# Patient Record
Sex: MALE | Race: BLACK OR AFRICAN AMERICAN | NOT HISPANIC OR LATINO | Employment: UNEMPLOYED | URBAN - METROPOLITAN AREA
[De-identification: names, ages, dates, MRNs, and addresses within clinical notes are randomized per-mention and may not be internally consistent; named-entity substitution may affect disease eponyms.]

---

## 2023-10-12 PROBLEM — S81.839A: Status: ACTIVE | Noted: 2023-10-12

## 2023-10-12 PROBLEM — S82.209A TIBIA FRACTURE: Status: ACTIVE | Noted: 2023-10-12

## 2023-10-12 PROBLEM — I31.9 MYOPERICARDITIS (HHS-HCC): Status: ACTIVE | Noted: 2023-10-12

## 2023-10-12 PROBLEM — M35.2: Status: ACTIVE | Noted: 2023-10-12

## 2023-10-12 PROBLEM — K29.20 GASTRITIS, ALCOHOLIC: Status: ACTIVE | Noted: 2023-10-12

## 2023-10-12 PROBLEM — M87.052 AVASCULAR NECROSIS OF LEFT FEMORAL HEAD (MULTI): Status: ACTIVE | Noted: 2023-10-12

## 2023-10-12 PROBLEM — S72.402A: Status: ACTIVE | Noted: 2023-10-12

## 2023-10-12 PROBLEM — S62.329A FRACTURE OF METACARPAL SHAFT OF RIGHT HAND, CLOSED: Status: ACTIVE | Noted: 2023-10-12

## 2023-10-12 PROBLEM — F10.10 ALCOHOL ABUSE: Status: ACTIVE | Noted: 2023-10-12

## 2023-10-12 PROBLEM — M87.051 AVASCULAR NECROSIS OF RIGHT FEMORAL HEAD (MULTI): Status: ACTIVE | Noted: 2023-10-12

## 2023-10-12 PROBLEM — H44.111 PANUVEITIS OF RIGHT EYE: Status: ACTIVE | Noted: 2023-10-12

## 2023-10-12 PROBLEM — M54.50 LUMBAGO: Status: ACTIVE | Noted: 2023-10-12

## 2023-10-12 RX ORDER — NAPROXEN SODIUM 220 MG/1
1 TABLET, FILM COATED ORAL DAILY
COMMUNITY
Start: 2021-10-21

## 2023-10-12 RX ORDER — OXYCODONE AND ACETAMINOPHEN 5; 325 MG/1; MG/1
1 TABLET ORAL EVERY 6 HOURS PRN
COMMUNITY
End: 2024-05-21 | Stop reason: HOSPADM

## 2023-10-12 RX ORDER — MELOXICAM 15 MG/1
15 TABLET ORAL DAILY
COMMUNITY
End: 2024-05-21 | Stop reason: HOSPADM

## 2023-10-12 RX ORDER — BRIMONIDINE TARTRATE 2 MG/ML
1 SOLUTION/ DROPS OPHTHALMIC EVERY 12 HOURS
COMMUNITY
Start: 2021-12-17

## 2023-10-12 RX ORDER — IBUPROFEN 600 MG/1
1 TABLET ORAL 3 TIMES DAILY PRN
COMMUNITY
Start: 2021-06-11 | End: 2023-11-06

## 2023-10-12 RX ORDER — PREDNISONE 1 MG/1
TABLET ORAL
COMMUNITY
Start: 2022-08-01 | End: 2024-05-21 | Stop reason: HOSPADM

## 2023-10-12 RX ORDER — ADALIMUMAB 40MG/0.4ML
KIT SUBCUTANEOUS
COMMUNITY
Start: 2022-01-04 | End: 2024-05-21 | Stop reason: HOSPADM

## 2023-10-12 RX ORDER — NALTREXONE HYDROCHLORIDE 50 MG/1
1 TABLET, FILM COATED ORAL DAILY
COMMUNITY
Start: 2021-06-11 | End: 2024-05-21 | Stop reason: HOSPADM

## 2023-10-12 RX ORDER — AZATHIOPRINE 50 MG/1
3 TABLET ORAL DAILY
COMMUNITY
Start: 2021-09-20 | End: 2024-05-21 | Stop reason: HOSPADM

## 2023-10-12 RX ORDER — ERGOCALCIFEROL 1.25 MG/1
1 CAPSULE ORAL
COMMUNITY
Start: 2021-10-21

## 2023-10-12 RX ORDER — PANTOPRAZOLE SODIUM 40 MG/1
1 TABLET, DELAYED RELEASE ORAL DAILY
COMMUNITY
Start: 2021-06-11

## 2023-10-13 ENCOUNTER — OFFICE VISIT (OUTPATIENT)
Dept: ORTHOPEDIC SURGERY | Facility: CLINIC | Age: 28
End: 2023-10-13
Payer: COMMERCIAL

## 2023-10-13 DIAGNOSIS — M16.12 PRIMARY OSTEOARTHRITIS OF LEFT HIP: Primary | ICD-10-CM

## 2023-10-13 PROCEDURE — 99203 OFFICE O/P NEW LOW 30 MIN: CPT | Performed by: EMERGENCY MEDICINE

## 2023-10-13 PROCEDURE — 20611 DRAIN/INJ JOINT/BURSA W/US: CPT | Performed by: EMERGENCY MEDICINE

## 2023-10-13 RX ORDER — LIDOCAINE HYDROCHLORIDE 10 MG/ML
4 INJECTION INFILTRATION; PERINEURAL
Status: COMPLETED | OUTPATIENT
Start: 2023-10-13 | End: 2023-10-13

## 2023-10-13 RX ORDER — TRIAMCINOLONE ACETONIDE 40 MG/ML
80 INJECTION, SUSPENSION INTRA-ARTICULAR; INTRAMUSCULAR
Status: COMPLETED | OUTPATIENT
Start: 2023-10-13 | End: 2023-10-13

## 2023-10-13 RX ADMIN — TRIAMCINOLONE ACETONIDE 80 MG: 40 INJECTION, SUSPENSION INTRA-ARTICULAR; INTRAMUSCULAR at 13:59

## 2023-10-13 RX ADMIN — LIDOCAINE HYDROCHLORIDE 4 ML: 10 INJECTION INFILTRATION; PERINEURAL at 13:59

## 2023-10-13 NOTE — PROGRESS NOTES
Subjective   Juan Marsh is a 28 y.o. male who presents for Pain of the Left Hip (Ref by Scarlett for hip inj. )    HPI  28-year-old male referred by Dr. Leonard for a left hip ultrasound-guided intra-articular cortisone injection.  Patient has history of left hip avascular necrosis.  He would like to exhaust all conservative measures prior to consideration for total joint replacement.  Considering this, he was referred for a therapeutic corticosteroid injection.      ROS: All pertinent positive symptoms are included in the history of present illness.    All other systems have been reviewed and are negative and noncontributory to this patient's current ailments.    Objective     There were no vitals filed for this visit.    Physical Exam  General/Constitutional: No apparent distress. Well-nourished and well developed.  Head: Normocephalic, Atraumatic.   Eyes: EOMI.  Vascular: No edema, swelling or tenderness, except as noted in detailed exam.  Respiratory: Non-labored breathing.  Integumentary: No impressive skin lesions present, except as noted in detailed exam.  Neurological: Alert and oriented.  Psychological:  Normal mood and affect.  Musculoskeletal: Normal, except as noted in detailed exam.  Left hip: No rash.  No erythema.  No deformity.  Flexion 80.  Internal rotation 0.  External rotation 10.  Positive FADIR.    Radiographs reviewed performed on 8/8/2023:  AP pelvis, left hip AP and lateral: Bilateral hip avascular necrosis and DJD.    Patient ID: Juan Marsh is a 28 y.o. male.    L Inj/Asp: L hip joint on 10/13/2023 1:59 PM  Indications: pain  Details: 20 G needle, ultrasound-guided anterior approach  Medications: 80 mg triamcinolone acetonide 40 mg/mL; 4 mL lidocaine 10 mg/mL (1 %)  Outcome: tolerated well, no immediate complications  Procedure, treatment alternatives, risks and benefits explained, specific risks discussed. Consent was given by the patient. Immediately prior to procedure a  time out was called to verify the correct patient, procedure, equipment, support staff and site/side marked as required. Patient was prepped and draped in the usual sterile fashion.           Assessment/Plan   Problem List Items Addressed This Visit    None  Visit Diagnoses       Primary osteoarthritis of left hip        Relevant Orders    Point of Care Ultrasound          Discussed risks versus benefits of having injection performed. Patient has elected to proceed with procedure. Please refer to procedure note above. Discussed with patient to limit weightbearing activities over the next 24-48 hours, they can then progress to full activities as tolerated. Discussed with patient to avoid water submersion over the next 2 days. Discussed with patient to call me immediately if they develop worsening pain, rash, erythema, or fevers. Patient should follow-up with Dr. Leonard, or return to no sooner than 3 months for repeat injection if he would like.  He also has right hip avascular process and DJD.  Patient states that his right hip is beginning to bother him and would like to consider injection therapy in the future if this continues to be bothersome for him.    Kirill Montes De Oca, DO  Sports Medicine  [facility]     ** Please excuse any errors in grammar or translation related to this dictation. Voice recognition software was utilized to prepare this document. **

## 2023-10-30 ENCOUNTER — APPOINTMENT (OUTPATIENT)
Dept: PHYSICAL THERAPY | Facility: HOSPITAL | Age: 28
End: 2023-10-30
Payer: COMMERCIAL

## 2023-11-06 ENCOUNTER — HOSPITAL ENCOUNTER (EMERGENCY)
Facility: HOSPITAL | Age: 28
Discharge: HOME | End: 2023-11-06
Payer: COMMERCIAL

## 2023-11-06 VITALS
SYSTOLIC BLOOD PRESSURE: 122 MMHG | WEIGHT: 130 LBS | BODY MASS INDEX: 19.7 KG/M2 | RESPIRATION RATE: 16 BRPM | DIASTOLIC BLOOD PRESSURE: 84 MMHG | TEMPERATURE: 97.8 F | HEART RATE: 88 BPM | OXYGEN SATURATION: 98 % | HEIGHT: 68 IN

## 2023-11-06 DIAGNOSIS — G89.29 CHRONIC RIGHT SHOULDER PAIN: Primary | ICD-10-CM

## 2023-11-06 DIAGNOSIS — M25.511 CHRONIC RIGHT SHOULDER PAIN: Primary | ICD-10-CM

## 2023-11-06 PROCEDURE — 99283 EMERGENCY DEPT VISIT LOW MDM: CPT

## 2023-11-06 PROCEDURE — 99285 EMERGENCY DEPT VISIT HI MDM: CPT | Mod: 25

## 2023-11-06 PROCEDURE — 2500000004 HC RX 250 GENERAL PHARMACY W/ HCPCS (ALT 636 FOR OP/ED): Mod: SE

## 2023-11-06 PROCEDURE — 99284 EMERGENCY DEPT VISIT MOD MDM: CPT

## 2023-11-06 PROCEDURE — 96372 THER/PROPH/DIAG INJ SC/IM: CPT

## 2023-11-06 RX ORDER — KETOROLAC TROMETHAMINE 30 MG/ML
30 INJECTION, SOLUTION INTRAMUSCULAR; INTRAVENOUS ONCE
Status: COMPLETED | OUTPATIENT
Start: 2023-11-06 | End: 2023-11-06

## 2023-11-06 RX ORDER — IBUPROFEN 600 MG/1
600 TABLET ORAL EVERY 8 HOURS PRN
Qty: 30 TABLET | Refills: 0 | Status: SHIPPED | OUTPATIENT
Start: 2023-11-06

## 2023-11-06 RX ORDER — ORPHENADRINE CITRATE 30 MG/ML
60 INJECTION INTRAMUSCULAR; INTRAVENOUS ONCE
Status: COMPLETED | OUTPATIENT
Start: 2023-11-06 | End: 2023-11-06

## 2023-11-06 RX ORDER — ACETAMINOPHEN 325 MG/1
650 TABLET ORAL EVERY 6 HOURS PRN
Qty: 30 TABLET | Refills: 0 | Status: SHIPPED | OUTPATIENT
Start: 2023-11-06

## 2023-11-06 RX ORDER — ORPHENADRINE CITRATE 30 MG/ML
INJECTION INTRAMUSCULAR; INTRAVENOUS
Status: COMPLETED
Start: 2023-11-06 | End: 2023-11-06

## 2023-11-06 RX ORDER — KETOROLAC TROMETHAMINE 30 MG/ML
INJECTION, SOLUTION INTRAMUSCULAR; INTRAVENOUS
Status: COMPLETED
Start: 2023-11-06 | End: 2023-11-06

## 2023-11-06 RX ORDER — METHOCARBAMOL 500 MG/1
500 TABLET, FILM COATED ORAL 2 TIMES DAILY
Qty: 20 TABLET | Refills: 0 | Status: SHIPPED | OUTPATIENT
Start: 2023-11-06 | End: 2024-05-21 | Stop reason: HOSPADM

## 2023-11-06 RX ADMIN — KETOROLAC TROMETHAMINE 30 MG: 30 INJECTION, SOLUTION INTRAMUSCULAR; INTRAVENOUS at 09:46

## 2023-11-06 RX ADMIN — ORPHENADRINE CITRATE 60 MG: 30 INJECTION INTRAMUSCULAR; INTRAVENOUS at 09:46

## 2023-11-06 ASSESSMENT — PAIN DESCRIPTION - ORIENTATION: ORIENTATION: RIGHT

## 2023-11-06 ASSESSMENT — PAIN - FUNCTIONAL ASSESSMENT: PAIN_FUNCTIONAL_ASSESSMENT: 0-10

## 2023-11-06 ASSESSMENT — COLUMBIA-SUICIDE SEVERITY RATING SCALE - C-SSRS
1. IN THE PAST MONTH, HAVE YOU WISHED YOU WERE DEAD OR WISHED YOU COULD GO TO SLEEP AND NOT WAKE UP?: NO
6. HAVE YOU EVER DONE ANYTHING, STARTED TO DO ANYTHING, OR PREPARED TO DO ANYTHING TO END YOUR LIFE?: NO
2. HAVE YOU ACTUALLY HAD ANY THOUGHTS OF KILLING YOURSELF?: NO

## 2023-11-06 ASSESSMENT — PAIN DESCRIPTION - LOCATION: LOCATION: SHOULDER

## 2023-11-06 ASSESSMENT — PAIN DESCRIPTION - PAIN TYPE: TYPE: ACUTE PAIN

## 2023-11-06 ASSESSMENT — PAIN SCALES - GENERAL: PAINLEVEL_OUTOF10: 8

## 2023-11-06 NOTE — ED PROVIDER NOTES
"HPI   Chief Complaint   Patient presents with    Shoulder Pain     R Shoulder Pain/R hand pain x \" a minute\"        Limitations to History: None    HPI: This is a 28-year-old male with a past medical history of arthritis and avascular necrosis of the right femoral head who presents to the emergency room with a 1 year history of right-sided shoulder pain.  Patient states that he has had this pain for 1 year and it has been unchanged.  Patient denies any constitutional symptoms such as fevers, chills, cough, nasal congestion.  Patient does state that he is now having some paresthesias going down his right upper extremity and into his hand.  Patient denies any recent traumas or falls.  Patient does state that he dislocated his shoulder 3 years ago.    Additional History Obtained from: None    12 point review of systems was performed and is negative unless otherwise specified    ------------------------------------------------------------------------------------------------------------------------------------------  Physical Exam:    VS: As documented in the triage note and EMR flowsheet from this visit were reviewed.    CONSTITUTIONAL: Well appearing, well nourished, awake, alert, oriented to person, place, time/situation and in no apparent distress.   EYES: Clear bilaterally, pupils equal, round and reactive to light.   CARDIOVASCULAR: Normal rate, regular rhythm.  Heart sounds S1, S2.  No murmurs, rubs or gallops.  RESPIRATORY: Breath sounds clear and equal bilaterally. No wheezes or rhonchi.   GASTROINTESTINAL: Abdomen soft, non-distended, no rebound, no guarding.   MUSCULOSKELETAL: Spine appears normal, range of motion is not limited, no muscle or joint tenderness.  Limited range of motion of the right shoulder secondary to pain.  Full active range of motion of the right elbow and wrist.  Radial and ulnar pulses palpated bilaterally with capillary refill less than 2 seconds in all phalanges.  No decrease in  " strength or sensation.  Positive Oneal sign.  NEUROLOGICAL: Alert and oriented, no focal deficits, no motor or sensory deficits.   SKIN: Skin normal color for race, warm, dry and intact. No evidence of trauma.   PSYCHIATRIC: Alert and oriented to person, place, time/situation. normal mood and affect. No apparent risk to self or others.     ------------------------------------------------------------------------------------------------------------------------------------------    Medical Decision Making:    This is a 28-year-old male who presents to the emergency room with concerns for chronic right shoulder pain.  Patient does not have any vascular compromise and has good  strength and sensation.  Given that patient does have arthritis in his left hip, it is very likely that patient has osteoarthritis of the right shoulder.  Differential includes adhesive capsulitis, rotator cuff tear, and cervical radiculopathy.  Patient was administered Toradol and Norflex for symptomatic management.  Patient was reassessed and did have some improvement of symptoms.  Patient will be discharged home with a prescription for ibuprofen, Tylenol, and a few dose of methocarbamol for symptomatic management.  Patient understands that he cannot operate heavy machinery including motor vehicles while taking muscle relaxers.  Patient was given strict return precautions.  Patient was agreeable to this plan had no further questions.  I did provide patient with the number for the [facility] as well as the sports medicine clinic for outpatient follow-up.  Patient will be discharged home.  Patient understands that if symptoms worsen or change in any way, they should return for immediate medical attention.  Patient understands that they should follow-up with their primary care physician within the next week to follow-up for shoulder pain.  Patient was stable upon discharge.      External Records Reviewed: I reviewed recent and  relevant outside records including: Previous provider notes      Escalation of Care:  Appropriate for outpatient follow-up with primary care provider, sports medicine    Social Determinants Affecting Care:  None    Prescription Drug Consideration: Ibuprofen, Tylenol, methocarbamol      @edcourse@    @edlabs@    @edimaging@    ARLINE Tesfaye PA-C  [facility]  [facility]                            No data recorded                Patient History   No past medical history on file.  Past Surgical History:   Procedure Laterality Date    CT HEAD ANGIO W AND WO IV CONTRAST  9/2/2021    CT HEAD ANGIO W AND WO IV CONTRAST 9/2/2021 [facility] CLINICAL LEGACY    CT NECK ANGIO W AND WO IV CONTRAST  9/2/2021    CT NECK ANGIO W AND WO IV CONTRAST 9/2/2021 [facility] CLINICAL LEGACY     Family History   Problem Relation Name Age of Onset    Other (Diabetes mellitus) Other Multiple Family Members     Hypertension Other Multiple Family Members     Cancer Other Multiple Family Members      Social History     Tobacco Use    Smoking status: Not on file    Smokeless tobacco: Not on file   Substance Use Topics    Alcohol use: Not on file    Drug use: Not on file       Physical Exam   ED Triage Vitals [11/06/23 0844]   Temp Heart Rate Resp BP   36.6 °C (97.8 °F) 88 16 122/84      SpO2 Temp src Heart Rate Source Patient Position   98 % -- Monitor Sitting      BP Location FiO2 (%)     -- --       Physical Exam    ED Course & MDM        Medical Decision Making      Procedure  Procedures     Angel Dalton PA-C  11/06/23 1004

## 2023-11-24 ENCOUNTER — DOCUMENTATION (OUTPATIENT)
Dept: PHYSICAL THERAPY | Facility: HOSPITAL | Age: 28
End: 2023-11-24
Payer: COMMERCIAL

## 2023-11-24 NOTE — PROGRESS NOTES
Physical Therapy    Discharge Summary    Name: Juan Marsh  MRN: [id number]  : 1995  Date: 23    Discharge Summary: PT    Discharge Information: Date of discharge 23, Date of last visit 23, Date of evaluation 23, Number of attended visits 1, Referred by Horacio, and Referred for bilateral hip pain      Discharge Status: Patient was seen for initial evaluation and instruction in HEP     Rehab Discharge Reason: Failed to schedule and/or keep follow-up appointment(s)

## 2024-03-06 ENCOUNTER — HOSPITAL ENCOUNTER (EMERGENCY)
Facility: HOSPITAL | Age: 29
Discharge: HOME | End: 2024-03-06
Payer: COMMERCIAL

## 2024-03-06 VITALS
SYSTOLIC BLOOD PRESSURE: 145 MMHG | HEART RATE: 104 BPM | TEMPERATURE: 98.2 F | DIASTOLIC BLOOD PRESSURE: 100 MMHG | OXYGEN SATURATION: 100 % | RESPIRATION RATE: 15 BRPM | BODY MASS INDEX: 17.77 KG/M2 | WEIGHT: 120 LBS | HEIGHT: 69 IN

## 2024-03-06 PROCEDURE — 4500999001 HC ED NO CHARGE

## 2024-03-06 ASSESSMENT — COLUMBIA-SUICIDE SEVERITY RATING SCALE - C-SSRS
6. HAVE YOU EVER DONE ANYTHING, STARTED TO DO ANYTHING, OR PREPARED TO DO ANYTHING TO END YOUR LIFE?: NO
1. IN THE PAST MONTH, HAVE YOU WISHED YOU WERE DEAD OR WISHED YOU COULD GO TO SLEEP AND NOT WAKE UP?: NO
2. HAVE YOU ACTUALLY HAD ANY THOUGHTS OF KILLING YOURSELF?: NO

## 2024-03-06 ASSESSMENT — PAIN - FUNCTIONAL ASSESSMENT: PAIN_FUNCTIONAL_ASSESSMENT: 0-10

## 2024-03-06 ASSESSMENT — PAIN SCALES - GENERAL: PAINLEVEL_OUTOF10: 8

## 2024-05-14 ENCOUNTER — HOSPITAL ENCOUNTER (INPATIENT)
Facility: HOSPITAL | Age: 29
LOS: 6 days | Discharge: HOME | End: 2024-05-21
Attending: EMERGENCY MEDICINE | Admitting: PSYCHIATRY & NEUROLOGY
Payer: COMMERCIAL

## 2024-05-14 ENCOUNTER — DOCUMENTATION (OUTPATIENT)
Dept: NEUROLOGY | Facility: HOSPITAL | Age: 29
End: 2024-05-14
Payer: COMMERCIAL

## 2024-05-14 ENCOUNTER — APPOINTMENT (OUTPATIENT)
Dept: RADIOLOGY | Facility: HOSPITAL | Age: 29
End: 2024-05-14
Payer: COMMERCIAL

## 2024-05-14 ENCOUNTER — CLINICAL SUPPORT (OUTPATIENT)
Dept: EMERGENCY MEDICINE | Facility: HOSPITAL | Age: 29
End: 2024-05-14
Payer: COMMERCIAL

## 2024-05-14 DIAGNOSIS — R59.1 LYMPHADENOPATHY: ICD-10-CM

## 2024-05-14 DIAGNOSIS — M35.2 BEHCET'S SYNDROME, NEUROLOGIC TYPE (MULTI): Primary | ICD-10-CM

## 2024-05-14 LAB
ALBUMIN SERPL BCP-MCNC: 3.7 G/DL (ref 3.4–5)
ALP SERPL-CCNC: 98 U/L (ref 33–120)
ALT SERPL W P-5'-P-CCNC: 6 U/L (ref 10–52)
ANION GAP SERPL CALC-SCNC: 15 MMOL/L (ref 10–20)
AST SERPL W P-5'-P-CCNC: 12 U/L (ref 9–39)
BASOPHILS # BLD AUTO: 0.03 X10*3/UL (ref 0–0.1)
BASOPHILS NFR BLD AUTO: 0.4 %
BILIRUB SERPL-MCNC: 0.3 MG/DL (ref 0–1.2)
BNP SERPL-MCNC: 5 PG/ML (ref 0–99)
BUN SERPL-MCNC: 13 MG/DL (ref 6–23)
CALCIUM SERPL-MCNC: 9.3 MG/DL (ref 8.6–10.6)
CARDIAC TROPONIN I PNL SERPL HS: <3 NG/L (ref 0–53)
CARDIAC TROPONIN I PNL SERPL HS: <3 NG/L (ref 0–53)
CHLORIDE SERPL-SCNC: 104 MMOL/L (ref 98–107)
CO2 SERPL-SCNC: 24 MMOL/L (ref 21–32)
CREAT SERPL-MCNC: 0.88 MG/DL (ref 0.5–1.3)
EGFRCR SERPLBLD CKD-EPI 2021: >90 ML/MIN/1.73M*2
EOSINOPHIL # BLD AUTO: 0.04 X10*3/UL (ref 0–0.7)
EOSINOPHIL NFR BLD AUTO: 0.5 %
ERYTHROCYTE [DISTWIDTH] IN BLOOD BY AUTOMATED COUNT: 15.5 % (ref 11.5–14.5)
GLUCOSE SERPL-MCNC: 91 MG/DL (ref 74–99)
HCT VFR BLD AUTO: 34.8 % (ref 41–52)
HGB BLD-MCNC: 12.2 G/DL (ref 13.5–17.5)
IMM GRANULOCYTES # BLD AUTO: 0.02 X10*3/UL (ref 0–0.7)
IMM GRANULOCYTES NFR BLD AUTO: 0.3 % (ref 0–0.9)
INR PPP: 1 (ref 0.9–1.1)
LYMPHOCYTES # BLD AUTO: 1.9 X10*3/UL (ref 1.2–4.8)
LYMPHOCYTES NFR BLD AUTO: 24 %
MCH RBC QN AUTO: 31.2 PG (ref 26–34)
MCHC RBC AUTO-ENTMCNC: 35.1 G/DL (ref 32–36)
MCV RBC AUTO: 89 FL (ref 80–100)
MONOCYTES # BLD AUTO: 0.63 X10*3/UL (ref 0.1–1)
MONOCYTES NFR BLD AUTO: 8 %
NEUTROPHILS # BLD AUTO: 5.3 X10*3/UL (ref 1.2–7.7)
NEUTROPHILS NFR BLD AUTO: 66.8 %
NRBC BLD-RTO: 0 /100 WBCS (ref 0–0)
PLATELET # BLD AUTO: 341 X10*3/UL (ref 150–450)
POTASSIUM SERPL-SCNC: 4.9 MMOL/L (ref 3.5–5.3)
PROT SERPL-MCNC: 6.8 G/DL (ref 6.4–8.2)
PROTHROMBIN TIME: 11.2 SECONDS (ref 9.8–12.8)
RBC # BLD AUTO: 3.91 X10*6/UL (ref 4.5–5.9)
SODIUM SERPL-SCNC: 138 MMOL/L (ref 136–145)
WBC # BLD AUTO: 7.9 X10*3/UL (ref 4.4–11.3)

## 2024-05-14 PROCEDURE — 85610 PROTHROMBIN TIME: CPT | Performed by: EMERGENCY MEDICINE

## 2024-05-14 PROCEDURE — 70547 MR ANGIOGRAPHY NECK W/O DYE: CPT | Mod: 59

## 2024-05-14 PROCEDURE — 82947 ASSAY GLUCOSE BLOOD QUANT: CPT

## 2024-05-14 PROCEDURE — 84075 ASSAY ALKALINE PHOSPHATASE: CPT | Performed by: EMERGENCY MEDICINE

## 2024-05-14 PROCEDURE — 70544 MR ANGIOGRAPHY HEAD W/O DYE: CPT

## 2024-05-14 PROCEDURE — 70544 MR ANGIOGRAPHY HEAD W/O DYE: CPT | Mod: 59

## 2024-05-14 PROCEDURE — 99223 1ST HOSP IP/OBS HIGH 75: CPT

## 2024-05-14 PROCEDURE — 70551 MRI BRAIN STEM W/O DYE: CPT | Mod: 52

## 2024-05-14 PROCEDURE — 99285 EMERGENCY DEPT VISIT HI MDM: CPT | Mod: 25

## 2024-05-14 PROCEDURE — 86780 TREPONEMA PALLIDUM: CPT

## 2024-05-14 PROCEDURE — 36415 COLL VENOUS BLD VENIPUNCTURE: CPT | Performed by: EMERGENCY MEDICINE

## 2024-05-14 PROCEDURE — 72141 MRI NECK SPINE W/O DYE: CPT | Mod: 52

## 2024-05-14 PROCEDURE — 93005 ELECTROCARDIOGRAM TRACING: CPT

## 2024-05-14 PROCEDURE — 70540 MRI ORBIT/FACE/NECK W/O DYE: CPT

## 2024-05-14 PROCEDURE — 83880 ASSAY OF NATRIURETIC PEPTIDE: CPT | Performed by: EMERGENCY MEDICINE

## 2024-05-14 PROCEDURE — 99285 EMERGENCY DEPT VISIT HI MDM: CPT | Performed by: EMERGENCY MEDICINE

## 2024-05-14 PROCEDURE — 84484 ASSAY OF TROPONIN QUANT: CPT | Performed by: EMERGENCY MEDICINE

## 2024-05-14 PROCEDURE — 99253 IP/OBS CNSLTJ NEW/EST LOW 45: CPT

## 2024-05-14 PROCEDURE — 85025 COMPLETE CBC W/AUTO DIFF WBC: CPT | Performed by: EMERGENCY MEDICINE

## 2024-05-14 ASSESSMENT — PAIN SCALES - GENERAL: PAINLEVEL_OUTOF10: 0 - NO PAIN

## 2024-05-14 ASSESSMENT — COLUMBIA-SUICIDE SEVERITY RATING SCALE - C-SSRS
6. HAVE YOU EVER DONE ANYTHING, STARTED TO DO ANYTHING, OR PREPARED TO DO ANYTHING TO END YOUR LIFE?: NO
2. HAVE YOU ACTUALLY HAD ANY THOUGHTS OF KILLING YOURSELF?: NO
1. IN THE PAST MONTH, HAVE YOU WISHED YOU WERE DEAD OR WISHED YOU COULD GO TO SLEEP AND NOT WAKE UP?: NO

## 2024-05-14 ASSESSMENT — PAIN - FUNCTIONAL ASSESSMENT: PAIN_FUNCTIONAL_ASSESSMENT: 0-10

## 2024-05-14 NOTE — CONSULTS
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 Ophthalmology consulted due to complaints of diplopia and blurred vision.    Patient reports that for the last few years, ever since being diagnosed with Behcet's and undergoing treatment for CRVO/CME OD, he has had blurred vision in the superior hemifield of his right eye (at least for 3 years). He reports that yesterday 5/13/24 around the time he woke up, he noticed that he was having horizontal diplopia. When he closed either eye, the diplopia resolved, but with both eyes open, objects were next to each other. He believes the diplopia is equal in all fields of gaze and all head positions. He endorses blurred vision in his right eye that is not new for him. He also endorses stable bilateral photophobia and flashes when he closes his eyes. He denies eye pain but says that when looking from side to side, he feels a soreness in his eye lids. No eye redness, floaters, or sudden vision loss.    Of note, patient last seen by uveitis specialist Dr. Chavez on 11/18/22. He had previously been on Azathioprine and Humira but per patient, he has been off of all immunosuppression medication for at least the last year.      ROS: Patient denies pain, redness, discharge, blind spots, or floaters. All other systems have been reviewed and are negative.    PMHx: please refer to admission HPI  Medications: please refer to medication reconciliation  Allergies: please refer to patient allergy list  Past Ocular History: as per above HPI  Family  History: reviewed and noncontributory to chief ophthalmic complaint  Orientation: Alert and oriented x3, appropriate mood and behavior    Examination:     Base Eye Exam       Visual Acuity (Snellen - Linear)         Right Left    Near sc 20/20 20/20              Tonometry (Tonopen, 6:42 PM)         Right Left    Pressure 14 13              Pupils         Dark Light React APD    Right 4 3 Brisk None    Left 4 3 Brisk None              Visual Fields         Left Right     Full Full              Extraocular Movement         Right Left     Full Full                  Additional Tests       Color         Right Left    Ishihara 11/11 11/11                  Slit Lamp and Fundus Exam       External Exam         Right Left    External Normal Normal              Slit Lamp Exam         Right Left    Lids/Lashes Normal Normal    Conjunctiva/Sclera Melanosis Melanosis    Cornea Clear Clear    Anterior Chamber Deep and quiet, no cell or hypopyon Deep and quiet, no cell or hypopyon    Iris Round and reactive Round and reactive    Lens Clear Clear    Anterior Vitreous Normal, no cell Normal, no cell              Fundus Exam         Right Left    Disc Pink and sharp Pink and sharp    C/D Ratio .3 .3    Macula Dull reflex Normal    Vessels attenuation, inferior ghost vessel; inferior perivascular sheathing, inferior heme and CWS attenuation    Periphery Inferior peripheral white exudates; no retinal tears or detachments Nasal dark without pressure                    Imaging:  Pending MRI brain/orbit w and wo contrast; MRV brain and MRA head/neck    Assessment and Plan:  #Posterior +/- intermediate uveitis  - 27 y/o M with history neuro-Behcet's, bilateral uveitis, retinal vasculitis with CRVO/CME right eye (s/p Kenalogg OD 9/21, Avastin OD 9/21; Avastin OD 11/21; Eylea OD 8/22; and Eylea OD 9/22) presenting to ED with right sided numbness, right ear ringing, blurred vision and diplopia  - Entrance testing is excellent. Anterior  segment exam is  unremarkable without evidence of hypopyon, ac cell or flare, no TIDs or iris nodules. On dilated exam, there are attenuated vessels in both eyes; there is also an inferior ghost vessel with perivascular sheathing, inferior heme and CWS with peripheral white exudates in the right eye  - Presentation is concerning for posterior +/- intermediate uveitis. There is a presumed history of neuro-Behcet's given initial presentation in 2021 of uveitis, apthous ulcers and genital ulcers. However, with evidence of peripheral snow banking on exam in  patient, would be highly suspicious for sarcoidosis as well. Testing in 2021 was negative for HSV1/HSV2/SLE/lyme/ACE/HLA B27/HLA B51/T spot/HIV/RPR/AQP4/MOG. With patient off of immunosuppression for the last year and now with acute presentation, would recommend working up for at least TB, sarcoid and syphillis, in addition to MRI imaging.    Recommendations:  - Recommend starting Pred forte QID right eye  - Would hold off on systemic prednisone at this point  - Recommend re-establishing with rheumatology to resume systemic immunosuppression treatment  - Recommend lab work up for TB, sarcoid, syphillis:  - RPR or VDRL  - FTA-ABS  - TB quantiferon  - Chest XR for hilar lymphadenopathy  - ACE  - Lysozyme  - Patient needs to be seen by uveitis specialist this week. We will email secretaries to arrange. Patient to be seen Thurs 5/16/24 at 8 AM in [facility].      Discussed with Dr. Nikki Alonzo MD PGY-4    Sly Vasquez MD  Ophthalmology PGY-2      Ophthalmology Adult Pager - [phone]  Ophthalmology Pediatrics Pager - [phone]    For adult follow-up appointments, call: [phone]  For pediatric follow-up appointments, call: [phone]      NOTE: This note is not finalized until attending reviews and signs.

## 2024-05-14 NOTE — ED TRIAGE NOTES
Pt reports that he started having right sided numbness that started 3 days ago, he states that it started with a ringing in his right ear. He also endorsed a headache and neck pain. He states that he has a PMHX of a stroke, MI, HTN that he is not prescribed medications for.

## 2024-05-14 NOTE — PROGRESS NOTES
I received Juan Marsh in signout from Dr. Juan Padron.  Please see the previous note for all HPI, PE and MDM up to the time of signout at 1500.  This is in addition to the primary documentation.    In brief Juan Marsh is an 28 y.o. male with a history of Behcet's disease that presents to the emergency department today for right-sided numbness and blurred vision concerning for exacerbation of his Behcet's disease.  The patient had lab work completed which was overall negative and neurology was consulted and are pending their recommendations at the time of signout.    MDM:  The patient was monitored for any changes in vital signs or symptoms and remained hemodynamically throughout the ED course.  Neurology recommended MRI orbits w and w/o contrast, MRV brain, MRA brain and have placed orders for these.  They are also recommending consulting ophthalmology given the patient's vision issues.    ED Course as of 05/15/24 0229   Tue May 14, 2024   1345 Comprehensive Metabolic Panel(!)  Largely unremarkable [TK]   1345 Brain Natriuretic Peptide  BNP WNL [TK]   1347 Protime-INR  PT/INR WNL [TK]   1347 Troponin I Series, High Sensitivity (0, 1 HR)  Trop WNL [TK]   1347 CBC with Differential(!)  Hgb appears to be at baseline [TK]   1657 Neurology recommending consult to ophthalmology.  Consult order placed at this time. [RS]   2220 Ophthalmology are recommending further workup with rpr or vdrl, fta-abs, tb quant, chest xr, ace and lysozyme which orders for these will be placed.  They are also recommending prednisolone acetate 1% 4 times daily for the right eye and this will be ordered.  The remainder of the ophthalmology workup can be followed up outpatient and will defer further workup to the neurology team at this time. [RS]   Wed May 15, 2024   0015 I signed the patient out to Mini Valdes DO in stable condition pending imaging and final disposition per neurology.  Patient will likely need to be admitted  given significant right-sided numbness and vision changes in the setting of Behcet's disease. [RS]      ED Course User Index  [RS] Otoniel Morales DO  [TK] Juan Padron MD     Disposition: Signed out to oncoming provider    Assessment and plan discussed with Dr. Isa Morales DO   Emergency Medicine, PGY-1     Disclaimer: This note was dictated by speech recognition. Minor errors in transcription may be present.     Procedures

## 2024-05-14 NOTE — PROGRESS NOTES
Chief Complaint: Right sided numbness     History of Present Illness:   Juan Marsh is a 28 y.o. male with a PMHx of Behcet's disease, arthritis, and avascular necrosis presenting to the ED for complaint of right sided whole body numbness and ringing in his ear that began Sunday. Patient also complains of blurry vision and double vision.  The patient also complains of a headache that he rates currently as a 6/10. Patient denies nausea, vomiting, bowel dysfunction, bladder dysfunction, fever, shakes, or chest pain.     Patient has indicated five episodes of right sided weakness with oral and genital ulcers since initial diagnosis in 2021.     Per Chart Review of Behcet history  In May of 2021, the patient developed pleuritic chest pain that was diagnosed as myopericarditis and treated with colchicine and ibuprofen. Patient presented to ED in August of 2021 with a oral and genital ulcers, headache, blurry vision, and right hand and leg weakness and Optho exam significant for retinal vasculitis. This presentation raised suspicion for Behcet's syndrome.     Initial workup included   MRI brain - MRI brain and cervical spine positive for Ill-defined region of abnormal T2/FLAIR hyperintensity involving the left lentiform nucleus, internal capsule, thalamus, cerebral peduncle, rostral tyrone, and medial temporal  pole with associated enhancement and punctate focus of diffusion restriction ( in internal capsule) which is concerning for parenchymal neuro Behcets.   LP - normal cells and protein, negative HSV1 and HSV 2, negative bacterial and viral panel  Negative SLE and rheumatologic   Lyme negative, ACE WNL  CSF did not show infective etiology   HLA-B27 and HLA B 51 is negative  CBC, CMP are WNL, TB T spot negative, HIV negative, RPR negative  AQP4 and MOG came back negative      Upon discharge, patient was given the following discharge orders:  FU with neuroimmunology   FU with Rheumatology   FU with  ophthalmology  FU with cardiology   Continue prednisone 80 mg/day as recommended by Optho for 2 weeks and then reduce to 60 mg/day x 2 weeks. Rheum will taper the steroids as outpt  Per Rheum: if TPMT is normal start on Imuran 150 mg/day  Rheum will follow up on his TPMT levels and will send the prescription for imuran to his pharmacy  cont PPI while on steroids   cont ASA 81 mg      Patient was seen by Dr. Ulloa on 10/21/2021, and patient was advised to take baby aspirin for future stroke prophylaxis, Vitamin D and smoking cessation.    On 01/03/2022, the patient was seen by rheumatology and presented with worsening retinal vasculitis prompting the initiation of TNF-alpha inhibitor therapy.  The patient was then seen by Dr. Ulloa with neurology on 05/02/2022, and it was noted that due to his CNS involvement, he should be closely monitored while taking Humira and follow-up appointment was to be scheduled in 01/2023. The patient did not follow-up with neurology.      Surgical History  Past Surgical History:   Procedure Laterality Date    CT ANGIO NECK  9/2/2021    CT NECK ANGIO W AND WO IV CONTRAST 9/2/2021 [facility] CLINICAL LEGACY    CT HEAD ANGIO W AND WO IV CONTRAST  9/2/2021    CT HEAD ANGIO W AND WO IV CONTRAST 9/2/2021 [facility] CLINICAL LEGACY       Social History  Endorses marijuana usage daily   Endorses 2 cans of EtOH daily   Indicates cigar use daily   Denies cocaine, heroin, and IV drug use   Patient lives at home with his mother, works in [email or web address].     Allergies  Patient has no known allergies.    Medications  (Not in a hospital admission)    Current Outpatient Medications   Medication Instructions    acetaminophen (TYLENOL) 650 mg, oral, Every 6 hours PRN    adalimumab (Humira,CF, Pen) 40 mg/0.4 mL pen injector kit pen-injector INJECT ONE PEN SUBCUTANEOUSLY EVERY OTHER WEEK<BR>    aspirin 81 mg chewable tablet 1 tablet, oral, Daily    azaTHIOprine (Imuran) 50 mg tablet 3 tablets, oral, Daily    brimonidine  (AlphaGAN P) 0.2 % ophthalmic solution 1 drop, Right Eye, Every 12 hours    ergocalciferol (Vitamin D-2) 1.25 MG (00338 UT) capsule 1 capsule, oral, Once Weekly    ibuprofen 600 mg, oral, Every 8 hours PRN    meloxicam (MOBIC) 15 mg, oral, Daily    methocarbamol (ROBAXIN) 500 mg, oral, 2 times daily    naltrexone (Depade) 50 mg tablet 1 tablet, oral, Daily    oxyCODONE-acetaminophen (Percocet) 5-325 mg tablet 1 tablet, oral, Every 6 hours PRN    pantoprazole (ProtoNix) 40 mg EC tablet 1 tablet, oral, Daily    predniSONE (Deltasone) 1 mg tablet Take as directed.      Patient indicates he is not currently taking any medication.     OBJECTIVE    24 Hour Vitals:  Temp:  [36.6 °C (97.8 °F)] 36.6 °C (97.8 °F)  Heart Rate:  [86] 86  Resp:  [18] 18  BP: (117)/(84) 117/84  FiO2 (%):  [21 %] 21 %    Relevant Prior Workup    Prior Brain Imaging:  MR Brain W and WO IV Contrast - 09/01/2021  1.  Ill-defined region of abnormal T2/FLAIR hyperintensity involving  the left lentiform nucleus, internal capsule, thalamus, cerebral  peduncle, rostral tyrone, and left corona radiata with associated  enhancement and punctate focus of diffusion restriction, as detailed.  Finding is nonspecific, but would be typical in location and  appearance for brain involvement of Behcet's disease. Location would  be atypical for acute disseminated encephalomyelitis or multiple  sclerosis.  2. Unremarkable MRI of the cervical spine.  MR Cervical Spine W and WO IV Contrast - 09/01/2021  1.  Ill-defined region of abnormal T2/FLAIR hyperintensity involving  the left lentiform nucleus, internal capsule, thalamus, cerebral  peduncle, rostral tyrone, and left corona radiata with associated  enhancement and punctate focus of diffusion restriction, as detailed.  Finding is nonspecific, but would be typical in location and  appearance for brain involvement of Behcet's disease. Location would  be atypical for acute disseminated encephalomyelitis or  multiple  sclerosis.  2. Unremarkable MRI of the cervical spine.    CT Angio Neck W and WO IV Contrast - 09/02/2021  1. Somewhat limited, motion degraded examination without evidence of  hemodynamically significant stenosis or proximal large branch vessel  cutoff on CTA of the head.  2. No measurable stenosis on CTA of the neck.    CT Angio Head W and WO IV Contrast - 09/02/2021  1. Somewhat limited, motion degraded examination without evidence of  hemodynamically significant stenosis or proximal large branch vessel  cutoff on CTA of the head.  2. No measurable stenosis on CTA of the neck.      MR Brain W and WO IV Contrast - 11/08/2021  Marked improvement in ill-defined FLAIR hyperintensity involving the  left lentiform nucleus and brainstem with residual small T2/FLAIR  hyperintense focus involving the left posterior limb of the internal  capsule and left cerebral peduncle consistent with improvement in  neuro Behcet's. Resolution of prior abnormal enhancement. No new  areas of signal abnormality identified.      Lab Results  Results from last 72 hours   Lab Units 05/14/24  1225   SODIUM mmol/L 138   POTASSIUM mmol/L 4.9   BUN mg/dL 13   CREATININE mg/dL 0.88   CALCIUM mg/dL 9.3      Results from last 72 hours   Lab Units 05/14/24  1225   WBC AUTO x10*3/uL 7.9   HEMOGLOBIN g/dL 12.2*   HEMATOCRIT % 34.8*   PLATELETS AUTO x10*3/uL 341      Results from last 72 hours   Lab Units 05/14/24  1225   AST U/L 12   ALT U/L 6*   ALK PHOS U/L 98          Physical Exam:  Neurological Exam:  MENTAL STATUS:  Orientation: AxOx3  Language: Expression and comprehension,  Thought processes: Logical, organized  Judgment and Insight: Intact    CRANIAL NERVES:  - II:  Patient indicates right eye upper scotoma  - III, IV, VI: EOM full to pursuit without nystagmus but indicates biocular double vision  - V: V1-V3 Patient reports diminished sensation on the right side  - VII: Face muscles symmetric with smile and eye closure  - VIII:  Intact to interview  - IX, X: Palate elevated symmetrically bilaterally, no hoarseness  - XI: 5/5 strength on shoulder shrugging bilaterally  - XII: Tongue midline without atrophy or fasciculation    MOTOR:   Muscle bulk: Normal throughout.  Muscle tone: Normal in both upper and lower extremities.  Movements: No fasciculations, tremors or other abnormal movement.    - Strength:   R                L  Deltoid    4+             5  Biceps    4+             5  Triceps    4+              5                 5-         5          Hip Abduction  5-               5  Hip Adduction  5-               5  Hip flexion   5-               5  Knee Ext    5-               5  Knee Flex           5-               5  DorsiFlex   5-               5  PlantarFlex        5-               5    REFLEXES:   L                 R  Biceps               2                 3  Triceps               2          3   BR                      2                 3  Patellar               2                 3  Achilles   1+               2+      Negative Major   Clonus present on the right side     COORDINATION: Assessment Deferred  SENSORY: Patient indicated diminished sensation on the right face, RUE, and RLE.  PROPRIOCEPTION:  Assessment Deferred  ROMBERG: Assessment Deferred  GAIT: Assessment Deferred     =-=-=-=-=-=-=-    Assessment:  Mr. 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 The patient also has a prior exposure to Humira.    Patient's neurological exam is notable for right eye visual field deficit, double vision, diminished right sided sensation, and mild proximal right upper extremity weakness.     The patient's symptoms may be caused by a new Behcet attack, residual symptoms from a prior Behcet attack, or iatrogenic  CNS inflammation. Given recent symptoms, patient will need ophthalmology evaluation, and vessel imaging to evaluate for vasculitis, and imaging to evaluate for new or exacerbated Behcet lesions.     Plan:   MRI brain, orbit, and cervical spine (W and WO contrast)  MRV brain   MRA head and neck     Consider initiating aspirin therapy pending imaging   Initiate IVMP pending imaging  Consult ophthalmology   Further recs pending above imaging    Patient is discussed and seen with Dr. Ulloa.         Shweta Jacobsen, MS3    _____________________________  I have reviewed and edited the information above and I agree with the assessment and plan as outlined by the medical student.     Rachel Thapa MD

## 2024-05-14 NOTE — CONSULTS
Chief Complaint: Right sided numbness     History of Present Illness:   Juan Marsh is a 28 y.o. male with a PMHx of Behcet's disease, arthritis, and avascular necrosis presenting to the ED for complaint of right sided whole body numbness and ringing in his ear that began Sunday. Patient also complains of blurry vision and double vision.  The patient also complains of a headache that he rates currently as a 6/10. Patient denies nausea, vomiting, bowel dysfunction, bladder dysfunction, fever, shakes, or chest pain.     Patient has indicated five episodes of right sided weakness with oral and genital ulcers since initial diagnosis in 2021.     Per Chart Review of Behcet history  In May of 2021, the patient developed pleuritic chest pain that was diagnosed as myopericarditis and treated with colchicine and ibuprofen. Patient presented to ED in August of 2021 with a oral and genital ulcers, headache, blurry vision, and right hand and leg weakness and Optho exam significant for retinal vasculitis. This presentation raised suspicion for Behcet's syndrome.     Initial workup included   MRI brain - MRI brain and cervical spine positive for Ill-defined region of abnormal T2/FLAIR hyperintensity involving the left lentiform nucleus, internal capsule, thalamus, cerebral peduncle, rostral tyrone, and medial temporal  pole with associated enhancement and punctate focus of diffusion restriction ( in internal capsule) which is concerning for parenchymal neuro Behcets.   LP - normal cells and protein, negative HSV1 and HSV 2, negative bacterial and viral panel  Negative SLE and rheumatologic   Lyme negative, ACE WNL  CSF did not show infective etiology   HLA-B27 and HLA B 51 is negative  CBC, CMP are WNL, TB T spot negative, HIV negative, RPR negative  AQP4 and MOG came back negative      Upon discharge, patient was given the following discharge orders:  FU with neuroimmunology   FU with Rheumatology   FU with  ophthalmology  FU with cardiology   Continue prednisone 80 mg/day as recommended by Optho for 2 weeks and then reduce to 60 mg/day x 2 weeks. Rheum will taper the steroids as outpt  Per Rheum: if TPMT is normal start on Imuran 150 mg/day  Rheum will follow up on his TPMT levels and will send the prescription for imuran to his pharmacy  cont PPI while on steroids   cont ASA 81 mg      Patient was seen by Dr. Ulloa on 10/21/2021, and patient was advised to take baby aspirin for future stroke prophylaxis, Vitamin D and smoking cessation.    On 01/03/2022, the patient was seen by rheumatology and presented with worsening retinal vasculitis prompting the initiation of TNF-alpha inhibitor therapy.  The patient was then seen by Dr. Ulloa with neurology on 05/02/2022, and it was noted that due to his CNS involvement, he should be closely monitored while taking Humira and follow-up appointment was to be scheduled in 01/2023. The patient did not follow-up with neurology.      Surgical History  Past Surgical History:   Procedure Laterality Date    CT ANGIO NECK  9/2/2021    CT NECK ANGIO W AND WO IV CONTRAST 9/2/2021 [facility] CLINICAL LEGACY    CT HEAD ANGIO W AND WO IV CONTRAST  9/2/2021    CT HEAD ANGIO W AND WO IV CONTRAST 9/2/2021 [facility] CLINICAL LEGACY       Social History  Endorses marijuana usage daily   Endorses 2 cans of EtOH daily   Indicates cigar use daily   Denies cocaine, heroin, and IV drug use   Patient lives at home with his mother, works in [employer].     Allergies  Patient has no known allergies.    Medications  (Not in a hospital admission)    Current Outpatient Medications   Medication Instructions    acetaminophen (TYLENOL) 650 mg, oral, Every 6 hours PRN    adalimumab (Humira,CF, Pen) 40 mg/0.4 mL pen injector kit pen-injector INJECT ONE PEN SUBCUTANEOUSLY EVERY OTHER WEEK<BR>    aspirin 81 mg chewable tablet 1 tablet, oral, Daily    azaTHIOprine (Imuran) 50 mg tablet 3 tablets, oral, Daily    brimonidine  (AlphaGAN P) 0.2 % ophthalmic solution 1 drop, Right Eye, Every 12 hours    ergocalciferol (Vitamin D-2) 1.25 MG (71844 UT) capsule 1 capsule, oral, Once Weekly    ibuprofen 600 mg, oral, Every 8 hours PRN    meloxicam (MOBIC) 15 mg, oral, Daily    methocarbamol (ROBAXIN) 500 mg, oral, 2 times daily    naltrexone (Depade) 50 mg tablet 1 tablet, oral, Daily    oxyCODONE-acetaminophen (Percocet) 5-325 mg tablet 1 tablet, oral, Every 6 hours PRN    pantoprazole (ProtoNix) 40 mg EC tablet 1 tablet, oral, Daily    predniSONE (Deltasone) 1 mg tablet Take as directed.      Patient indicates he is not currently taking any medication.     OBJECTIVE    24 Hour Vitals:  Temperature:  [36.6 °C (97.8 °F)] 36.6 °C (97.8 °F)  Heart Rate:  [65-86] 65  Respirations:  [18] 18  BP: (116-117)/(84-85) 116/85  FiO2 (%):  [21 %] 21 %    Relevant Prior Workup    Prior Brain Imaging:  MR Brain W and WO IV Contrast - 09/01/2021  1.  Ill-defined region of abnormal T2/FLAIR hyperintensity involving  the left lentiform nucleus, internal capsule, thalamus, cerebral  peduncle, rostral tyrone, and left corona radiata with associated  enhancement and punctate focus of diffusion restriction, as detailed.  Finding is nonspecific, but would be typical in location and  appearance for brain involvement of Behcet's disease. Location would  be atypical for acute disseminated encephalomyelitis or multiple  sclerosis.  2. Unremarkable MRI of the cervical spine.  MR Cervical Spine W and WO IV Contrast - 09/01/2021  1.  Ill-defined region of abnormal T2/FLAIR hyperintensity involving  the left lentiform nucleus, internal capsule, thalamus, cerebral  peduncle, rostral tyrone, and left corona radiata with associated  enhancement and punctate focus of diffusion restriction, as detailed.  Finding is nonspecific, but would be typical in location and  appearance for brain involvement of Behcet's disease. Location would  be atypical for acute disseminated  encephalomyelitis or multiple  sclerosis.  2. Unremarkable MRI of the cervical spine.    CT Angio Neck W and WO IV Contrast - 09/02/2021  1. Somewhat limited, motion degraded examination without evidence of  hemodynamically significant stenosis or proximal large branch vessel  cutoff on CTA of the head.  2. No measurable stenosis on CTA of the neck.    CT Angio Head W and WO IV Contrast - 09/02/2021  1. Somewhat limited, motion degraded examination without evidence of  hemodynamically significant stenosis or proximal large branch vessel  cutoff on CTA of the head.  2. No measurable stenosis on CTA of the neck.      MR Brain W and WO IV Contrast - 11/08/2021  Marked improvement in ill-defined FLAIR hyperintensity involving the  left lentiform nucleus and brainstem with residual small T2/FLAIR  hyperintense focus involving the left posterior limb of the internal  capsule and left cerebral peduncle consistent with improvement in  neuro Behcet's. Resolution of prior abnormal enhancement. No new  areas of signal abnormality identified.      Lab Results  Results from last 72 hours   Lab Units 05/14/24  1225   SODIUM mmol/L 138   POTASSIUM mmol/L 4.9   BUN mg/dL 13   CREATININE mg/dL 0.88   CALCIUM mg/dL 9.3      Results from last 72 hours   Lab Units 05/14/24  1225   WBC AUTO x10*3/uL 7.9   HEMOGLOBIN g/dL 12.2*   HEMATOCRIT % 34.8*   PLATELETS AUTO x10*3/uL 341      Results from last 72 hours   Lab Units 05/14/24  1225   AST U/L 12   ALT U/L 6*   ALK PHOS U/L 98          Physical Exam:  Neurological Exam:  MENTAL STATUS:  Orientation: AxOx3  Language: Expression and comprehension,  Thought processes: Logical, organized  Judgment and Insight: Intact    CRANIAL NERVES:  - II:  Patient indicates right eye upper scotoma  - III, IV, VI: EOM full to pursuit without nystagmus but indicates biocular double vision  - V: V1-V3 Patient reports diminished sensation on the right side  - VII: Face muscles symmetric with smile and eye  closure  - VIII: Intact to interview  - IX, X: Palate elevated symmetrically bilaterally, no hoarseness  - XI: 5/5 strength on shoulder shrugging bilaterally  - XII: Tongue midline without atrophy or fasciculation    MOTOR:   Muscle bulk: Normal throughout.  Muscle tone: Normal in both upper and lower extremities.  Movements: No fasciculations, tremors or other abnormal movement.    - Strength:   R                L  Deltoid    4+             5  Biceps    4+             5  Triceps    4+              5                 5-         5          Hip Abduction  5-               5  Hip Adduction  5-               5  Hip flexion   5-               5  Knee Ext    5-               5  Knee Flex           5-               5  DorsiFlex   5-               5  PlantarFlex        5-               5    REFLEXES:   L                 R  Biceps               2                 3  Triceps               2          3   BR                      2                 3  Patellar               2                 3  Achilles   1+               2+      Negative Major   Clonus present on the right side     COORDINATION: Assessment Deferred  SENSORY: Patient indicated diminished sensation on the right face, RUE, and RLE.  PROPRIOCEPTION:  Assessment Deferred  ROMBERG: Assessment Deferred  GAIT: Assessment Deferred     =-=-=-=-=-=-=-    Assessment:  Mr. 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 The patient also has a prior exposure to Humira.    Patient's neurological exam is notable for right eye visual field deficit, double vision, diminished right sided sensation, and mild proximal right upper extremity weakness.     The patient's symptoms may be caused by a new Behcet attack, residual symptoms from a prior Behcet  attack, or iatrogenic CNS inflammation. Given recent symptoms, patient will need ophthalmology evaluation, and vessel imaging to evaluate for vasculitis, and imaging to evaluate for new or exacerbated Behcet lesions.     Plan:   MRI brain, orbit, and cervical spine (W and WO contrast)  MRV brain   MRA head and neck     Will likely start ASA after imaging   Possible IVMP/other immunosuppressants after  imaging  Please consult ophthalmology   Further recs pending above imaging    Patient is discussed and seen with Dr. Ulloa.  Above not final until signed by an attending.     Shweta Jacobsen, MS3  _______________________________________________________________________________________________    I have reviewed and edited the information above and I agree with the assessment and plan as outlined by the medical student.      Rachel Thapa MD

## 2024-05-14 NOTE — ED PROVIDER NOTES
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 He reported that he had ringing in his ear that started Sunday and reported that he developed numbness on the whole right side of his body yesterday. The patient stated that he had a similar episode in the past and was admitted at that time. He denied any hearing loss but reported his hearing does sound muffled on the right side. He denied any weakness and is able to ambulate without problems. He denied any vision changes or lesion in his mouth.     Of note, his presentation is similar to an admission he had in 2021 for right sided weakness. He was thought to have Behcet's disease and was treated with steroids.                            Noemi Coma Scale Score: 15                     Patient History   History reviewed. No pertinent past medical history.  Past Surgical History:   Procedure Laterality Date    CT ANGIO NECK  9/2/2021    CT NECK ANGIO W AND WO IV CONTRAST 9/2/2021 [facility]    CT HEAD ANGIO W AND WO IV CONTRAST  9/2/2021    CT HEAD ANGIO W AND WO IV CONTRAST 9/2/2021 [facility]     Family History   Problem Relation Name Age of Onset    Other (Diabetes mellitus) Other Multiple Family Members     Hypertension Other Multiple Family Members     Cancer Other Multiple Family Members      Social History     Tobacco Use    Smoking status: Every Day     Types: Cigars    Smokeless tobacco: Not on file   Vaping Use    Vaping status: Unknown   Substance Use Topics    Alcohol use: Yes    Drug use: Yes     Types: Marijuana       Physical Exam   ED Triage Vitals [05/14/24 1047]   Temperature Heart Rate Respirations BP   36.6 °C (97.8 °F) 86 18 117/84      Pulse Ox Temp Source Heart Rate Source  Patient Position   98 % Temporal Monitor Sitting      BP Location FiO2 (%)     Right arm 21 %       Physical Exam  Constitutional:       General: He is not in acute distress.     Appearance: Normal appearance. He is not ill-appearing or diaphoretic.   HENT:      Head: Normocephalic and atraumatic.      Mouth/Throat:      Mouth: Mucous membranes are moist.      Pharynx: No oropharyngeal exudate or posterior oropharyngeal erythema.   Eyes:      Extraocular Movements: Extraocular movements intact.   Cardiovascular:      Rate and Rhythm: Normal rate and regular rhythm.      Heart sounds: Normal heart sounds. No murmur heard.     No friction rub. No gallop.   Pulmonary:      Effort: Pulmonary effort is normal. No respiratory distress.      Breath sounds: Normal breath sounds. No stridor. No wheezing, rhonchi or rales.   Musculoskeletal:      Cervical back: Normal range of motion.   Skin:     General: Skin is warm and dry.   Neurological:      Mental Status: He is alert.      Cranial Nerves: Cranial nerves 2-12 are intact.      Motor: No weakness.   Psychiatric:         Mood and Affect: Mood normal.         Behavior: Behavior normal.         ED Course & MDM   ED Course as of 05/18/24 1758   Tue May 14, 2024   1345 Comprehensive Metabolic Panel(!)  Largely unremarkable [TK]   1345 Brain Natriuretic Peptide  BNP WNL [TK]   1347 Protime-INR  PT/INR WNL [TK]   1347 Troponin I Series, High Sensitivity (0, 1 HR)  Trop WNL [TK]   1347 CBC with Differential(!)  Hgb appears to be at baseline [TK]   1657 Neurology recommending consult to ophthalmology.  Consult order placed at this time. [RS]   2220 Ophthalmology are recommending further workup with rpr or vdrl, fta-abs, tb quant, chest xr, ace and lysozyme which orders for these will be placed.  They are also recommending prednisolone acetate 1% 4 times daily for the right eye and this will be ordered.  The remainder of the ophthalmology workup can be followed up outpatient and  will defer further workup to the neurology team at this time. [RS]   Wed May 15, 2024   0015 I signed the patient out to Mini Valdes DO in stable condition pending imaging and final disposition per neurology.  Patient will likely need to be admitted given significant right-sided numbness and vision changes in the setting of Behcet's disease. 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 The patient had a similar admission in 2021 concerning for behcet's disease. The patient's presentation could b due to a flair of his behcet's disease vs stroke. A neuro exam was performed and unremarkable. Basic labs were ordered which were largely unremarkable. Hgb was 12.2 which appears to be at baseline. Troponin, BNP, and PT/INR were unremarkable. Neurology were consulted and recommended MRI of brain and c-spine which are pending. The patient remains hemodynamically stable and was signed out to the oncoming resident.        Procedure  Procedures     Juan Padron MD  Resident  05/14/24 8224

## 2024-05-15 ENCOUNTER — APPOINTMENT (OUTPATIENT)
Dept: RADIOLOGY | Facility: HOSPITAL | Age: 29
End: 2024-05-15
Payer: COMMERCIAL

## 2024-05-15 LAB
ALBUMIN SERPL BCP-MCNC: 3.8 G/DL (ref 3.4–5)
ANION GAP SERPL CALC-SCNC: 17 MMOL/L (ref 10–20)
APPEARANCE CSF: CLEAR
ATRIAL RATE: 78 BPM
BASOPHILS # BLD AUTO: 0.02 X10*3/UL (ref 0–0.1)
BASOPHILS NFR BLD AUTO: 0.3 %
BASOPHILS NFR CSF MANUAL: 0 %
BLASTS CSF MANUAL: 0 %
BUN SERPL-MCNC: 11 MG/DL (ref 6–23)
C GATTII+NEOFOR DNA CSF QL NAA+NON-PROBE: NOT DETECTED
CALCIUM SERPL-MCNC: 9.6 MG/DL (ref 8.6–10.6)
CHLORIDE SERPL-SCNC: 101 MMOL/L (ref 98–107)
CMV DNA CSF QL NAA+NON-PROBE: NOT DETECTED
CO2 SERPL-SCNC: 24 MMOL/L (ref 21–32)
COLOR CSF: COLORLESS
COLOR CSF: COLORLESS
COLOR SPUN CSF: COLORLESS
CREAT SERPL-MCNC: 0.8 MG/DL (ref 0.5–1.3)
E COLI K1 DNA CSF QL NAA+NON-PROBE: NOT DETECTED
EGFRCR SERPLBLD CKD-EPI 2021: >90 ML/MIN/1.73M*2
EOSINOPHIL # BLD AUTO: 0.07 X10*3/UL (ref 0–0.7)
EOSINOPHIL NFR BLD AUTO: 1.1 %
EOSINOPHIL NFR CSF MANUAL: 0 %
ERYTHROCYTE [DISTWIDTH] IN BLOOD BY AUTOMATED COUNT: 15.7 % (ref 11.5–14.5)
EV RNA CSF QL NAA+NON-PROBE: NOT DETECTED
GLUCOSE CSF-MCNC: 63 MG/DL (ref 40–70)
GLUCOSE SERPL-MCNC: 90 MG/DL (ref 74–99)
GP B STREP DNA CSF QL NAA+NON-PROBE: NOT DETECTED
HAEM INFLU DNA CSF QL NAA+NON-PROBE: NOT DETECTED
HCT VFR BLD AUTO: 37.8 % (ref 41–52)
HGB BLD-MCNC: 12.1 G/DL (ref 13.5–17.5)
HHV6 DNA CSF QL NAA+NON-PROBE: NOT DETECTED
HSV1 DNA CSF QL NAA+NON-PROBE: NOT DETECTED
HSV1 DNA CSF QL NAA+PROBE: NOT DETECTED
HSV2 DNA CSF QL NAA+NON-PROBE: NOT DETECTED
HSV2 DNA CSF QL NAA+PROBE: NOT DETECTED
IMM GRANULOCYTES # BLD AUTO: 0.06 X10*3/UL (ref 0–0.7)
IMM GRANULOCYTES NFR BLD AUTO: 0.9 % (ref 0–0.9)
IMM GRANULOCYTES NFR CSF: 0 %
L MONOCYTOG DNA CSF QL NAA+NON-PROBE: NOT DETECTED
LYMPHOCYTES # BLD AUTO: 2.11 X10*3/UL (ref 1.2–4.8)
LYMPHOCYTES NFR BLD AUTO: 32.3 %
LYMPHOCYTES NFR CSF MANUAL: 31 % (ref 28–96)
MAGNESIUM SERPL-MCNC: 2.2 MG/DL (ref 1.6–2.4)
MCH RBC QN AUTO: 29.9 PG (ref 26–34)
MCHC RBC AUTO-ENTMCNC: 32 G/DL (ref 32–36)
MCV RBC AUTO: 93 FL (ref 80–100)
MONOCYTES # BLD AUTO: 0.56 X10*3/UL (ref 0.1–1)
MONOCYTES NFR BLD AUTO: 8.6 %
MONOS+MACROS NFR CSF MANUAL: 19 % (ref 16–56)
N MEN DNA CSF QL NAA+NON-PROBE: NOT DETECTED
NEUTROPHILS # BLD AUTO: 3.72 X10*3/UL (ref 1.2–7.7)
NEUTROPHILS NFR BLD AUTO: 56.8 %
NEUTS SEG NFR CSF MANUAL: 50 % (ref 0–5)
NRBC BLD-RTO: 0 /100 WBCS (ref 0–0)
OTHER CELLS NFR CSF MANUAL: 0 %
P AXIS: 74 DEGREES
P OFFSET: 199 MS
P ONSET: 145 MS
PARECHOVIRUS A RNA CSF QL NAA+NON-PROBE: NOT DETECTED
PHOSPHATE SERPL-MCNC: 4.7 MG/DL (ref 2.5–4.9)
PLASMA CELLS NFR CSF MICRO: 0 %
PLATELET # BLD AUTO: 372 X10*3/UL (ref 150–450)
POTASSIUM SERPL-SCNC: 3.7 MMOL/L (ref 3.5–5.3)
PR INTERVAL: 158 MS
PROT CSF-MCNC: 48 MG/DL (ref 15–45)
Q ONSET: 224 MS
QRS COUNT: 13 BEATS
QRS DURATION: 80 MS
QT INTERVAL: 350 MS
QTC CALCULATION(BAZETT): 399 MS
QTC FREDERICIA: 382 MS
R AXIS: 63 DEGREES
RBC # BLD AUTO: 4.05 X10*6/UL (ref 4.5–5.9)
RBC # CSF AUTO: 0 /UL (ref 0–5)
S PNEUM DNA CSF QL NAA+NON-PROBE: NOT DETECTED
SODIUM SERPL-SCNC: 138 MMOL/L (ref 136–145)
T AXIS: 75 DEGREES
T OFFSET: 399 MS
TOTAL CELLS COUNTED CSF: 100
TREPONEMA PALLIDUM IGG+IGM AB [PRESENCE] IN SERUM OR PLASMA BY IMMUNOASSAY: NONREACTIVE
TUBE # CSF: ABNORMAL
VENTRICULAR RATE: 78 BPM
VZV DNA CSF QL NAA+NON-PROBE: NOT DETECTED
WBC # BLD AUTO: 6.5 X10*3/UL (ref 4.4–11.3)
WBC # CSF AUTO: 12 /UL (ref 1–5)

## 2024-05-15 PROCEDURE — 70540 MRI ORBIT/FACE/NECK W/O DYE: CPT | Mod: REDUCED SERVICES | Performed by: RADIOLOGY

## 2024-05-15 PROCEDURE — 82164 ANGIOTENSIN I ENZYME TEST: CPT

## 2024-05-15 PROCEDURE — 70543 MRI ORBT/FAC/NCK W/O &W/DYE: CPT

## 2024-05-15 PROCEDURE — 2500000001 HC RX 250 WO HCPCS SELF ADMINISTERED DRUGS (ALT 637 FOR MEDICARE OP): Mod: SE

## 2024-05-15 PROCEDURE — 2500000004 HC RX 250 GENERAL PHARMACY W/ HCPCS (ALT 636 FOR OP/ED)

## 2024-05-15 PROCEDURE — 70544 MR ANGIOGRAPHY HEAD W/O DYE: CPT | Mod: REDUCED SERVICES | Performed by: RADIOLOGY

## 2024-05-15 PROCEDURE — 36415 COLL VENOUS BLD VENIPUNCTURE: CPT

## 2024-05-15 PROCEDURE — 85025 COMPLETE CBC W/AUTO DIFF WBC: CPT

## 2024-05-15 PROCEDURE — 83735 ASSAY OF MAGNESIUM: CPT

## 2024-05-15 PROCEDURE — 70546 MR ANGIOGRAPH HEAD W/O&W/DYE: CPT

## 2024-05-15 PROCEDURE — 88112 CYTOPATH CELL ENHANCE TECH: CPT | Performed by: PATHOLOGY

## 2024-05-15 PROCEDURE — 99223 1ST HOSP IP/OBS HIGH 75: CPT

## 2024-05-15 PROCEDURE — 88112 CYTOPATH CELL ENHANCE TECH: CPT | Mod: TC,MCY

## 2024-05-15 PROCEDURE — 88185 FLOWCYTOMETRY/TC ADD-ON: CPT | Mod: TC

## 2024-05-15 PROCEDURE — 88187 FLOWCYTOMETRY/READ 2-8: CPT | Performed by: PATHOLOGY

## 2024-05-15 PROCEDURE — 009U3ZX DRAINAGE OF SPINAL CANAL, PERCUTANEOUS APPROACH, DIAGNOSTIC: ICD-10-PCS | Performed by: PSYCHIATRY & NEUROLOGY

## 2024-05-15 PROCEDURE — 88104 CYTOPATH FL NONGYN SMEARS: CPT | Performed by: PATHOLOGY

## 2024-05-15 PROCEDURE — 89051 BODY FLUID CELL COUNT: CPT

## 2024-05-15 PROCEDURE — A9575 INJ GADOTERATE MEGLUMI 0.1ML: HCPCS | Performed by: PSYCHIATRY & NEUROLOGY

## 2024-05-15 PROCEDURE — 85549 MURAMIDASE: CPT

## 2024-05-15 PROCEDURE — 87529 HSV DNA AMP PROBE: CPT

## 2024-05-15 PROCEDURE — 87070 CULTURE OTHR SPECIMN AEROBIC: CPT

## 2024-05-15 PROCEDURE — 71046 X-RAY EXAM CHEST 2 VIEWS: CPT | Performed by: RADIOLOGY

## 2024-05-15 PROCEDURE — 82040 ASSAY OF SERUM ALBUMIN: CPT

## 2024-05-15 PROCEDURE — 2550000001 HC RX 255 CONTRASTS: Performed by: PSYCHIATRY & NEUROLOGY

## 2024-05-15 PROCEDURE — 2500000005 HC RX 250 GENERAL PHARMACY W/O HCPCS

## 2024-05-15 PROCEDURE — 82374 ASSAY BLOOD CARBON DIOXIDE: CPT

## 2024-05-15 PROCEDURE — 1100000001 HC PRIVATE ROOM DAILY

## 2024-05-15 PROCEDURE — 70553 MRI BRAIN STEM W/O & W/DYE: CPT

## 2024-05-15 PROCEDURE — 2500000001 HC RX 250 WO HCPCS SELF ADMINISTERED DRUGS (ALT 637 FOR MEDICARE OP)

## 2024-05-15 PROCEDURE — 72141 MRI NECK SPINE W/O DYE: CPT | Mod: REDUCED SERVICES | Performed by: RADIOLOGY

## 2024-05-15 PROCEDURE — 99222 1ST HOSP IP/OBS MODERATE 55: CPT | Performed by: STUDENT IN AN ORGANIZED HEALTH CARE EDUCATION/TRAINING PROGRAM

## 2024-05-15 PROCEDURE — 82945 GLUCOSE OTHER FLUID: CPT

## 2024-05-15 PROCEDURE — 84157 ASSAY OF PROTEIN OTHER: CPT

## 2024-05-15 PROCEDURE — 71046 X-RAY EXAM CHEST 2 VIEWS: CPT

## 2024-05-15 PROCEDURE — 70551 MRI BRAIN STEM W/O DYE: CPT | Mod: REDUCED SERVICES | Performed by: RADIOLOGY

## 2024-05-15 PROCEDURE — 2500000001 HC RX 250 WO HCPCS SELF ADMINISTERED DRUGS (ALT 637 FOR MEDICARE OP): Performed by: STUDENT IN AN ORGANIZED HEALTH CARE EDUCATION/TRAINING PROGRAM

## 2024-05-15 PROCEDURE — 70547 MR ANGIOGRAPHY NECK W/O DYE: CPT | Mod: REDUCED SERVICES | Performed by: RADIOLOGY

## 2024-05-15 PROCEDURE — 93010 ELECTROCARDIOGRAM REPORT: CPT | Performed by: NURSE PRACTITIONER

## 2024-05-15 PROCEDURE — 87483 CNS DNA AMP PROBE TYPE 12-25: CPT

## 2024-05-15 RX ORDER — MELOXICAM 15 MG/1
15 TABLET ORAL DAILY
Status: DISCONTINUED | OUTPATIENT
Start: 2024-05-15 | End: 2024-05-16

## 2024-05-15 RX ORDER — ACETAMINOPHEN 325 MG/1
650 TABLET ORAL EVERY 4 HOURS PRN
Status: DISCONTINUED | OUTPATIENT
Start: 2024-05-15 | End: 2024-05-21 | Stop reason: HOSPADM

## 2024-05-15 RX ORDER — GADOTERATE MEGLUMINE 376.9 MG/ML
15 INJECTION INTRAVENOUS
Status: COMPLETED | OUTPATIENT
Start: 2024-05-15 | End: 2024-05-15

## 2024-05-15 RX ORDER — CAFFEINE 200 MG
100 TABLET ORAL ONCE AS NEEDED
Status: DISCONTINUED | OUTPATIENT
Start: 2024-05-15 | End: 2024-05-21 | Stop reason: HOSPADM

## 2024-05-15 RX ORDER — PREDNISOLONE ACETATE 10 MG/ML
1 SUSPENSION/ DROPS OPHTHALMIC 4 TIMES DAILY
Status: DISCONTINUED | OUTPATIENT
Start: 2024-05-15 | End: 2024-05-21 | Stop reason: HOSPADM

## 2024-05-15 RX ORDER — KETOROLAC TROMETHAMINE 30 MG/ML
30 INJECTION, SOLUTION INTRAMUSCULAR; INTRAVENOUS
Status: COMPLETED | OUTPATIENT
Start: 2024-05-15 | End: 2024-05-15

## 2024-05-15 RX ORDER — NAPROXEN SODIUM 220 MG/1
81 TABLET, FILM COATED ORAL DAILY
Status: DISCONTINUED | OUTPATIENT
Start: 2024-05-15 | End: 2024-05-21 | Stop reason: HOSPADM

## 2024-05-15 RX ORDER — OXYCODONE HYDROCHLORIDE 5 MG/1
5 TABLET ORAL ONCE
Status: COMPLETED | OUTPATIENT
Start: 2024-05-15 | End: 2024-05-15

## 2024-05-15 RX ORDER — ACETAMINOPHEN 325 MG/1
975 TABLET ORAL ONCE
Status: COMPLETED | OUTPATIENT
Start: 2024-05-15 | End: 2024-05-15

## 2024-05-15 RX ORDER — PANTOPRAZOLE SODIUM 40 MG/1
40 TABLET, DELAYED RELEASE ORAL DAILY
Status: DISCONTINUED | OUTPATIENT
Start: 2024-05-15 | End: 2024-05-21 | Stop reason: HOSPADM

## 2024-05-15 RX ORDER — POLYETHYLENE GLYCOL 3350 17 G/17G
17 POWDER, FOR SOLUTION ORAL DAILY
Status: DISCONTINUED | OUTPATIENT
Start: 2024-05-15 | End: 2024-05-21 | Stop reason: HOSPADM

## 2024-05-15 RX ORDER — ENOXAPARIN SODIUM 100 MG/ML
40 INJECTION SUBCUTANEOUS EVERY 24 HOURS
Status: DISCONTINUED | OUTPATIENT
Start: 2024-05-15 | End: 2024-05-21 | Stop reason: HOSPADM

## 2024-05-15 RX ORDER — AZATHIOPRINE 75 MG/1
150 TABLET ORAL DAILY
Status: DISCONTINUED | OUTPATIENT
Start: 2024-05-15 | End: 2024-05-21 | Stop reason: HOSPADM

## 2024-05-15 RX ORDER — NALTREXONE HYDROCHLORIDE 50 MG/1
50 TABLET, FILM COATED ORAL DAILY
Status: DISCONTINUED | OUTPATIENT
Start: 2024-05-15 | End: 2024-05-21 | Stop reason: HOSPADM

## 2024-05-15 RX ORDER — GABAPENTIN 300 MG/1
300 CAPSULE ORAL EVERY 8 HOURS SCHEDULED
Status: DISCONTINUED | OUTPATIENT
Start: 2024-05-15 | End: 2024-05-21 | Stop reason: HOSPADM

## 2024-05-15 RX ORDER — BRIMONIDINE TARTRATE 2 MG/ML
1 SOLUTION/ DROPS OPHTHALMIC EVERY 12 HOURS
Status: DISCONTINUED | OUTPATIENT
Start: 2024-05-15 | End: 2024-05-21 | Stop reason: HOSPADM

## 2024-05-15 RX ORDER — ACETAMINOPHEN 160 MG/5ML
650 SOLUTION ORAL EVERY 4 HOURS PRN
Status: DISCONTINUED | OUTPATIENT
Start: 2024-05-15 | End: 2024-05-21 | Stop reason: HOSPADM

## 2024-05-15 RX ADMIN — PANTOPRAZOLE SODIUM 40 MG: 40 TABLET, DELAYED RELEASE ORAL at 09:50

## 2024-05-15 RX ADMIN — BRIMONIDINE TARTRATE 1 DROP: 2 SOLUTION/ DROPS OPHTHALMIC at 10:49

## 2024-05-15 RX ADMIN — GADOTERATE MEGLUMINE 12 ML: 376.9 INJECTION INTRAVENOUS at 14:06

## 2024-05-15 RX ADMIN — BRIMONIDINE TARTRATE 1 DROP: 2 SOLUTION/ DROPS OPHTHALMIC at 18:51

## 2024-05-15 RX ADMIN — PREDNISOLONE ACETATE 1 DROP: 10 SUSPENSION/ DROPS OPHTHALMIC at 02:54

## 2024-05-15 RX ADMIN — NALTREXONE HYDROCHLORIDE 50 MG: 50 TABLET, FILM COATED ORAL at 09:50

## 2024-05-15 RX ADMIN — ACETAMINOPHEN 975 MG: 325 TABLET ORAL at 00:38

## 2024-05-15 RX ADMIN — ENOXAPARIN SODIUM 40 MG: 100 INJECTION SUBCUTANEOUS at 09:53

## 2024-05-15 RX ADMIN — OXYCODONE HYDROCHLORIDE 5 MG: 5 TABLET ORAL at 00:38

## 2024-05-15 RX ADMIN — PREDNISOLONE ACETATE 1 DROP: 10 SUSPENSION/ DROPS OPHTHALMIC at 10:49

## 2024-05-15 RX ADMIN — AZATHIOPRINE 150 MG: 75 TABLET ORAL at 09:50

## 2024-05-15 RX ADMIN — PREDNISOLONE ACETATE 1 DROP: 10 SUSPENSION/ DROPS OPHTHALMIC at 20:59

## 2024-05-15 RX ADMIN — ASPIRIN 81 MG 81 MG: 81 TABLET ORAL at 09:50

## 2024-05-15 RX ADMIN — METHYLPREDNISOLONE SODIUM SUCCINATE 1000 MG: 1 INJECTION, POWDER, LYOPHILIZED, FOR SOLUTION INTRAMUSCULAR; INTRAVENOUS at 22:33

## 2024-05-15 RX ADMIN — GABAPENTIN 300 MG: 300 CAPSULE ORAL at 21:00

## 2024-05-15 RX ADMIN — KETOROLAC TROMETHAMINE 30 MG: 30 INJECTION, SOLUTION INTRAMUSCULAR; INTRAVENOUS at 13:23

## 2024-05-15 RX ADMIN — PREDNISOLONE ACETATE 1 DROP: 10 SUSPENSION/ DROPS OPHTHALMIC at 18:49

## 2024-05-15 RX ADMIN — PREDNISOLONE ACETATE 1 DROP: 10 SUSPENSION/ DROPS OPHTHALMIC at 15:26

## 2024-05-15 ASSESSMENT — COGNITIVE AND FUNCTIONAL STATUS - GENERAL
DAILY ACTIVITIY SCORE: 24
MOBILITY SCORE: 24
MOBILITY SCORE: 24
PATIENT BASELINE BEDBOUND: NO

## 2024-05-15 ASSESSMENT — ACTIVITIES OF DAILY LIVING (ADL)
TOILETING: INDEPENDENT
LACK_OF_TRANSPORTATION: NO
DRESSING YOURSELF: INDEPENDENT
HEARING - RIGHT EAR: FUNCTIONAL
BATHING: INDEPENDENT
PATIENT'S MEMORY ADEQUATE TO SAFELY COMPLETE DAILY ACTIVITIES?: YES
GROOMING: INDEPENDENT
WALKS IN HOME: INDEPENDENT
JUDGMENT_ADEQUATE_SAFELY_COMPLETE_DAILY_ACTIVITIES: YES
HEARING - LEFT EAR: FUNCTIONAL
FEEDING YOURSELF: INDEPENDENT
ADEQUATE_TO_COMPLETE_ADL: YES

## 2024-05-15 ASSESSMENT — PAIN SCALES - GENERAL
PAINLEVEL_OUTOF10: 10 - WORST POSSIBLE PAIN
PAINLEVEL_OUTOF10: 1

## 2024-05-15 ASSESSMENT — PAIN - FUNCTIONAL ASSESSMENT: PAIN_FUNCTIONAL_ASSESSMENT: 0-10

## 2024-05-15 ASSESSMENT — PAIN DESCRIPTION - LOCATION: LOCATION: BACK

## 2024-05-15 NOTE — CONSULTS
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 Ophthalmology consulted due to complaints of diplopia and blurred vision.    Patient reports that for the last few years, ever since being diagnosed with Behcet's and undergoing treatment for CRVO/CME OD, he has had blurred vision in the superior hemifield of his right eye (at least for 3 years). He reports that yesterday 5/13/24 around the time he woke up, he noticed that he was having horizontal diplopia. When he closed either eye, the diplopia resolved, but with both eyes open, objects were next to each other. He believes the diplopia is equal in all fields of gaze and all head positions. He endorses blurred vision in his right eye that is not new for him. He also endorses stable bilateral photophobia and flashes when he closes his eyes. He denies eye pain but says that when looking from side to side, he feels a soreness in his eye lids. No eye redness, floaters, or sudden vision loss.    Of note, patient last seen by uveitis specialist Dr. Chavez on 11/18/22. He had previously been on Azathioprine and Humira but per patient, he has been off of all immunosuppression medication for at least the last year.    Update 5/15/24  Vision stable compared with prior exam subjectively, no new complaints. Declines to have intravitreal injection while inpatient.      ROS: Patient denies pain, redness, discharge, blind spots, or floaters. All other systems have been reviewed and are negative.    PMHx: please refer to admission  HPI  Medications: please refer to medication reconciliation  Allergies: please refer to patient allergy list  Past Ocular History: as per above HPI  Family History: reviewed and noncontributory to chief ophthalmic complaint  Orientation: Alert and oriented x3, appropriate mood and behavior    Examination:     Slit Lamp and Fundus Exam       External Exam         Right Left    External Normal Normal              Slit Lamp Exam         Right Left    Lids/Lashes Normal Normal    Conjunctiva/Sclera Melanosis Melanosis    Cornea Clear Clear    Anterior Chamber Deep and quiet, no cell or hypopyon Deep and quiet, no cell or hypopyon    Iris Round and reactive Round and reactive    Lens Clear Clear    Anterior Vitreous Normal, no cell Normal, no cell              Fundus Exam         Right Left    Disc Pink and sharp Pink and sharp    C/D Ratio .3 .3    Macula Dull reflex Normal    Vessels attenuation, inferior ghost vessel; inferior perivascular sheathing, inferior heme and CWS, overall approximately stable vs. Prior fundus imaging attenuation    Periphery Inferior peripheral white exudates; no retinal tears or detachments Nasal dark without pressure                    Imaging:  Pending MRI brain/orbit w and wo contrast; MRV brain and MRA head/neck    Assessment and Plan:  #Posterior +/- intermediate uveitis  - 27 y/o M with history neuro-Behcet's, bilateral uveitis, retinal vasculitis with CRVO/CME right eye (s/p Kenalogg OD 9/21, Avastin OD 9/21; Avastin OD 11/21; Eylea OD 8/22; and Eylea OD 9/22) presenting to ED with right sided numbness, right ear ringing, blurred vision and diplopia  - Entrance testing is excellent. Anterior segment exam is  unremarkable without evidence of hypopyon, ac cell or flare, no TIDs or iris nodules. On dilated exam, there are attenuated vessels in both eyes; there is also an inferior ghost vessel with perivascular sheathing, inferior heme and CWS with peripheral white exudates in the right  eye  - Presentation is concerning for posterior +/- intermediate uveitis. There is a presumed history of neuro-Behcet's given initial presentation in 2021 of uveitis, apthous ulcers and genital ulcers. However, with evidence of peripheral snow banking on exam in  patient, would be highly suspicious for sarcoidosis as well. Testing in 2021 was negative for HSV1/HSV2/SLE/lyme/ACE/HLA B27/HLA B51/T spot/HIV/RPR/AQP4/MOG. With patient off of immunosuppression for the last year and now with acute presentation, would recommend working up for at least TB, sarcoid and syphillis, in addition to MRI imaging.    Recommendations:  - Recommend starting Pred forte QID right eye  - Recommend re-establishing with rheumatology to resume systemic immunosuppression treatment  - Recommend lab work up for TB, sarcoid, syphillis:  - RPR or VDRL  - FTA-ABS  - TB quantiferon  - Chest XR for hilar lymphadenopathy  - ACE  - Lysozyme  - Examined with Dr Chavez 5/15/24, although exam overall fairly stable vs prior with Dr Chavez, recommended intravitreal injection; patient declined in favor of outpatient follow up  -Emphasized importance of adherence to systemic therapies for modulation of underlying , patient expresses understanding  -Will message secretaries to arrange outpatient follow up, patient expresses understanding of importance of outpatient follow up      Examined and discussed with Dr Chavez      Ophthalmology Adult Pager - [phone]  Ophthalmology Pediatrics Pager - [phone]    For adult follow-up appointments, call: [phone]  For pediatric follow-up appointments, call: [phone]      NOTE: This note is not finalized until attending reviews and signs.

## 2024-05-15 NOTE — PROGRESS NOTES
Emergency Medicine Transition of Care Note.      I received Juan Marsh in signout from Dr. Morales.  Please see the previous ED provider note for all HPI, PE and MDM up to the time of signout at 2300. This is in addition to the primary record. In brief Juan Marsh is an 28 y.o. male presenting for   Chief Complaint   Patient presents with    Numbness    .      At the time of signout we were awaiting: MRI, neuro recs, and re-eval    ED Course as of 05/15/24 0312   Tue May 14, 2024   1345 Comprehensive Metabolic Panel(!)  Largely unremarkable [TK]   1345 Brain Natriuretic Peptide  BNP WNL [TK]   1347 Protime-INR  PT/INR WNL [TK]   1347 Troponin I Series, High Sensitivity (0, 1 HR)  Trop WNL [TK]   1347 CBC with Differential(!)  Hgb appears to be at baseline [TK]   1657 Neurology recommending consult to ophthalmology.  Consult order placed at this time. [RS]   2220 Ophthalmology are recommending further workup with rpr or vdrl, fta-abs, tb quant, chest xr, ace and lysozyme which orders for these will be placed.  They are also recommending prednisolone acetate 1% 4 times daily for the right eye and this will be ordered.  The remainder of the ophthalmology workup can be followed up outpatient and will defer further workup to the neurology team at this time. [RS]   Wed May 15, 2024   0015 I signed the patient out to Mini Valdes DO in stable condition pending imaging and final disposition per neurology.  Patient will likely need to be admitted given significant right-sided numbness and vision changes in the setting of Behcet's disease. [RS]      ED Course User Index  [RS] Otoniel Morales DO  [TK] Juan Padron MD         Diagnoses as of 05/15/24 0312   Behcet's syndrome, neurologic type (Multi)       Final diagnoses:   [M35.2] Behcet's syndrome, neurologic type (Multi)       Medical Decision Making    Briefly, this is a 28-year-old male who initially presented to the ED with right-sided weakness,  found to have intracranial Behcet's.  Discussed this with patient, and the neurology team.  Neurology recommended admission for further management, patient is agreeable to this plan.  He was given Tylenol and oxycodone for pain, which on reevaluation was improved.  Remained hemodynamically stable in the ED.      Mini Ortega DO  Emergency Medicine , PGY-2    I reviewed the case with the attending ED physician. The attending ED physician agrees with the plan. Patient and/or patient´s representative was counseled regarding labs, imaging, likely diagnosis, and plan. All questions were answered.    Disclaimer: This note was dictated by speech recognition.  Attempt at proofreading was made to minimize errors.  Errors in transcription may be present.  Please call if questions.

## 2024-05-15 NOTE — CARE PLAN
The patient's goals for the shift include pt will remain freee from falls    The clinical goals for the shift include no increased blurry vision    Over the shift, the patient did not make progress toward the following goals. Barriers to progression include chronic disease. Recommendations to address these barriers include medication.

## 2024-05-15 NOTE — H&P
History Of Present Illness  Juan Marsh is a 28 y.o. male with a PMHx of Behcet's disease, arthritis, and avascular necrosis presenting to the ED for complaint of right sided whole body numbness and ringing in his ear that began Sunday. Patient also complains of blurry vision and double vision.  The patient also complains of a headache that he rates currently as a 6/10. Patient denies nausea, vomiting, bowel dysfunction, bladder dysfunction, fever, shakes, or chest pain.      Patient has indicated five episodes of right sided weakness with oral and genital ulcers since initial diagnosis in 2021.      Per Chart Review of Behcet history  In May of 2021, the patient developed pleuritic chest pain that was diagnosed as myopericarditis and treated with colchicine and ibuprofen. Patient presented to ED in August of 2021 with a oral and genital ulcers, headache, blurry vision, and right hand and leg weakness and Optho exam significant for retinal vasculitis. This presentation raised suspicion for Behcet's syndrome.      Initial workup included   MRI brain - MRI brain and cervical spine positive for Ill-defined region of abnormal T2/FLAIR hyperintensity involving the left lentiform nucleus, internal capsule, thalamus, cerebral peduncle, rostral tyrone, and medial temporal  pole with associated enhancement and punctate focus of diffusion restriction ( in internal capsule) which is concerning for parenchymal neuro Behcets.   LP - normal cells and protein, negative HSV1 and HSV 2, negative bacterial and viral panel  Negative SLE and rheumatologic   Lyme negative, ACE WNL  CSF did not show infective etiology   HLA-B27 and HLA B 51 is negative  CBC, CMP are WNL, TB T spot negative, HIV negative, RPR negative  AQP4 and MOG came back negative        Upon discharge, patient was given the following discharge orders:  FU with neuroimmunology   FU with Rheumatology   FU with ophthalmology  FU with cardiology   Continue  prednisone 80 mg/day as recommended by Optho for 2 weeks and then reduce to 60 mg/day x 2 weeks. Rheum will taper the steroids as outpt  Per Rheum: if TPMT is normal start on Imuran 150 mg/day  Rheum will follow up on his TPMT levels and will send the prescription for imuran to his pharmacy  cont PPI while on steroids   cont ASA 81 mg     Patient was seen by Dr. Ulloa on 10/21/2021, and patient was advised to take baby aspirin for future stroke prophylaxis, Vitamin D and smoking cessation.     On 01/03/2022, the patient was seen by rheumatology and presented with worsening retinal vasculitis prompting the initiation of TNF-alpha inhibitor therapy.  The patient was then seen by Dr. Ulloa with neurology on 05/02/2022, and it was noted that due to his CNS involvement, he should be closely monitored while taking Humira and follow-up appointment was to be scheduled in 01/2023. The patient did not follow-up with neurology.    Relevant Prior Workup     Prior Brain Imaging:  MR Brain W and WO IV Contrast - 09/01/2021  1.  Ill-defined region of abnormal T2/FLAIR hyperintensity involving  the left lentiform nucleus, internal capsule, thalamus, cerebral  peduncle, rostral tyrone, and left corona radiata with associated  enhancement and punctate focus of diffusion restriction, as detailed.  Finding is nonspecific, but would be typical in location and  appearance for brain involvement of Behcet's disease. Location would  be atypical for acute disseminated encephalomyelitis or multiple  sclerosis.  2. Unremarkable MRI of the cervical spine.  MR Cervical Spine W and WO IV Contrast - 09/01/2021  1.  Ill-defined region of abnormal T2/FLAIR hyperintensity involving  the left lentiform nucleus, internal capsule, thalamus, cerebral  peduncle, rostral tyrone, and left corona radiata with associated  enhancement and punctate focus of diffusion restriction, as detailed.  Finding is nonspecific, but would be typical in location  "and  appearance for brain involvement of Behcet's disease. Location would  be atypical for acute disseminated encephalomyelitis or multiple  sclerosis.  2. Unremarkable MRI of the cervical spine.     CT Angio Neck W and WO IV Contrast - 09/02/2021  1. Somewhat limited, motion degraded examination without evidence of  hemodynamically significant stenosis or proximal large branch vessel  cutoff on CTA of the head.  2. No measurable stenosis on CTA of the neck.     CT Angio Head W and WO IV Contrast - 09/02/2021  1. Somewhat limited, motion degraded examination without evidence of  hemodynamically significant stenosis or proximal large branch vessel  cutoff on CTA of the head.  2. No measurable stenosis on CTA of the neck.        MR Brain W and WO IV Contrast - 11/08/2021  Marked improvement in ill-defined FLAIR hyperintensity involving the  left lentiform nucleus and brainstem with residual small T2/FLAIR  hyperintense focus involving the left posterior limb of the internal  capsule and left cerebral peduncle consistent with improvement in  neuro Behcet's. Resolution of prior abnormal enhancement. No new  areas of signal abnormality identified.       Past Medical History  No past medical history on file.  Surgical History  Past Surgical History:   Procedure Laterality Date    CT ANGIO NECK  9/2/2021    CT NECK ANGIO W AND WO IV CONTRAST 9/2/2021 [facility]    CT HEAD ANGIO W AND WO IV CONTRAST  9/2/2021    CT HEAD ANGIO W AND WO IV CONTRAST 9/2/2021 [facility]     Social History  Social History     Tobacco Use    Smoking status: Unknown     Allergies  Patient has no known allergies.  (Not in a hospital admission)      Review of Systems  Neurological Exam  Physical Exam  Last Recorded Vitals  Blood pressure 121/80, pulse 70, temperature 36.9 °C (98.5 °F), temperature source Oral, resp. rate 18, height 1.753 m (5' 9\"), weight 59 kg (130 lb), SpO2 99%.    Neurological Exam:  MENTAL " STATUS:  Orientation: AxOx3  Language: Expression and comprehension,  Thought processes: Logical, organized  Judgment and Insight: Intact     CRANIAL NERVES:  - II:  Patient indicates right eye upper scotoma  - III, IV, VI: EOM full to pursuit without nystagmus but indicates biocular double vision  - V: V1-V3 Patient reports diminished sensation on the right side  - VII: Face muscles symmetric with smile and eye closure  - VIII: Intact to interview  - IX, X: Palate elevated symmetrically bilaterally, no hoarseness  - XI: 5/5 strength on shoulder shrugging bilaterally  - XII: Tongue midline without atrophy or fasciculation     MOTOR:   Muscle bulk: Normal throughout.  Muscle tone: Normal in both upper and lower extremities.  Movements: No fasciculations, tremors or other abnormal movement.     - Strength:         R                L  Deltoid               4+             5  Biceps                4+             5  Triceps               4+              5                      5-               5                                           Hip Abduction  5-               5  Hip Adduction  5-               5  Hip flexion         5-               5  Knee Ext             5-               5  Knee Flex           5-               5  DorsiFlex            5-               5  PlantarFlex        5-               5     REFLEXES:          L                 R  Biceps                2                 3  Triceps               2                 3   BR                      2                 3  Patellar               2                 3  Achilles              1+               2+        Negative Major   Clonus present on the right side      COORDINATION: Assessment Deferred  SENSORY: Patient indicated diminished sensation on the right face, RUE, and RLE.  PROPRIOCEPTION:  Assessment Deferred  ROMBERG: Assessment Deferred  GAIT: Assessment Deferred    Relevant Results  Results for orders placed or performed during the hospital encounter of  05/14/24 (from the past 24 hour(s))   CBC with Differential   Result Value Ref Range    WBC 7.9 4.4 - 11.3 x10*3/uL    nRBC 0.0 0.0 - 0.0 /100 WBCs    RBC 3.91 (L) 4.50 - 5.90 x10*6/uL    Hemoglobin 12.2 (L) 13.5 - 17.5 g/dL    Hematocrit 34.8 (L) 41.0 - 52.0 %    MCV 89 80 - 100 fL    MCH 31.2 26.0 - 34.0 pg    MCHC 35.1 32.0 - 36.0 g/dL    RDW 15.5 (H) 11.5 - 14.5 %    Platelets 341 150 - 450 x10*3/uL    Neutrophils % 66.8 40.0 - 80.0 %    Immature Granulocytes %, Automated 0.3 0.0 - 0.9 %    Lymphocytes % 24.0 13.0 - 44.0 %    Monocytes % 8.0 2.0 - 10.0 %    Eosinophils % 0.5 0.0 - 6.0 %    Basophils % 0.4 0.0 - 2.0 %    Neutrophils Absolute 5.30 1.20 - 7.70 x10*3/uL    Immature Granulocytes Absolute, Automated 0.02 0.00 - 0.70 x10*3/uL    Lymphocytes Absolute 1.90 1.20 - 4.80 x10*3/uL    Monocytes Absolute 0.63 0.10 - 1.00 x10*3/uL    Eosinophils Absolute 0.04 0.00 - 0.70 x10*3/uL    Basophils Absolute 0.03 0.00 - 0.10 x10*3/uL   Comprehensive Metabolic Panel   Result Value Ref Range    Glucose 91 74 - 99 mg/dL    Sodium 138 136 - 145 mmol/L    Potassium 4.9 3.5 - 5.3 mmol/L    Chloride 104 98 - 107 mmol/L    Bicarbonate 24 21 - 32 mmol/L    Anion Gap 15 10 - 20 mmol/L    Urea Nitrogen 13 6 - 23 mg/dL    Creatinine 0.88 0.50 - 1.30 mg/dL    eGFR >90 >60 mL/min/1.73m*2    Calcium 9.3 8.6 - 10.6 mg/dL    Albumin 3.7 3.4 - 5.0 g/dL    Alkaline Phosphatase 98 33 - 120 U/L    Total Protein 6.8 6.4 - 8.2 g/dL    AST 12 9 - 39 U/L    Bilirubin, Total 0.3 0.0 - 1.2 mg/dL    ALT 6 (L) 10 - 52 U/L   Brain Natriuretic Peptide   Result Value Ref Range    BNP 5 0 - 99 pg/mL   Protime-INR   Result Value Ref Range    Protime 11.2 9.8 - 12.8 seconds    INR 1.0 0.9 - 1.1   Troponin I, High Sensitivity, Initial   Result Value Ref Range    Troponin I, High Sensitivity <3 0 - 53 ng/L   Troponin, High Sensitivity, 1 Hour   Result Value Ref Range    Troponin I, High Sensitivity <3 0 - 53 ng/L     Blue Eye Coma Scale  Best Eye  Response: Spontaneous  Best Verbal Response: Oriented  Best Motor Response: Follows commands  Noemi Coma Scale Score: 15    Result Date: 5/15/2024  Interpreted By:  Aubree العلي and Baker Zachary STUDY: XR CHEST 2 VIEWS; ;  5/15/2024 12:44 am   INDICATION: Signs/Symptoms:TB r/o.   COMPARISON: Chest radiograph on 11/02/2022.   ACCESSION NUMBER(S): [id number]   ORDERING CLINICIAN: TREMAINE CABALLERO   FINDINGS: PA and lateral views of the chest.   The cardiomediastinal silhouette is normal in size and configuration.   No consolidation, pleural effusion, or pneumothorax.   No acute osseous abnormality.       No acute cardiopulmonary process.   I personally reviewed the images/study and I agree with the findings as stated by Dr. Pedro Good M.D. This study was interpreted at [place], [place].   MACRO: None   Signed by: Aubree العلي 5/15/2024 2:24 AM Dictation workstation:   [id number]    MR angio head wo IV contrast    Result Date: 5/15/2024  Interpreted By:  Aubree العلي and Marshall Colin STUDY: MR BRAIN WO IV CONTRAST; MR CERVICAL SPINE WO IV CONTRAST; MR ANGIO NECK WO IV CONTRAST; MR ANGIO HEAD WO IV CONTRAST; MR ORBIT WO IV CONTRAST;  5/15/2024 12:31 am   INDICATION: Signs/Symptoms:Patient with history of neuro Behcet's, presenting with right-sided weakness, sensory symptoms, diplopia, right superior visual field deficit; Signs/Symptoms:Patient with history of neuro Behcet's, presenting with right-sided weakness, sensory symptoms, diplopia, right superior visual field deficit, check for vasculitis.   COMPARISON: MRI brain dated 11/06/2021 09/01/2021. MRI cervical spine dated 09/01/2021.   ACCESSION NUMBER(S): [id number]; [id number]; [id number]; [id number]; [id number]   ORDERING CLINICIAN: TREMAINE CABALLERO   TECHNIQUE: Diffusion, T2, FLAIR, and T1 weighted MR images of the brain were obtained.  High resolution coronal T1 and STIR images  of the orbits were obtained.  Time-of-flight MRA of the head and neck was performed. The images were reviewed as source images and maximum intensity projections.  Limited sagittal T2 weighted images were acquired through the cervical spine. The remainder of the cervical spine examination was unable to be completed secondary to patient's intolerance of exam. Evaluation of the orbits is partially limited by motion artifact on coronal imaging.   FINDINGS: MR head and orbits:   CSF Spaces: The ventricles, sulci and basal cisterns are within normal limits. No extra-axial fluid collections.   Parenchyma: There is no diffusion restriction abnormality to suggest acute infarct.  There is new/progressed confluent, extensive T2 and FLAIR hyperintensity involving the left posterior limb of the internal capsule, left thalamus, and left midbrain which partially extends into the left tyrone and mid medulla. There is minimal associated local mass effect. No midline shift or evidence of ventricular entrapment. No associated gradient echo signal to suggest hemorrhage. Findings are significantly progressed in this region from prior imaging.   Orbits: There is no intraorbital mass.  The optic nerves and optic chiasm are unremarkable.  The extraocular muscles are unremarkable. The lacrimal glands are within normal limits.   Paranasal Sinuses and Mastoids: Minimal mucosal thickening within the left ethmoid air cells otherwise visualized paranasal sinuses and mastoid air cells are unremarkable.   MRA of head:   Anterior circulation: There is expected flow signal in bilateral intracranial internal carotid arteries, bilateral carotid terminals, bilateral proximal anterior and middle cerebral arteries.   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 Otherwise, bilateral intracranial vertebral arteries,  vertebrobasilar junction, basilar artery and proximal posterior cerebral arteries demonstrate expected flow signal.   MRA of neck:   The source images are mildly degraded by artifact.   Right carotid vessels: There is expected flow signal in the visualized portion of the common carotid artery. There is mild attenuation of flow signal at the carotid bifurcation which may be secondary to flow related artifact. The internal carotid artery in the neck demonstrates expected flow signal.   Left carotid vessels:  There is expected flow signal in the visualized portion of the common carotid artery. There is mild attenuation of flow signal at the carotid bifurcation which may be secondary to flow related artifact. The internal carotid artery in the neck demonstrates expected flow signal.   Vertebral vessels:  Asymmetric slightly diminutive appearance of the left vertebral artery otherwise the visualized segments of the cervical vertebral arteries demonstrate expected flow signal.   MR cervical spine:   Limited cervical spine imaging.   Alignment: There is mild reversal of the normal lordotic curvature of the cervical spine, similar to prior exam.   Vertebrae/Intervertebral Discs: The vertebral bodies demonstrate expected height.  The marrow signal is within normal limits. Mild interval increased diffuse disc bulging at C6-C7 which partially effaces the ventral thecal sac without significant spinal canal stenosis. Otherwise, the remainder of the intervertebral disc demonstrate normal signal intensity and morphology.   Cord: Normal in caliber and signal.   The upper thoracic vertebrae and spinal canal are unremarkable.   The prevertebral and posterior paraspinous soft tissues are within normal limits.       MR brain:   Extensive confluent T2/FLAIR hyperintensity involving the left posterior limb of the internal capsule, extending into the left thalamus and left midbrain which partially extends into the left tyrone and medulla  with mild associated mass effect. Findings are nonspecific and atypical for age however given clinical history findings may represent progression of intracranial Behcet's disease.   No evidence of restricted diffusion   MR orbits:   Unremarkable MRI of the orbits.   MRA head and neck:   No evidence of major vessel cut off or significant stenosis on MRA head and neck.   MR cervical spine:   Limited sagittal T2 weighted imaging of the cervical spine demonstrates mild interval increased diffuse disc bulging at C6-C7 which partially effaces the ventral thecal sac without significant spinal canal stenosis. Otherwise, no cervical spinal cord signal abnormality.     I personally reviewed the images/study and I agree with the resident findings as stated. This study was interpreted at [place], [place].   MACRO: None   Signed by: Aubree العلي 5/15/2024 2:22 AM Dictation workstation:   [id number]    MR angio neck wo IV contrast    Result Date: 5/15/2024  Interpreted By:  Aubree العلي and Marshall Colin STUDY: MR BRAIN WO IV CONTRAST; MR CERVICAL SPINE WO IV CONTRAST; MR ANGIO NECK WO IV CONTRAST; MR ANGIO HEAD WO IV CONTRAST; MR ORBIT WO IV CONTRAST;  5/15/2024 12:31 am   INDICATION: Signs/Symptoms:Patient with history of neuro Behcet's, presenting with right-sided weakness, sensory symptoms, diplopia, right superior visual field deficit; Signs/Symptoms:Patient with history of neuro Behcet's, presenting with right-sided weakness, sensory symptoms, diplopia, right superior visual field deficit, check for vasculitis.   COMPARISON: MRI brain dated 11/06/2021 09/01/2021. MRI cervical spine dated 09/01/2021.   ACCESSION NUMBER(S): [id number]; [id number]; [id number]; [id number]; [id number]   ORDERING CLINICIAN: TREMAINE CABALLERO   TECHNIQUE: Diffusion, T2, FLAIR, and T1 weighted MR images of the brain were obtained.  High resolution coronal T1 and STIR images of the  orbits were obtained.  Time-of-flight MRA of the head and neck was performed. The images were reviewed as source images and maximum intensity projections.  Limited sagittal T2 weighted images were acquired through the cervical spine. The remainder of the cervical spine examination was unable to be completed secondary to patient's intolerance of exam. Evaluation of the orbits is partially limited by motion artifact on coronal imaging.   FINDINGS: MR head and orbits:   CSF Spaces: The ventricles, sulci and basal cisterns are within normal limits. No extra-axial fluid collections.   Parenchyma: There is no diffusion restriction abnormality to suggest acute infarct.  There is new/progressed confluent, extensive T2 and FLAIR hyperintensity involving the left posterior limb of the internal capsule, left thalamus, and left midbrain which partially extends into the left tyrone and mid medulla. There is minimal associated local mass effect. No midline shift or evidence of ventricular entrapment. No associated gradient echo signal to suggest hemorrhage. Findings are significantly progressed in this region from prior imaging.   Orbits: There is no intraorbital mass.  The optic nerves and optic chiasm are unremarkable.  The extraocular muscles are unremarkable. The lacrimal glands are within normal limits.   Paranasal Sinuses and Mastoids: Minimal mucosal thickening within the left ethmoid air cells otherwise visualized paranasal sinuses and mastoid air cells are unremarkable.   MRA of head:   Anterior circulation: There is expected flow signal in bilateral intracranial internal carotid arteries, bilateral carotid terminals, bilateral proximal anterior and middle cerebral arteries.   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 Otherwise, bilateral intracranial vertebral arteries,  with mild associated mass effect. Findings are nonspecific and atypical for age however given clinical history findings may represent progression of intracranial Behcet's disease.   No evidence of restricted diffusion   MR orbits:   Unremarkable MRI of the orbits.   MRA head and neck:   No evidence of major vessel cut off or significant stenosis on MRA head and neck.   MR cervical spine:   Limited sagittal T2 weighted imaging of the cervical spine demonstrates mild interval increased diffuse disc bulging at C6-C7 which partially effaces the ventral thecal sac without significant spinal canal stenosis. Otherwise, no cervical spinal cord signal abnormality.     I personally reviewed the images/study and I agree with the resident findings as stated. This study was interpreted at [place], [place].   MACRO: None   Signed by: Aubree العلي 5/15/2024 2:22 AM Dictation workstation:   [id number]    MR orbit wo IV contrast    Result Date: 5/15/2024  Interpreted By:  Aubree العلي and Marshall Colin STUDY: MR BRAIN WO IV CONTRAST; MR CERVICAL SPINE WO IV CONTRAST; MR ANGIO NECK WO IV CONTRAST; MR ANGIO HEAD WO IV CONTRAST; MR ORBIT WO IV CONTRAST;  5/15/2024 12:31 am   INDICATION: Signs/Symptoms:Patient with history of neuro Behcet's, presenting with right-sided weakness, sensory symptoms, diplopia, right superior visual field deficit; Signs/Symptoms:Patient with history of neuro Behcet's, presenting with right-sided weakness, sensory symptoms, diplopia, right superior visual field deficit, check for vasculitis.   COMPARISON: MRI brain dated 11/06/2021 09/01/2021. MRI cervical spine dated 09/01/2021.   ACCESSION NUMBER(S): [id number]; [id number]; [id number]; [id number]; [id number]   ORDERING CLINICIAN: TREMAINE CABALLERO   TECHNIQUE: Diffusion, T2, FLAIR, and T1 weighted MR images of the brain were obtained.  High resolution coronal T1 and STIR images of the  with mild associated mass effect. Findings are nonspecific and atypical for age however given clinical history findings may represent progression of intracranial Behcet's disease.   No evidence of restricted diffusion   MR orbits:   Unremarkable MRI of the orbits.   MRA head and neck:   No evidence of major vessel cut off or significant stenosis on MRA head and neck.   MR cervical spine:   Limited sagittal T2 weighted imaging of the cervical spine demonstrates mild interval increased diffuse disc bulging at C6-C7 which partially effaces the ventral thecal sac without significant spinal canal stenosis. Otherwise, no cervical spinal cord signal abnormality.     I personally reviewed the images/study and I agree with the resident findings as stated. This study was interpreted at [place], [place].   MACRO: None   Signed by: Aubree العلي 5/15/2024 2:22 AM Dictation workstation:   [id number]    MR brain wo IV contrast    Result Date: 5/15/2024  Interpreted By:  Aubree العلي and Marshall Colin STUDY: MR BRAIN WO IV CONTRAST; MR CERVICAL SPINE WO IV CONTRAST; MR ANGIO NECK WO IV CONTRAST; MR ANGIO HEAD WO IV CONTRAST; MR ORBIT WO IV CONTRAST;  5/15/2024 12:31 am   INDICATION: Signs/Symptoms:Patient with history of neuro Behcet's, presenting with right-sided weakness, sensory symptoms, diplopia, right superior visual field deficit; Signs/Symptoms:Patient with history of neuro Behcet's, presenting with right-sided weakness, sensory symptoms, diplopia, right superior visual field deficit, check for vasculitis.   COMPARISON: MRI brain dated 11/06/2021 09/01/2021. MRI cervical spine dated 09/01/2021.   ACCESSION NUMBER(S): [id number]; [id number]; [id number]; [id number]; [id number]   ORDERING CLINICIAN: TREMAINE CABALLERO   TECHNIQUE: Diffusion, T2, FLAIR, and T1 weighted MR images of the brain were obtained.  High resolution coronal T1 and STIR images of the  with mild associated mass effect. Findings are nonspecific and atypical for age however given clinical history findings may represent progression of intracranial Behcet's disease.   No evidence of restricted diffusion   MR orbits:   Unremarkable MRI of the orbits.   MRA head and neck:   No evidence of major vessel cut off or significant stenosis on MRA head and neck.   MR cervical spine:   Limited sagittal T2 weighted imaging of the cervical spine demonstrates mild interval increased diffuse disc bulging at C6-C7 which partially effaces the ventral thecal sac without significant spinal canal stenosis. Otherwise, no cervical spinal cord signal abnormality.     I personally reviewed the images/study and I agree with the resident findings as stated. This study was interpreted at [place], [place].   MACRO: None   Signed by: Aubree العلي 5/15/2024 2:22 AM Dictation workstation:   [id number]    MR cervical spine wo IV contrast    Result Date: 5/15/2024  Interpreted By:  Aubree العلي and Marshall Colin STUDY: MR BRAIN WO IV CONTRAST; MR CERVICAL SPINE WO IV CONTRAST; MR ANGIO NECK WO IV CONTRAST; MR ANGIO HEAD WO IV CONTRAST; MR ORBIT WO IV CONTRAST;  5/15/2024 12:31 am   INDICATION: Signs/Symptoms:Patient with history of neuro Behcet's, presenting with right-sided weakness, sensory symptoms, diplopia, right superior visual field deficit; Signs/Symptoms:Patient with history of neuro Behcet's, presenting with right-sided weakness, sensory symptoms, diplopia, right superior visual field deficit, check for vasculitis.   COMPARISON: MRI brain dated 11/06/2021 09/01/2021. MRI cervical spine dated 09/01/2021.   ACCESSION NUMBER(S): [id number]; [id number]; [id number]; [id number]; [id number]   ORDERING CLINICIAN: TREMAINE CABALLERO   TECHNIQUE: Diffusion, T2, FLAIR, and T1 weighted MR images of the brain were obtained.  High resolution coronal T1 and STIR images of  the orbits were obtained.  Time-of-flight MRA of the head and neck was performed. The images were reviewed as source images and maximum intensity projections.  Limited sagittal T2 weighted images were acquired through the cervical spine. The remainder of the cervical spine examination was unable to be completed secondary to patient's intolerance of exam. Evaluation of the orbits is partially limited by motion artifact on coronal imaging.   FINDINGS: MR head and orbits:   CSF Spaces: The ventricles, sulci and basal cisterns are within normal limits. No extra-axial fluid collections.   Parenchyma: There is no diffusion restriction abnormality to suggest acute infarct.  There is new/progressed confluent, extensive T2 and FLAIR hyperintensity involving the left posterior limb of the internal capsule, left thalamus, and left midbrain which partially extends into the left tyrone and mid medulla. There is minimal associated local mass effect. No midline shift or evidence of ventricular entrapment. No associated gradient echo signal to suggest hemorrhage. Findings are significantly progressed in this region from prior imaging.   Orbits: There is no intraorbital mass.  The optic nerves and optic chiasm are unremarkable.  The extraocular muscles are unremarkable. The lacrimal glands are within normal limits.   Paranasal Sinuses and Mastoids: Minimal mucosal thickening within the left ethmoid air cells otherwise visualized paranasal sinuses and mastoid air cells are unremarkable.   MRA of head:   Anterior circulation: There is expected flow signal in bilateral intracranial internal carotid arteries, bilateral carotid terminals, bilateral proximal anterior and middle cerebral arteries.   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 Otherwise, bilateral intracranial vertebral arteries,  with mild associated mass effect. Findings are nonspecific and atypical for age however given clinical history findings may represent progression of intracranial Behcet's disease.   No evidence of restricted diffusion   MR orbits:   Unremarkable MRI of the orbits.   MRA head and neck:   No evidence of major vessel cut off or significant stenosis on MRA head and neck.   MR cervical spine:   Limited sagittal T2 weighted imaging of the cervical spine demonstrates mild interval increased diffuse disc bulging at C6-C7 which partially effaces the ventral thecal sac without significant spinal canal stenosis. Otherwise, no cervical spinal cord signal abnormality.     I personally reviewed the images/study and I agree with the resident findings as stated. This study was interpreted at [facility], [place], [place].   MACRO: None   Signed by: Aubree العلي 5/15/2024 2:22 AM Dictation workstation:   [id number]    MR venography intracranial wo IV contrast    Result Date: 5/15/2024  These images are not reportable by radiology and will not be interpreted by  Radiologists.     Assessment/Plan   Active Problems:  There are no active Hospital Problems.    Mr. 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 The patient also has a prior exposure to Humira.     Patient's neurological exam is notable for right eye visual field deficit, double vision, diminished right sided sensation, and mild proximal right upper extremity weakness. MRI brain demonstrates FLAIR changes in the left midbrain/tyrone, thalamus, and internal capsule, most consistent with Bachet's attack. MRI orbits, MRV brain, and MRA head/neck are not remarkable. MRI C-spine demonstrates some incidental C6-7 disc  bulging but no cord signal changes.      #Neuro Bahcets  -admit to Neurology  -will discuss initiation of IVMP/other immunosuppressant with attending in the AM  - continue ASA 81mg daily      *Note was forwarded from initial consulting residents to an H&P with an updated plan, I did not personally see this patient*      René Camara MD

## 2024-05-15 NOTE — PROGRESS NOTES
Care Transitions Progress Note:  Plan per medical team: Behets synddrome; LP steroid  Team Members Present: NP: MD: CHANTAL: FARHAN  Dispo: home  Status: inpatient  [facility] [place]  Barriers:none  Adod: 5 days

## 2024-05-15 NOTE — PROGRESS NOTES
"Juan Marsh is a 28 y.o. male on day 0 of admission presenting with Behcet's syndrome, neurologic type (Multi).    Subjective   Juan was fatigued today after not much rest last night and his long MRI imaging session. His tiredness improved later in the morning. He reports not feeling any different from yesterday related to his diminished sensation of the R side. Also reporting leg pain related to his known history of avascular necrosis.     Leg pain limited his time in MRI yesterday. Juan asked for medication to reduce pain before next imaging session.     Objective   Last Recorded Vitals  Blood pressure 128/86, pulse 87, temperature 36.2 °C (97.2 °F), temperature source Temporal, resp. rate 16, height 1.753 m (5' 9\"), weight 59 kg (130 lb), SpO2 100%.    Physical Exam  Gen: in no acute distress  CVS: limbs warm and well-perfused  Pulm: breathing comfortably on RA  Neuro:   - Awake, alert, oriented  - Face symmetric, full EOM. Endorses horizontal diplopia upon looking to his left  - Sensation to light touch diminished on entire R side compared to L (face, UE, LE)    Medications  aspirin, 81 mg, oral, Daily  azaTHIOprine, 150 mg, oral, Daily  brimonidine, 1 drop, Right Eye, q12h  enoxaparin, 40 mg, subcutaneous, q24h  gabapentin, 300 mg, oral, q8h YONNY  gadoterate meglumine, 15 mL, intravenous, Once in imaging  [Held by provider] meloxicam, 15 mg, oral, Daily  naltrexone, 50 mg, oral, Daily  pantoprazole, 40 mg, oral, Daily  polyethylene glycol, 17 g, oral, Daily  prednisoLONE acetate, 1 drop, Right Eye, 4x daily      Labs  Results from last 72 hours   Lab Units 05/15/24  1033 05/14/24  1225   SODIUM mmol/L 138 138   POTASSIUM mmol/L 3.7 4.9   BUN mg/dL 11 13   CREATININE mg/dL 0.80 0.88   CALCIUM mg/dL 9.6 9.3   MAGNESIUM mg/dL 2.20  --       Results from last 72 hours   Lab Units 05/15/24  1033 05/14/24  1225   WBC AUTO x10*3/uL 6.5 7.9   HEMOGLOBIN g/dL 12.1* 12.2*   HEMATOCRIT % 37.8* 34.8* "   PLATELETS AUTO x10*3/uL 372 341      Results from last 72 hours   Lab Units 05/14/24  1225   AST U/L 12   ALT U/L 6*   ALK PHOS U/L 98   PROTEIN TOTAL g/dL 6.8   BILIRUBIN TOTAL mg/dL 0.3      Results from last 72 hours   Lab Units 05/14/24  1225   INR  1.0      Imaging  MR brain wo (5/14):  Extensive confluent T2/FLAIR hyperintensity involving the left  posterior limb of the internal capsule, extending into the left  thalamus and left midbrain which partially extends into the left tyrone  and medulla with mild associated mass effect. Findings are  nonspecific and atypical for age however given clinical history  findings may represent progression of intracranial Behcet's disease.  No evidence of restricted diffusion      MR orbits wo (5/14):  Unremarkable MRI of the orbits.      MRA head and neck wo (5/14):  No evidence of major vessel cut off or significant stenosis on MRA  head and neck.      MR cervical spine wo (5/14):  Limited sagittal T2 weighted imaging of the cervical spine  demonstrates mild interval increased diffuse disc bulging at C6-C7  which partially effaces the ventral thecal sac without significant  spinal canal stenosis. Otherwise, no cervical spinal cord signal  abnormality.        Assessment/Plan   Mr. 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 The patient also has prior exposure to Humira. Patient's neurological exam is notable for R eye visual field deficit, diplopia, diminished sensation on entire R side, and mild proximal right upper extremity weakness.     #Neuro Behcet's  :: 5/14 MRIs wo contrast: brain demonstrates FLAIR changes in the left midbrain/tyrone, thalamus, and internal capsule, most consistent with Behcet's attack.   ::MRI orbits, MRV  brain, and MRA head/neck are not remarkable.   ::MRI C-spine demonstrates some incidental C6-7 disc bulging but no cord signal changes.  - Order STAT MRI w contrast brain/orbits and MRV w contrast  - After MRI, STAT LP with routine values, infectious workup, priority on cytology and flow cytometry to r/o lymphoma, infectious causes  - After LP, IVMP 5 doses followed by 60 mg PO Prednisone with slow taper (10 mg every 2 weeks & maintenance 10mg)  - ASA 81mg daily  - Azathioprine 150mg daily  - Gabapentin 300mg q8h  - Toradol 30mg injection once prior to second MRI session  - Pending labs   Serum: ACE, lysozyme  Note written by FAB RUTHERFORD, MS4.     I have reviewed and edited the information above and I agree with the assessment and plan as outlined by the medical student. Edits made directly into the note as necessary.  Josy Cutler MD   PGY-2 Neurology  General [phone]

## 2024-05-15 NOTE — PROCEDURES
NEUROLOGY PROCEDURE NOTE  Procedure Name: Lumbar Puncture  Date: 5/14/24  Time of Procedure: 16:30  Proceduralist(s): Iraj Cutler  Indication(s): Hx neuro Bechet's, r/o Lymphoma,   Consented?: Yes  Pre-Procedure Verification?: Yes    The patient was positioned in lateral decubitus. The lower back was prepped and draped in the usual sterile fashion. A solution of 1% lidocaine was used to numb the region. Using landmarks, a 22-gauge spinal needle was inserted in the L4-L5 innerspace and advanced into the subarachnoid space on 2 attempt(s). The stylet was removed. Four tubes containing a total approximate volume of 13 cc of clear fluid were collected and sent for analysis. The patient tolerated the procedure well; there were no immediate complications. Instructed to lie flat for at least 2 hours.

## 2024-05-16 LAB
ALBUMIN SERPL BCP-MCNC: 4.2 G/DL (ref 3.4–5)
ANION GAP SERPL CALC-SCNC: 18 MMOL/L (ref 10–20)
APTT PPP: 52 SECONDS (ref 27–38)
BASOPHILS # BLD AUTO: 0.01 X10*3/UL (ref 0–0.1)
BASOPHILS NFR BLD AUTO: 0.1 %
BUN SERPL-MCNC: 17 MG/DL (ref 6–23)
CALCIUM SERPL-MCNC: 9.9 MG/DL (ref 8.6–10.6)
CHLORIDE SERPL-SCNC: 99 MMOL/L (ref 98–107)
CO2 SERPL-SCNC: 22 MMOL/L (ref 21–32)
CREAT SERPL-MCNC: 0.88 MG/DL (ref 0.5–1.3)
EGFRCR SERPLBLD CKD-EPI 2021: >90 ML/MIN/1.73M*2
EOSINOPHIL # BLD AUTO: 0 X10*3/UL (ref 0–0.7)
EOSINOPHIL NFR BLD AUTO: 0 %
ERYTHROCYTE [DISTWIDTH] IN BLOOD BY AUTOMATED COUNT: 15.2 % (ref 11.5–14.5)
GLUCOSE SERPL-MCNC: 151 MG/DL (ref 74–99)
HCT VFR BLD AUTO: 42.4 % (ref 41–52)
HGB BLD-MCNC: 13.7 G/DL (ref 13.5–17.5)
HLA-B51 QL: NEGATIVE
IMM GRANULOCYTES # BLD AUTO: 0.02 X10*3/UL (ref 0–0.7)
IMM GRANULOCYTES NFR BLD AUTO: 0.3 % (ref 0–0.9)
INR PPP: 1.1 (ref 0.9–1.1)
LYMPHOCYTES # BLD AUTO: 0.85 X10*3/UL (ref 1.2–4.8)
LYMPHOCYTES NFR BLD AUTO: 11.4 %
MAGNESIUM SERPL-MCNC: 2.13 MG/DL (ref 1.6–2.4)
MCH RBC QN AUTO: 29.7 PG (ref 26–34)
MCHC RBC AUTO-ENTMCNC: 32.3 G/DL (ref 32–36)
MCV RBC AUTO: 92 FL (ref 80–100)
MONOCYTES # BLD AUTO: 0.03 X10*3/UL (ref 0.1–1)
MONOCYTES NFR BLD AUTO: 0.4 %
NEUTROPHILS # BLD AUTO: 6.57 X10*3/UL (ref 1.2–7.7)
NEUTROPHILS NFR BLD AUTO: 87.8 %
NRBC BLD-RTO: 0 /100 WBCS (ref 0–0)
PATH REVIEW-CELL CT,CSF: NORMAL
PHOSPHATE SERPL-MCNC: 4.1 MG/DL (ref 2.5–4.9)
PLATELET # BLD AUTO: 415 X10*3/UL (ref 150–450)
POTASSIUM SERPL-SCNC: 4.2 MMOL/L (ref 3.5–5.3)
PROTHROMBIN TIME: 12.3 SECONDS (ref 9.8–12.8)
RBC # BLD AUTO: 4.61 X10*6/UL (ref 4.5–5.9)
SODIUM SERPL-SCNC: 135 MMOL/L (ref 136–145)
WBC # BLD AUTO: 7.5 X10*3/UL (ref 4.4–11.3)

## 2024-05-16 PROCEDURE — 2500000005 HC RX 250 GENERAL PHARMACY W/O HCPCS

## 2024-05-16 PROCEDURE — 2500000004 HC RX 250 GENERAL PHARMACY W/ HCPCS (ALT 636 FOR OP/ED)

## 2024-05-16 PROCEDURE — 36415 COLL VENOUS BLD VENIPUNCTURE: CPT | Performed by: STUDENT IN AN ORGANIZED HEALTH CARE EDUCATION/TRAINING PROGRAM

## 2024-05-16 PROCEDURE — 85610 PROTHROMBIN TIME: CPT | Performed by: STUDENT IN AN ORGANIZED HEALTH CARE EDUCATION/TRAINING PROGRAM

## 2024-05-16 PROCEDURE — 99233 SBSQ HOSP IP/OBS HIGH 50: CPT

## 2024-05-16 PROCEDURE — 80069 RENAL FUNCTION PANEL: CPT

## 2024-05-16 PROCEDURE — 83735 ASSAY OF MAGNESIUM: CPT

## 2024-05-16 PROCEDURE — 2500000001 HC RX 250 WO HCPCS SELF ADMINISTERED DRUGS (ALT 637 FOR MEDICARE OP)

## 2024-05-16 PROCEDURE — 36415 COLL VENOUS BLD VENIPUNCTURE: CPT

## 2024-05-16 PROCEDURE — 85025 COMPLETE CBC W/AUTO DIFF WBC: CPT

## 2024-05-16 PROCEDURE — 2500000001 HC RX 250 WO HCPCS SELF ADMINISTERED DRUGS (ALT 637 FOR MEDICARE OP): Performed by: STUDENT IN AN ORGANIZED HEALTH CARE EDUCATION/TRAINING PROGRAM

## 2024-05-16 PROCEDURE — 1100000001 HC PRIVATE ROOM DAILY

## 2024-05-16 RX ORDER — LIDOCAINE 560 MG/1
2 PATCH PERCUTANEOUS; TOPICAL; TRANSDERMAL DAILY
Status: DISCONTINUED | OUTPATIENT
Start: 2024-05-16 | End: 2024-05-21 | Stop reason: HOSPADM

## 2024-05-16 RX ADMIN — PREDNISOLONE ACETATE 1 DROP: 10 SUSPENSION/ DROPS OPHTHALMIC at 05:12

## 2024-05-16 RX ADMIN — METHYLPREDNISOLONE SODIUM SUCCINATE 1000 MG: 1 INJECTION, POWDER, LYOPHILIZED, FOR SOLUTION INTRAMUSCULAR; INTRAVENOUS at 14:30

## 2024-05-16 RX ADMIN — BRIMONIDINE TARTRATE 1 DROP: 2 SOLUTION/ DROPS OPHTHALMIC at 16:45

## 2024-05-16 RX ADMIN — GABAPENTIN 300 MG: 300 CAPSULE ORAL at 14:30

## 2024-05-16 RX ADMIN — AZATHIOPRINE 150 MG: 75 TABLET ORAL at 09:06

## 2024-05-16 RX ADMIN — POLYETHYLENE GLYCOL 3350 17 G: 17 POWDER, FOR SOLUTION ORAL at 09:06

## 2024-05-16 RX ADMIN — ASPIRIN 81 MG 81 MG: 81 TABLET ORAL at 09:05

## 2024-05-16 RX ADMIN — PREDNISOLONE ACETATE 1 DROP: 10 SUSPENSION/ DROPS OPHTHALMIC at 13:00

## 2024-05-16 RX ADMIN — PREDNISOLONE ACETATE 1 DROP: 10 SUSPENSION/ DROPS OPHTHALMIC at 20:24

## 2024-05-16 RX ADMIN — PANTOPRAZOLE SODIUM 40 MG: 40 TABLET, DELAYED RELEASE ORAL at 09:05

## 2024-05-16 RX ADMIN — LIDOCAINE 2 PATCH: 4 PATCH TOPICAL at 09:06

## 2024-05-16 RX ADMIN — PREDNISOLONE ACETATE 1 DROP: 10 SUSPENSION/ DROPS OPHTHALMIC at 17:00

## 2024-05-16 RX ADMIN — GABAPENTIN 300 MG: 300 CAPSULE ORAL at 21:01

## 2024-05-16 RX ADMIN — NALTREXONE HYDROCHLORIDE 50 MG: 50 TABLET, FILM COATED ORAL at 09:06

## 2024-05-16 RX ADMIN — ENOXAPARIN SODIUM 40 MG: 100 INJECTION SUBCUTANEOUS at 09:05

## 2024-05-16 RX ADMIN — BRIMONIDINE TARTRATE 1 DROP: 2 SOLUTION/ DROPS OPHTHALMIC at 04:25

## 2024-05-16 RX ADMIN — GABAPENTIN 300 MG: 300 CAPSULE ORAL at 05:12

## 2024-05-16 ASSESSMENT — COGNITIVE AND FUNCTIONAL STATUS - GENERAL
MOBILITY SCORE: 24
MOBILITY SCORE: 24
DAILY ACTIVITIY SCORE: 24

## 2024-05-16 ASSESSMENT — PAIN DESCRIPTION - LOCATION: LOCATION: BACK

## 2024-05-16 ASSESSMENT — PAIN - FUNCTIONAL ASSESSMENT: PAIN_FUNCTIONAL_ASSESSMENT: 0-10

## 2024-05-16 ASSESSMENT — PAIN SCALES - GENERAL
PAINLEVEL_OUTOF10: 4
PAINLEVEL_OUTOF10: 0 - NO PAIN

## 2024-05-16 NOTE — DOCUMENTATION CLARIFICATION NOTE
"    PATIENT:               MADELIN GEE  ACCT #:                  [id number]  MRN:                       [id number]  :                       1995  ADMIT DATE:       2024 10:32 PM  DISCH DATE:  RESPONDING PROVIDER #:        [id number]          PROVIDER RESPONSE TEXT:    Midline shift or mass effect without brain compression    CDI QUERY TEXT:    Clarification    Instruction:    Based on your assessment of the patient and the clinical information, please provide the requested documentation by clicking on the appropriate radio button and enter any additional information if prompted.    Question: Please further clarify if there is a diagnosis related to the clinical information    When answering this query, please exercise your independent professional judgment. The fact that a question is being asked, does not imply that any particular answer is desired or expected.    The patient's clinical indicators include:  Clinical Information:  Patient is a 28 year old male who presented to the ED with fight sided weakness.    Clinical Indicators:  From the MR of the brain on , \" Extensive confluent T2/FLAIR hyperintensity involving the left posterior limb of the internal capsule, extending into the left thalamus and left midbrain which partially extends into the left tyrone and medulla with mild associated mass effect. \"    Treatment:  IVMP 1 gram daily x 5 days, repeat MRI after IVMP, LP, frequent neuro checks, Imuran    Risk Factors:  Tinnitus, headache, visual defects, Behcet's disease, weakness  Options provided:  -- Brain compression non-traumatic  -- Brain compression traumatic  -- Midline shift or mass effect without brain compression  -- Other - I will add my own diagnosis  -- Refer to Clinical Documentation Reviewer    Query created by: Kirsten Reddy on 2024 8:30 AM      Electronically signed by:  SHAHNAZ RODAS MD 2024 1:31 PM          "

## 2024-05-16 NOTE — PROGRESS NOTES
"Juan Marsh is a 28 y.o. male on day 1 of admission presenting with Behcet's syndrome, neurologic type (Multi).    Subjective   No acute events overnight. Juan reports feeling similar to yesterday, still trouble with superior visual field on the R and diminished R sided sensation. Has not noticed much difference since his first IVMP dose last night.     Objective   Last Recorded Vitals  Blood pressure 112/75, pulse 93, temperature 35.9 °C (96.6 °F), temperature source Temporal, resp. rate 16, height 1.753 m (5' 9\"), weight 59 kg (130 lb), SpO2 99%.    Physical Exam  Gen: in no acute distress  CVS: limbs warm and well-perfused  Pulm: breathing comfortably on RA  Neuro:   - Awake, alert, oriented  - Face symmetric, full EOM. Mild horizontal diplopia upon looking fully to his left and right (seeing \"one and a half fingers\" when one finger is held up). R superior visual field deficit.  - Sensation to light touch diminished on entire R side compared to L (face, UE, LE)    Medications  aspirin, 81 mg, oral, Daily  azaTHIOprine, 150 mg, oral, Daily  brimonidine, 1 drop, Right Eye, q12h  enoxaparin, 40 mg, subcutaneous, q24h  gabapentin, 300 mg, oral, q8h YONNY  lidocaine, 2 patch, transdermal, Daily  methylPREDNISolone sodium succinate (PF), 1,000 mg, intravenous, q18h  naltrexone, 50 mg, oral, Daily  pantoprazole, 40 mg, oral, Daily  polyethylene glycol, 17 g, oral, Daily  prednisoLONE acetate, 1 drop, Right Eye, 4x daily    Labs  Results from last 72 hours   Lab Units 05/16/24  1117 05/15/24  1033 05/14/24  1225   SODIUM mmol/L 135* 138 138   POTASSIUM mmol/L 4.2 3.7 4.9   BUN mg/dL 17 11 13   CREATININE mg/dL 0.88 0.80 0.88   CALCIUM mg/dL 9.9 9.6 9.3   MAGNESIUM mg/dL 2.13 2.20  --       Results from last 72 hours   Lab Units 05/16/24  1126 05/15/24  1033 05/14/24  1225   WBC AUTO x10*3/uL 7.5 6.5 7.9   HEMOGLOBIN g/dL 13.7 12.1* 12.2*   HEMATOCRIT % 42.4 37.8* 34.8*   PLATELETS AUTO x10*3/uL 415 372 341    "   Results from last 72 hours   Lab Units 05/14/24  1225   AST U/L 12   ALT U/L 6*   ALK PHOS U/L 98   PROTEIN TOTAL g/dL 6.8   BILIRUBIN TOTAL mg/dL 0.3      Results from last 72 hours   Lab Units 05/16/24  1126 05/14/24  1225   INR  1.1 1.0      Imaging  MRI brain/orbit w contrast, MRV w contrast (5/15):  1. No evidence of acute ischemia or significant midline shift.  2. No evidence of dural venous sinus thrombosis.  3. Redemonstration of a mildly expansile region of T2/FLAIR signal  abnormality extending from the left thalamus, internal capsule, and  left lentiform nucleus along the left brainstem to the left medullary  pyramids which demonstrates mild patchy enhancement, not measurably  changed compared to previous contrast-enhanced MRI dated 11/06/2021.  Additional patchy enhancement within the left hippocampal region.  4. Finding is most compatible with bechet syn however low-grade  neoplasm such as primary CNS lymphoma not excluded.    Assessment/Plan   Mr. 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 The patient also has prior exposure to Humira. Patient's neurological exam is notable for R eye visual field deficit, diplopia, diminished sensation on entire R side, and mild proximal right upper extremity weakness.     Updates:  5/16 - LP completed yesterday, basic profile leukocytosis and slightly high protein, infectious workup negative so far. Received first IVMP dose last night. CT c/a/p to r/o lymphoma and sarcoidosis.    #Neuro Behcet's  ::MRI brain/orbit w contrast (5/15) - Redemonstration of a mildly expansile region of T2/FLAIR signal abnormality extending from the left thalamus, internal capsule, and  left lentiform nucleus along the left brainstem to the  left medullary  pyramids which demonstrates mild patchy enhancement, not measurably  changed compared to previous contrast-enhanced MRI dated 11/06/2021.  Additional patchy enhancement within the left hippocampal region.  ::MRI orbits, MRV brain, and MRA head/neck (all w/wo) are not remarkable.   ::MRI C-spine wo (5/14) - demonstrates some incidental C6-7 disc bulging but no cord signal changes.  -CSF (5/15): glucose 63, protein 48, RBC 0, WBC 12, 50% neutrophils  - LP completed 5/15 with routine values, infectious workup, priority on cytology and flow cytometry to r/o lymphoma, infectious causes   WBC 12, Edinson% 50, Prot 48   Biofire negative   Syph nonreactive   HSV1/2 negative   Culture/smear prelim negative  - IVMP 1g q18h, dose 1/5 - 5/15 2233  - CT chest abdomen pelvis to r/o lymphoma and sarcoidosis  - After 5th dose IVMP, start 60 mg PO Prednisone with slow taper (10 mg every 2 weeks & maintenance 10mg)  - ASA 81mg daily  - Azathioprine 150mg daily  - Gabapentin 300mg q8h  - Toradol 30mg injection once prior to second MRI session  - Pending labs   Serum: ACE, lysozyme    Note written by FAB RUTHERFORD, MS4.     I have reviewed and edited the information above and I agree with the assessment and plan as outlined by the medical student. Edits made directly into the note as necessary.  Josy Cutler MD   PGY-2 Neurology  General [phone]

## 2024-05-16 NOTE — CARE PLAN
The patient's goals for the shift include pt will remain freee from falls    The clinical goals for the shift include no increase blurry vision

## 2024-05-16 NOTE — CONSULTS
"Nutrition Initial Assessment:   Nutrition Assessment    Reason for Assessment: Admission nursing screening    Patient is a 28 y.o. male presenting with two days duration of R sided diminished sensation, headache, tinnitus, and diplopia.      PMHx  Behcet's disease, arthritis, and avascular necrosis       Nutrition History:  Energy Intake: Fair 50-75 %  Food and Nutrient History: Pt states that he was eating well at home. Since he has been here his appetite has been less. He states that the facial numbness makes it feel like he had novocaine  Food Allergies/Intolerances:  None  GI Symptoms: None  Oral Problems: None       Anthropometrics:  Height: 175.3 cm (5' 9\")   Weight: 59 kg (130 lb)   BMI (Calculated): 19.19  IBW/kg (Dietitian Calculated): 73 kg  Percent of IBW: 81 %       Weight History:   Wt Readings from Last 3 Encounters:   05/14/24 59 kg (130 lb)   11/06/23 59 kg (130 lb)   09/29/22 61.8 kg (136 lb 5 oz)      Pt states that his usual weight is between 130-140#.  He would like to gain weight but can't.     Weight Change %:   Wt fairly stable for the past several years.     Nutrition Focused Physical Exam Findings:    Subcutaneous Fat Loss:   Orbital Fat Pads: Mild-Moderate (slight dark circles and slight hollowing)  Buccal Fat Pads: Mild-Moderate (flat cheeks, minimal bounce)  Triceps: Mild-Moderate (less than ample fat tissue)  Muscle Wasting:  Temporalis: Well nourished (well-defined muscle)  Pectoralis (Clavicular Region): Well nourished (clavicle not visible)  Deltoid/Trapezius: Well nourished (rounded appearance at arm, shoulder, neck)  Interosseous: Well nourished (muscle bulges)    Nutrition Significant Labs:  CBC Trend:   Results from last 7 days   Lab Units 05/16/24  1126 05/15/24  1033 05/14/24  1225   WBC AUTO x10*3/uL 7.5 6.5 7.9   RBC AUTO x10*6/uL 4.61 4.05* 3.91*   HEMOGLOBIN g/dL 13.7 12.1* 12.2*   HEMATOCRIT % 42.4 37.8* 34.8*   MCV fL 92 93 89   PLATELETS AUTO x10*3/uL 415 372 341    , BMP " Trend:   Results from last 7 days   Lab Units 05/15/24  1033 05/14/24  1225   GLUCOSE mg/dL 90 91   CALCIUM mg/dL 9.6 9.3   SODIUM mmol/L 138 138   POTASSIUM mmol/L 3.7 4.9   CO2 mmol/L 24 24   CHLORIDE mmol/L 101 104   BUN mg/dL 11 13   CREATININE mg/dL 0.80 0.88        Nutrition Specific Medications:  Solumedrol,     I/O:   Last BM Date: 05/15/24;      Dietary Orders (From admission, onward)       Start     Ordered    05/15/24 0430  Adult diet Regular  Diet effective now        Question:  Diet type  Answer:  Regular    05/15/24 0426                     Estimated Needs:   Total Energy Estimated Needs (kCal): 2000 kCal  Method for Estimating Needs: 28kcal/kg IBW  Total Protein Estimated Needs (g): 85 g  Method for Estimating Needs: 1.2gm/kg IBW  Total Fluid Estimated Needs (mL):  (per team)        Nutrition Diagnosis   Malnutrition Diagnosis  Patient has Malnutrition Diagnosis: No (Pt is underwt with low fat mass. Muscle mass intact with no weight loss.)    Nutrition Diagnosis  Patient has Nutrition Diagnosis: Yes  Diagnosis Status (1): New  Nutrition Diagnosis 1: Underweight  Related to (1): unknown cause  As Evidenced by (1): BMI of 19..2 and 81% IBW.       Nutrition Interventions/Recommendations         Nutrition Prescription:  Individualized Nutrition Prescription Provided for : Add Ensure Plus one per day for an additional 350 calories and 13 gm pro per serving.        Nutrition Interventions:   Interventions: Meals and snacks, Medical food supplement  Goal: Consume <75% of meals and one ONS per day.      Nutrition Education:   Discussed use of high protein drinks at home to promote weight gain.  Offered product suggestions.        Nutrition Monitoring and Evaluation   Food/Nutrient Related History Monitoring  Monitoring and Evaluation Plan: Energy intake, Amount of food  Criteria: Intake adequate to meet needs    Body Composition/Growth/Weight History  Monitoring and Evaluation Plan: Weight  Criteria: prevent  weight loss/promote weight gain.      Time Spent/Follow-up Reminder:   Time Spent (min): 45 minutes  Last Date of Nutrition Visit: 05/16/24  Nutrition Follow-Up Needed?: Dietitian to reassess per policy

## 2024-05-17 ENCOUNTER — APPOINTMENT (OUTPATIENT)
Dept: RADIOLOGY | Facility: HOSPITAL | Age: 29
End: 2024-05-17
Payer: COMMERCIAL

## 2024-05-17 LAB
ACE SERPL-CCNC: 21 U/L (ref 16–85)
ALBUMIN SERPL BCP-MCNC: 3.8 G/DL (ref 3.4–5)
ANION GAP SERPL CALC-SCNC: 14 MMOL/L (ref 10–20)
BASOPHILS # BLD AUTO: 0.02 X10*3/UL (ref 0–0.1)
BASOPHILS NFR BLD AUTO: 0.1 %
BUN SERPL-MCNC: 19 MG/DL (ref 6–23)
CALCIUM SERPL-MCNC: 9.5 MG/DL (ref 8.6–10.6)
CHLORIDE SERPL-SCNC: 103 MMOL/L (ref 98–107)
CO2 SERPL-SCNC: 25 MMOL/L (ref 21–32)
CREAT SERPL-MCNC: 0.87 MG/DL (ref 0.5–1.3)
EGFRCR SERPLBLD CKD-EPI 2021: >90 ML/MIN/1.73M*2
EOSINOPHIL # BLD AUTO: 0 X10*3/UL (ref 0–0.7)
EOSINOPHIL NFR BLD AUTO: 0 %
ERYTHROCYTE [DISTWIDTH] IN BLOOD BY AUTOMATED COUNT: 15.1 % (ref 11.5–14.5)
GLUCOSE SERPL-MCNC: 117 MG/DL (ref 74–99)
HCT VFR BLD AUTO: 36.8 % (ref 41–52)
HGB BLD-MCNC: 11.9 G/DL (ref 13.5–17.5)
IMM GRANULOCYTES # BLD AUTO: 0.09 X10*3/UL (ref 0–0.7)
IMM GRANULOCYTES NFR BLD AUTO: 0.5 % (ref 0–0.9)
LYMPHOCYTES # BLD AUTO: 1.17 X10*3/UL (ref 1.2–4.8)
LYMPHOCYTES NFR BLD AUTO: 6 %
MAGNESIUM SERPL-MCNC: 2.2 MG/DL (ref 1.6–2.4)
MCH RBC QN AUTO: 29.9 PG (ref 26–34)
MCHC RBC AUTO-ENTMCNC: 32.3 G/DL (ref 32–36)
MCV RBC AUTO: 93 FL (ref 80–100)
MONOCYTES # BLD AUTO: 0.72 X10*3/UL (ref 0.1–1)
MONOCYTES NFR BLD AUTO: 3.7 %
NEUTROPHILS # BLD AUTO: 17.54 X10*3/UL (ref 1.2–7.7)
NEUTROPHILS NFR BLD AUTO: 89.7 %
NRBC BLD-RTO: 0 /100 WBCS (ref 0–0)
PHOSPHATE SERPL-MCNC: 4.8 MG/DL (ref 2.5–4.9)
PLATELET # BLD AUTO: 381 X10*3/UL (ref 150–450)
POTASSIUM SERPL-SCNC: 4.4 MMOL/L (ref 3.5–5.3)
RBC # BLD AUTO: 3.98 X10*6/UL (ref 4.5–5.9)
SODIUM SERPL-SCNC: 138 MMOL/L (ref 136–145)
WBC # BLD AUTO: 19.5 X10*3/UL (ref 4.4–11.3)

## 2024-05-17 PROCEDURE — 2500000005 HC RX 250 GENERAL PHARMACY W/O HCPCS

## 2024-05-17 PROCEDURE — 2500000004 HC RX 250 GENERAL PHARMACY W/ HCPCS (ALT 636 FOR OP/ED)

## 2024-05-17 PROCEDURE — 85025 COMPLETE CBC W/AUTO DIFF WBC: CPT

## 2024-05-17 PROCEDURE — 83735 ASSAY OF MAGNESIUM: CPT

## 2024-05-17 PROCEDURE — 74177 CT ABD & PELVIS W/CONTRAST: CPT | Performed by: STUDENT IN AN ORGANIZED HEALTH CARE EDUCATION/TRAINING PROGRAM

## 2024-05-17 PROCEDURE — 2500000001 HC RX 250 WO HCPCS SELF ADMINISTERED DRUGS (ALT 637 FOR MEDICARE OP): Performed by: STUDENT IN AN ORGANIZED HEALTH CARE EDUCATION/TRAINING PROGRAM

## 2024-05-17 PROCEDURE — 2500000001 HC RX 250 WO HCPCS SELF ADMINISTERED DRUGS (ALT 637 FOR MEDICARE OP)

## 2024-05-17 PROCEDURE — 36415 COLL VENOUS BLD VENIPUNCTURE: CPT

## 2024-05-17 PROCEDURE — 97165 OT EVAL LOW COMPLEX 30 MIN: CPT | Mod: GO

## 2024-05-17 PROCEDURE — 97161 PT EVAL LOW COMPLEX 20 MIN: CPT | Mod: GP | Performed by: PHYSICAL THERAPIST

## 2024-05-17 PROCEDURE — 71260 CT THORAX DX C+: CPT | Performed by: STUDENT IN AN ORGANIZED HEALTH CARE EDUCATION/TRAINING PROGRAM

## 2024-05-17 PROCEDURE — 86481 TB AG RESPONSE T-CELL SUSP: CPT

## 2024-05-17 PROCEDURE — 80069 RENAL FUNCTION PANEL: CPT

## 2024-05-17 PROCEDURE — 2550000001 HC RX 255 CONTRASTS: Performed by: PSYCHIATRY & NEUROLOGY

## 2024-05-17 PROCEDURE — 74177 CT ABD & PELVIS W/CONTRAST: CPT

## 2024-05-17 PROCEDURE — 1100000001 HC PRIVATE ROOM DAILY

## 2024-05-17 PROCEDURE — 99233 SBSQ HOSP IP/OBS HIGH 50: CPT

## 2024-05-17 RX ORDER — LORAZEPAM 1 MG/1
2 TABLET ORAL EVERY 2 HOUR PRN
Status: DISCONTINUED | OUTPATIENT
Start: 2024-05-17 | End: 2024-05-19

## 2024-05-17 RX ORDER — MULTIVIT-MIN/IRON FUM/FOLIC AC 7.5 MG-4
1 TABLET ORAL DAILY
Status: DISCONTINUED | OUTPATIENT
Start: 2024-05-17 | End: 2024-05-21 | Stop reason: HOSPADM

## 2024-05-17 RX ORDER — LANOLIN ALCOHOL/MO/W.PET/CERES
100 CREAM (GRAM) TOPICAL DAILY
Status: DISCONTINUED | OUTPATIENT
Start: 2024-05-20 | End: 2024-05-19

## 2024-05-17 RX ORDER — LORAZEPAM 0.5 MG/1
0.5 TABLET ORAL EVERY 2 HOUR PRN
Status: DISCONTINUED | OUTPATIENT
Start: 2024-05-17 | End: 2024-05-19

## 2024-05-17 RX ORDER — FOLIC ACID 1 MG/1
1 TABLET ORAL DAILY
Status: DISCONTINUED | OUTPATIENT
Start: 2024-05-17 | End: 2024-05-21 | Stop reason: HOSPADM

## 2024-05-17 RX ORDER — LORAZEPAM 1 MG/1
1 TABLET ORAL EVERY 2 HOUR PRN
Status: DISCONTINUED | OUTPATIENT
Start: 2024-05-17 | End: 2024-05-19

## 2024-05-17 RX ADMIN — LIDOCAINE 2 PATCH: 4 PATCH TOPICAL at 10:03

## 2024-05-17 RX ADMIN — THIAMINE HYDROCHLORIDE 500 MG: 100 INJECTION, SOLUTION INTRAMUSCULAR; INTRAVENOUS at 14:00

## 2024-05-17 RX ADMIN — NALTREXONE HYDROCHLORIDE 50 MG: 50 TABLET, FILM COATED ORAL at 10:03

## 2024-05-17 RX ADMIN — GABAPENTIN 300 MG: 300 CAPSULE ORAL at 22:10

## 2024-05-17 RX ADMIN — PANTOPRAZOLE SODIUM 40 MG: 40 TABLET, DELAYED RELEASE ORAL at 10:02

## 2024-05-17 RX ADMIN — BRIMONIDINE TARTRATE 1 DROP: 2 SOLUTION/ DROPS OPHTHALMIC at 04:21

## 2024-05-17 RX ADMIN — FOLIC ACID 1 MG: 1 TABLET ORAL at 11:46

## 2024-05-17 RX ADMIN — METHYLPREDNISOLONE SODIUM SUCCINATE 1000 MG: 1 INJECTION, POWDER, LYOPHILIZED, FOR SOLUTION INTRAMUSCULAR; INTRAVENOUS at 10:02

## 2024-05-17 RX ADMIN — ACETAMINOPHEN 650 MG: 650 SOLUTION ORAL at 22:10

## 2024-05-17 RX ADMIN — AZATHIOPRINE 150 MG: 75 TABLET ORAL at 10:03

## 2024-05-17 RX ADMIN — PREDNISOLONE ACETATE 1 DROP: 10 SUSPENSION/ DROPS OPHTHALMIC at 05:07

## 2024-05-17 RX ADMIN — THIAMINE HYDROCHLORIDE 500 MG: 100 INJECTION, SOLUTION INTRAMUSCULAR; INTRAVENOUS at 22:12

## 2024-05-17 RX ADMIN — GABAPENTIN 300 MG: 300 CAPSULE ORAL at 05:04

## 2024-05-17 RX ADMIN — GABAPENTIN 300 MG: 300 CAPSULE ORAL at 15:20

## 2024-05-17 RX ADMIN — PREDNISOLONE ACETATE 1 DROP: 10 SUSPENSION/ DROPS OPHTHALMIC at 20:02

## 2024-05-17 RX ADMIN — IOHEXOL 80 ML: 350 INJECTION, SOLUTION INTRAVENOUS at 19:40

## 2024-05-17 RX ADMIN — PREDNISOLONE ACETATE 1 DROP: 10 SUSPENSION/ DROPS OPHTHALMIC at 15:53

## 2024-05-17 RX ADMIN — BRIMONIDINE TARTRATE 1 DROP: 2 SOLUTION/ DROPS OPHTHALMIC at 15:55

## 2024-05-17 RX ADMIN — Medication 1 TABLET: at 11:46

## 2024-05-17 RX ADMIN — ASPIRIN 81 MG 81 MG: 81 TABLET ORAL at 10:02

## 2024-05-17 RX ADMIN — ENOXAPARIN SODIUM 40 MG: 100 INJECTION SUBCUTANEOUS at 10:03

## 2024-05-17 ASSESSMENT — PAIN - FUNCTIONAL ASSESSMENT
PAIN_FUNCTIONAL_ASSESSMENT: 0-10

## 2024-05-17 ASSESSMENT — LIFESTYLE VARIABLES
PAROXYSMAL SWEATS: NO SWEAT VISIBLE
NAUSEA AND VOMITING: NO NAUSEA AND NO VOMITING
TOTAL SCORE: 3
PAROXYSMAL SWEATS: NO SWEAT VISIBLE
AGITATION: NORMAL ACTIVITY
AUDITORY DISTURBANCES: NOT PRESENT
ORIENTATION AND CLOUDING OF SENSORIUM: ORIENTED AND CAN DO SERIAL ADDITIONS
VISUAL DISTURBANCES: NOT PRESENT
ORIENTATION AND CLOUDING OF SENSORIUM: ORIENTED AND CAN DO SERIAL ADDITIONS
ANXIETY: NO ANXIETY, AT EASE
NAUSEA AND VOMITING: NO NAUSEA AND NO VOMITING
HEADACHE, FULLNESS IN HEAD: NOT PRESENT
AGITATION: NORMAL ACTIVITY
PAROXYSMAL SWEATS: BARELY PERCEPTIBLE SWEATING, PALMS MOIST
ORIENTATION AND CLOUDING OF SENSORIUM: ORIENTED AND CAN DO SERIAL ADDITIONS
VISUAL DISTURBANCES: NOT PRESENT
TOTAL SCORE: 0
ANXIETY: NO ANXIETY, AT EASE
AUDITORY DISTURBANCES: NOT PRESENT
HEADACHE, FULLNESS IN HEAD: NOT PRESENT
VISUAL DISTURBANCES: NOT PRESENT
AUDITORY DISTURBANCES: NOT PRESENT
TREMOR: NO TREMOR
TREMOR: NO TREMOR
NAUSEA AND VOMITING: NO NAUSEA AND NO VOMITING
AGITATION: 2
ANXIETY: NO ANXIETY, AT EASE
TOTAL SCORE: 0
TREMOR: NO TREMOR
HEADACHE, FULLNESS IN HEAD: NOT PRESENT

## 2024-05-17 ASSESSMENT — PAIN SCALES - GENERAL
PAINLEVEL_OUTOF10: 8
PAINLEVEL_OUTOF10: 0 - NO PAIN
PAINLEVEL_OUTOF10: 0 - NO PAIN
PAINLEVEL_OUTOF10: 8

## 2024-05-17 ASSESSMENT — COGNITIVE AND FUNCTIONAL STATUS - GENERAL
MOBILITY SCORE: 24
DAILY ACTIVITIY SCORE: 24
MOBILITY SCORE: 24
DAILY ACTIVITIY SCORE: 24
MOBILITY SCORE: 24

## 2024-05-17 ASSESSMENT — ACTIVITIES OF DAILY LIVING (ADL)
ADL_ASSISTANCE: INDEPENDENT
ADL_ASSISTANCE: INDEPENDENT

## 2024-05-17 NOTE — PROGRESS NOTES
Occupational Therapy    Evaluation    Patient Name: Juan Marsh  MRN: [id number]  Today's Date: 5/17/2024  Room: [address] [place] [address]  Time Calculation  Start Time: 0922  Stop Time: 0938  Time Calculation (min): 16 min    Assessment  IP OT Assessment  OT Assessment: Pt presents with decreased R UE strength and coordination, and balance imapiring occuaptional performance of ADLs and functional mobility. Pt demonstrates near functional performance of ADLs and functional mobility, and demonstrates understanding of built up handle and hand exercises. No further acute OT needs. Pt would benefit from low intensity post d/c  Evaluation/Treatment Tolerance: Patient tolerated treatment well  End of Session Communication: Bedside nurse  End of Session Patient Position: Up in chair, Alarm off, not on at start of session  Plan:  No Skilled OT: No acute OT goals identified  OT Frequency: OT eval only  OT Discharge Recommendations: Low intensity level of continued care  Equipment Recommended upon Discharge:  (tub bench)  OT Recommended Transfer Status: Assist of 1, Stand by assist  OT - OK to Discharge: Yes (indicates completed eval and discharge recommendation)    Subjective   Current Problem:  1. Behcet's syndrome, neurologic type (Multi)          General:  Reason for Referral: presenting with two days duration of R sided diminished sensation, headache, tinnitus, and diplopia. The patient also has prior exposure to Humira. Patient's neurological exam is notable for R eye visual field deficit, diplopia, diminished sensation on entire R side, and mild proximal right upper extremity weakness.  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, arthritis, and avascular necrosis of right femoral head  Co-Treatment: PT  Co-Treatment Reason: maximize Pt safety and therapeutic success  Prior to Session Communication: Bedside  nurse  Patient Position Received: Bed, 3 rail up, Alarm off, not on at start of session  General Comment: pleasant and cooperative, agreeable to OT   Precautions:  Hearing/Visual Limitations: WFL; Pt reported improved diplopia but still slightly present  Medical Precautions: Fall precautions    Pain:  Pain Assessment  Pain Assessment: 0-10  Pain Score: 8  Pain Type: Chronic pain  Pain Location: Leg  Pain Orientation: Left, Right  Pain Interventions: Repositioned, Ambulation/increased activity  Lines/Tubes/Drains:  heplocked       Objective   Cognition:  Overall Cognitive Status: Within Functional Limits  Orientation Level: Oriented X4  Safety/Judgement: Within Functional Limits           Home Living:  Type of Home: Apartment  Lives With: Parent(s) (mother)  Home Adaptive Equipment: Crutches  Home Layout: One level, Full bath main level  Home Access: Stairs to enter with rails  Entrance Stairs-Rails: Right  Entrance Stairs-Number of Steps: 1 1/2 flights  Bathroom Shower/Tub: Tub/shower unit  Bathroom Toilet: Standard  Bathroom Equipment: None   Prior Function:  Level of [place]: Independent with ADLs and functional transfers  ADL Assistance: Independent  Homemaking Assistance: Independent  Ambulatory Assistance: Independent  Hand Dominance: Right  IADL History:  Current License: Yes  Mode of Transportation: Car  Occupation: Unemployed  Leisure and Hobbies: boxing  ADL:  Eating Deficit:  (MOD I anticipated)  Grooming Deficit:  (MOD I anticipated)  Bathing Deficit:  (MOD I anticiapted)  UE Dressing Deficit:  (IND anticipated)  LE Dressing Deficit:  (Pt donned B socks IND seated EOB)  Toileting Deficit:  (IND anticipated)  Activity Tolerance:  Endurance: Endurance does not limit participation in activity    Bed Mobility/Transfers: Bed Mobility  Bed Mobility: Yes  Bed Mobility 1  Bed Mobility 1: Supine to sitting  Level of Assistance 1: Independent  Functional Mobility  Functional Mobility Performed: Yes  Functional  Mobility 1  Comments 1: Pt performed functional mobility max household distance from bed to 1 lap in hallway to 1 hallway length to 3 steps down/up back to room supervision-SBA, Pt demonstrated slightly unsteady gait     Transfers  Transfer: Yes  Transfer 1  Transfer From 1: Bed to  Transfer to 1: Stand  Technique 1: Sit to stand  Transfer Level of Assistance 1: Independent  Trials/Comments 1: 2x  Transfers 2  Transfer From 2: Stand to  Transfer to 2: Bed  Technique 2: Stand to sit  Transfer Level of Assistance 2: Independent  Trials/Comments 2: 1x  Transfers 3  Transfer From 3: Stand to  Transfer to 3: Chair with arms  Technique 3: Stand to sit  Transfer Level of Assistance 3: Independent  Trials/Comments 3: 1x    Vision: Vision - Basic Assessment  Patient Visual Report: Diplopia (slightly, Pt reported improvement since admission)    Sensation:  Light Touch: No apparent deficits    Coordination:  Movements are Fluid and Coordinated: No  Upper Body Coordination: R UE impaired coordination  Finger to Nose:  (R UE impaired)  Rapid Alternating Movements: Intact   Hand Function:  Hand Function  Gross Grasp:  (L UE WFL, R UE gross grasp AROM WFL and strength 3/5)  Coordination:  (L UE WFL, R UE impaired)  Extremities: RUE   RUE :  (AROM WFL, grossly 4/5), LUE   LUE: Within Functional Limits, RLE   RLE : Within Functional Limits, and LLE   LLE : Within Functional Limits    Outcome Measures: [facility] Daily Activity  Putting on and taking off regular lower body clothing: None  Bathing (including washing, rinsing, drying): None  Putting on and taking off regular upper body clothing: None  Toileting, which includes using toilet, bedpan or urinal: None  Taking care of personal grooming such as brushing teeth: None  Eating Meals: None  Daily Activity - Total Score: 24         ,     OT Adult Other Outcome Measures  4AT: 4AT-    Education Documentation  Precautions, taught by Sweetie Solitario OT at 5/17/2024 10:54 AM.  Learner:  Patient  Readiness: Acceptance  Method: Explanation  Response: Verbalizes Understanding    ADL Training, taught by Sweetie Solitario OT at 5/17/2024 10:54 AM.  Learner: Patient  Readiness: Acceptance  Method: Explanation  Response: Verbalizes Understanding    Education Comments  OT educated and demo'd use of built up hand for ADLs (I.e feeding, grooming) and hand exercises with therasponge (yellow and red provided)    05/17/24 at 10:55 AM   Sweetie Solitario OT   Rehab Office: [phone]

## 2024-05-17 NOTE — PROGRESS NOTES
"Juan Marsh is a 28 y.o. male on day 2 of admission presenting with Behcet's syndrome, neurologic type (Multi).    Subjective   No acute events overnight. Juan reports feeling similar to yesterday, still trouble with superior visual field on the R and diminished R sided sensation. May have had slight improvement of double vision.     Objective   Last Recorded Vitals  Blood pressure 110/73, pulse 80, temperature 36.5 °C (97.7 °F), temperature source Temporal, resp. rate 17, height 1.753 m (5' 9\"), weight 59 kg (130 lb), SpO2 99%.    Physical Exam  Gen: in no acute distress  CVS: limbs warm and well-perfused  Pulm: breathing comfortably on RA  Neuro:   - Awake, alert, oriented  - Face symmetric, full EOM. Mild horizontal diplopia upon looking fully to his left and right, may be improving but unclear. R superior visual field deficit.  - Sensation to light touch diminished on entire R side compared to L (face, UE, LE)    Medications  aspirin, 81 mg, oral, Daily  azaTHIOprine, 150 mg, oral, Daily  brimonidine, 1 drop, Right Eye, q12h  enoxaparin, 40 mg, subcutaneous, q24h  folic acid, 1 mg, oral, Daily  gabapentin, 300 mg, oral, q8h YONNY  lidocaine, 2 patch, transdermal, Daily  methylPREDNISolone sodium succinate (PF), 1,000 mg, intravenous, q18h  multivitamin with minerals, 1 tablet, oral, Daily  naltrexone, 50 mg, oral, Daily  pantoprazole, 40 mg, oral, Daily  polyethylene glycol, 17 g, oral, Daily  prednisoLONE acetate, 1 drop, Right Eye, 4x daily  [START ON 5/20/2024] thiamine, 100 mg, oral, Daily  thiamine, 500 mg, intravenous, q8h YONNY    Labs  Results from last 72 hours   Lab Units 05/17/24  0901 05/16/24  1117 05/15/24  1033   SODIUM mmol/L 138 135* 138   POTASSIUM mmol/L 4.4 4.2 3.7   BUN mg/dL 19 17 11   CREATININE mg/dL 0.87 0.88 0.80   CALCIUM mg/dL 9.5 9.9 9.6   MAGNESIUM mg/dL 2.20 2.13 2.20      Results from last 72 hours   Lab Units 05/17/24  0901 05/16/24  1126 05/15/24  1033   WBC AUTO " x10*3/uL 19.5* 7.5 6.5   HEMOGLOBIN g/dL 11.9* 13.7 12.1*   HEMATOCRIT % 36.8* 42.4 37.8*   PLATELETS AUTO x10*3/uL 381 415 372      Results from last 72 hours   Lab Units 05/16/24  1126   INR  1.1      Imaging  No new imaging to review    Assessment/Plan   Mr. 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 The patient also has prior exposure to Humira. Patient's neurological exam is notable for R eye visual field deficit, diplopia, diminished sensation on entire R side, and mild proximal right upper extremity weakness.     Updates:  5/17 - DNA HLA-B51 negative. Awaiting CT c/a/p to r/o lymphoma and sarcoid. Third IVMP dose this morning.     5/16 - LP completed yesterday, basic profile leukocytosis and slightly high protein, infectious workup negative so far. Received first IVMP dose last night. CT c/a/p to r/o lymphoma and sarcoidosis.    #Neuro Behcet's  ::MRI brain/orbit w contrast (5/15) - Redemonstration of a mildly expansile region of T2/FLAIR signal abnormality extending from the left thalamus, internal capsule, and  left lentiform nucleus along the left brainstem to the left medullary  pyramids which demonstrates mild patchy enhancement, not measurably  changed compared to previous contrast-enhanced MRI dated 11/06/2021.  Additional patchy enhancement within the left hippocampal region.  ::MRI orbits, MRV brain, and MRA head/neck (all w/wo) are not remarkable.   ::MRI C-spine wo (5/14) - demonstrates some incidental C6-7 disc bulging but no cord signal changes.  - CSF (5/15): glucose 63, protein 48, RBC 0, WBC 12, 50% neutrophils  Biofire negative   Syph nonreactive   HSV1/2 negative   Culture/smear prelim negative   Cytology/flow cytometry pending  - IVMP 1g q18h,  dose 3/5 - 5/15 2233, 5/16 1430, 5/17 1002  - Awaiting CT chest abdomen pelvis to r/o lymphoma and sarcoidosis  - After 5th dose IVMP, start 60 mg PO Prednisone with slow taper (10 mg every 2 weeks & maintenance 10mg)  - ASA 81mg daily  - Azathioprine 150mg daily  - Gabapentin 300mg q8h  - Toradol 30mg injection once prior to second MRI session  - Pending labs   Serum: ACE, lysozyme    Note written by FAB RUTHERFORD, MS4.     I have reviewed and edited the information above and I agree with the assessment and plan as outlined by the medical student. Edits made directly into the note as necessary.  Josy Cutler MD   PGY-2 Neurology  General [phone]

## 2024-05-17 NOTE — CARE PLAN
The patient's goals for the shift include pain control    The clinical goals for the shift include pt will be free of falls

## 2024-05-17 NOTE — PROGRESS NOTES
Physical Therapy    Physical Therapy Evaluation Only    Patient Name: Juan Marsh  MRN: [id number]  Today's Date: 5/17/2024   Time Calculation  Start Time: 0922  Stop Time: 0938  Time Calculation (min): 16 min    Assessment/Plan   PT Assessment  Rehab Prognosis: Excellent  End of Session Communication: Bedside nurse  Assessment Comment: Pt. is a 27 y/o M admitted with R sided weakness, improved with IV steriods. Pt. presents at baseline for functional mobility and is safe to return home with no acute PT needs.  End of Session Patient Position: Up in chair, Alarm off, not on at start of session  IP OR SWING BED PT PLAN  Inpatient or Swing Bed: Inpatient  PT Plan  Treatment/Interventions: Bed mobility, Transfer training, Gait training, Stair training, Therapeutic activity  PT Plan: PT Eval only  PT Eval Only Reason: No acute PT needs identified  PT Frequency: PT eval only  PT Discharge Recommendations: Other (comment) (recommend outpatient PT for higher level strengthening and balance exercises.)  PT Recommended Transfer Status: Independent  PT - OK to Discharge: Yes (PT evaluation complete and DC recommendation made.)    Subjective       General Visit Information:  General  Reason for Referral: presenting with two days duration of R sided diminished sensation, headache, tinnitus, and diplopia. The patient also has prior exposure to Humira. Patient's neurological exam is notable for R eye visual field deficit, diplopia, diminished sensation on entire R side, and mild proximal right upper extremity weakness.  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 mobility and facilitate DC planning  Prior to Session Communication: Bedside nurse  Patient Position Received: Bed, 3 rail up, Alarm  off, not on at start of session  General Comment: Pt. sleeping upon arrival, woke up pleasant and agreeable to participate in session. Pt presents at functional baseline with no IP rehab needs.    Home Living:  Home Living  Type of Home: Apartment  Lives With: Parent(s) (mother)  Home Adaptive Equipment: Crutches  Home Layout: One level, Full bath main level  Home Access: Stairs to enter with rails  Entrance Stairs-Rails: Right  Entrance Stairs-Number of Steps: 1 1/2 flights  Bathroom Shower/Tub: Tub/shower unit    Prior Level of Function:  Prior Function Per Pt/Caregiver Report  Level of [place]: Independent with ADLs and functional transfers, Independent with homemaking with ambulation  ADL Assistance: Independent  Homemaking Assistance: Independent  Ambulatory Assistance: Independent (no AD)  Vocational: Unemployed  Prior Function Comments: -driving, reports 2 recent falls.    Precautions:  Precautions  Hearing/Visual Limitations: WFL; Pt reported improved diplopia but still slightly present  Medical Precautions: Fall precautions       Objective     Pain:  Pain Assessment  Pain Assessment: 0-10  Pain Score: 8  Pain Type: Chronic pain  Pain Location: Leg  Pain Orientation: Right, Left    Cognition:  Cognition  Overall Cognitive Status: Within Functional Limits  Orientation Level: Oriented X4    General Assessments:      Activity Tolerance  Endurance: Endurance does not limit participation in activity  Sensation  Light Touch: No apparent deficits        Coordination  Rapid Alternating Movements: Intact  Coordination Comment: finger opposition in tact  Postural Control  Postural Control: Within Functional Limits  Static Sitting Balance  Static Sitting-Balance Support: Feet supported  Static Sitting-Level of Assistance: Independent  Dynamic Sitting Balance  Dynamic Sitting-Balance Support: Feet supported  Dynamic Sitting-Balance:  (mod I for LE MMT)  Static Standing Balance  Static Standing-Balance Support: No  upper extremity supported  Static Standing-Level of Assistance: Modified independent  Dynamic Standing Balance  Dynamic Standing-Balance Support: No upper extremity supported  Dynamic Standing-Balance:  (mod I for hallway ambulation)    Functional Assessments:     Bed Mobility  Bed Mobility: Yes  Bed Mobility 1  Bed Mobility 1: Supine to sitting  Level of Assistance 1: Independent  Transfers  Transfer: Yes  Transfer 1  Technique 1: Sit to stand, Stand to sit  Transfer Device 1:  (no AD)  Transfer Level of Assistance 1: Independent  Transfers 2  Transfer From 2: Stand to  Transfer to 2: Chair with arms  Technique 2: Sit to stand, Stand to sit  Transfer Device 2:  (no AD)  Transfer Level of Assistance 2: Independent  Ambulation/Gait Training  Ambulation/Gait Training Performed: Yes  Ambulation/Gait Training 1  Surface 1: Level tile  Device 1: No device  Assistance 1: Modified independent  Quality of Gait 1:  (no gross gait deviations identified. No LOB/postural sway.)  Comments/Distance (ft) 1: 200 ft  Stairs  Stairs: Yes  Stairs  Rails 1: Right  Device 1: Railing  Assistance 1: Modified independent  Comment/Number of Steps 1: ascend/descend 3 steps in step over step pattern without difficulty       Extremity/Trunk Assessments:        RLE   RLE : Within Functional Limits  LLE   LLE : Within Functional Limits    Outcome Measures:  [facility] Basic Mobility  Turning from your back to your side while in a flat bed without using bedrails: None  Moving from lying on your back to sitting on the side of a flat bed without using bedrails: None  Moving to and from bed to chair (including a wheelchair): None  Standing up from a chair using your arms (e.g. wheelchair or bedside chair): None  To walk in hospital room: None  Climbing 3-5 steps with railing: None  Basic Mobility - Total Score: 24                    Education Documentation  Body Mechanics, taught by Angelita oRoney PT at 5/17/2024 12:50 PM.  Learner:  Patient  Readiness: Acceptance  Method: Explanation  Response: Verbalizes Understanding    Mobility Training, taught by Angelita Rooney PT at 5/17/2024 12:50 PM.  Learner: Patient  Readiness: Acceptance  Method: Explanation  Response: Verbalizes Understanding

## 2024-05-18 LAB
ALB CSF/SERPL: 7.6 RATIO (ref 0–9)
ALBUMIN CSF-MCNC: 26 MG/DL (ref 0–35)
ALBUMIN SERPL BCP-MCNC: 3.6 G/DL (ref 3.4–5)
ALBUMIN SERPL-MCNC: 3400 MG/DL (ref 3500–5200)
ANION GAP SERPL CALC-SCNC: 15 MMOL/L (ref 10–20)
BASOPHILS # BLD AUTO: 0.02 X10*3/UL (ref 0–0.1)
BASOPHILS NFR BLD AUTO: 0.1 %
BUN SERPL-MCNC: 24 MG/DL (ref 6–23)
CALCIUM SERPL-MCNC: 9.3 MG/DL (ref 8.6–10.6)
CHLORIDE SERPL-SCNC: 101 MMOL/L (ref 98–107)
CO2 SERPL-SCNC: 25 MMOL/L (ref 21–32)
CREAT SERPL-MCNC: 0.9 MG/DL (ref 0.5–1.3)
EGFRCR SERPLBLD CKD-EPI 2021: >90 ML/MIN/1.73M*2
EOSINOPHIL # BLD AUTO: 0 X10*3/UL (ref 0–0.7)
EOSINOPHIL NFR BLD AUTO: 0 %
ERYTHROCYTE [DISTWIDTH] IN BLOOD BY AUTOMATED COUNT: 15.3 % (ref 11.5–14.5)
GLUCOSE SERPL-MCNC: 122 MG/DL (ref 74–99)
HCT VFR BLD AUTO: 34.1 % (ref 41–52)
HGB BLD-MCNC: 11.5 G/DL (ref 13.5–17.5)
IGG CSF-MCNC: 4.3 MG/DL (ref 0–6)
IGG SERPL-MCNC: 1141 MG/DL (ref 768–1632)
IGG SYNTH RATE SER+CSF CALC-MRATE: <0 MG/D
IGG/ALB CLEAR SER+CSF-RTO: 0.49 RATIO (ref 0.28–0.66)
IGG/ALB CSF: 0.17 RATIO (ref 0.09–0.25)
IMM GRANULOCYTES # BLD AUTO: 0.12 X10*3/UL (ref 0–0.7)
IMM GRANULOCYTES NFR BLD AUTO: 0.8 % (ref 0–0.9)
LYMPHOCYTES # BLD AUTO: 0.84 X10*3/UL (ref 1.2–4.8)
LYMPHOCYTES NFR BLD AUTO: 5.5 %
LYSOZYME SERPL-MCNC: 1.24 UG/ML
MAGNESIUM SERPL-MCNC: 2.23 MG/DL (ref 1.6–2.4)
MCH RBC QN AUTO: 30.7 PG (ref 26–34)
MCHC RBC AUTO-ENTMCNC: 33.7 G/DL (ref 32–36)
MCV RBC AUTO: 91 FL (ref 80–100)
MONOCYTES # BLD AUTO: 0.14 X10*3/UL (ref 0.1–1)
MONOCYTES NFR BLD AUTO: 0.9 %
NEUTROPHILS # BLD AUTO: 14.28 X10*3/UL (ref 1.2–7.7)
NEUTROPHILS NFR BLD AUTO: 92.7 %
NRBC BLD-RTO: 0 /100 WBCS (ref 0–0)
OLIGOCLONAL BANDS CSF ELPH-IMP: ABNORMAL
OLIGOCLONAL BANDS CSF ELPH-IMP: NEGATIVE
OLIGOCLONAL BANDS CSF IEF: 0 BANDS (ref 0–1)
PHOSPHATE SERPL-MCNC: 4.6 MG/DL (ref 2.5–4.9)
PLATELET # BLD AUTO: 350 X10*3/UL (ref 150–450)
POTASSIUM SERPL-SCNC: 4.5 MMOL/L (ref 3.5–5.3)
RBC # BLD AUTO: 3.75 X10*6/UL (ref 4.5–5.9)
SODIUM SERPL-SCNC: 136 MMOL/L (ref 136–145)
WBC # BLD AUTO: 15.4 X10*3/UL (ref 4.4–11.3)

## 2024-05-18 PROCEDURE — 2500000001 HC RX 250 WO HCPCS SELF ADMINISTERED DRUGS (ALT 637 FOR MEDICARE OP)

## 2024-05-18 PROCEDURE — 99233 SBSQ HOSP IP/OBS HIGH 50: CPT

## 2024-05-18 PROCEDURE — 85025 COMPLETE CBC W/AUTO DIFF WBC: CPT

## 2024-05-18 PROCEDURE — 1100000001 HC PRIVATE ROOM DAILY

## 2024-05-18 PROCEDURE — 2500000001 HC RX 250 WO HCPCS SELF ADMINISTERED DRUGS (ALT 637 FOR MEDICARE OP): Performed by: STUDENT IN AN ORGANIZED HEALTH CARE EDUCATION/TRAINING PROGRAM

## 2024-05-18 PROCEDURE — 80069 RENAL FUNCTION PANEL: CPT

## 2024-05-18 PROCEDURE — 36415 COLL VENOUS BLD VENIPUNCTURE: CPT

## 2024-05-18 PROCEDURE — 83735 ASSAY OF MAGNESIUM: CPT

## 2024-05-18 PROCEDURE — 2500000004 HC RX 250 GENERAL PHARMACY W/ HCPCS (ALT 636 FOR OP/ED)

## 2024-05-18 PROCEDURE — 2500000005 HC RX 250 GENERAL PHARMACY W/O HCPCS

## 2024-05-18 RX ADMIN — PREDNISOLONE ACETATE 1 DROP: 10 SUSPENSION/ DROPS OPHTHALMIC at 17:10

## 2024-05-18 RX ADMIN — ASPIRIN 81 MG 81 MG: 81 TABLET ORAL at 09:30

## 2024-05-18 RX ADMIN — THIAMINE HYDROCHLORIDE 500 MG: 100 INJECTION, SOLUTION INTRAMUSCULAR; INTRAVENOUS at 15:26

## 2024-05-18 RX ADMIN — PREDNISOLONE ACETATE 1 DROP: 10 SUSPENSION/ DROPS OPHTHALMIC at 13:02

## 2024-05-18 RX ADMIN — FOLIC ACID 1 MG: 1 TABLET ORAL at 09:30

## 2024-05-18 RX ADMIN — ACETAMINOPHEN 650 MG: 325 TABLET ORAL at 20:16

## 2024-05-18 RX ADMIN — PREDNISOLONE ACETATE 1 DROP: 10 SUSPENSION/ DROPS OPHTHALMIC at 20:10

## 2024-05-18 RX ADMIN — METHYLPREDNISOLONE SODIUM SUCCINATE 1000 MG: 1 INJECTION, POWDER, LYOPHILIZED, FOR SOLUTION INTRAMUSCULAR; INTRAVENOUS at 02:17

## 2024-05-18 RX ADMIN — THIAMINE HYDROCHLORIDE 500 MG: 100 INJECTION, SOLUTION INTRAMUSCULAR; INTRAVENOUS at 05:55

## 2024-05-18 RX ADMIN — BRIMONIDINE TARTRATE 1 DROP: 2 SOLUTION/ DROPS OPHTHALMIC at 20:10

## 2024-05-18 RX ADMIN — GABAPENTIN 300 MG: 300 CAPSULE ORAL at 20:11

## 2024-05-18 RX ADMIN — AZATHIOPRINE 150 MG: 75 TABLET ORAL at 09:31

## 2024-05-18 RX ADMIN — PREDNISOLONE ACETATE 1 DROP: 10 SUSPENSION/ DROPS OPHTHALMIC at 09:32

## 2024-05-18 RX ADMIN — PANTOPRAZOLE SODIUM 40 MG: 40 TABLET, DELAYED RELEASE ORAL at 09:30

## 2024-05-18 RX ADMIN — THIAMINE HYDROCHLORIDE 500 MG: 100 INJECTION, SOLUTION INTRAMUSCULAR; INTRAVENOUS at 21:44

## 2024-05-18 RX ADMIN — NALTREXONE HYDROCHLORIDE 50 MG: 50 TABLET, FILM COATED ORAL at 09:30

## 2024-05-18 RX ADMIN — GABAPENTIN 300 MG: 300 CAPSULE ORAL at 13:02

## 2024-05-18 RX ADMIN — Medication 1 TABLET: at 09:30

## 2024-05-18 RX ADMIN — GABAPENTIN 300 MG: 300 CAPSULE ORAL at 09:40

## 2024-05-18 RX ADMIN — BRIMONIDINE TARTRATE 1 DROP: 2 SOLUTION/ DROPS OPHTHALMIC at 09:31

## 2024-05-18 RX ADMIN — METHYLPREDNISOLONE SODIUM SUCCINATE 1000 MG: 1 INJECTION, POWDER, LYOPHILIZED, FOR SOLUTION INTRAMUSCULAR; INTRAVENOUS at 20:10

## 2024-05-18 RX ADMIN — LIDOCAINE 2 PATCH: 4 PATCH TOPICAL at 09:29

## 2024-05-18 RX ADMIN — ENOXAPARIN SODIUM 40 MG: 100 INJECTION SUBCUTANEOUS at 09:32

## 2024-05-18 ASSESSMENT — PAIN SCALES - GENERAL
PAINLEVEL_OUTOF10: 0 - NO PAIN

## 2024-05-18 ASSESSMENT — LIFESTYLE VARIABLES
TREMOR: NO TREMOR
TOTAL SCORE: 0
TREMOR: NO TREMOR
HEADACHE, FULLNESS IN HEAD: NOT PRESENT
NAUSEA AND VOMITING: NO NAUSEA AND NO VOMITING
AGITATION: NORMAL ACTIVITY
PAROXYSMAL SWEATS: NO SWEAT VISIBLE
ORIENTATION AND CLOUDING OF SENSORIUM: ORIENTED AND CAN DO SERIAL ADDITIONS
TOTAL SCORE: 0
TREMOR: NO TREMOR
NAUSEA AND VOMITING: NO NAUSEA AND NO VOMITING
TOTAL SCORE: 0
PAROXYSMAL SWEATS: NO SWEAT VISIBLE
ANXIETY: NO ANXIETY, AT EASE
AGITATION: NORMAL ACTIVITY
ORIENTATION AND CLOUDING OF SENSORIUM: ORIENTED AND CAN DO SERIAL ADDITIONS
AGITATION: NORMAL ACTIVITY
HEADACHE, FULLNESS IN HEAD: NOT PRESENT
ORIENTATION AND CLOUDING OF SENSORIUM: ORIENTED AND CAN DO SERIAL ADDITIONS
ANXIETY: NO ANXIETY, AT EASE
AGITATION: NORMAL ACTIVITY
ORIENTATION AND CLOUDING OF SENSORIUM: ORIENTED AND CAN DO SERIAL ADDITIONS
AUDITORY DISTURBANCES: NOT PRESENT
NAUSEA AND VOMITING: NO NAUSEA AND NO VOMITING
AGITATION: NORMAL ACTIVITY
TOTAL SCORE: 0
PAROXYSMAL SWEATS: NO SWEAT VISIBLE
ORIENTATION AND CLOUDING OF SENSORIUM: ORIENTED AND CAN DO SERIAL ADDITIONS
NAUSEA AND VOMITING: NO NAUSEA AND NO VOMITING
TOTAL SCORE: 1
TOTAL SCORE: 0
ANXIETY: NO ANXIETY, AT EASE
AUDITORY DISTURBANCES: NOT PRESENT
TREMOR: NO TREMOR
HEADACHE, FULLNESS IN HEAD: NOT PRESENT
VISUAL DISTURBANCES: VERY MILD SENSITIVITY
AUDITORY DISTURBANCES: NOT PRESENT
ANXIETY: NO ANXIETY, AT EASE
TREMOR: NO TREMOR
ORIENTATION AND CLOUDING OF SENSORIUM: ORIENTED AND CAN DO SERIAL ADDITIONS
VISUAL DISTURBANCES: NOT PRESENT
HEADACHE, FULLNESS IN HEAD: NOT PRESENT
NAUSEA AND VOMITING: NO NAUSEA AND NO VOMITING
VISUAL DISTURBANCES: NOT PRESENT
NAUSEA AND VOMITING: NO NAUSEA AND NO VOMITING
PAROXYSMAL SWEATS: NO SWEAT VISIBLE
VISUAL DISTURBANCES: NOT PRESENT
VISUAL DISTURBANCES: NOT PRESENT
AGITATION: NORMAL ACTIVITY
AUDITORY DISTURBANCES: NOT PRESENT
HEADACHE, FULLNESS IN HEAD: NOT PRESENT
PAROXYSMAL SWEATS: NO SWEAT VISIBLE
AUDITORY DISTURBANCES: NOT PRESENT
PAROXYSMAL SWEATS: NO SWEAT VISIBLE
PULSE: 70
ANXIETY: NO ANXIETY, AT EASE
ANXIETY: NO ANXIETY, AT EASE
VISUAL DISTURBANCES: NOT PRESENT
HEADACHE, FULLNESS IN HEAD: NOT PRESENT
TREMOR: NO TREMOR
AUDITORY DISTURBANCES: NOT PRESENT
BLOOD PRESSURE: 109/71

## 2024-05-18 ASSESSMENT — COGNITIVE AND FUNCTIONAL STATUS - GENERAL
DAILY ACTIVITIY SCORE: 24
MOBILITY SCORE: 24

## 2024-05-18 ASSESSMENT — PAIN - FUNCTIONAL ASSESSMENT
PAIN_FUNCTIONAL_ASSESSMENT: 0-10
PAIN_FUNCTIONAL_ASSESSMENT: 0-10

## 2024-05-18 NOTE — CARE PLAN
The patient's goals for the shift include remain free from falls    The clinical goals for the shift include remain free of falls    Problem: Pain  Goal: Turns in bed with improved pain control throughout the shift  Outcome: Progressing  Goal: Walks with improved pain control throughout the shift  Outcome: Progressing  Goal: Free from opioid side effects throughout the shift  Outcome: Progressing     Problem: Fall/Injury  Goal: Not fall by end of shift  Outcome: Progressing  Goal: Verbalize understanding of personal risk factors for fall in the hospital  Outcome: Progressing  Goal: Verbalize understanding of risk factor reduction measures to prevent injury from fall in the home  Outcome: Progressing  Goal: Pace activities to prevent fatigue by end of the shift  Outcome: Progressing

## 2024-05-18 NOTE — HOSPITAL COURSE
Mr. 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 The patient also has prior exposure to Humira. Patient's neurological exam was notable for R eye visual field deficit, diplopia, diminished sensation on entire R side, and mild proximal right upper extremity weakness.     He was admitted to the general neurology service.  MRI brain/orbit with contrast on 5/15 showed redemonstration of a mildly expansile region of T2/FLAIR signal abnormality extending from the left thalamus, internal capsule, and left lentiform nucleus along the left brainstem to the left medullary pyramids which demonstrates mild patchy enhancement, not measurably changed compared to previous contrast-enhanced MRI dated 11/06/2021. MRI orbits, MRV brain, and MRA head/neck (all w/wo) are not remarkable. MRI C-spine wo (5/14) - demonstrates some incidental C6-7 disc bulging but no cord signal changes. LP was completed on 5/15 which revealed glucose 63, protein 48 WBC 12.  Biofire negative, syphilis, HSV 1/2, culture/smear negative.  Flow cytometry and cytology pending. ACE and lysozyme wnl.     Imaging findings were concerning for relapse of neuro Bechet vs. Lymphoma.  He was treated with a 5-dose course of Solu-Medrol from 5/15 to 5/18.  CT CAP on 5/17 showed size increase in bilateral external iliac lymph nodes and interval development of new bilateral femoral head osteonecrosis. IR consulted for lymph node biopsy however due to technically difficult biopsy, recommendation was to monitor size of lymph node with repeat imaging. Repeat MRI brain (5/20) showed interval resolution of enhancement in L thalamus/tyrone and improvement of enhancement in L cerebral peduncle.    Patient's symptoms  clinically improved, and plan for further outpatient workup and care was discussed with patient who agrees with plan.  Patient medically stable for discharge to home on 5/21.    New medications at discharge:  Mycophenolate Mofetil 500 mg twice daily  Prednisone 60 mg daily with taper 10 mg/week till stable on 10 mg/day    Follow-ups:  Neuro immunology: neuro Bechet follow up, follow up repeat iliac LN imaging in 2 months. Started on MMF, follow up biweekly CBC.  Rheumatology: post hospital discharge, follow up management of Bechet  Ophthalmology: follow up Uveitis

## 2024-05-18 NOTE — NURSING NOTE
Outcome Summary: Upon arrival at the patients bedside the patient was off the unit, shortly after the patient appeared ambulating around the unit with his street clothes on.Rn performed a head to toe assessment, findings recorded, MSP+4, GCS+=15, the patient denies pain during the shift, no noted diplopia, increased weakness, or tinnitus. The patient reports that after receiving several dosages of the IV steroids he feels much better. The patient remained free of distress, ciwa =0, q2 vitals until 1045 on 5/18, rounding ensued care transferred without incidence.

## 2024-05-18 NOTE — CARE PLAN
Problem: Pain  Goal: Turns in bed with improved pain control throughout the shift  Outcome: Progressing  Goal: Walks with improved pain control throughout the shift  Outcome: Progressing  Goal: Free from opioid side effects throughout the shift  Outcome: Progressing     Problem: Fall/Injury  Goal: Not fall by end of shift  Outcome: Progressing  Goal: Verbalize understanding of personal risk factors for fall in the hospital  Outcome: Progressing  Goal: Pace activities to prevent fatigue by end of the shift  Outcome: Progressing   T

## 2024-05-18 NOTE — PROGRESS NOTES
"Juan Marsh is a 28 y.o. male on day 3 of admission presenting with Behcet's syndrome, neurologic type (Multi).    Subjective   No acute events overnight. Juan reports both his R sided numbness and R eye visual field deficit are \"a little bit\" better. Received his fourth dose IVMP this morning at 0217. Last dose due tonight at 2000.     Discussed with Juan the results of his CT chest/abdomen/pelvis and our plan of lymph node biopsy.     Objective   Last Recorded Vitals  Blood pressure 110/73, pulse 83, temperature 36.7 °C (98.1 °F), temperature source Temporal, resp. rate 22, height 1.753 m (5' 9\"), weight 59 kg (130 lb), SpO2 100%.    Physical Exam  Gen: in no acute distress  CVS: limbs warm and well-perfused  Pulm: breathing comfortably on RA  Neuro:   - Awake, alert, oriented  - Face symmetric, full EOM. Mild horizontal diplopia upon looking fully to his left and right, may be improving but unclear. R superior visual field deficit.  - Sensation to light touch diminished on entire R side compared to L (face, UE, LE)    Medications  aspirin, 81 mg, oral, Daily  azaTHIOprine, 150 mg, oral, Daily  brimonidine, 1 drop, Right Eye, q12h  enoxaparin, 40 mg, subcutaneous, q24h  folic acid, 1 mg, oral, Daily  gabapentin, 300 mg, oral, q8h YONNY  lidocaine, 2 patch, transdermal, Daily  methylPREDNISolone sodium succinate (PF), 1,000 mg, intravenous, q18h  multivitamin with minerals, 1 tablet, oral, Daily  naltrexone, 50 mg, oral, Daily  pantoprazole, 40 mg, oral, Daily  polyethylene glycol, 17 g, oral, Daily  prednisoLONE acetate, 1 drop, Right Eye, 4x daily  [START ON 5/20/2024] thiamine, 100 mg, oral, Daily  thiamine, 500 mg, intravenous, q8h YONNY    Labs  Results from last 72 hours   Lab Units 05/18/24  0859 05/17/24  0901 05/16/24  1117   SODIUM mmol/L 136 138 135*   POTASSIUM mmol/L 4.5 4.4 4.2   BUN mg/dL 24* 19 17   CREATININE mg/dL 0.90 0.87 0.88   CALCIUM mg/dL 9.3 9.5 9.9   MAGNESIUM mg/dL 2.23 2.20 " 2.13      Results from last 72 hours   Lab Units 05/18/24  0859 05/17/24  0901 05/16/24  1126   WBC AUTO x10*3/uL 15.4* 19.5* 7.5   HEMOGLOBIN g/dL 11.5* 11.9* 13.7   HEMATOCRIT % 34.1* 36.8* 42.4   PLATELETS AUTO x10*3/uL 350 381 415      Results from last 72 hours   Lab Units 05/16/24  1126   INR  1.1      Imaging  CT chest/abdomen/pelvis (5/17):   CHEST:  1.  No evidence intrathoracic lymphadenopathy or mass.      ABDOMEN-PELVIS:  1.  Slight interval increase in size of prominent bilateral external  iliac lymph nodes compared to 05/25/2021 CT, measuring up to 1.6 x  1.0 cm as described, which can be reactive.  2. Otherwise, no evidence of abdominopelvic lymphadenopathy or mass.  No hepatosplenomegaly.  3. Indeterminate trace free pelvic fluid.  4. Interval development of new bilateral femoral head osteonecrosis,  compared to 05/25/2021 CT.    Assessment/Plan   Mr. 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 The patient also has prior exposure to Humira. Patient's neurological exam is notable for R eye visual field deficit, diplopia, diminished sensation on entire R side, and mild proximal right upper extremity weakness.     Updates:  5/18 - Fourth dose IVMP this morning. CT c/a/p showed size increase in bilateral external iliac lymph nodes and interval development of new bilateral femoral head osteonecrosis. Ordered lymph node biopsy. Repeat MRI brain to be completed after last IVMP dose.    5/17 - DNA HLA-B51 negative. Awaiting CT c/a/p to r/o lymphoma and sarcoid. Third IVMP dose this morning.     5/16 - LP completed yesterday, basic profile leukocytosis and slightly high protein, infectious workup negative so far. Received first IVMP dose last night. CT c/a/p to r/o  lymphoma and sarcoidosis.    #Neuro Behcet's  ::MRI brain/orbit w contrast (5/15) - Redemonstration of a mildly expansile region of T2/FLAIR signal abnormality extending from the left thalamus, internal capsule, and  left lentiform nucleus along the left brainstem to the left medullary  pyramids which demonstrates mild patchy enhancement, not measurably  changed compared to previous contrast-enhanced MRI dated 11/06/2021.  Additional patchy enhancement within the left hippocampal region.  ::MRI orbits, MRV brain, and MRA head/neck (all w/wo) are not remarkable.   ::MRI C-spine wo (5/14) - demonstrates some incidental C6-7 disc bulging but no cord signal changes.  ::CT chest/abdomen/pelvis (5/17) - size increase in bilateral external iliac lymph nodes and interval development of new bilateral femoral head osteonecrosis  - CSF (5/15): glucose 63, protein 48, RBC 0, WBC 12, 50% neutrophils  Biofire negative   Syph nonreactive   HSV1/2 negative   Culture/smear prelim negative   Cytology/flow cytometry pending  - IVMP 1g q18h, dose 4/5 - 5/15 2233, 5/16 1430, 5/17 1002, 5/18 0217  - After 5th dose IVMP, start 60 mg PO Prednisone with taper (10 mg every week & maintenance 10mg) - faster taper than originally planned due to osteonecrosis on CT  - Repeat MRI brain ordered for 5/19 AM (after last dose of IVMP course)  Medications  - ASA 81mg daily  - Azathioprine 150mg daily  - Gabapentin 300mg q8h  - Sarcoidosis labs   Serum: ACE wnl, lysozyme pending    #Hx daily EtOH use  - Continue CIWA for now    Note written by FAB RUTHERFORD, MS4.     I have reviewed and edited the information above and I agree with the assessment and plan as outlined by the medical student. Edits made directly into the note as necessary.  Josy Cutler MD   PGY-2 Neurology  General [phone]

## 2024-05-19 LAB
ALBUMIN SERPL BCP-MCNC: 3.7 G/DL (ref 3.4–5)
ANION GAP SERPL CALC-SCNC: 15 MMOL/L (ref 10–20)
BASOPHILS # BLD AUTO: 0.01 X10*3/UL (ref 0–0.1)
BASOPHILS NFR BLD AUTO: 0.1 %
BUN SERPL-MCNC: 18 MG/DL (ref 6–23)
CALCIUM SERPL-MCNC: 9.3 MG/DL (ref 8.6–10.6)
CHLORIDE SERPL-SCNC: 100 MMOL/L (ref 98–107)
CO2 SERPL-SCNC: 28 MMOL/L (ref 21–32)
CREAT SERPL-MCNC: 0.85 MG/DL (ref 0.5–1.3)
EGFRCR SERPLBLD CKD-EPI 2021: >90 ML/MIN/1.73M*2
EOSINOPHIL # BLD AUTO: 0 X10*3/UL (ref 0–0.7)
EOSINOPHIL NFR BLD AUTO: 0 %
ERYTHROCYTE [DISTWIDTH] IN BLOOD BY AUTOMATED COUNT: 14.7 % (ref 11.5–14.5)
GLUCOSE SERPL-MCNC: 99 MG/DL (ref 74–99)
HCT VFR BLD AUTO: 33.1 % (ref 41–52)
HGB BLD-MCNC: 11.6 G/DL (ref 13.5–17.5)
IMM GRANULOCYTES # BLD AUTO: 0.06 X10*3/UL (ref 0–0.7)
IMM GRANULOCYTES NFR BLD AUTO: 0.5 % (ref 0–0.9)
LYMPHOCYTES # BLD AUTO: 1.12 X10*3/UL (ref 1.2–4.8)
LYMPHOCYTES NFR BLD AUTO: 8.7 %
MAGNESIUM SERPL-MCNC: 2.3 MG/DL (ref 1.6–2.4)
MCH RBC QN AUTO: 30.9 PG (ref 26–34)
MCHC RBC AUTO-ENTMCNC: 35 G/DL (ref 32–36)
MCV RBC AUTO: 88 FL (ref 80–100)
MONOCYTES # BLD AUTO: 0.48 X10*3/UL (ref 0.1–1)
MONOCYTES NFR BLD AUTO: 3.7 %
NEUTROPHILS # BLD AUTO: 11.15 X10*3/UL (ref 1.2–7.7)
NEUTROPHILS NFR BLD AUTO: 87 %
NIL(NEG) CONTROL SPOT COUNT: NORMAL
NRBC BLD-RTO: 0 /100 WBCS (ref 0–0)
PANEL A SPOT COUNT: 0
PANEL B SPOT COUNT: 0
PHOSPHATE SERPL-MCNC: 4.6 MG/DL (ref 2.5–4.9)
PLATELET # BLD AUTO: 338 X10*3/UL (ref 150–450)
POS CONTROL SPOT COUNT: NORMAL
POTASSIUM SERPL-SCNC: 4.6 MMOL/L (ref 3.5–5.3)
RBC # BLD AUTO: 3.76 X10*6/UL (ref 4.5–5.9)
SODIUM SERPL-SCNC: 138 MMOL/L (ref 136–145)
T-SPOT. TB INTERPRETATION: NEGATIVE
WBC # BLD AUTO: 12.8 X10*3/UL (ref 4.4–11.3)

## 2024-05-19 PROCEDURE — 2500000004 HC RX 250 GENERAL PHARMACY W/ HCPCS (ALT 636 FOR OP/ED)

## 2024-05-19 PROCEDURE — 2500000005 HC RX 250 GENERAL PHARMACY W/O HCPCS

## 2024-05-19 PROCEDURE — 2500000001 HC RX 250 WO HCPCS SELF ADMINISTERED DRUGS (ALT 637 FOR MEDICARE OP)

## 2024-05-19 PROCEDURE — 1100000001 HC PRIVATE ROOM DAILY

## 2024-05-19 PROCEDURE — 83735 ASSAY OF MAGNESIUM: CPT

## 2024-05-19 PROCEDURE — 36415 COLL VENOUS BLD VENIPUNCTURE: CPT

## 2024-05-19 PROCEDURE — 85025 COMPLETE CBC W/AUTO DIFF WBC: CPT

## 2024-05-19 PROCEDURE — 2500000001 HC RX 250 WO HCPCS SELF ADMINISTERED DRUGS (ALT 637 FOR MEDICARE OP): Performed by: STUDENT IN AN ORGANIZED HEALTH CARE EDUCATION/TRAINING PROGRAM

## 2024-05-19 PROCEDURE — 99233 SBSQ HOSP IP/OBS HIGH 50: CPT

## 2024-05-19 PROCEDURE — 80069 RENAL FUNCTION PANEL: CPT

## 2024-05-19 RX ADMIN — GABAPENTIN 300 MG: 300 CAPSULE ORAL at 21:11

## 2024-05-19 RX ADMIN — AZATHIOPRINE 150 MG: 75 TABLET ORAL at 09:01

## 2024-05-19 RX ADMIN — PREDNISOLONE ACETATE 1 DROP: 10 SUSPENSION/ DROPS OPHTHALMIC at 20:38

## 2024-05-19 RX ADMIN — LIDOCAINE 2 PATCH: 4 PATCH TOPICAL at 09:00

## 2024-05-19 RX ADMIN — GABAPENTIN 300 MG: 300 CAPSULE ORAL at 13:03

## 2024-05-19 RX ADMIN — PREDNISOLONE ACETATE 1 DROP: 10 SUSPENSION/ DROPS OPHTHALMIC at 13:03

## 2024-05-19 RX ADMIN — GABAPENTIN 300 MG: 300 CAPSULE ORAL at 06:31

## 2024-05-19 RX ADMIN — THIAMINE HYDROCHLORIDE 500 MG: 100 INJECTION, SOLUTION INTRAMUSCULAR; INTRAVENOUS at 06:31

## 2024-05-19 RX ADMIN — ENOXAPARIN SODIUM 40 MG: 100 INJECTION SUBCUTANEOUS at 09:00

## 2024-05-19 RX ADMIN — ASPIRIN 81 MG 81 MG: 81 TABLET ORAL at 09:01

## 2024-05-19 RX ADMIN — Medication 1 TABLET: at 09:01

## 2024-05-19 RX ADMIN — PANTOPRAZOLE SODIUM 40 MG: 40 TABLET, DELAYED RELEASE ORAL at 09:01

## 2024-05-19 RX ADMIN — PREDNISOLONE ACETATE 1 DROP: 10 SUSPENSION/ DROPS OPHTHALMIC at 16:29

## 2024-05-19 RX ADMIN — PREDNISOLONE ACETATE 1 DROP: 10 SUSPENSION/ DROPS OPHTHALMIC at 06:31

## 2024-05-19 RX ADMIN — FOLIC ACID 1 MG: 1 TABLET ORAL at 09:01

## 2024-05-19 RX ADMIN — ACETAMINOPHEN 650 MG: 325 TABLET ORAL at 20:37

## 2024-05-19 RX ADMIN — BRIMONIDINE TARTRATE 1 DROP: 2 SOLUTION/ DROPS OPHTHALMIC at 09:00

## 2024-05-19 RX ADMIN — BRIMONIDINE TARTRATE 1 DROP: 2 SOLUTION/ DROPS OPHTHALMIC at 20:34

## 2024-05-19 RX ADMIN — NALTREXONE HYDROCHLORIDE 50 MG: 50 TABLET, FILM COATED ORAL at 09:01

## 2024-05-19 ASSESSMENT — COGNITIVE AND FUNCTIONAL STATUS - GENERAL
DAILY ACTIVITIY SCORE: 24
MOBILITY SCORE: 24
DAILY ACTIVITIY SCORE: 24
MOBILITY SCORE: 24
MOBILITY SCORE: 24
DAILY ACTIVITIY SCORE: 24

## 2024-05-19 ASSESSMENT — PAIN SCALES - GENERAL
PAINLEVEL_OUTOF10: 3
PAINLEVEL_OUTOF10: 0 - NO PAIN

## 2024-05-19 ASSESSMENT — PAIN - FUNCTIONAL ASSESSMENT: PAIN_FUNCTIONAL_ASSESSMENT: 0-10

## 2024-05-19 ASSESSMENT — PAIN DESCRIPTION - LOCATION: LOCATION: HEAD

## 2024-05-19 NOTE — PROGRESS NOTES
"Juan Marsh is a 28 y.o. male on day 4 of admission presenting with Behcet's syndrome, neurologic type (Multi).    Subjective   NAEON. R sided numbness and R eye visual field deficit are improving.    Objective   Last Recorded Vitals  Blood pressure 122/67, pulse 88, temperature 36.4 °C (97.5 °F), resp. rate 17, height 1.753 m (5' 9\"), weight 59 kg (130 lb), SpO2 96%.    Physical Exam  Gen: in no acute distress  CVS: limbs warm and well-perfused  Pulm: breathing comfortably on RA  Neuro:   - Awake, alert, oriented  - Face symmetric, full EOM. Mild horizontal diplopia upon looking fully to his left and right, may be improving but unclear. R superior visual field deficit.  - Sensation to light touch diminished on entire R side compared to L (face, UE, LE)  - Motor: 4/5 R  strength, 4+/5 RUE flexion and extension, 5/5 LUE    Medications  aspirin, 81 mg, oral, Daily  azaTHIOprine, 150 mg, oral, Daily  brimonidine, 1 drop, Right Eye, q12h  enoxaparin, 40 mg, subcutaneous, q24h  folic acid, 1 mg, oral, Daily  gabapentin, 300 mg, oral, q8h YONNY  lidocaine, 2 patch, transdermal, Daily  multivitamin with minerals, 1 tablet, oral, Daily  naltrexone, 50 mg, oral, Daily  pantoprazole, 40 mg, oral, Daily  polyethylene glycol, 17 g, oral, Daily  prednisoLONE acetate, 1 drop, Right Eye, 4x daily  [START ON 5/20/2024] thiamine, 100 mg, oral, Daily  thiamine, 500 mg, intravenous, q8h YONNY    Labs  Results from last 72 hours   Lab Units 05/18/24  0859 05/17/24  0901   SODIUM mmol/L 136 138   POTASSIUM mmol/L 4.5 4.4   BUN mg/dL 24* 19   CREATININE mg/dL 0.90 0.87   CALCIUM mg/dL 9.3 9.5   MAGNESIUM mg/dL 2.23 2.20      Results from last 72 hours   Lab Units 05/18/24  0859 05/17/24  0901 05/16/24  1126   WBC AUTO x10*3/uL 15.4* 19.5* 7.5   HEMOGLOBIN g/dL 11.5* 11.9* 13.7   HEMATOCRIT % 34.1* 36.8* 42.4   PLATELETS AUTO x10*3/uL 350 381 415      Results from last 72 hours   Lab Units 05/16/24  1126   INR  1.1    "   Imaging  CT chest/abdomen/pelvis (5/17):   CHEST:  1.  No evidence intrathoracic lymphadenopathy or mass.      ABDOMEN-PELVIS:  1.  Slight interval increase in size of prominent bilateral external  iliac lymph nodes compared to 05/25/2021 CT, measuring up to 1.6 x  1.0 cm as described, which can be reactive.  2. Otherwise, no evidence of abdominopelvic lymphadenopathy or mass.  No hepatosplenomegaly.  3. Indeterminate trace free pelvic fluid.  4. Interval development of new bilateral femoral head osteonecrosis,  compared to 05/25/2021 CT.    Assessment/Plan   Mr. 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 The patient also has prior exposure to Humira. Patient's neurological exam is notable for R eye visual field deficit, diplopia, diminished sensation on entire R side, and mild proximal right upper extremity weakness.     Updates:  5/19 - Last dose IVMP. Repeat MRI brain tomorrow. Lymph node biopsy pending. Will consider initiating MMF 500mg BID pending results of biopsy. Follow up outpatient with Dr. Ulloa and rheumatology, referrals placed. Will discuss utility of IVIG vs PLEX. Discuss utility of rituximab vs. Infliximab if no improvement. CIWA discontinued.    5/18 - Fourth dose IVMP this morning. CT c/a/p showed size increase in bilateral external iliac lymph nodes and interval development of new bilateral femoral head osteonecrosis. Ordered lymph node biopsy. Repeat MRI brain to be completed after last IVMP dose.    5/17 - DNA HLA-B51 negative. Awaiting CT c/a/p to r/o lymphoma and sarcoid. Third IVMP dose this morning.     5/16 - LP completed yesterday, basic profile leukocytosis and slightly high protein, infectious workup negative so far. Received first IVMP  dose last night. CT c/a/p to r/o lymphoma and sarcoidosis.    #Neuro Behcet's  ::MRI brain/orbit w contrast (5/15) - Redemonstration of a mildly expansile region of T2/FLAIR signal abnormality extending from the left thalamus, internal capsule, and  left lentiform nucleus along the left brainstem to the left medullary  pyramids which demonstrates mild patchy enhancement, not measurably  changed compared to previous contrast-enhanced MRI dated 11/06/2021.  Additional patchy enhancement within the left hippocampal region.  ::MRI orbits, MRV brain, and MRA head/neck (all w/wo) are not remarkable.   ::MRI C-spine wo (5/14) - demonstrates some incidental C6-7 disc bulging but no cord signal changes.  ::CT chest/abdomen/pelvis (5/17) - size increase in bilateral external iliac lymph nodes and interval development of new bilateral femoral head osteonecrosis  - CSF (5/15): glucose 63, protein 48, RBC 0, WBC 12, 50% neutrophils  Biofire negative   Syph nonreactive   HSV1/2 negative   Culture/smear prelim negative   Cytology/flow cytometry pending  - IVMP 1g q18h, dose 4/5 - 5/15 2233, 5/16 1430, 5/17 1002, 5/18 0217  - After 5th dose IVMP, start 60 mg PO Prednisone with taper (10 mg every week & maintenance 10mg) - faster taper than originally planned due to osteonecrosis on CT  - Repeat MRI brain ordered for 5/19 AM (after last dose of IVMP course)  Medications  - ASA 81mg daily  - Azathioprine 150mg daily  - Gabapentin 300mg q8h  - Sarcoidosis labs   Serum: ACE wnl, lysozyme pending    #Hx daily EtOH use  - Continue CIWA for now    Arnold Archuleta MD   PGY-1 Neurology  General [phone]

## 2024-05-20 ENCOUNTER — APPOINTMENT (OUTPATIENT)
Dept: RADIOLOGY | Facility: HOSPITAL | Age: 29
End: 2024-05-20
Payer: COMMERCIAL

## 2024-05-20 LAB
ALBUMIN SERPL BCP-MCNC: 3.5 G/DL (ref 3.4–5)
ANION GAP SERPL CALC-SCNC: 13 MMOL/L (ref 10–20)
BASOPHILS # BLD AUTO: 0 X10*3/UL (ref 0–0.1)
BASOPHILS NFR BLD AUTO: 0 %
BUN SERPL-MCNC: 21 MG/DL (ref 6–23)
CALCIUM SERPL-MCNC: 8.9 MG/DL (ref 8.6–10.6)
CHLORIDE SERPL-SCNC: 102 MMOL/L (ref 98–107)
CO2 SERPL-SCNC: 29 MMOL/L (ref 21–32)
CREAT SERPL-MCNC: 0.88 MG/DL (ref 0.5–1.3)
EGFRCR SERPLBLD CKD-EPI 2021: >90 ML/MIN/1.73M*2
EOSINOPHIL # BLD AUTO: 0.01 X10*3/UL (ref 0–0.7)
EOSINOPHIL NFR BLD AUTO: 0.1 %
ERYTHROCYTE [DISTWIDTH] IN BLOOD BY AUTOMATED COUNT: 15.4 % (ref 11.5–14.5)
GLUCOSE BLD MANUAL STRIP-MCNC: 154 MG/DL (ref 74–99)
GLUCOSE SERPL-MCNC: 77 MG/DL (ref 74–99)
HCT VFR BLD AUTO: 35.8 % (ref 41–52)
HGB BLD-MCNC: 11.4 G/DL (ref 13.5–17.5)
IMM GRANULOCYTES # BLD AUTO: 0.04 X10*3/UL (ref 0–0.7)
IMM GRANULOCYTES NFR BLD AUTO: 0.4 % (ref 0–0.9)
LYMPHOCYTES # BLD AUTO: 4.23 X10*3/UL (ref 1.2–4.8)
LYMPHOCYTES NFR BLD AUTO: 38.9 %
MAGNESIUM SERPL-MCNC: 2.34 MG/DL (ref 1.6–2.4)
MCH RBC QN AUTO: 30.2 PG (ref 26–34)
MCHC RBC AUTO-ENTMCNC: 31.8 G/DL (ref 32–36)
MCV RBC AUTO: 95 FL (ref 80–100)
MONOCYTES # BLD AUTO: 0.46 X10*3/UL (ref 0.1–1)
MONOCYTES NFR BLD AUTO: 4.2 %
NEUTROPHILS # BLD AUTO: 6.13 X10*3/UL (ref 1.2–7.7)
NEUTROPHILS NFR BLD AUTO: 56.4 %
NRBC BLD-RTO: 0 /100 WBCS (ref 0–0)
PHOSPHATE SERPL-MCNC: 3.8 MG/DL (ref 2.5–4.9)
PLATELET # BLD AUTO: 355 X10*3/UL (ref 150–450)
POTASSIUM SERPL-SCNC: 3.9 MMOL/L (ref 3.5–5.3)
RBC # BLD AUTO: 3.77 X10*6/UL (ref 4.5–5.9)
SODIUM SERPL-SCNC: 140 MMOL/L (ref 136–145)
WBC # BLD AUTO: 10.9 X10*3/UL (ref 4.4–11.3)

## 2024-05-20 PROCEDURE — 36415 COLL VENOUS BLD VENIPUNCTURE: CPT

## 2024-05-20 PROCEDURE — 70553 MRI BRAIN STEM W/O & W/DYE: CPT | Performed by: RADIOLOGY

## 2024-05-20 PROCEDURE — 70553 MRI BRAIN STEM W/O & W/DYE: CPT

## 2024-05-20 PROCEDURE — 85025 COMPLETE CBC W/AUTO DIFF WBC: CPT

## 2024-05-20 PROCEDURE — 2500000004 HC RX 250 GENERAL PHARMACY W/ HCPCS (ALT 636 FOR OP/ED)

## 2024-05-20 PROCEDURE — 80069 RENAL FUNCTION PANEL: CPT

## 2024-05-20 PROCEDURE — 2500000001 HC RX 250 WO HCPCS SELF ADMINISTERED DRUGS (ALT 637 FOR MEDICARE OP)

## 2024-05-20 PROCEDURE — A9575 INJ GADOTERATE MEGLUMI 0.1ML: HCPCS | Performed by: STUDENT IN AN ORGANIZED HEALTH CARE EDUCATION/TRAINING PROGRAM

## 2024-05-20 PROCEDURE — 2500000005 HC RX 250 GENERAL PHARMACY W/O HCPCS

## 2024-05-20 PROCEDURE — 99232 SBSQ HOSP IP/OBS MODERATE 35: CPT

## 2024-05-20 PROCEDURE — 2550000001 HC RX 255 CONTRASTS: Performed by: STUDENT IN AN ORGANIZED HEALTH CARE EDUCATION/TRAINING PROGRAM

## 2024-05-20 PROCEDURE — 2500000001 HC RX 250 WO HCPCS SELF ADMINISTERED DRUGS (ALT 637 FOR MEDICARE OP): Performed by: STUDENT IN AN ORGANIZED HEALTH CARE EDUCATION/TRAINING PROGRAM

## 2024-05-20 PROCEDURE — 83735 ASSAY OF MAGNESIUM: CPT

## 2024-05-20 PROCEDURE — 1100000001 HC PRIVATE ROOM DAILY

## 2024-05-20 RX ORDER — PREDNISONE 20 MG/1
60 TABLET ORAL DAILY
Status: DISCONTINUED | OUTPATIENT
Start: 2024-05-20 | End: 2024-05-21 | Stop reason: HOSPADM

## 2024-05-20 RX ORDER — PREDNISONE 10 MG/1
10 TABLET ORAL DAILY
Status: DISCONTINUED | OUTPATIENT
Start: 2024-07-01 | End: 2024-05-20

## 2024-05-20 RX ORDER — GADOTERATE MEGLUMINE 376.9 MG/ML
12 INJECTION INTRAVENOUS
Status: COMPLETED | OUTPATIENT
Start: 2024-05-20 | End: 2024-05-20

## 2024-05-20 RX ORDER — PREDNISONE 10 MG/1
10 TABLET ORAL DAILY
Status: DISCONTINUED | OUTPATIENT
Start: 2024-06-24 | End: 2024-05-21 | Stop reason: HOSPADM

## 2024-05-20 RX ORDER — PREDNISONE 20 MG/1
20 TABLET ORAL DAILY
Status: DISCONTINUED | OUTPATIENT
Start: 2024-06-17 | End: 2024-05-20

## 2024-05-20 RX ORDER — PREDNISONE 20 MG/1
40 TABLET ORAL DAILY
Status: DISCONTINUED | OUTPATIENT
Start: 2024-06-17 | End: 2024-05-20

## 2024-05-20 RX ORDER — PREDNISONE 20 MG/1
40 TABLET ORAL DAILY
Status: DISCONTINUED | OUTPATIENT
Start: 2024-06-03 | End: 2024-05-21 | Stop reason: HOSPADM

## 2024-05-20 RX ORDER — PREDNISONE 20 MG/1
20 TABLET ORAL DAILY
Status: DISCONTINUED | OUTPATIENT
Start: 2024-06-17 | End: 2024-05-21 | Stop reason: HOSPADM

## 2024-05-20 RX ORDER — PREDNISONE 20 MG/1
60 TABLET ORAL DAILY
Status: DISCONTINUED | OUTPATIENT
Start: 2024-05-20 | End: 2024-05-20

## 2024-05-20 RX ADMIN — ENOXAPARIN SODIUM 40 MG: 100 INJECTION SUBCUTANEOUS at 08:38

## 2024-05-20 RX ADMIN — PREDNISOLONE ACETATE 1 DROP: 10 SUSPENSION/ DROPS OPHTHALMIC at 05:31

## 2024-05-20 RX ADMIN — ACETAMINOPHEN 650 MG: 325 TABLET ORAL at 21:58

## 2024-05-20 RX ADMIN — PREDNISOLONE ACETATE 1 DROP: 10 SUSPENSION/ DROPS OPHTHALMIC at 16:23

## 2024-05-20 RX ADMIN — GABAPENTIN 300 MG: 300 CAPSULE ORAL at 21:58

## 2024-05-20 RX ADMIN — LIDOCAINE 2 PATCH: 4 PATCH TOPICAL at 08:38

## 2024-05-20 RX ADMIN — ASPIRIN 81 MG 81 MG: 81 TABLET ORAL at 08:39

## 2024-05-20 RX ADMIN — PREDNISOLONE ACETATE 1 DROP: 10 SUSPENSION/ DROPS OPHTHALMIC at 21:58

## 2024-05-20 RX ADMIN — GABAPENTIN 300 MG: 300 CAPSULE ORAL at 05:31

## 2024-05-20 RX ADMIN — GADOTERATE MEGLUMINE 12 ML: 376.9 INJECTION INTRAVENOUS at 23:09

## 2024-05-20 RX ADMIN — PANTOPRAZOLE SODIUM 40 MG: 40 TABLET, DELAYED RELEASE ORAL at 08:39

## 2024-05-20 RX ADMIN — PREDNISONE 60 MG: 20 TABLET ORAL at 13:09

## 2024-05-20 RX ADMIN — GABAPENTIN 300 MG: 300 CAPSULE ORAL at 13:09

## 2024-05-20 RX ADMIN — BRIMONIDINE TARTRATE 1 DROP: 2 SOLUTION/ DROPS OPHTHALMIC at 21:58

## 2024-05-20 RX ADMIN — AZATHIOPRINE 150 MG: 75 TABLET ORAL at 08:38

## 2024-05-20 RX ADMIN — POLYETHYLENE GLYCOL 3350 17 G: 17 POWDER, FOR SOLUTION ORAL at 08:38

## 2024-05-20 RX ADMIN — FOLIC ACID 1 MG: 1 TABLET ORAL at 08:39

## 2024-05-20 RX ADMIN — NALTREXONE HYDROCHLORIDE 50 MG: 50 TABLET, FILM COATED ORAL at 08:38

## 2024-05-20 RX ADMIN — Medication 1 TABLET: at 08:39

## 2024-05-20 RX ADMIN — PREDNISOLONE ACETATE 1 DROP: 10 SUSPENSION/ DROPS OPHTHALMIC at 13:11

## 2024-05-20 RX ADMIN — BRIMONIDINE TARTRATE 1 DROP: 2 SOLUTION/ DROPS OPHTHALMIC at 09:00

## 2024-05-20 ASSESSMENT — COGNITIVE AND FUNCTIONAL STATUS - GENERAL
MOBILITY SCORE: 24
DAILY ACTIVITIY SCORE: 24
MOBILITY SCORE: 24

## 2024-05-20 ASSESSMENT — PAIN - FUNCTIONAL ASSESSMENT
PAIN_FUNCTIONAL_ASSESSMENT: 0-10
PAIN_FUNCTIONAL_ASSESSMENT: 0-10

## 2024-05-20 ASSESSMENT — PAIN SCALES - GENERAL
PAINLEVEL_OUTOF10: 3
PAINLEVEL_OUTOF10: 9

## 2024-05-20 ASSESSMENT — PAIN DESCRIPTION - LOCATION
LOCATION: BACK
LOCATION: BACK

## 2024-05-20 NOTE — CARE PLAN
The patient's goals for the shift include pt will remain freee from falls    The clinical goals for the shift include will be comfortable

## 2024-05-20 NOTE — PROGRESS NOTES
"Juan Marsh is a 28 y.o. male on day 5 of admission presenting with Behcet's syndrome, neurologic type (Multi).    Subjective   No acute events overnight. He reports feeling \"alright\" today and improved overall since the beginning of his admission. R sided numbness and R eye visual field deficit are improving.     Discussion of the plan to get MRI today and lymph node biopsy by IR. Reviewed with him the reasoning between leading differentials of neuro Behcet's and lymphoma.     Objective   Last Recorded Vitals  Blood pressure 102/69, pulse 58, temperature 36.8 °C (98.2 °F), temperature source Temporal, resp. rate 16, height 1.753 m (5' 9\"), weight 59 kg (130 lb), SpO2 98%.    Physical Exam  Gen: in no acute distress  CVS: limbs warm and well-perfused  Pulm: breathing comfortably on RA  Neuro:   - Awake, alert, oriented  - Face symmetric, full EOM. Mild horizontal diplopia upon looking fully to his left and right, may be improving but unclear. R superior visual field deficit.  - Sensation to light touch diminished on entire R side compared to L (face, UE, LE)  - Motor: 4/5 R  strength, 4+/5 RUE flexion and extension, 5/5 LUE    Medications  aspirin, 81 mg, oral, Daily  azaTHIOprine, 150 mg, oral, Daily  brimonidine, 1 drop, Right Eye, q12h  enoxaparin, 40 mg, subcutaneous, q24h  folic acid, 1 mg, oral, Daily  gabapentin, 300 mg, oral, q8h YONNY  lidocaine, 2 patch, transdermal, Daily  multivitamin with minerals, 1 tablet, oral, Daily  naltrexone, 50 mg, oral, Daily  pantoprazole, 40 mg, oral, Daily  polyethylene glycol, 17 g, oral, Daily  prednisoLONE acetate, 1 drop, Right Eye, 4x daily    Labs  Results from last 72 hours   Lab Units 05/20/24  0844 05/19/24  1230 05/18/24  0859   SODIUM mmol/L 140 138 136   POTASSIUM mmol/L 3.9 4.6 4.5   BUN mg/dL 21 18 24*   CREATININE mg/dL 0.88 0.85 0.90   CALCIUM mg/dL 8.9 9.3 9.3   MAGNESIUM mg/dL 2.34 2.30 2.23      Results from last 72 hours   Lab Units " 05/20/24  0844 05/19/24  1230 05/18/24  0859   WBC AUTO x10*3/uL 10.9 12.8* 15.4*   HEMOGLOBIN g/dL 11.4* 11.6* 11.5*   HEMATOCRIT % 35.8* 33.1* 34.1*   PLATELETS AUTO x10*3/uL 355 338 350             Imaging  No new imaging to review    Assessment/Plan   Mr. 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 The patient also has prior exposure to Humira. Patient's neurological exam is notable for R eye visual field deficit, diplopia, diminished sensation on entire R side, and mild proximal right upper extremity weakness.     Updates:  5/20 - Started prednisone taper 60 mg (reduce by 10 mg every week, maintenance 10 mg). Repeat MRI brain scheduled for today. Scheduled as add-on for external iliac lymph node bx by IR.    5/19 - Last dose IVMP. Repeat MRI brain tomorrow. Lymph node biopsy pending. Will consider initiating MMF 500mg BID pending results of biopsy. Follow up outpatient with Dr. Ulloa and rheumatology, referrals placed. Will discuss utility of IVIG vs PLEX. Discuss utility of rituximab vs. Infliximab if no improvement. CIWA discontinued.    5/18 - Fourth dose IVMP this morning. CT c/a/p showed size increase in bilateral external iliac lymph nodes and interval development of new bilateral femoral head osteonecrosis. Ordered lymph node biopsy. Repeat MRI brain to be completed after last IVMP dose.    5/17 - DNA HLA-B51 negative. Awaiting CT c/a/p to r/o lymphoma and sarcoid. Third IVMP dose this morning.     5/16 - LP completed yesterday, basic profile leukocytosis and slightly high protein, infectious workup negative so far. Received first IVMP dose last night. CT c/a/p to r/o lymphoma and sarcoidosis.    #Neuro Behcet's  ::MRI brain/orbit w contrast (5/15) -  Redemonstration of a mildly expansile region of T2/FLAIR signal abnormality extending from the left thalamus, internal capsule, and  left lentiform nucleus along the left brainstem to the left medullary  pyramids which demonstrates mild patchy enhancement, not measurably  changed compared to previous contrast-enhanced MRI dated 11/06/2021.  Additional patchy enhancement within the left hippocampal region.  ::MRI orbits, MRV brain, and MRA head/neck (all w/wo) are not remarkable.   ::MRI C-spine wo (5/14) - demonstrates some incidental C6-7 disc bulging but no cord signal changes.  ::CT chest/abdomen/pelvis (5/17) - size increase in bilateral external iliac lymph nodes and interval development of new bilateral femoral head osteonecrosis  - CSF (5/15): glucose 63, protein 48, RBC 0, WBC 12, 50% neutrophils  Biofire negative   Syph nonreactive   HSV1/2 negative   Culture/smear prelim negative   Cytology/flow cytometry pending  - IVMP 1g q18h, dose 5/5 completed on 5/18  - After 5th dose IVMP, started 60 mg PO Prednisone with taper (10 mg every week & maintenance 10 mg) - faster taper than originally planned due to osteonecrosis on CT  - Repeat MRI brain ordered for 5/19 AM (after last dose of IVMP course)  - Ordered external iliac lymph node biopsy by IR   Medications  - ASA 81mg daily  - Azathioprine 150mg daily  - Gabapentin 300mg q8h  - Sarcoidosis labs   Serum: ACE, lysozyme wnl    #Hx daily EtOH use  - Continue CIWA for now    FAB RUTHERFORD, MS4  Reviewed by Mohinder Shi  Neurology PGY-2  General Neurology team pager [phone]

## 2024-05-21 ENCOUNTER — PHARMACY VISIT (OUTPATIENT)
Dept: PHARMACY | Facility: CLINIC | Age: 29
End: 2024-05-21
Payer: MEDICAID

## 2024-05-21 VITALS
HEIGHT: 69 IN | BODY MASS INDEX: 19.26 KG/M2 | TEMPERATURE: 99 F | SYSTOLIC BLOOD PRESSURE: 100 MMHG | OXYGEN SATURATION: 100 % | WEIGHT: 130 LBS | DIASTOLIC BLOOD PRESSURE: 65 MMHG | HEART RATE: 54 BPM | RESPIRATION RATE: 16 BRPM

## 2024-05-21 LAB
ALBUMIN SERPL BCP-MCNC: 4 G/DL (ref 3.4–5)
ANION GAP SERPL CALC-SCNC: 13 MMOL/L (ref 10–20)
BASOPHILS # BLD AUTO: 0.01 X10*3/UL (ref 0–0.1)
BASOPHILS NFR BLD AUTO: 0.1 %
BUN SERPL-MCNC: 19 MG/DL (ref 6–23)
CALCIUM SERPL-MCNC: 9.1 MG/DL (ref 8.6–10.6)
CHLORIDE SERPL-SCNC: 101 MMOL/L (ref 98–107)
CO2 SERPL-SCNC: 26 MMOL/L (ref 21–32)
CREAT SERPL-MCNC: 0.78 MG/DL (ref 0.5–1.3)
EGFRCR SERPLBLD CKD-EPI 2021: >90 ML/MIN/1.73M*2
EOSINOPHIL # BLD AUTO: 0.03 X10*3/UL (ref 0–0.7)
EOSINOPHIL NFR BLD AUTO: 0.2 %
ERYTHROCYTE [DISTWIDTH] IN BLOOD BY AUTOMATED COUNT: 14.7 % (ref 11.5–14.5)
GLUCOSE SERPL-MCNC: 85 MG/DL (ref 74–99)
HCT VFR BLD AUTO: 35.8 % (ref 41–52)
HGB BLD-MCNC: 12 G/DL (ref 13.5–17.5)
IMM GRANULOCYTES # BLD AUTO: 0.05 X10*3/UL (ref 0–0.7)
IMM GRANULOCYTES NFR BLD AUTO: 0.4 % (ref 0–0.9)
LYMPHOCYTES # BLD AUTO: 5 X10*3/UL (ref 1.2–4.8)
LYMPHOCYTES NFR BLD AUTO: 41.1 %
MAGNESIUM SERPL-MCNC: 2.38 MG/DL (ref 1.6–2.4)
MCH RBC QN AUTO: 30.5 PG (ref 26–34)
MCHC RBC AUTO-ENTMCNC: 33.5 G/DL (ref 32–36)
MCV RBC AUTO: 91 FL (ref 80–100)
MONOCYTES # BLD AUTO: 0.44 X10*3/UL (ref 0.1–1)
MONOCYTES NFR BLD AUTO: 3.6 %
NEUTROPHILS # BLD AUTO: 6.65 X10*3/UL (ref 1.2–7.7)
NEUTROPHILS NFR BLD AUTO: 54.6 %
NRBC BLD-RTO: 0 /100 WBCS (ref 0–0)
PHOSPHATE SERPL-MCNC: 3 MG/DL (ref 2.5–4.9)
PLATELET # BLD AUTO: 391 X10*3/UL (ref 150–450)
POTASSIUM SERPL-SCNC: 3.4 MMOL/L (ref 3.5–5.3)
RBC # BLD AUTO: 3.93 X10*6/UL (ref 4.5–5.9)
SODIUM SERPL-SCNC: 137 MMOL/L (ref 136–145)
WBC # BLD AUTO: 12.2 X10*3/UL (ref 4.4–11.3)

## 2024-05-21 PROCEDURE — RXMED WILLOW AMBULATORY MEDICATION CHARGE

## 2024-05-21 PROCEDURE — 2500000001 HC RX 250 WO HCPCS SELF ADMINISTERED DRUGS (ALT 637 FOR MEDICARE OP)

## 2024-05-21 PROCEDURE — 36415 COLL VENOUS BLD VENIPUNCTURE: CPT

## 2024-05-21 PROCEDURE — 2500000005 HC RX 250 GENERAL PHARMACY W/O HCPCS

## 2024-05-21 PROCEDURE — 2500000004 HC RX 250 GENERAL PHARMACY W/ HCPCS (ALT 636 FOR OP/ED)

## 2024-05-21 PROCEDURE — 85025 COMPLETE CBC W/AUTO DIFF WBC: CPT

## 2024-05-21 PROCEDURE — 2500000001 HC RX 250 WO HCPCS SELF ADMINISTERED DRUGS (ALT 637 FOR MEDICARE OP): Performed by: STUDENT IN AN ORGANIZED HEALTH CARE EDUCATION/TRAINING PROGRAM

## 2024-05-21 PROCEDURE — 83735 ASSAY OF MAGNESIUM: CPT

## 2024-05-21 PROCEDURE — 80069 RENAL FUNCTION PANEL: CPT

## 2024-05-21 RX ORDER — FOLIC ACID 1 MG/1
1 TABLET ORAL DAILY
Qty: 30 TABLET | Refills: 0 | Status: SHIPPED | OUTPATIENT
Start: 2024-05-21 | End: 2024-06-20

## 2024-05-21 RX ORDER — GABAPENTIN 300 MG/1
300 CAPSULE ORAL EVERY 8 HOURS SCHEDULED
Qty: 90 CAPSULE | Refills: 1 | Status: SHIPPED | OUTPATIENT
Start: 2024-05-21 | End: 2024-05-21

## 2024-05-21 RX ORDER — GABAPENTIN 300 MG/1
300 CAPSULE ORAL EVERY 8 HOURS SCHEDULED
Qty: 90 CAPSULE | Refills: 1 | Status: SHIPPED | OUTPATIENT
Start: 2024-05-21 | End: 2024-07-20

## 2024-05-21 RX ORDER — MYCOPHENOLATE MOFETIL 500 MG/1
500 TABLET ORAL 2 TIMES DAILY
Qty: 180 TABLET | Refills: 0 | Status: SHIPPED | OUTPATIENT
Start: 2024-05-21 | End: 2024-08-19

## 2024-05-21 RX ORDER — PREDNISONE 10 MG/1
TABLET ORAL DAILY
Qty: 74 TABLET | Refills: 0 | Status: SHIPPED | OUTPATIENT
Start: 2024-06-17 | End: 2024-05-21

## 2024-05-21 RX ORDER — LIDOCAINE 560 MG/1
2 PATCH PERCUTANEOUS; TOPICAL; TRANSDERMAL DAILY
Qty: 28 PATCH | Refills: 0 | Status: SHIPPED | OUTPATIENT
Start: 2024-05-21

## 2024-05-21 RX ORDER — PREDNISONE 10 MG/1
TABLET ORAL
Qty: 194 TABLET | Refills: 0 | Status: SHIPPED | OUTPATIENT
Start: 2024-05-21 | End: 2024-06-24

## 2024-05-21 RX ORDER — PREDNISONE 10 MG/1
TABLET ORAL DAILY
Qty: 120 TABLET | Refills: 0 | Status: SHIPPED | OUTPATIENT
Start: 2024-05-21 | End: 2024-05-21

## 2024-05-21 RX ORDER — PREDNISOLONE ACETATE 10 MG/ML
1 SUSPENSION/ DROPS OPHTHALMIC 4 TIMES DAILY
Qty: 15 ML | Refills: 1 | Status: SHIPPED | OUTPATIENT
Start: 2024-05-21

## 2024-05-21 RX ORDER — PREDNISONE 10 MG/1
TABLET ORAL
Qty: 74 TABLET | Refills: 0 | Status: SHIPPED | OUTPATIENT
Start: 2024-06-17 | End: 2024-05-21

## 2024-05-21 RX ORDER — LIDOCAINE 560 MG/1
2 PATCH PERCUTANEOUS; TOPICAL; TRANSDERMAL DAILY
Qty: 28 PATCH | Refills: 0 | Status: SHIPPED | OUTPATIENT
Start: 2024-05-21 | End: 2024-05-21

## 2024-05-21 RX ORDER — PREDNISOLONE ACETATE 10 MG/ML
1 SUSPENSION/ DROPS OPHTHALMIC 4 TIMES DAILY
Qty: 15 ML | Refills: 1 | Status: SHIPPED | OUTPATIENT
Start: 2024-05-21 | End: 2024-05-21

## 2024-05-21 RX ORDER — MULTIVIT-MIN/IRON FUM/FOLIC AC 7.5 MG-4
1 TABLET ORAL DAILY
Qty: 30 TABLET | Refills: 0 | Status: SHIPPED | OUTPATIENT
Start: 2024-05-21 | End: 2024-06-20

## 2024-05-21 RX ORDER — FOLIC ACID 1 MG/1
1 TABLET ORAL DAILY
Qty: 30 TABLET | Refills: 1 | Status: SHIPPED | OUTPATIENT
Start: 2024-05-21 | End: 2024-05-21

## 2024-05-21 RX ORDER — MULTIVIT-MIN/IRON FUM/FOLIC AC 7.5 MG-4
1 TABLET ORAL DAILY
Qty: 30 TABLET | Refills: 1 | Status: SHIPPED | OUTPATIENT
Start: 2024-05-21 | End: 2024-05-21

## 2024-05-21 RX ADMIN — PANTOPRAZOLE SODIUM 40 MG: 40 TABLET, DELAYED RELEASE ORAL at 14:38

## 2024-05-21 RX ADMIN — Medication 1 TABLET: at 14:38

## 2024-05-21 RX ADMIN — LIDOCAINE 2 PATCH: 4 PATCH TOPICAL at 14:37

## 2024-05-21 RX ADMIN — NALTREXONE HYDROCHLORIDE 50 MG: 50 TABLET, FILM COATED ORAL at 14:38

## 2024-05-21 RX ADMIN — AZATHIOPRINE 150 MG: 75 TABLET ORAL at 14:37

## 2024-05-21 RX ADMIN — FOLIC ACID 1 MG: 1 TABLET ORAL at 14:38

## 2024-05-21 RX ADMIN — PREDNISONE 60 MG: 20 TABLET ORAL at 14:37

## 2024-05-21 RX ADMIN — BRIMONIDINE TARTRATE 1 DROP: 2 SOLUTION/ DROPS OPHTHALMIC at 11:09

## 2024-05-21 RX ADMIN — ENOXAPARIN SODIUM 40 MG: 100 INJECTION SUBCUTANEOUS at 14:37

## 2024-05-21 RX ADMIN — ASPIRIN 81 MG 81 MG: 81 TABLET ORAL at 14:38

## 2024-05-21 RX ADMIN — GABAPENTIN 300 MG: 300 CAPSULE ORAL at 14:37

## 2024-05-21 RX ADMIN — PREDNISOLONE ACETATE 1 DROP: 10 SUSPENSION/ DROPS OPHTHALMIC at 14:40

## 2024-05-21 ASSESSMENT — PAIN SCALES - GENERAL
PAINLEVEL_OUTOF10: 0 - NO PAIN

## 2024-05-21 ASSESSMENT — PAIN - FUNCTIONAL ASSESSMENT
PAIN_FUNCTIONAL_ASSESSMENT: 0-10

## 2024-05-21 NOTE — CONSULTS
IR was consulted for assessment of right external iliac/inguinal enlarged lymph node. Imaging was reviewed which showed a nonenlarged lymph node by size criteria which was not in a safe, accessible location for biopsy. Imaging follow up is recommended to document stability or progression.  This was discussed with the ordering provider, Sidney Zaldivar DO.    The consult was discussed with attending physician, Kenzie Kowalski MD.     Angel Trinidad MD, PGY-3

## 2024-05-21 NOTE — DISCHARGE INSTRUCTIONS
You were in the hospital due to neurological symptoms of Behcet's disease. This is an autoimmune condition in which the body creates antibodies that cause inflammation. IV steroids helped reduce the inflammation in your brain that was causing your right side numbness, weakness, and vision problems. Continuing to take your medication, reducing or eliminating consumption of alcohol and tobacco, and following up outpatient with your doctors will help to further reduce your symptoms and lower the chance that they will come back.     While in the hospital, you began a steroid taper. The starting dose is prednisone 60mg once daily, and every 7 days since your first dose you will be taking 10mg less (50mg, 40mg, 30mg,...) until you reach the maintenance dose of 10mg which you will continue taking. You will start taking a steroid-sparing medication called Cellcept (mycophenolate) which suppresses the immune system. You will take 2 500mg pills daily for a total of 1000mg per day. The Cellcept (mycophenolate) will help lower the inflammation caused by your immune system due to Behcet's disease. While your system is getting used to this medication, we have ordered blood labs for you every 2 weeks to keep a close watch on your blood cell counts.     Please follow up outpatient with Neurology (Dr. Ulloa), Rheumatology (Dr. Payne), and ophthalmology. You also have a repeat CT pelvis to be completed before your follow-up doctor visits to evaluate the enlarged lymph nodes found during this hospitalization.

## 2024-05-21 NOTE — DISCHARGE SUMMARY
Discharge Diagnosis  Behcet's syndrome, neurologic type (Multi)    Follow-ups:  Neuro immunology: neuro Bechet follow up, follow up repeat iliac LN imaging in 2 months. Started on MMF, follow up biweekly CBC.  Rheumatology: post hospital discharge, follow up management of Bechet  Ophthalmology: follow up Uveitis    Test Results Pending At Discharge  Pending Labs       Order Current Status    Flow Cytometry Test In process    Flow Cytometry Test In process    Non-gynecologic cytology In process    CSF Culture/Smear Preliminary result          Hospital Course  Mr. 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 The patient also has prior exposure to Humira. Patient's neurological exam was notable for R eye visual field deficit, diplopia, diminished sensation on entire R side, and mild proximal right upper extremity weakness.     He was admitted to the general neurology service.  MRI brain/orbit with contrast on 5/15 showed redemonstration of a mildly expansile region of T2/FLAIR signal abnormality extending from the left thalamus, internal capsule, and left lentiform nucleus along the left brainstem to the left medullary pyramids which demonstrates mild patchy enhancement, not measurably changed compared to previous contrast-enhanced MRI dated 11/06/2021. MRI orbits, MRV brain, and MRA head/neck (all w/wo) are not remarkable. MRI C-spine wo (5/14) - demonstrates some incidental C6-7 disc bulging but no cord signal changes. LP was completed on 5/15 which revealed glucose 63, protein 48 WBC 12.  Biofire negative, syphilis, HSV 1/2, culture/smear negative.  Flow cytometry and cytology pending. ACE and lysozyme wnl.     Imaging findings were concerning for relapse  of neuro Bechet vs. Lymphoma.  He was treated with a 5-dose course of Solu-Medrol from 5/15 to 5/18.  CT CAP on 5/17 showed size increase in bilateral external iliac lymph nodes and interval development of new bilateral femoral head osteonecrosis. IR consulted for lymph node biopsy however due to technically difficult biopsy, recommendation was to monitor size of lymph node with repeat imaging. Repeat MRI brain (5/20) showed interval resolution of enhancement in L thalamus/tyrone and improvement of enhancement in L cerebral peduncle.    Patient's symptoms clinically improved, and plan for further outpatient workup and care was discussed with patient who agrees with plan.  Patient medically stable for discharge to home on 5/21.    New medications at discharge:  Mycophenolate Mofetil 500 mg twice daily  Prednisone 60 mg daily with taper 10 mg/week till stable on 10 mg/day    Pertinent Physical Exam At Time of Discharge  Gen: in no acute distress  CVS: limbs warm and well-perfused  Pulm: breathing comfortably on RA  Neuro:   - Awake, alert, oriented  - Face symmetric, full EOM. Much improved horizontal diplopia upon looking fully to his left and right. R superior visual field deficit that is improved temporally, but still somewhat present nasally  - Sensation to light touch faintly diminished on R face/UE/LE. Most prominent on R fingertips.   - Motor: 4+/5 R  strength, 4+/5 RUE flexion and extension, 5/5 LUE      Home Medications     Medication List      START taking these medications     folic acid 1 mg tablet; Commonly known as: Folvite; Take 1 tablet (1 mg)   by mouth once daily.   gabapentin 300 mg capsule; Commonly known as: Neurontin; Take 1 capsule   (300 mg) by mouth every 8 hours.   lidocaine 4 % patch; Place 2 patches over 12 hours on the skin once   daily. Remove & discard patch within 12 hours or as directed by MD.   multivitamin with minerals tablet; Take 1 tablet by mouth once daily.   mycophenolate 500  mg tablet; Commonly known as: Cellcept; Take 1 tablet   (500 mg) by mouth 2 times a day.   prednisoLONE acetate 1 % ophthalmic suspension; Commonly known as:   Pred-Forte; Administer 1 drop into the right eye 4 times a day.     CHANGE how you take these medications     predniSONE 10 mg tablet; Commonly known as: Deltasone; Take 6 tablets   (60 mg) by mouth once daily for 6 days, THEN 5 tablets (50 mg) once daily   for 7 days, THEN 4 tablets (40 mg) once daily for 7 days, THEN 3 tablets   (30 mg) once daily for 7 days, THEN 2 tablets (20 mg) once daily for 7   days. THEN 1 tablet (10 mg) once daily.; Start taking on: May 21, 2024;   What changed: medication strength, See the new instructions.     CONTINUE taking these medications     acetaminophen 325 mg tablet; Commonly known as: TylenoL; Take 2 tablets   (650 mg) by mouth every 6 hours if needed for mild pain (1 - 3) or fever   (temp greater than 38.0 C).   aspirin 81 mg chewable tablet   brimonidine 0.2 % ophthalmic solution; Commonly known as: AlphaGAN   ergocalciferol 1.25 MG (02334 UT) capsule; Commonly known as: Vitamin   D-2   ibuprofen 600 mg tablet; Take 1 tablet (600 mg) by mouth every 8 hours   if needed for mild pain (1 - 3) or fever (temp greater than 38.0 C).   pantoprazole 40 mg EC tablet; Commonly known as: ProtoNix     STOP taking these medications     azaTHIOprine 50 mg tablet; Commonly known as: Imuran   Humira(CF) Pen 40 mg/0.4 mL pen injector kit pen-injector; Generic drug:   adalimumab   meloxicam 15 mg tablet; Commonly known as: Mobic   methocarbamol 500 mg tablet; Commonly known as: Robaxin   naltrexone 50 mg tablet; Commonly known as: Depade   oxyCODONE-acetaminophen 5-325 mg tablet; Commonly known as: Percocet       Outpatient Follow-Up  Future Appointments   Date Time Provider Department Center   7/30/2024 12:00 PM Juancarlos Ulloa MD [id number] Academic       [facility], [facility]  Edited by: Arnold Archuleta MD PGY1 Neurology

## 2024-05-22 ENCOUNTER — PATIENT OUTREACH (OUTPATIENT)
Dept: CARE COORDINATION | Facility: CLINIC | Age: 29
End: 2024-05-22
Payer: COMMERCIAL

## 2024-05-22 LAB
BACTERIA CSF CULT: NORMAL
CELL POPULATIONS: NORMAL
DIAGNOSIS: NORMAL
FLOW DIFFERENTIAL: NORMAL
FLOW TEST ORDERED: NORMAL
GRAM STN SPEC: NORMAL
GRAM STN SPEC: NORMAL
LAB TEST METHOD: NORMAL
NUMBER OF CELLS COLLECTED: NORMAL
PATH REPORT.TOTAL CANCER: NORMAL
SIGNATURE COMMENT: NORMAL
SPECIMEN VIABILITY: NORMAL

## 2024-05-24 ENCOUNTER — PATIENT OUTREACH (OUTPATIENT)
Dept: CARE COORDINATION | Facility: CLINIC | Age: 29
End: 2024-05-24
Payer: COMMERCIAL

## 2024-05-28 LAB
LABORATORY COMMENT REPORT: NORMAL
LABORATORY COMMENT REPORT: NORMAL
PATH REPORT.FINAL DX SPEC: NORMAL
PATH REPORT.GROSS SPEC: NORMAL
PATH REPORT.RELEVANT HX SPEC: NORMAL
PATH REPORT.TOTAL CANCER: NORMAL
RESIDENT REVIEW: NORMAL

## 2024-06-18 ENCOUNTER — APPOINTMENT (OUTPATIENT)
Dept: OCCUPATIONAL THERAPY | Facility: HOSPITAL | Age: 29
End: 2024-06-18
Payer: COMMERCIAL

## 2024-06-19 ENCOUNTER — APPOINTMENT (OUTPATIENT)
Dept: OPHTHALMOLOGY | Facility: CLINIC | Age: 29
End: 2024-06-19
Payer: COMMERCIAL

## 2024-06-19 DIAGNOSIS — H44.111 PANUVEITIS OF RIGHT EYE: ICD-10-CM

## 2024-06-19 DIAGNOSIS — H34.8310 BRANCH RETINAL VEIN OCCLUSION OF RIGHT EYE WITH MACULAR EDEMA (CMS-HCC): Primary | ICD-10-CM

## 2024-06-19 PROCEDURE — 67028 INJECTION EYE DRUG: CPT | Mod: RIGHT SIDE | Performed by: OPHTHALMOLOGY

## 2024-06-19 PROCEDURE — 92134 CPTRZ OPH DX IMG PST SGM RTA: CPT | Mod: BILATERAL PROCEDURE | Performed by: OPHTHALMOLOGY

## 2024-06-19 PROCEDURE — 99214 OFFICE O/P EST MOD 30 MIN: CPT | Performed by: OPHTHALMOLOGY

## 2024-06-19 ASSESSMENT — CONF VISUAL FIELD
OD_NORMAL: 1
OD_INFERIOR_TEMPORAL_RESTRICTION: 0
OD_SUPERIOR_NASAL_RESTRICTION: 0
OS_INFERIOR_TEMPORAL_RESTRICTION: 0
OS_SUPERIOR_TEMPORAL_RESTRICTION: 0
OS_NORMAL: 1
OD_SUPERIOR_TEMPORAL_RESTRICTION: 0
METHOD: COUNTING FINGERS
OS_SUPERIOR_NASAL_RESTRICTION: 0
OS_INFERIOR_NASAL_RESTRICTION: 0
OD_INFERIOR_NASAL_RESTRICTION: 0

## 2024-06-19 ASSESSMENT — VISUAL ACUITY
METHOD: SNELLEN - LINEAR
OD_SC: 20/50-1
OD_PH_SC: 20/25-1
OS_SC: 20/20-1

## 2024-06-19 ASSESSMENT — ENCOUNTER SYMPTOMS
GASTROINTESTINAL NEGATIVE: 0
MUSCULOSKELETAL NEGATIVE: 0
EYES NEGATIVE: 1
CONSTITUTIONAL NEGATIVE: 0
CARDIOVASCULAR NEGATIVE: 0
RESPIRATORY NEGATIVE: 0
NEUROLOGICAL NEGATIVE: 0
ALLERGIC/IMMUNOLOGIC NEGATIVE: 0
ENDOCRINE NEGATIVE: 0
PSYCHIATRIC NEGATIVE: 0

## 2024-06-19 ASSESSMENT — TONOMETRY
OD_IOP_MMHG: 13
OS_IOP_MMHG: 14
IOP_METHOD: GOLDMANN APPLANATION

## 2024-06-19 NOTE — PROGRESS NOTES
#Posterior +/- intermediate uveitis right eye   - 27 y/o M with history neuro-Behcet's, bilateral uveitis, retinal vasculitis with CRVO/CME right eye (s/p Kenalogg OD 9/21, Avastin OD 9/21; Avastin OD 11/21; Eylea OD 8/22; and Eylea OD 9/22) presenting to ED with right sided numbness, right ear ringing, blurred vision and diplopia  - Entrance testing is excellent. Anterior segment exam is  unremarkable without evidence of hypopyon, ac cell or flare, no TIDs or iris nodules. On dilated exam, there are attenuated vessels in both eyes; there is also an inferior ghost vessel with perivascular sheathing, inferior heme and CWS with peripheral white exudates in the right eye  - Presentation is concerning for posterior +/- intermediate uveitis. There is a presumed history of neuro-Behcet's given initial presentation in 2021 of uveitis, apthous ulcers and genital ulcers. However, with evidence of peripheral snow banking on exam in  patient, would be highly suspicious for sarcoidosis as well. Testing in 2021 was negative for HSV1/HSV2/SLE/lyme/ACE/HLA B27/HLA B51/T spot/HIV/RPR/AQP4/MOG. With patient off of immunosuppression for the last year and now with acute presentation, would recommend working up for at least TB, sarcoid and syphillis, in addition to MRI imaging.     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 Recommend anti-VEGF injections. Eylea done OD today in a sterile manner with Betadine 5% for antisepsis.        This injection is unrelated to immunosuppressive being considered  This is a separate activity or very complex care  RTC 1 month

## 2024-06-20 ASSESSMENT — EXTERNAL EXAM - LEFT EYE: OS_EXAM: NORMAL

## 2024-06-20 ASSESSMENT — SLIT LAMP EXAM - LIDS
COMMENTS: NORMAL
COMMENTS: NORMAL

## 2024-06-20 ASSESSMENT — CUP TO DISC RATIO
OD_RATIO: .3
OS_RATIO: .3

## 2024-06-20 ASSESSMENT — EXTERNAL EXAM - RIGHT EYE: OD_EXAM: NORMAL

## 2024-06-20 ASSESSMENT — ENCOUNTER SYMPTOMS: HEMATOLOGIC/LYMPHATIC NEGATIVE: 1

## 2024-06-24 ENCOUNTER — PATIENT OUTREACH (OUTPATIENT)
Dept: CARE COORDINATION | Facility: CLINIC | Age: 29
End: 2024-06-24
Payer: COMMERCIAL

## 2024-06-24 NOTE — PROGRESS NOTES
Outreach call to patient to check in 30 days after hospital discharge to support smooth transition of care.  Patient with no additional needs noted. Patient had an MD appointment on June 19th.  No additional outreach needed at this time.

## 2024-06-26 ENCOUNTER — APPOINTMENT (OUTPATIENT)
Dept: OCCUPATIONAL THERAPY | Facility: HOSPITAL | Age: 29
End: 2024-06-26
Payer: COMMERCIAL

## 2024-06-26 ENCOUNTER — HOSPITAL ENCOUNTER (OUTPATIENT)
Dept: RADIOLOGY | Facility: HOSPITAL | Age: 29
Discharge: HOME | End: 2024-06-26
Payer: COMMERCIAL

## 2024-06-26 DIAGNOSIS — R59.1 LYMPHADENOPATHY: ICD-10-CM

## 2024-06-26 PROCEDURE — 74177 CT ABD & PELVIS W/CONTRAST: CPT

## 2024-06-26 PROCEDURE — 2550000001 HC RX 255 CONTRASTS: Mod: SE

## 2024-07-09 ENCOUNTER — OFFICE VISIT (OUTPATIENT)
Dept: NEUROLOGY | Facility: HOSPITAL | Age: 29
End: 2024-07-09
Payer: COMMERCIAL

## 2024-07-09 DIAGNOSIS — M35.2 BEHCET'S SYNDROME, NEUROLOGIC TYPE (MULTI): ICD-10-CM

## 2024-07-09 PROCEDURE — 99215 OFFICE O/P EST HI 40 MIN: CPT | Performed by: PSYCHIATRY & NEUROLOGY

## 2024-07-09 RX ORDER — PREDNISONE 5 MG/1
5 TABLET ORAL DAILY
Qty: 30 TABLET | Refills: 6 | Status: SHIPPED | OUTPATIENT
Start: 2024-07-09 | End: 2025-02-04

## 2024-07-09 RX ORDER — PREDNISONE 5 MG/1
5 TABLET ORAL DAILY
Qty: 30 TABLET | Refills: 6 | Status: SHIPPED | OUTPATIENT
Start: 2024-07-09 | End: 2024-07-09
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
## 2024-07-09 NOTE — PATIENT INSTRUCTIONS
I'm glad you are feeling better after the steroids.     Please continue mycophenolate 500 mg twice daily. I will add a small dose of prednisone 5 mg daily to prevent any recurrence of brain inflmmation until mycophenolate is in full effect. You can stop the prednisone in November.     I will order STAT referral to orthopedics to help with your hip pain.     Please continue to work on quitting smoking.     Blood work anytime this week.     I will order repeat brain MRI and pelvic CT to be done in November to monitor improvement.     Follow up in November or December after the scans.     Thank you for visiting the clinic today    Juancarlos Ulloa MD

## 2024-07-30 ENCOUNTER — APPOINTMENT (OUTPATIENT)
Dept: NEUROLOGY | Facility: HOSPITAL | Age: 29
End: 2024-07-30
Payer: COMMERCIAL

## 2024-08-02 ENCOUNTER — APPOINTMENT (OUTPATIENT)
Dept: OPHTHALMOLOGY | Facility: CLINIC | Age: 29
End: 2024-08-02
Payer: COMMERCIAL

## 2024-08-20 ENCOUNTER — HOSPITAL ENCOUNTER (OUTPATIENT)
Dept: RADIOLOGY | Facility: HOSPITAL | Age: 29
Discharge: HOME | End: 2024-08-20
Payer: COMMERCIAL

## 2024-08-20 ENCOUNTER — OFFICE VISIT (OUTPATIENT)
Dept: ORTHOPEDIC SURGERY | Facility: HOSPITAL | Age: 29
End: 2024-08-20
Payer: COMMERCIAL

## 2024-08-20 DIAGNOSIS — M87.051 AVASCULAR NECROSIS OF RIGHT FEMORAL HEAD (MULTI): ICD-10-CM

## 2024-08-20 DIAGNOSIS — M87.052 AVASCULAR NECROSIS OF LEFT FEMORAL HEAD (MULTI): ICD-10-CM

## 2024-08-20 DIAGNOSIS — S72.492D OTHER CLOSED FRACTURE OF DISTAL END OF LEFT FEMUR WITH ROUTINE HEALING, SUBSEQUENT ENCOUNTER: ICD-10-CM

## 2024-08-20 DIAGNOSIS — M35.2: Primary | ICD-10-CM

## 2024-08-20 PROCEDURE — 73552 X-RAY EXAM OF FEMUR 2/>: CPT | Mod: LT

## 2024-08-20 PROCEDURE — 73552 X-RAY EXAM OF FEMUR 2/>: CPT | Mod: BILATERAL PROCEDURE | Performed by: RADIOLOGY

## 2024-08-20 PROCEDURE — 73522 X-RAY EXAM HIPS BI 3-4 VIEWS: CPT

## 2024-08-20 PROCEDURE — 73522 X-RAY EXAM HIPS BI 3-4 VIEWS: CPT | Mod: BILATERAL PROCEDURE | Performed by: RADIOLOGY

## 2024-08-20 PROCEDURE — 99214 OFFICE O/P EST MOD 30 MIN: CPT | Performed by: ORTHOPAEDIC SURGERY

## 2024-08-20 RX ORDER — CYCLOBENZAPRINE HCL 10 MG
10 TABLET ORAL 3 TIMES DAILY PRN
Qty: 90 TABLET | Refills: 0 | Status: SHIPPED | OUTPATIENT
Start: 2024-08-20 | End: 2024-09-19

## 2024-08-20 NOTE — PROGRESS NOTES
Subjective    Patient ID: Juan Marsh is a 29 y.o. male.    Chief Complaint: Bilateral hip pain     Last Surgery: Left retrograde femoral nail 11/2/2022    HPI  Patient underwent retrograde nail fixation by me in 2022.  His fracture is healed.  He has no issue related to the fracture.  He is see me for bilateral hip pain.  I have seen him in the past for bilateral severe hip avascular necrosis with collapse.  Patient has  Behcet's disease and is on 5 mg of prednisone.  The last time I saw him in February of last year I referred him to receive intra-articular corticosteroid injection.  He reported that the injection gave him relief but the lasted for about a month.  Recently he had worsening pain in his hip.  Patient reported that he is having difficulty walking and has been using crutches.  He is out of work due to pain in the hip.  Objective   Ortho Exam  The patient is well-nourished and well-developed and in no acute distress. The patient displayed normal mood and affect. The patient's pupils were equal, round sclerae are white. Patient's respirations appear to be regular and unlabored.    Patient is able to ambulate with a mild antalgic gait.  Examination of the hip reveals no skin abnormalities.  No tenderness to palpation about the hip, knee and thigh area.  Range of motion of the hip reveals flexion past 90 degrees, patient has full extension, 40 degrees of hip abduction, 30 degrees of abduction, internal rotation to 20 degrees and external rotation to 45 degrees.  There is moderate pain with rotational range of motion of the hip.    Image Results:  I personally reviewed bilateral hip radiograph that reveals severe bilateral hip avascular necrosis with subchondral collapse.  The left femur fracture is well-healed.  Intramedullary nail in place.    Assessment/Plan   Encounter Diagnoses:  Other closed fracture of distal end of left femur with routine healing, subsequent encounter    Avascular necrosis  of left femoral head (Multi)    Avascular necrosis of right femoral head (Multi)  Patient severe bilateral hip avascular necrosis with subchondral collapse and secondary osteoarthritis that is very symptomatic symptomatic.  I discussed treatment options with patient.  I discussed with patient that would likely benefit from arthroplasty in the future however I recommend exhausting conservative pain at this time specially given his young age.  I referred patient for outpatient ultrasound-guided corticosteroid injection to bilateral hip.  Patient will continue gentle stretching exercises.  I referred patient to outpatient physical therapy.  Patient will continue to take over-the-counter anti-inflammatories as needed.  Patient will come back and see me in the future if the above treatment option does not alleviate her symptoms and wants to discuss arthroplasty in the future.   Orders Placed This Encounter    XR hips bilateral 3 or 4 VW w pelvis when performed    XR femur left 2+ views     No follow-ups on file.

## 2024-08-27 ENCOUNTER — APPOINTMENT (OUTPATIENT)
Dept: NEUROLOGY | Facility: HOSPITAL | Age: 29
End: 2024-08-27
Payer: COMMERCIAL

## 2024-08-28 ENCOUNTER — APPOINTMENT (OUTPATIENT)
Dept: CARDIOLOGY | Facility: HOSPITAL | Age: 29
End: 2024-08-28
Payer: COMMERCIAL

## 2024-08-28 ENCOUNTER — APPOINTMENT (OUTPATIENT)
Dept: RADIOLOGY | Facility: HOSPITAL | Age: 29
End: 2024-08-28
Payer: COMMERCIAL

## 2024-08-28 ENCOUNTER — HOSPITAL ENCOUNTER (INPATIENT)
Facility: HOSPITAL | Age: 29
LOS: 2 days | Discharge: HOME | End: 2024-08-31
Attending: EMERGENCY MEDICINE | Admitting: HOSPITALIST
Payer: COMMERCIAL

## 2024-08-28 DIAGNOSIS — M35.2: Primary | ICD-10-CM

## 2024-08-28 LAB
ALBUMIN SERPL BCP-MCNC: 4.8 G/DL (ref 3.4–5)
ALP SERPL-CCNC: 86 U/L (ref 33–120)
ALT SERPL W P-5'-P-CCNC: 8 U/L (ref 10–52)
ANION GAP SERPL CALC-SCNC: 20 MMOL/L (ref 10–20)
AST SERPL W P-5'-P-CCNC: 14 U/L (ref 9–39)
ATRIAL RATE: 74 BPM
BASOPHILS # BLD AUTO: 0.04 X10*3/UL (ref 0–0.1)
BASOPHILS NFR BLD AUTO: 0.5 %
BILIRUB SERPL-MCNC: 0.5 MG/DL (ref 0–1.2)
BUN SERPL-MCNC: 28 MG/DL (ref 6–23)
CALCIUM SERPL-MCNC: 10.1 MG/DL (ref 8.6–10.3)
CARDIAC TROPONIN I PNL SERPL HS: 3 NG/L (ref 0–20)
CHLORIDE SERPL-SCNC: 102 MMOL/L (ref 98–107)
CO2 SERPL-SCNC: 21 MMOL/L (ref 21–32)
CREAT SERPL-MCNC: 1.02 MG/DL (ref 0.5–1.3)
EGFRCR SERPLBLD CKD-EPI 2021: >90 ML/MIN/1.73M*2
EOSINOPHIL # BLD AUTO: 0.03 X10*3/UL (ref 0–0.7)
EOSINOPHIL NFR BLD AUTO: 0.4 %
ERYTHROCYTE [DISTWIDTH] IN BLOOD BY AUTOMATED COUNT: 14.2 % (ref 11.5–14.5)
GLUCOSE SERPL-MCNC: 92 MG/DL (ref 74–99)
HCT VFR BLD AUTO: 45.8 % (ref 41–52)
HGB BLD-MCNC: 14.8 G/DL (ref 13.5–17.5)
IMM GRANULOCYTES # BLD AUTO: 0.02 X10*3/UL (ref 0–0.7)
IMM GRANULOCYTES NFR BLD AUTO: 0.2 % (ref 0–0.9)
LYMPHOCYTES # BLD AUTO: 2.46 X10*3/UL (ref 1.2–4.8)
LYMPHOCYTES NFR BLD AUTO: 30.3 %
MAGNESIUM SERPL-MCNC: 2.6 MG/DL (ref 1.6–2.4)
MCH RBC QN AUTO: 32.7 PG (ref 26–34)
MCHC RBC AUTO-ENTMCNC: 32.3 G/DL (ref 32–36)
MCV RBC AUTO: 101 FL (ref 80–100)
MONOCYTES # BLD AUTO: 0.75 X10*3/UL (ref 0.1–1)
MONOCYTES NFR BLD AUTO: 9.2 %
NEUTROPHILS # BLD AUTO: 4.82 X10*3/UL (ref 1.2–7.7)
NEUTROPHILS NFR BLD AUTO: 59.4 %
NRBC BLD-RTO: 0 /100 WBCS (ref 0–0)
P AXIS: 70 DEGREES
P OFFSET: 198 MS
P ONSET: 145 MS
PLATELET # BLD AUTO: 357 X10*3/UL (ref 150–450)
POTASSIUM SERPL-SCNC: 4.4 MMOL/L (ref 3.5–5.3)
PR INTERVAL: 154 MS
PROT SERPL-MCNC: 7.9 G/DL (ref 6.4–8.2)
Q ONSET: 222 MS
QRS COUNT: 12 BEATS
QRS DURATION: 78 MS
QT INTERVAL: 362 MS
QTC CALCULATION(BAZETT): 401 MS
QTC FREDERICIA: 388 MS
R AXIS: 57 DEGREES
RBC # BLD AUTO: 4.53 X10*6/UL (ref 4.5–5.9)
SODIUM SERPL-SCNC: 139 MMOL/L (ref 136–145)
T AXIS: 60 DEGREES
T OFFSET: 403 MS
VENTRICULAR RATE: 74 BPM
WBC # BLD AUTO: 8.1 X10*3/UL (ref 4.4–11.3)

## 2024-08-28 PROCEDURE — 36415 COLL VENOUS BLD VENIPUNCTURE: CPT

## 2024-08-28 PROCEDURE — 84484 ASSAY OF TROPONIN QUANT: CPT

## 2024-08-28 PROCEDURE — 85025 COMPLETE CBC W/AUTO DIFF WBC: CPT

## 2024-08-28 PROCEDURE — 82077 ASSAY SPEC XCP UR&BREATH IA: CPT | Performed by: HOSPITALIST

## 2024-08-28 PROCEDURE — 70450 CT HEAD/BRAIN W/O DYE: CPT

## 2024-08-28 PROCEDURE — 80053 COMPREHEN METABOLIC PANEL: CPT

## 2024-08-28 PROCEDURE — 99285 EMERGENCY DEPT VISIT HI MDM: CPT

## 2024-08-28 PROCEDURE — 96375 TX/PRO/DX INJ NEW DRUG ADDON: CPT

## 2024-08-28 PROCEDURE — 2500000004 HC RX 250 GENERAL PHARMACY W/ HCPCS (ALT 636 FOR OP/ED): Performed by: EMERGENCY MEDICINE

## 2024-08-28 PROCEDURE — 93005 ELECTROCARDIOGRAM TRACING: CPT

## 2024-08-28 PROCEDURE — 96374 THER/PROPH/DIAG INJ IV PUSH: CPT

## 2024-08-28 PROCEDURE — 70450 CT HEAD/BRAIN W/O DYE: CPT | Performed by: STUDENT IN AN ORGANIZED HEALTH CARE EDUCATION/TRAINING PROGRAM

## 2024-08-28 PROCEDURE — 83735 ASSAY OF MAGNESIUM: CPT

## 2024-08-28 RX ORDER — ONDANSETRON HYDROCHLORIDE 2 MG/ML
4 INJECTION, SOLUTION INTRAVENOUS ONCE
Status: COMPLETED | OUTPATIENT
Start: 2024-08-28 | End: 2024-08-28

## 2024-08-28 RX ORDER — MORPHINE SULFATE 4 MG/ML
4 INJECTION, SOLUTION INTRAMUSCULAR; INTRAVENOUS ONCE
Status: COMPLETED | OUTPATIENT
Start: 2024-08-28 | End: 2024-08-28

## 2024-08-28 ASSESSMENT — PAIN DESCRIPTION - LOCATION
LOCATION: HEAD
LOCATION: NECK

## 2024-08-28 ASSESSMENT — PAIN SCALES - GENERAL
PAINLEVEL_OUTOF10: 10 - WORST POSSIBLE PAIN

## 2024-08-28 ASSESSMENT — PAIN DESCRIPTION - PAIN TYPE: TYPE: ACUTE PAIN

## 2024-08-28 ASSESSMENT — PAIN - FUNCTIONAL ASSESSMENT: PAIN_FUNCTIONAL_ASSESSMENT: 0-10

## 2024-08-28 NOTE — ED TRIAGE NOTES
TRIAGE NOTE   I saw the patient as the Clinician in Triage and performed a brief history and physical exam, established acuity, and ordered appropriate tests to develop basic plan of care. Patient will be seen by an [facility], resident and/or physician who will independently evaluate the patient. Please see subsequent provider notes for further details and disposition.     Brief HPI: In brief, Juan Marsh is a 29 y.o. male that presents for headache.  Patient has past medical history of Bechet disease.  Patient states that he had a history of a lesion in his brain and is following with neurology.  He states that over the past week he has had increasing headache located on the left side of his head.  Describes it as throbbing aching and stabbing.  Denies exacerbating relieving factors.  States it is intermittent.  He states that he feels very off balance when he walks as well.  States that he feels like he is moving very slow and is unable to talk at a normal speed.  He denies any numbness or weakness or tingling.  Denies chest pain or shortness of breath or syncope.  He states that yesterday he had visual hallucinations which she has never experienced in the past.  Does not currently have visual hallucinations.  Denies auditory hallucinations.  Denies any suicidal or homicidal ideation.  Does have history of depression.  Has never had similar symptoms in the past.  Has no other symptoms or concerns at this time.  Last known well was over a week ago.  Focused Physical exam:   EOMI without nystagmus.  PERRL.  5/5 strength in upper and lower extremities bilaterally.  Sensation intact in upper and lower extremities bilaterally.  Very unsteady gait.  Needs assistance with ambulation.  Plan/MDM:   Initiating CBC, CMP, troponin, EKG, magnesium, CT head.  Patient will be seen in the back of the ED for further evaluation and treatment.  I will not be following with this patient during his ED visit.  This is just a  preliminary evaluation during triage.  Notified nursing staff that patient needs to be the next to the back of the ED.  No stroke alert called given duration of symptoms.    I evaluated this patient in triage with the RN. Due to the patients complaint labs and or imaging were ordered by myself in an attempt to expedite patient care however I am not participating in care after evaluation. This is a preliminary assessment. Pt does not appear in acute distress at this time. They will have a full evaluation as soon as possible. They will be cared for by another provider who will possibly order more labs, imaging or interventions. Pt did not have a full ROS or PE completed by myself however below is a summary with reasons for orders.  For the remainder of the patient's workup and ED course, please refer to the main ED provider note. We discussed need for diagnostic testing including laboratory studies and imaging.  We also discussed that patient may be asked to wait in the waiting room while these tests are pending.  They understand that if they choose to leave without having the testing completed or resulted that we cannot rule out acute life threatening illnesses and the risks involved could lead to worsening condition, permanent disability or even death.

## 2024-08-28 NOTE — ED TRIAGE NOTES
Pt arrives to ED for a headache that has been ongoing since Sat. Pt states he has hx of migraines. Pt reports sensitivity to lights and sounds. Pt denies CP/SOB. Pt states he had episode of emesis this morning and reports nausea.

## 2024-08-29 ENCOUNTER — APPOINTMENT (OUTPATIENT)
Dept: RADIOLOGY | Facility: HOSPITAL | Age: 29
End: 2024-08-29
Payer: COMMERCIAL

## 2024-08-29 PROBLEM — M35.2: Status: ACTIVE | Noted: 2024-08-29

## 2024-08-29 LAB
ANION GAP SERPL CALC-SCNC: 9 MMOL/L (ref 10–20)
BASOPHILS # BLD AUTO: 0 X10*3/UL (ref 0–0.1)
BASOPHILS NFR BLD AUTO: 0 %
BUN SERPL-MCNC: 20 MG/DL (ref 6–23)
CALCIUM SERPL-MCNC: 6.2 MG/DL (ref 8.6–10.3)
CHLORIDE SERPL-SCNC: 115 MMOL/L (ref 98–107)
CO2 SERPL-SCNC: 20 MMOL/L (ref 21–32)
CREAT SERPL-MCNC: 0.63 MG/DL (ref 0.5–1.3)
EGFRCR SERPLBLD CKD-EPI 2021: >90 ML/MIN/1.73M*2
EOSINOPHIL # BLD AUTO: 0 X10*3/UL (ref 0–0.7)
EOSINOPHIL NFR BLD AUTO: 0 %
ERYTHROCYTE [DISTWIDTH] IN BLOOD BY AUTOMATED COUNT: 13.3 % (ref 11.5–14.5)
ETHANOL SERPL-MCNC: <10 MG/DL
GLUCOSE SERPL-MCNC: 94 MG/DL (ref 74–99)
HCT VFR BLD AUTO: 27.1 % (ref 41–52)
HGB BLD-MCNC: 9.2 G/DL (ref 13.5–17.5)
IMM GRANULOCYTES # BLD AUTO: 0.01 X10*3/UL (ref 0–0.7)
IMM GRANULOCYTES NFR BLD AUTO: 0.4 % (ref 0–0.9)
LYMPHOCYTES # BLD AUTO: 0.42 X10*3/UL (ref 1.2–4.8)
LYMPHOCYTES NFR BLD AUTO: 16.1 %
MAGNESIUM SERPL-MCNC: 1.5 MG/DL (ref 1.6–2.4)
MCH RBC QN AUTO: 32.7 PG (ref 26–34)
MCHC RBC AUTO-ENTMCNC: 33.9 G/DL (ref 32–36)
MCV RBC AUTO: 96 FL (ref 80–100)
MONOCYTES # BLD AUTO: 0.02 X10*3/UL (ref 0.1–1)
MONOCYTES NFR BLD AUTO: 0.8 %
NEUTROPHILS # BLD AUTO: 2.16 X10*3/UL (ref 1.2–7.7)
NEUTROPHILS NFR BLD AUTO: 82.7 %
NRBC BLD-RTO: 0 /100 WBCS (ref 0–0)
PLATELET # BLD AUTO: 249 X10*3/UL (ref 150–450)
POTASSIUM SERPL-SCNC: 3.1 MMOL/L (ref 3.5–5.3)
RBC # BLD AUTO: 2.81 X10*6/UL (ref 4.5–5.9)
SODIUM SERPL-SCNC: 141 MMOL/L (ref 136–145)
WBC # BLD AUTO: 2.6 X10*3/UL (ref 4.4–11.3)

## 2024-08-29 PROCEDURE — 85025 COMPLETE CBC W/AUTO DIFF WBC: CPT | Performed by: HOSPITALIST

## 2024-08-29 PROCEDURE — 70553 MRI BRAIN STEM W/O & W/DYE: CPT

## 2024-08-29 PROCEDURE — 2500000004 HC RX 250 GENERAL PHARMACY W/ HCPCS (ALT 636 FOR OP/ED): Performed by: HOSPITALIST

## 2024-08-29 PROCEDURE — 70553 MRI BRAIN STEM W/O & W/DYE: CPT | Performed by: STUDENT IN AN ORGANIZED HEALTH CARE EDUCATION/TRAINING PROGRAM

## 2024-08-29 PROCEDURE — 80048 BASIC METABOLIC PNL TOTAL CA: CPT | Performed by: HOSPITALIST

## 2024-08-29 PROCEDURE — 2500000001 HC RX 250 WO HCPCS SELF ADMINISTERED DRUGS (ALT 637 FOR MEDICARE OP): Performed by: HOSPITALIST

## 2024-08-29 PROCEDURE — 2500000005 HC RX 250 GENERAL PHARMACY W/O HCPCS: Performed by: HOSPITALIST

## 2024-08-29 PROCEDURE — 2550000001 HC RX 255 CONTRASTS: Performed by: HOSPITALIST

## 2024-08-29 PROCEDURE — 83735 ASSAY OF MAGNESIUM: CPT | Performed by: HOSPITALIST

## 2024-08-29 PROCEDURE — 36415 COLL VENOUS BLD VENIPUNCTURE: CPT | Performed by: HOSPITALIST

## 2024-08-29 PROCEDURE — 1200000002 HC GENERAL ROOM WITH TELEMETRY DAILY

## 2024-08-29 PROCEDURE — 99223 1ST HOSP IP/OBS HIGH 75: CPT | Performed by: HOSPITALIST

## 2024-08-29 PROCEDURE — A9575 INJ GADOTERATE MEGLUMI 0.1ML: HCPCS | Performed by: HOSPITALIST

## 2024-08-29 PROCEDURE — 99253 IP/OBS CNSLTJ NEW/EST LOW 45: CPT | Performed by: PSYCHIATRY & NEUROLOGY

## 2024-08-29 RX ORDER — ACETAMINOPHEN 325 MG/1
650 TABLET ORAL EVERY 4 HOURS PRN
Status: DISCONTINUED | OUTPATIENT
Start: 2024-08-29 | End: 2024-08-31 | Stop reason: HOSPADM

## 2024-08-29 RX ORDER — NAPROXEN SODIUM 220 MG/1
81 TABLET, FILM COATED ORAL DAILY
Status: DISCONTINUED | OUTPATIENT
Start: 2024-08-29 | End: 2024-08-31 | Stop reason: HOSPADM

## 2024-08-29 RX ORDER — SENNOSIDES 8.6 MG/1
2 TABLET ORAL 2 TIMES DAILY
Status: DISCONTINUED | OUTPATIENT
Start: 2024-08-29 | End: 2024-08-31 | Stop reason: HOSPADM

## 2024-08-29 RX ORDER — BRIMONIDINE TARTRATE 2 MG/ML
1 SOLUTION/ DROPS OPHTHALMIC 2 TIMES DAILY
Status: DISCONTINUED | OUTPATIENT
Start: 2024-08-29 | End: 2024-08-31 | Stop reason: HOSPADM

## 2024-08-29 RX ORDER — CYCLOBENZAPRINE HCL 10 MG
10 TABLET ORAL 3 TIMES DAILY PRN
Status: DISCONTINUED | OUTPATIENT
Start: 2024-08-29 | End: 2024-08-31 | Stop reason: HOSPADM

## 2024-08-29 RX ORDER — ONDANSETRON HYDROCHLORIDE 2 MG/ML
4 INJECTION, SOLUTION INTRAVENOUS EVERY 8 HOURS PRN
Status: DISCONTINUED | OUTPATIENT
Start: 2024-08-29 | End: 2024-08-31 | Stop reason: HOSPADM

## 2024-08-29 RX ORDER — SODIUM CHLORIDE 9 MG/ML
75 INJECTION, SOLUTION INTRAVENOUS CONTINUOUS
Status: DISCONTINUED | OUTPATIENT
Start: 2024-08-29 | End: 2024-08-31 | Stop reason: HOSPADM

## 2024-08-29 RX ORDER — ONDANSETRON 4 MG/1
4 TABLET, ORALLY DISINTEGRATING ORAL EVERY 8 HOURS PRN
Status: DISCONTINUED | OUTPATIENT
Start: 2024-08-29 | End: 2024-08-31 | Stop reason: HOSPADM

## 2024-08-29 RX ORDER — GADOTERATE MEGLUMINE 376.9 MG/ML
10 INJECTION INTRAVENOUS
Status: COMPLETED | OUTPATIENT
Start: 2024-08-29 | End: 2024-08-29

## 2024-08-29 RX ORDER — PREDNISOLONE ACETATE 10 MG/ML
1 SUSPENSION/ DROPS OPHTHALMIC 4 TIMES DAILY
Status: DISCONTINUED | OUTPATIENT
Start: 2024-08-29 | End: 2024-08-29

## 2024-08-29 RX ORDER — PREDNISOLONE ACETATE 10 MG/ML
1 SUSPENSION/ DROPS OPHTHALMIC 4 TIMES DAILY
Status: DISCONTINUED | OUTPATIENT
Start: 2024-08-29 | End: 2024-08-31 | Stop reason: HOSPADM

## 2024-08-29 SDOH — SOCIAL STABILITY: SOCIAL INSECURITY: ARE THERE ANY APPARENT SIGNS OF INJURIES/BEHAVIORS THAT COULD BE RELATED TO ABUSE/NEGLECT?: NO

## 2024-08-29 SDOH — SOCIAL STABILITY: SOCIAL INSECURITY: HAVE YOU HAD ANY THOUGHTS OF HARMING ANYONE ELSE?: NO

## 2024-08-29 SDOH — SOCIAL STABILITY: SOCIAL INSECURITY: HAS ANYONE EVER THREATENED TO HURT YOUR FAMILY OR YOUR PETS?: NO

## 2024-08-29 SDOH — ECONOMIC STABILITY: INCOME INSECURITY: HOW HARD IS IT FOR YOU TO PAY FOR THE VERY BASICS LIKE FOOD, HOUSING, MEDICAL CARE, AND HEATING?: SOMEWHAT HARD

## 2024-08-29 SDOH — ECONOMIC STABILITY: TRANSPORTATION INSECURITY
IN THE PAST 12 MONTHS, HAS THE LACK OF TRANSPORTATION KEPT YOU FROM MEDICAL APPOINTMENTS OR FROM GETTING MEDICATIONS?: NO

## 2024-08-29 SDOH — HEALTH STABILITY: MENTAL HEALTH: HOW OFTEN DO YOU HAVE 6 OR MORE DRINKS ON ONE OCCASION?: NEVER

## 2024-08-29 SDOH — HEALTH STABILITY: MENTAL HEALTH: HOW OFTEN DO YOU HAVE A DRINK CONTAINING ALCOHOL?: NEVER

## 2024-08-29 SDOH — HEALTH STABILITY: PHYSICAL HEALTH: ON AVERAGE, HOW MANY MINUTES DO YOU ENGAGE IN EXERCISE AT THIS LEVEL?: 40 MIN

## 2024-08-29 SDOH — ECONOMIC STABILITY: HOUSING INSECURITY: IN THE PAST 12 MONTHS, HOW MANY TIMES HAVE YOU MOVED WHERE YOU WERE LIVING?: 0

## 2024-08-29 SDOH — ECONOMIC STABILITY: HOUSING INSECURITY: AT ANY TIME IN THE PAST 12 MONTHS, WERE YOU HOMELESS OR LIVING IN A SHELTER (INCLUDING NOW)?: NO

## 2024-08-29 SDOH — ECONOMIC STABILITY: INCOME INSECURITY: IN THE LAST 12 MONTHS, WAS THERE A TIME WHEN YOU WERE NOT ABLE TO PAY THE MORTGAGE OR RENT ON TIME?: NO

## 2024-08-29 SDOH — HEALTH STABILITY: PHYSICAL HEALTH: ON AVERAGE, HOW MANY DAYS PER WEEK DO YOU ENGAGE IN MODERATE TO STRENUOUS EXERCISE (LIKE A BRISK WALK)?: 3 DAYS

## 2024-08-29 SDOH — SOCIAL STABILITY: SOCIAL INSECURITY: DO YOU FEEL ANYONE HAS EXPLOITED OR TAKEN ADVANTAGE OF YOU FINANCIALLY OR OF YOUR PERSONAL PROPERTY?: NO

## 2024-08-29 SDOH — SOCIAL STABILITY: SOCIAL INSECURITY: DO YOU FEEL UNSAFE GOING BACK TO THE PLACE WHERE YOU ARE LIVING?: NO

## 2024-08-29 SDOH — SOCIAL STABILITY: SOCIAL INSECURITY: ARE YOU OR HAVE YOU BEEN THREATENED OR ABUSED PHYSICALLY, EMOTIONALLY, OR SEXUALLY BY ANYONE?: NO

## 2024-08-29 SDOH — SOCIAL STABILITY: SOCIAL INSECURITY: HAVE YOU HAD THOUGHTS OF HARMING ANYONE ELSE?: NO

## 2024-08-29 SDOH — SOCIAL STABILITY: SOCIAL INSECURITY: WERE YOU ABLE TO COMPLETE ALL THE BEHAVIORAL HEALTH SCREENINGS?: YES

## 2024-08-29 SDOH — SOCIAL STABILITY: SOCIAL INSECURITY: ABUSE: ADULT

## 2024-08-29 SDOH — HEALTH STABILITY: MENTAL HEALTH: HOW MANY STANDARD DRINKS CONTAINING ALCOHOL DO YOU HAVE ON A TYPICAL DAY?: PATIENT DOES NOT DRINK

## 2024-08-29 SDOH — ECONOMIC STABILITY: TRANSPORTATION INSECURITY
IN THE PAST 12 MONTHS, HAS LACK OF TRANSPORTATION KEPT YOU FROM MEETINGS, WORK, OR FROM GETTING THINGS NEEDED FOR DAILY LIVING?: NO

## 2024-08-29 SDOH — SOCIAL STABILITY: SOCIAL INSECURITY: DOES ANYONE TRY TO KEEP YOU FROM HAVING/CONTACTING OTHER FRIENDS OR DOING THINGS OUTSIDE YOUR HOME?: NO

## 2024-08-29 ASSESSMENT — ACTIVITIES OF DAILY LIVING (ADL)
ASSISTIVE_DEVICE: CRUTCHES
TOILETING: INDEPENDENT
HEARING - RIGHT EAR: FUNCTIONAL
ADEQUATE_TO_COMPLETE_ADL: YES
DRESSING YOURSELF: INDEPENDENT
GROOMING: INDEPENDENT
JUDGMENT_ADEQUATE_SAFELY_COMPLETE_DAILY_ACTIVITIES: YES
HEARING - LEFT EAR: FUNCTIONAL
PATIENT'S MEMORY ADEQUATE TO SAFELY COMPLETE DAILY ACTIVITIES?: YES
LACK_OF_TRANSPORTATION: NO
BATHING: INDEPENDENT
WALKS IN HOME: INDEPENDENT
FEEDING YOURSELF: INDEPENDENT

## 2024-08-29 ASSESSMENT — COGNITIVE AND FUNCTIONAL STATUS - GENERAL
WALKING IN HOSPITAL ROOM: A LITTLE
MOBILITY SCORE: 23
DRESSING REGULAR UPPER BODY CLOTHING: A LITTLE
WALKING IN HOSPITAL ROOM: A LITTLE
DAILY ACTIVITIY SCORE: 24
MOBILITY SCORE: 22
HELP NEEDED FOR BATHING: A LITTLE
DAILY ACTIVITIY SCORE: 19
DRESSING REGULAR LOWER BODY CLOTHING: A LITTLE
DAILY ACTIVITIY SCORE: 24
MOBILITY SCORE: 22
DAILY ACTIVITIY SCORE: 24
CLIMB 3 TO 5 STEPS WITH RAILING: A LITTLE
PATIENT BASELINE BEDBOUND: NO
MOBILITY SCORE: 22
CLIMB 3 TO 5 STEPS WITH RAILING: A LITTLE
CLIMB 3 TO 5 STEPS WITH RAILING: A LITTLE
TOILETING: A LITTLE
CLIMB 3 TO 5 STEPS WITH RAILING: A LITTLE
WALKING IN HOSPITAL ROOM: A LITTLE
PERSONAL GROOMING: A LITTLE

## 2024-08-29 ASSESSMENT — PAIN DESCRIPTION - LOCATION: LOCATION: NECK

## 2024-08-29 ASSESSMENT — LIFESTYLE VARIABLES
HOW MANY STANDARD DRINKS CONTAINING ALCOHOL DO YOU HAVE ON A TYPICAL DAY: PATIENT DOES NOT DRINK
HOW OFTEN DO YOU HAVE 6 OR MORE DRINKS ON ONE OCCASION: NEVER
AUDIT-C TOTAL SCORE: 0
AUDIT-C TOTAL SCORE: 0
SKIP TO QUESTIONS 9-10: 1
HOW OFTEN DO YOU HAVE A DRINK CONTAINING ALCOHOL: NEVER
SKIP TO QUESTIONS 9-10: 1
AUDIT-C TOTAL SCORE: 0

## 2024-08-29 ASSESSMENT — PATIENT HEALTH QUESTIONNAIRE - PHQ9
2. FEELING DOWN, DEPRESSED OR HOPELESS: NOT AT ALL
1. LITTLE INTEREST OR PLEASURE IN DOING THINGS: NOT AT ALL
SUM OF ALL RESPONSES TO PHQ9 QUESTIONS 1 & 2: 0

## 2024-08-29 ASSESSMENT — PAIN SCALES - GENERAL
PAINLEVEL_OUTOF10: 7
PAINLEVEL_OUTOF10: 0 - NO PAIN
PAINLEVEL_OUTOF10: 5 - MODERATE PAIN

## 2024-08-29 ASSESSMENT — ENCOUNTER SYMPTOMS
HEMATOLOGIC/LYMPHATIC NEGATIVE: 1
HALLUCINATIONS: 1
VOMITING: 1
RESPIRATORY NEGATIVE: 1
CARDIOVASCULAR NEGATIVE: 1
HEADACHES: 1
NAUSEA: 1
MUSCULOSKELETAL NEGATIVE: 1
WEAKNESS: 1
ALLERGIC/IMMUNOLOGIC NEGATIVE: 1
CONSTITUTIONAL NEGATIVE: 1

## 2024-08-29 ASSESSMENT — PAIN - FUNCTIONAL ASSESSMENT: PAIN_FUNCTIONAL_ASSESSMENT: 0-10

## 2024-08-29 ASSESSMENT — PAIN DESCRIPTION - PAIN TYPE: TYPE: CHRONIC PAIN

## 2024-08-29 NOTE — ED PROVIDER NOTES
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  Headache is not the worst headache of his life.  Denies any double vision blurred vision loss of vision no trauma falls fever chills nausea vomit diarrhea cough congestion incontinence seizures syncope anoscopy no hematemesis melena hematochezia no hemoptysis    Past history: Neuro Behcet's syndrome-disease  Social: Patient denies a current tobacco alcohol drug abuse.  REVIEW OF SYSTEMS:    GENERAL.: No weight loss, fatigue, anorexia, insomnia, fever.  Positive headache    EYES: No vision loss, double vision, drainage, eye pain.    ENT: No pharyngitis, dry mouth.    CARDIOPULMONARY: No chest pain, palpitations, syncope, near syncope. No shortness of breath, cough, hemoptysis.    GI: No abdominal pain, change in bowel habits, melena, hematemesis, hematochezia, nausea, vomiting, diarrhea.    : No discharge, dysuria, frequency, urgency, hematuria.    MS: No limb pain, joint pain, joint swelling.  Neuro: Positive for difficulty walking  SKIN: No rashes.    PSYCH: No depression, anxiety, suicidality, homicidality.    Review of systems is otherwise negative unless stated above or in history of present illness.  Social history, family history, allergies reviewed.  PHYSICAL EXAM:    GENERAL: Vitals noted, no distress. Alert and oriented  x 3. Non-toxic.    Eyes: Pupils equal round and reactive accommodation EOMs are intact  EENT: TMs clear. Posterior oropharynx unremarkable. No meningismus. No LAD.     NECK: Supple. Nontender. No midline tenderness.     CARDIAC: Regular, rate, rhythm. No murmurs rubs or gallops. No JVD    PULMONARY: Lungs clear bilaterally with good aeration. No wheezes rales or rhonchi. No respiratory distress.     ABDOMEN: Soft, nonsurgical. Nontender. No peritoneal signs. Normoactive  bowel sounds. No pulsatile masses.     EXTREMITIES: No peripheral edema. Negative Homans bilaterally, no cords.  2+ bounding pulses well-perfused    SKIN: No rash. Intact.     NEURO: No focal neurologic deficits, NIH score of 0. Cranial nerves normal as tested from II through XII.     MEDICAL DECISION MAKING:  EKG on my interpretation shows a normal sinus rhythm normal axis rate mid 70s early repolarization with no ischemic changes.  CBC with differential chemistry LFTs are unremarkable CT head is abnormal please refer to radiology report    Consults: Patient discussed with neurosurgery and no intervention recommended discussed with  neurology [facility] where he was hospitalized recently, he recommended intravenous methylprednisolone 1 g a day for 3 to 5 days with an outpatient follow-up.  No other intervention recommended.  It was recommended patient does not need transfer to [facility].    ED course: Patient remained stable hemodynamic.    Treatment in ED: IV established given IV IV fluids morphine and Zofran and IV methylprednisolone 1 g.    ED course: He remained stable hemodynamic.    Impression: #1 acute worsening/exacerbation of neuro Behcet's disease    Plan/MDM: 29-year-old  male history of known neuro Behcet's disease currently on prednisone comes in with a headache and difficulty ambulation currently neurologic intact GCS 15 stroke scale 0 workup is concerning for abnormal CAT scan please refer to radiology report discussed with neurosurgery and neurology they both recommended no surgical intervention and IV steroids and outpatient follow-up once discharged with Dr. Ulloa currently low suspicion for stroke TIA meningitis meningoencephalitis or any other catastrophic pathology.              Patient History   Past Medical History:   Diagnosis Date    Uveitis      Past Surgical History:   Procedure Laterality Date    CT ANGIO NECK  9/2/2021    CT NECK ANGIO W AND WO IV CONTRAST  9/2/2021 [facility] CLINICAL LEGACY    CT HEAD ANGIO W AND WO IV CONTRAST  9/2/2021    CT HEAD ANGIO W AND WO IV CONTRAST 9/2/2021 [facility] CLINICAL LEGACY     Family History   Problem Relation Name Age of Onset    Other (Diabetes mellitus) Other Multiple Family Members     Hypertension Other Multiple Family Members     Cancer Other Multiple Family Members      Social History     Tobacco Use    Smoking status: Every Day     Types: Cigars    Smokeless tobacco: Not on file   Vaping Use    Vaping status: Unknown   Substance Use Topics    Alcohol use: Yes    Drug use: Yes     Types: Marijuana       Physical Exam   ED Triage Vitals [08/28/24 1106]   Temperature Heart Rate Respirations BP   36.7 °C (98 °F) 100 17 99/77      Pulse Ox Temp Source Heart Rate Source Patient Position   100 % Temporal Monitor --      BP Location FiO2 (%)     -- --       Physical Exam      ED Course & [facility]   ED Course as of 08/29/24 0128   Thu Aug 29, 2024   0127 Patient CBC shows no leukocytosis chemistry LFTs are unremarkable CT head is abnormal discussed with neurosurgery no intervention recommended discussed with neurology [facility]  who recommends a 1 g of Solu-Medrol for 35 days in patient hospitalization at [facility] with an outpatient follow-up once discharged with Dr. Ulloa [MT]      ED Course User Index  [MT] Osorio Casiano MD         Diagnoses as of 08/29/24 0128   Behcet's disease with multisystem involvement (Multi)                 No data recorded     [place] Coma Scale Score: 15 (08/28/24 1602 : Hayden Figueroa RN)                           Medical Decision Making      Procedure  Procedures     Osorio Casiano MD  08/29/24 0132

## 2024-08-29 NOTE — PROGRESS NOTES
08/29/24 1021   Current Planned Discharge Disposition   Current Planned Discharge Disposition Home

## 2024-08-29 NOTE — CARE PLAN
Problem: Pain - Adult  Goal: Verbalizes/displays adequate comfort level or baseline comfort level  Outcome: Progressing     Problem: Safety - Adult  Goal: Free from fall injury  Outcome: Progressing     Problem: Discharge Planning  Goal: Discharge to home or other facility with appropriate resources  Outcome: Progressing     Problem: Chronic Conditions and Co-morbidities  Goal: Patient's chronic conditions and co-morbidity symptoms are monitored and maintained or improved  Outcome: Progressing   The patient's goals for the shift include to remain stable    The clinical goals for the shift include to remain safe    Over the shift, the patient did not make progress toward the following goals. Barriers to progression include . Recommendations to address these barriers include .

## 2024-08-29 NOTE — ASSESSMENT & PLAN NOTE
- Neurology [facility] rec Solumedrol 1g daily x 3-5 days  - to follow up after discharge with his neurologist Dr Ulloa  - Neurology consult  - telemetry  - pain control    Prior CVA  - reports prior CVA, however, not on ASA or statin    ETOH abuse history  - Denies ETOH use  - check serum ETOH and drug screen

## 2024-08-29 NOTE — NURSING NOTE
Patient scored as a high falls risk and was educated multiple times on safety.  He refuses the bed alarm. Charge nurse as well as nurse manager made aware.

## 2024-08-29 NOTE — PROGRESS NOTES
08/29/24 1228   Discharge Planning   Living Arrangements Parent   Support Systems Parent   Assistance Needed ADLs   Expected Discharge Disposition Other   Housing Stability   In the last 12 months, was there a time when you were not able to pay the mortgage or rent on time? N   At any time in the past 12 months, were you homeless or living in a shelter (including now)? N   Transportation Needs   In the past 12 months, has lack of transportation kept you from medical appointments or from getting medications? no   In the past 12 months, has lack of transportation kept you from meetings, work, or from getting things needed for daily living? No     TCC spoke with pt mother. Pt lives with her. Pt is able walk up stairs but slowly. Pt has been to out patient therapy. PT OT to eval for dc plan.

## 2024-08-29 NOTE — H&P
History Of Present Illness  Juan Marsh is a 29 y.o. 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    Pt with recent hospitalization 7/24 for possible Neuro-Behcet's flare.     HA started one week ago, worsening right sided weakness 2 weeks ago. HA is diffuse, throbbing, stabbing, with visual changes. States he has occ blurring of the vision, was having visual hallucinations earlier today.   His sister is in the room and provides some additional history.  States he has approx 2 flares per month, but does not always seek medical attention. Typical flare symptoms are HA, visual changes and worsening of his residual stroke deficits of right sided weakness and slurred speech.   Normally takes prednisone 5mg daily at baseline.     Pt states this is the worst HA he has experienced with the flares so far.   +NV this am, which prompted him to come to the ED.     In the ED, CT head showed edema, mass effect in the basal ganglia, left lentiform nuclei, left hypothalamus and left thalamus.   Dr Casiano spoke with Dr. Gibbs at [facility] who rec Solumedrol 1 gram IV daily for 3-5 days, followed by steroid taper.        Past Medical History  Past Medical History:   Diagnosis Date    Uveitis        Surgical History  Past Surgical History:   Procedure Laterality Date    CT ANGIO NECK  9/2/2021    CT NECK ANGIO W AND WO IV CONTRAST 9/2/2021 [facility] CLINICAL LEGACY    CT HEAD ANGIO W AND WO IV CONTRAST  9/2/2021    CT HEAD ANGIO W AND WO IV CONTRAST 9/2/2021 [facility] CLINICAL LEGACY        Social History  He reports that he has been smoking cigars. He does not have any smokeless tobacco history on file. He reports current alcohol use. He reports current drug use. Drug: Marijuana.    Family History  Family History   Problem Relation Name Age of Onset    Other (Diabetes mellitus) Other Multiple Family Members      Hypertension Other Multiple Family Members     Cancer Other Multiple Family Members         Allergies  Patient has no known allergies.    Review of Systems   Constitutional: Negative.    HENT: Negative.     Eyes:  Positive for visual disturbance.   Respiratory: Negative.     Cardiovascular: Negative.    Gastrointestinal:  Positive for nausea and vomiting.   Genitourinary: Negative.    Musculoskeletal: Negative.    Skin: Negative.    Allergic/Immunologic: Negative.    Neurological:  Positive for weakness and headaches.   Hematological: Negative.    Psychiatric/Behavioral:  Positive for hallucinations.         Physical Exam  Vitals reviewed.   Constitutional:       Appearance: Normal appearance.      Comments: Thin young AA male, slurring of speech, mild distress. Sister at bedside   HENT:      Head: Normocephalic and atraumatic.      Mouth/Throat:      Mouth: Mucous membranes are moist.   Eyes:      Extraocular Movements: Extraocular movements intact.      Conjunctiva/sclera: Conjunctivae normal.      Pupils: Pupils are equal, round, and reactive to light.   Cardiovascular:      Rate and Rhythm: Normal rate and regular rhythm.      Pulses: Normal pulses.      Heart sounds: Normal heart sounds.   Pulmonary:      Effort: Pulmonary effort is normal.      Breath sounds: Normal breath sounds.   Abdominal:      General: Abdomen is flat. Bowel sounds are normal.      Palpations: Abdomen is soft.   Musculoskeletal:         General: Normal range of motion.   Skin:     General: Skin is warm and dry.   Neurological:      Mental Status: He is alert.      Comments: Strength 5/5 LUE and LLE, 4/5 Rt side  Facial droop right side  Slurring of speech.      Psychiatric:         Mood and Affect: Mood normal.         Behavior: Behavior normal.         Thought Content: Thought content normal.         Judgment: Judgment normal.          Last Recorded Vitals  Blood pressure 108/81, pulse 79, temperature 36.7 °C (98 °F), temperature source  "Temporal, resp. rate 16, height 1.753 m (5' 9\"), weight 58.1 kg (128 lb), SpO2 99%.    Relevant Results        Results for orders placed or performed during the hospital encounter of 08/28/24 (from the past 24 hour(s))   CBC and Auto Differential   Result Value Ref Range    WBC 8.1 4.4 - 11.3 x10*3/uL    nRBC 0.0 0.0 - 0.0 /100 WBCs    RBC 4.53 4.50 - 5.90 x10*6/uL    Hemoglobin 14.8 13.5 - 17.5 g/dL    Hematocrit 45.8 41.0 - 52.0 %     (H) 80 - 100 fL    MCH 32.7 26.0 - 34.0 pg    MCHC 32.3 32.0 - 36.0 g/dL    RDW 14.2 11.5 - 14.5 %    Platelets 357 150 - 450 x10*3/uL    Neutrophils % 59.4 40.0 - 80.0 %    Immature Granulocytes %, Automated 0.2 0.0 - 0.9 %    Lymphocytes % 30.3 13.0 - 44.0 %    Monocytes % 9.2 2.0 - 10.0 %    Eosinophils % 0.4 0.0 - 6.0 %    Basophils % 0.5 0.0 - 2.0 %    Neutrophils Absolute 4.82 1.20 - 7.70 x10*3/uL    Immature Granulocytes Absolute, Automated 0.02 0.00 - 0.70 x10*3/uL    Lymphocytes Absolute 2.46 1.20 - 4.80 x10*3/uL    Monocytes Absolute 0.75 0.10 - 1.00 x10*3/uL    Eosinophils Absolute 0.03 0.00 - 0.70 x10*3/uL    Basophils Absolute 0.04 0.00 - 0.10 x10*3/uL   Comprehensive metabolic panel   Result Value Ref Range    Glucose 92 74 - 99 mg/dL    Sodium 139 136 - 145 mmol/L    Potassium 4.4 3.5 - 5.3 mmol/L    Chloride 102 98 - 107 mmol/L    Bicarbonate 21 21 - 32 mmol/L    Anion Gap 20 10 - 20 mmol/L    Urea Nitrogen 28 (H) 6 - 23 mg/dL    Creatinine 1.02 0.50 - 1.30 mg/dL    eGFR >90 >60 mL/min/1.73m*2    Calcium 10.1 8.6 - 10.3 mg/dL    Albumin 4.8 3.4 - 5.0 g/dL    Alkaline Phosphatase 86 33 - 120 U/L    Total Protein 7.9 6.4 - 8.2 g/dL    AST 14 9 - 39 U/L    Bilirubin, Total 0.5 0.0 - 1.2 mg/dL    ALT 8 (L) 10 - 52 U/L   Troponin I, High Sensitivity   Result Value Ref Range    Troponin I, High Sensitivity 3 0 - 20 ng/L   Magnesium   Result Value Ref Range    Magnesium 2.60 (H) 1.60 - 2.40 mg/dL   ECG 12 lead   Result Value Ref Range    Ventricular Rate 74 BPM    " Atrial Rate 74 BPM    NH Interval 154 ms    QRS Duration 78 ms    QT Interval 362 ms    QTC Calculation(Bazett) 401 ms    P Axis 70 degrees    R Axis 57 degrees    T Axis 60 degrees    QRS Count 12 beats    Q Onset 222 ms    P Onset 145 ms    P Offset 198 ms    T Offset 403 ms    QTC Fredericia 388 ms     CT head wo IV contrast    Result Date: 8/28/2024  Interpreted By:  Keely Kamara, STUDY: CT HEAD WO IV CONTRAST;  8/28/2024 6:28 pm   INDICATION: Signs/Symptoms:headache.     COMPARISON: MRI of the brain dated 05/20/2024; 08/29/2021   ACCESSION NUMBER(S): [id number]   ORDERING CLINICIAN: ENRICO MEEKS   TECHNIQUE: Noncontrast axial CT scan of head was performed. Angled reformats in brain and bone windows were generated. The images were reviewed in bone, brain, blood and soft tissue windows.   FINDINGS: No hyperdense intracranial hemorrhage is evident.   In the interim since prior MRI on 05/20/2024, there has been interval increase in geographic area of hypodense attenuation present in the left basal ganglia, involving of the left hypothalamus and thalamus, and extending into the left corona radiata. There is somewhat increased local mass effect with some effacement of the left lateral ventricle without significant rightward shift of midline structures.   Gray-white differentiation appears intact, without evidence of CT apparent transcortical infarct.   There is extension of the hypodense attenuation of the left cerebral peduncle. These findings are new since prior noncontrast CT head imaging in August of 2021.   No ventricular dilatation is present. Basal cisterns are patent. No extra-axial fluid collection is identified.   Scalp soft tissues do not demonstrate any acute abnormality. No acute calvarial abnormality is present. Mastoid air cells and middle ear cavities are clear.   Visualized paranasal sinuses are unremarkable in appearance.       1. In the interim since prior MR imaging in May of 2024,  there has been interval increase in geographic area of low-density attenuation, likely representing edema, with the associated local mass effect in the left basal ganglia, with involvement of the left lentiform nuclei, left hypothalamus, and left thalamus. There is somewhat increased effacement of the left lateral ventricles compared to prior MRI without significant midline shift. Findings may represent acute worsening of patient's neuro Behcet's syndrome, and MRI of the brain can be considered for further characterization. 2. No evidence of hemorrhage, CT apparent transcortical infarct or ventriculomegaly.     MACRO: None   Signed by: Keely Kamara 8/28/2024 7:02 PM Dictation workstation:   [id number]    ECG 12 lead    Result Date: 8/28/2024  Normal sinus rhythm with sinus arrhythmia Normal ECG When compared with ECG of 14-MAY-2024 10:54, No significant change was found See ED provider note for full interpretation and clinical correlation Confirmed by Emily Webb ([id number]) on 8/28/2024 4:43:33 PM       Assessment/Plan   Assessment & Plan  Behcet's disease with multisystem involvement (Multi)  - Neurology [facility] rec Solumedrol 1g daily x 3-5 days  - to follow up after discharge with his neurologist Dr Ulloa  - Neurology consult  - telemetry  - pain control    Prior CVA  - reports prior CVA, however, not on ASA or statin    ETOH abuse history  - Denies ETOH use  - check serum ETOH and drug screen         I spent 45 minutes in the professional and overall care of this patient.      Rica Carver MD

## 2024-08-29 NOTE — CONSULTS
Inpatient consult to Neurology  Consult performed by: Leoncio Garcia MD  Consult ordered by: Rica Carver MD          History Of Present Illness  Juan Marsh is a 29 y.o. 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    He has past medical history significant for alcohol and tobacco use, myopericarditis, uveitis, avascular necrosis of right hip.    Neurological history is notable for presentation in August 2021 with uveitis and history of oral and genital ulcers at which time Behcet's disease was initially suspected.  He was [place] inpatient neurology [place] [facility] [place] 2021 with blurry vision and right hand weakness and underwent extensive evaluation at that time including lumbar puncture with unrevealing findings, ophthalmology and rheumatology consultations, and MRIs of brain and cervical spine.  The brain MRI was notable for T2/FLAIR hyperintense signal involving left thalamus, cerebral peduncle, internal capsule, lentiform nucleus, rostral tyrone and medial temporal pole with associated enhancement and a punctate focus of diffusion restriction in the internal capsule, suspicious for parenchymal neuro Behcet's.  He was treated with Solu-Medrol followed by a steroid taper and with plan for Imuran initiation as an outpatient.  Aquaporin 4 and MOG antibodies were negative.  He was subsequently placed on Humira by rheumatology due to progression of retinal vasculitis.    He was readmitted to neurology in May 2024 with headache, tinnitus, diplopia, right hemisensory disturbance, right proximal upper extremity weakness.  Recurrent left hemispheric subcortical lesion with similar distribution as on the prior MRI but extending further into the lower tyrone and upper medulla and with slight mass effect.  LP was repeated as well as CT of the chest/abdomen/pelvis, the latter to evaluate for  "evidence of systemic lymphoma or sarcoidosis.  He was given a 5-day course of intravenous methylprednisolone followed by prednisone taper.  Plan was outlined for mycophenolate or rituximab to be started outpatient.  He was discharged on 5/21 following a repeat brain MRI that showed improvement.    He followed up with Dr. Ulloa, his neuro immunologist, most recently on 7/9/2024 at that point on mycophenolate 500 mg twice daily and was resumed on prednisone 5 mg daily at that point with plan to stop prednisone altogether in November.  Plan at that point was for repeat brain MRI in November.    He presented to the [facility] ED yesterday and is evaluated on the medical floor today.  Of note, he is a reluctant historian and is reluctant to participate in the exam, indicating headache and preferring to lie on his side with eyes closed.  He indicates he has had a new persistent holocranial but predominantly bilateral vertex headache for the past week.  It is currently 7/10 intensity.  Associated photophobia.  He denies vomiting.  He does not endorse vision loss or diplopia but does indicate formed visual hallucinations.  He also endorses recurrence of right upper extremity weakness which seems to predominantly involve wrist and finger extensors, likewise over the past week.  He has been ambulatory with a crutch and does not indicate recent falls.  He denies recent hearing change.  He denies dysphagia.  He is vague when questioned about constitutional symptoms/illnesses.    It is unclear if he has been taking mycophenolate based on review of the outpatient medication list on Epic, although he is not able to tell me his medications apart from prednisone, gabapentin and \"one other pill\" that he cannot name.    I reviewed his current noncontrast head CT which shows hypodensity in the left thalamus and midbrain but with significant increase in surrounding mass effect compared to the FLAIR sequence of the brain MRI from 5/20/2024.  " Specifically there is mass effect on the left lateral ventricle and there is hypodensity spreading into the surrounding corona radiata and subinsular white matter that was not noted on the MRI.  The fourth ventricle is patent.      Past Medical History  Past Medical History:   Diagnosis Date    Uveitis      Surgical History  Past Surgical History:   Procedure Laterality Date    CT ANGIO NECK  9/2/2021    CT NECK ANGIO W AND WO IV CONTRAST 9/2/2021 [facility]    CT HEAD ANGIO W AND WO IV CONTRAST  9/2/2021    CT HEAD ANGIO W AND WO IV CONTRAST 9/2/2021 [facility] CL[facility]GAC[facility]     Social History  Social History     Tobacco Use    Smoking status: Every Day     Types: Cigars   Vaping Use    Vaping status: Unknown   Substance Use Topics    Alcohol use: Yes    Drug use: Yes     Types: Marijuana     Allergies  Patient has no known allergies.  Medications Prior to Admission   Medication Sig Dispense Refill Last Dose    acetaminophen (TylenoL) 325 mg tablet Take 2 tablets (650 mg) by mouth every 6 hours if needed for mild pain (1 - 3) or fever (temp greater than 38.0 C). 30 tablet 0     aspirin 81 mg chewable tablet Chew 1 tablet (81 mg) once daily.       brimonidine (AlphaGAN P) 0.2 % ophthalmic solution Administer 1 drop into the right eye every 12 hours.       cyclobenzaprine (Flexeril) 10 mg tablet Take 1 tablet (10 mg) by mouth 3 times a day as needed for muscle spasms. 90 tablet 0     ergocalciferol (Vitamin D-2) 1.25 MG (32555 UT) capsule Take 1 capsule (1,250 mcg) by mouth 1 (one) time per week.       gabapentin (Neurontin) 300 mg capsule Take 1 capsule (300 mg) by mouth every 8 hours. 90 capsule 1     ibuprofen 600 mg tablet Take 1 tablet (600 mg) by mouth every 8 hours if needed for mild pain (1 - 3) or fever (temp greater than 38.0 C). 30 tablet 0     lidocaine 4 % patch Place 2 patches over 12 hours on the skin once daily. Remove & discard patch within 12 hours or as directed by MD. 28 patch 0      pantoprazole (ProtoNix) 40 mg EC tablet Take 1 tablet (40 mg) by mouth once daily.       prednisoLONE acetate (Pred-Forte) 1 % ophthalmic suspension Administer 1 drop into the right eye 4 times a day. 15 mL 1     predniSONE (Deltasone) 5 mg tablet Take 1 tablet (5 mg) by mouth once daily. 30 tablet 6        Review of Systems    Review of Systems:  Neurologic:  As per the history of present illness.  Constitutional:  Negative for fever.  Musculoskeletal: Positive for left lower extremity pain. Cardiovascular:  Negative for chest pain.  Respiratory:  Negative for dyspnea.  Eyes: As per the history of present illness.  ENT: Negative for acute hearing change.  GI:  Negative for vomiting.  :  Negative for bladder incontinence.  Dermatologic:  Negative for acute rash.          Neurological Exam  Physical Exam    Physical Examination:    General: Awake but withdrawn and reluctant to participate in history/exam, lying in left lateral decubitus position with eyes closed until ultimately convinced to lie supine for exam.  Great-grandmother at bedside.    Mental Status: Poor eye contact but responsive to questions, with terse replies.  Appropriate responses.  No paraphasic errors.  Followed commands accurately with prodding.    Cranial Nerves:  Funduscopic exam was not well visualized bilaterally on nondilated exam and in the context of limited cooperation.  Pupils were equal, round and reactive to light with no relative afferent pupillary defect.  Extraocular movements were intact and conjugate without nystagmus.  Visual fields were grossly full to confrontation tested binocularly, with limited cooperation. Facial motor function was symmetrically intact.  Hearing was grossly intact for purposes of conversation.  No dysarthria.    Motor: Muscle tone was normal throughout.  There was drift of the right upper extremity not contacting the bed within 10 seconds, and the right hand curled into flexion during drift testing.   "Confrontation strength was symmetrically 5/5 throughout the left upper extremity and at least 4+ throughout both lower extremities.  Right upper extremity was at least 4+ biceps, 4 triceps, 4-4+ wrist and finger extensors, 4-4+ .    Coordination: Poor cooperation with finger to finger testing but no postural or rest tremor, myoclonus or dystonic posturing.    Tendon Reflexes: Biceps, brachioradialis and patellar were 3+ right and trace left.  Neutral plantars.      Last Recorded Vitals  Blood pressure 112/74, pulse 81, temperature 36.5 °C (97.7 °F), resp. rate 16, height 1.753 m (5' 9\"), weight 58.1 kg (128 lb), SpO2 94%.    Relevant Results                    Noemi Coma Scale  Best Eye Response: Spontaneous  Best Verbal Response: Oriented  Best Motor Response: Follows commands  [place] Coma Scale Score: 15                 I have personally reviewed the following imaging results CT head wo IV contrast    Result Date: 8/28/2024  Interpreted By:  Keely Kamara, STUDY: CT HEAD WO IV CONTRAST;  8/28/2024 6:28 pm   INDICATION: Signs/Symptoms:headache.     COMPARISON: MRI of the brain dated 05/20/2024; 08/29/2021   ACCESSION NUMBER(S): [id number]   ORDERING CLINICIAN: ENRICO MEEKS   TECHNIQUE: Noncontrast axial CT scan of head was performed. Angled reformats in brain and bone windows were generated. The images were reviewed in bone, brain, blood and soft tissue windows.   FINDINGS: No hyperdense intracranial hemorrhage is evident.   In the interim since prior MRI on 05/20/2024, there has been interval increase in geographic area of hypodense attenuation present in the left basal ganglia, involving of the left hypothalamus and thalamus, and extending into the left corona radiata. There is somewhat increased local mass effect with some effacement of the left lateral ventricle without significant rightward shift of midline structures.   Gray-white differentiation appears intact, without evidence of CT " apparent transcortical infarct.   There is extension of the hypodense attenuation of the left cerebral peduncle. These findings are new since prior noncontrast CT head imaging in August of 2021.   No ventricular dilatation is present. Basal cisterns are patent. No extra-axial fluid collection is identified.   Scalp soft tissues do not demonstrate any acute abnormality. No acute calvarial abnormality is present. Mastoid air cells and middle ear cavities are clear.   Visualized paranasal sinuses are unremarkable in appearance.       1. In the interim since prior MR imaging in May of 2024, there has been interval increase in geographic area of low-density attenuation, likely representing edema, with the associated local mass effect in the left basal ganglia, with involvement of the left lentiform nuclei, left hypothalamus, and left thalamus. There is somewhat increased effacement of the left lateral ventricles compared to prior MRI without significant midline shift. Findings may represent acute worsening of patient's neuro Behcet's syndrome, and MRI of the brain can be considered for further characterization. 2. No evidence of hemorrhage, CT apparent transcortical infarct or ventriculomegaly.     MACRO: None   Signed by: Keely Kamara 8/28/2024 7:02 PM Dictation workstation:   [id number]       Assessment/Plan     Presentation compatible with progression of neuro Behcet's disease, specifically with progression of mass effect surrounding longstanding left thalamic/midbrain lesion, which I discussed with the patient likely explains his headache and recurrent right upper extremity weakness.    He was discussed by ED with [facility] neurologist who recommended initiating high-dose Solu-Medrol and managing him at [facility].  Anticipate 3-5 days of Solu-Medrol 1 g/day, with the first dose given last night.  I discussed with him that Solu-Medrol should help to resolve his headache and improve his right upper extremity  strength.    It is not clear to me whether he has been taking mycophenolate.  He will need to see neuro immunology shortly after hospital discharge to review long-term immunosuppressant strategy.    Recommend repeating brain MRI at this point and I have ordered it.  I will review it when completed.    I advised him to notify nursing should he note any new neurological complaints while here.      Assessment & Plan  Behcet's disease with multisystem involvement (Multi)             I spent 45 minutes in the professional and overall care of this patient.      Leoncio Garcia MD

## 2024-08-29 NOTE — PROGRESS NOTES
Pharmacy Medication History Review~~~PATIENT REQUESTS REFILLS ON ERGOCALCIFEROL 1.25MCG, GABAPENTIN 300MG, IBUPROFEN 600MG AND LIDOCAINE 4% PATCH~~~~    Juan Marsh is a 29 y.o. male admitted for Behcet's disease with multisystem involvement (Multi). Pharmacy reviewed the patient's [id number] medications and allergies for accuracy.    The list below reflectives the updated PTA list. Please review each medication in order reconciliation for additional clarification and justification.  Prior to Admission Medications   Prescriptions Last Dose Informant     acetaminophen (TylenoL) 325 mg tablet 8/28/2024      Sig: Take 2 tablets (650 mg) by mouth every 6 hours if needed for mild pain (1 - 3) or fever (temp greater than 38.0 C).   aspirin 81 mg chewable tablet 8/27/2024      Sig: Chew 1 tablet (81 mg) once daily.   brimonidine (AlphaGAN P) 0.2 % ophthalmic solution 8/27/2024      Sig: Administer 1 drop into the right eye every 12 hours.   cyclobenzaprine (Flexeril) 10 mg tablet 8/28/2024      Sig: Take 1 tablet (10 mg) by mouth 3 times a day as needed for muscle spasms.   ergocalciferol (Vitamin D-2) 1.25 MG (05622 UT) capsule Past week      Sig: Take 1 capsule (1,250 mcg) by mouth 1 (one) time per week.   gabapentin (Neurontin) 300 mg capsule Past week      Sig: Take 1 capsule (300 mg) by mouth every 8 hours.   ibuprofen 600 mg tablet 8/28/2024      Sig: Take 1 tablet (600 mg) by mouth every 8 hours if needed for mild pain (1 - 3) or fever (temp greater than 38.0 C).   lidocaine 4 % patch Past Week      Sig: Place 2 patches over 12 hours on the skin once daily. Remove & discard patch within 12 hours or as directed by MD.   pantoprazole (ProtoNix) 40 mg EC tablet 8/28/2024      Sig: Take 1 tablet (40 mg) by mouth once daily.   predniSONE (Deltasone) 5 mg tablet 8/28/2024      Sig: Take 1 tablet (5 mg) by mouth once daily.   prednisoLONE acetate (Pred-Forte) 1 % ophthalmic suspension 8/28/2024      Sig:  Administer 1 drop into the right eye 4 times a day.      Facility-Administered Medications: None      Allergies  Reviewed by Cynthia Villanueva on 8/29/2024   No Known Allergies         Below are additional concerns with the patient's PTA list.  The following updates were made to the Prior to Admission medication list:     Source of Information:     Medications ADDED:   N/A  Medications CHANGED:  N/A  Medications REMOVED:   N/A  Medications NOT TAKING:   N/A    Allergy reviewed : Yes    Comments: Patient verified all medications and doses.    Cynthia Villanueva

## 2024-08-29 NOTE — PROGRESS NOTES
08/29/24 0932   [facility] Disability Status   Are you deaf or do you have serious difficulty hearing? N   Are you blind or do you have serious difficulty seeing, even when wearing glasses? N   Because of a physical, mental, or emotional condition, do you have serious difficulty concentrating, remembering, or making decisions? (5 years old or older) N   Do you have serious difficulty walking or climbing stairs? Y   Do you have serious difficulty dressing or bathing? Y   Because of a physical, mental, or emotional condition, do you have serious difficulty doing errands alone such as visiting the doctor? Y

## 2024-08-30 LAB
AMPHETAMINES UR QL SCN: ABNORMAL
BARBITURATES UR QL SCN: ABNORMAL
BENZODIAZ UR QL SCN: ABNORMAL
BZE UR QL SCN: ABNORMAL
CANNABINOIDS UR QL SCN: ABNORMAL
FENTANYL+NORFENTANYL UR QL SCN: ABNORMAL
METHADONE UR QL SCN: ABNORMAL
OPIATES UR QL SCN: ABNORMAL
OXYCODONE+OXYMORPHONE UR QL SCN: ABNORMAL
PCP UR QL SCN: ABNORMAL

## 2024-08-30 PROCEDURE — 97535 SELF CARE MNGMENT TRAINING: CPT | Mod: GO

## 2024-08-30 PROCEDURE — 99231 SBSQ HOSP IP/OBS SF/LOW 25: CPT | Performed by: PSYCHIATRY & NEUROLOGY

## 2024-08-30 PROCEDURE — 97110 THERAPEUTIC EXERCISES: CPT | Mod: GO

## 2024-08-30 PROCEDURE — 99232 SBSQ HOSP IP/OBS MODERATE 35: CPT | Performed by: INTERNAL MEDICINE

## 2024-08-30 PROCEDURE — 1200000002 HC GENERAL ROOM WITH TELEMETRY DAILY

## 2024-08-30 PROCEDURE — 2500000001 HC RX 250 WO HCPCS SELF ADMINISTERED DRUGS (ALT 637 FOR MEDICARE OP): Performed by: HOSPITALIST

## 2024-08-30 PROCEDURE — 80307 DRUG TEST PRSMV CHEM ANLYZR: CPT | Performed by: HOSPITALIST

## 2024-08-30 PROCEDURE — 97161 PT EVAL LOW COMPLEX 20 MIN: CPT | Mod: GP

## 2024-08-30 PROCEDURE — 97165 OT EVAL LOW COMPLEX 30 MIN: CPT | Mod: GO

## 2024-08-30 PROCEDURE — 2500000004 HC RX 250 GENERAL PHARMACY W/ HCPCS (ALT 636 FOR OP/ED): Performed by: HOSPITALIST

## 2024-08-30 ASSESSMENT — COGNITIVE AND FUNCTIONAL STATUS - GENERAL
DAILY ACTIVITIY SCORE: 24
CLIMB 3 TO 5 STEPS WITH RAILING: A LITTLE
DAILY ACTIVITIY SCORE: 24
MOBILITY SCORE: 22
WALKING IN HOSPITAL ROOM: A LITTLE
CLIMB 3 TO 5 STEPS WITH RAILING: A LITTLE
WALKING IN HOSPITAL ROOM: A LITTLE
MOBILITY SCORE: 22

## 2024-08-30 ASSESSMENT — PAIN SCALES - GENERAL
PAINLEVEL_OUTOF10: 0 - NO PAIN
PAINLEVEL_OUTOF10: 0 - NO PAIN
PAINLEVEL_OUTOF10: 8
PAINLEVEL_OUTOF10: 0 - NO PAIN
PAINLEVEL_OUTOF10: 4

## 2024-08-30 ASSESSMENT — ACTIVITIES OF DAILY LIVING (ADL)
ADL_ASSISTANCE: INDEPENDENT
ADL_ASSISTANCE: INDEPENDENT

## 2024-08-30 ASSESSMENT — PAIN - FUNCTIONAL ASSESSMENT
PAIN_FUNCTIONAL_ASSESSMENT: 0-10

## 2024-08-30 NOTE — PROGRESS NOTES
"Juan Marsh is a 29 y.o. male on day 1 of admission presenting with Behcet's disease with multisystem involvement (Multi).      Subjective   He looks much better today, sitting up in bedside chair listening to music.  His headache is much better.  He feels there has been some partial improvement in right upper extremity weakness.  He is tolerating methylprednisolone well thus far without insomnia, heartburn or other noted side effects.    He endorses no new neurological complaints overnight.       Objective     Last Recorded Vitals  Blood pressure 132/84, pulse 57, temperature 36.7 °C (98.1 °F), temperature source Temporal, resp. rate 18, height 1.753 m (5' 9\"), weight 58.1 kg (128 lb), SpO2 100%.    Physical Exam  Neurological Exam    Sitting up in bedside chair, alert and appropriate in interaction although relatively taciturn.  Follows instructions accurately and promptly without bradyphrenia.  Extraocular movements intact and conjugate without nystagmus.  Visual fields full to confrontation tested binocularly.  Facial movements symmetrically intact.  Hearing grossly intact.  No dysarthria.  Motor exam stable compared to yesterday with right upper extremity 4/5 at triceps and stably weak at wrist and finger extensors.    Relevant Results    MR brain w and wo IV contrast    Result Date: 8/29/2024  Interpreted By:  Angel Hopper, STUDY: MR BRAIN W AND WO IV CONTRAST;  8/29/2024 8:34 pm   INDICATION: Signs/Symptoms:History of suspected neuro Behcet's with predominant involvement of left thalamus and midbrain, with progression of tumefactive findings compared to MRI in May..   COMPARISON: MRI brain 05/14/2024, 05/15/2024, and 05/20/2024.   ACCESSION NUMBER(S): [id number]   ORDERING CLINICIAN: HARINI MCCORD   TECHNIQUE: Multiplanar, multi-sequence images of the brain were obtained before and after the intravenous administration of 10 mL gadolinium.   FINDINGS: Compared to MRI 05/20/2024, there has been " significant progression of expansile T2/FLAIR hyperintense signal abnormality it now involves the majority of the left mid-rostral tyrone, left cerebral peduncle, left thalamus, left basal ganglia and associated white matter tracts, and into the left corona radiata. Previously, there is a significantly milder T2/FLAIR hyperintense signal within the left cerebral peduncle, left thalamus, left ventral calcified on left medial lentiform nucleus. Previously, there is no T2/FLAIR hyperintense involvement throughout the entire basal ganglia or in the left corona radiata. The degree of expansile nature is also significantly increased with new thickening of the left cristobal tyrone, increased thickening of the left cerebral peduncle, and new thickening of the left thalamus and diffusely throughout the left basal ganglia and left corona radiata. As result, there is new rightward bowing of the septum pellucidum by 0.5 cm (FLAIR image 17, series [id number]) and there is mild mass effect on the body and frontal horn of the left lateral ventricle.   There is also new abnormal T2/FLAIR signal abnormality and enlargement of the left aspect of the optic chiasm, left optic tract, and left aspect of the hypothalamus (FLAIR 23-25).   There also progression of patchy perivascular enhancement within multiple locations. For example, there numerous new foci of patchy enhancement within the left cristobal tyrone most notably in the region of the interpeduncular fossa (axial image 230-238, series [id number]); increased patchy enhancement within the left cerebral peduncle (axial image 206, series [id number]); new enhancing lesion (1 cm x .7 cm) in the anterior limb of internal capsule on (axial image 177, series [id number];), and new enhancing lesion in the dorsal aspect of the posterior limb left internal capsule extending linearly into the corona radiata (1.7 cm x 0.9 x 0.5 cm) (axial image 163, series [id number]).   Several of the above described enhancing areas  demonstrate diffusion restriction; specifically the area in the interpeduncular fossa, the anterior limb of left internal capsule, and the posterior limb of internal capsule/corona radiata.   There is no hemorrhage associated with any of the lesions.     The craniovertebral junction is normal. The orbits and globes are unremarkable. The paranasal sinuses show no air-fluid levels or hemorrhage. The mastoid air cells are clear.       1. Significant progression of extent and expansile nature of T2/FLAIR hyperintense lesion previously epicentered on the left cerebral peduncle and now involving the entire left basal ganglia and anterior/posterior limb internal capsule, newly extent into the left corona radiata, and now involving the vast majority of the left thalamus and mid-rostral left hemipons, left optic chiasm and left optic tract. Multiple new areas of patchy likely perivascular enhancing lesions are seen in the tyrone, left cerebral peduncle, anterior limb left internal capsule and posterior limb left internal capsule extending into the left corona radiata, multiple which also demonstrate diffusion restriction.   Differential diagnosis: A. All of the aforementioned areas of involvement and imaging features, except for involvement of the left optic chiasm/tract and corona radiata, are very typical of neurobehcet's disease.   B. however, an infiltrating low-grade primary glioma with de-differentiation into high-grade glioma could have an identical imaging appearance and should be definitively excluded. Chronic lymphocytic inflammation with pontine perivascular enhancement responsive to steroids (i.e., CLIPPERS) can appear similar as well but is usually confined to the brainstem. Integration of the clinical context, physical exam/laboratory findings, and imaging is recommended.   C. chronic lymphocytic inflammation with pontine perivascular enhancement responsive to steroids (i.e., CLIPPERS) can appear similar as well  but is usually confined to the brainstem. Integration of the clinical context, physical exam/laboratory findings, and imaging is recommended.   MACRO: None.   Signed by: Angel Hopper 8/29/2024 9:57 PM Dictation workstation:   [id number]    CT head wo IV contrast    Result Date: 8/28/2024  Interpreted By:  Keely Kamara, STUDY: CT HEAD WO IV CONTRAST;  8/28/2024 6:28 pm   INDICATION: Signs/Symptoms:headache.     COMPARISON: MRI of the brain dated 05/20/2024; 08/29/2021   ACCESSION NUMBER(S): [id number]   ORDERING CLINICIAN: ENRICO MEEKS   TECHNIQUE: Noncontrast axial CT scan of head was performed. Angled reformats in brain and bone windows were generated. The images were reviewed in bone, brain, blood and soft tissue windows.   FINDINGS: No hyperdense intracranial hemorrhage is evident.   In the interim since prior MRI on 05/20/2024, there has been interval increase in geographic area of hypodense attenuation present in the left basal ganglia, involving of the left hypothalamus and thalamus, and extending into the left corona radiata. There is somewhat increased local mass effect with some effacement of the left lateral ventricle without significant rightward shift of midline structures.   Gray-white differentiation appears intact, without evidence of CT apparent transcortical infarct.   There is extension of the hypodense attenuation of the left cerebral peduncle. These findings are new since prior noncontrast CT head imaging in August of 2021.   No ventricular dilatation is present. Basal cisterns are patent. No extra-axial fluid collection is identified.   Scalp soft tissues do not demonstrate any acute abnormality. No acute calvarial abnormality is present. Mastoid air cells and middle ear cavities are clear.   Visualized paranasal sinuses are unremarkable in appearance.       1. In the interim since prior MR imaging in May of 2024, there has been interval increase in geographic area of  low-density attenuation, likely representing edema, with the associated local mass effect in the left basal ganglia, with involvement of the left lentiform nuclei, left hypothalamus, and left thalamus. There is somewhat increased effacement of the left lateral ventricles compared to prior MRI without significant midline shift. Findings may represent acute worsening of patient's neuro Behcet's syndrome, and MRI of the brain can be considered for further characterization. 2. No evidence of hemorrhage, CT apparent transcortical infarct or ventriculomegaly.     MACRO: None   Signed by: Keely Kamara 8/28/2024 7:02 PM Dictation workstation:   [id number]    ECG 12 lead    Result Date: 8/28/2024  Normal sinus rhythm with sinus arrhythmia Normal ECG When compared with ECG of 14-MAY-2024 10:54, No significant change was found See ED provider note for full interpretation and clinical correlation Confirmed by Emily Webb ([id number]) on 8/28/2024 4:43:33 PM                      [place]inder Coma Scale  Best Eye Response: Spontaneous  Best Verbal Response: Oriented  Best Motor Response: Follows commands  [place]inder Coma Scale Score: 15                             Assessment/Plan      Assessment & Plan  Behcet's disease with multisystem involvement (Multi)    He appears significantly improved with marked reduction of headache and subjective (more than objective) improvement in right upper extremity motor function, now on day #3 of intravenous methylprednisolone for flare of neuro Behcet's disease.    As indicated above, his brain MRI which I reviewed shows significantly more extensive T2/FLAIR signal abnormality in and surrounding the left thalamus, involving multiple surrounding structures and associated with mass effect including 5 mm of left-to-right midline (septal) shift.  As such, although his headache has resolved I would favor carrying out intravenous methylprednisolone at least to 4 days so will anticipate dose #4 tomorrow,  and then consider discharge on prednisone taper.             Leoncio Garcia MD

## 2024-08-30 NOTE — CARE PLAN
The patient's goals for the shift include to remain stable    The clinical goals for the shift include pt will remain safe and not fall during shift

## 2024-08-30 NOTE — CARE PLAN
The patient's goals for the shift include to remain stable    The clinical goals for the shift include manage pain level throughout shift      Problem: Pain - Adult  Goal: Verbalizes/displays adequate comfort level or baseline comfort level  Outcome: Progressing     Problem: Safety - Adult  Goal: Free from fall injury  Outcome: Progressing     Problem: Discharge Planning  Goal: Discharge to home or other facility with appropriate resources  Outcome: Progressing     Problem: Chronic Conditions and Co-morbidities  Goal: Patient's chronic conditions and co-morbidity symptoms are monitored and maintained or improved  Outcome: Progressing

## 2024-08-30 NOTE — PROGRESS NOTES
Physical Therapy    Physical Therapy Evaluation    Patient Name: Juan Marsh  MRN: [id number]  Today's Date: 8/30/2024   Time Calculation  Start Time: 1049  Stop Time: 1102  Time Calculation (min): 13 min    Assessment/Plan   PT Assessment  PT Assessment Results: Decreased strength, Impaired balance, Decreased mobility, Impaired sensation  Rehab Prognosis: Good  Barriers to Discharge: None identified  Evaluation/Treatment Tolerance: Patient tolerated treatment well  Medical Staff Made Aware: Yes  Strengths: Ability to acquire knowledge, Premorbid level of function, Support of extended family/friends  Barriers to Participation:  (none identified)  End of Session Communication: Bedside nurse  Assessment Comment: Pt present today with good functional BLE strength, fair balance, and fair activity tolerance. Pt would benefit from continued PT to progress gait training and minimize risk of falls at D/C. At D/C, pt would benefit from outpatient phyisical therapy for gait training and lower body strengthening to maximize independence and safety with functional mobility  End of Session Patient Position: Bed, 3 rail up, Alarm off, not on at start of session (RN aware)  IP OR SWING BED PT PLAN  Inpatient or Swing Bed: Inpatient  PT Plan  Treatment/Interventions: Gait training, Stair training, Balance training, Strengthening, Therapeutic exercise, Therapeutic activity, Home exercise program  PT Plan: Ongoing PT  PT Frequency: 3 times per week  PT Discharge Recommendations: Other (comment) (Outpatient PT)  PT Recommended Transfer Status: Independent  PT - OK to Discharge: Yes (PT POC intiated today)    Subjective   General Visit Information:  General  Reason for Referral: 28 y/o M presenting with headache and worsening R-sided weakness. Pt with PMHx of Behcet's disease and CVA  Referred By: RAISA Richard  Past Medical History Relevant to Rehab: Neuro-Behcet's disease, CVA with residual R-sided weakness, slurred speech, ETOH  abuse, tobacco use, avascular necrosis of the R-hip, uveitis  Family/Caregiver Present: No  Prior to Session Communication: Bedside nurse  Patient Position Received: Bed, 3 rail up, Alarm off, not on at start of session  Preferred Learning Style: auditory, kinesthetic, visual  General Comment: Pt supine in bed sleeping upon PT arrival. Cleared to participate with RN, and agreeable to PT evaluation.  Home Living:  Home Living  Type of Home: Apartment  Lives With: Parent(s) (Mother)  Home Adaptive Equipment: Crutches  Home Layout: One level  Home Access: Stairs to enter with rails  Entrance Stairs-Rails: Right  Entrance Stairs-Number of Steps: 1.5 flights  Bathroom Shower/Tub: Tub/shower unit  Bathroom Toilet: Standard  Bathroom Equipment: None  Prior Level of Function:  Prior Function Per Pt/Caregiver Report  Level of [place]: Independent with ADLs and functional transfers, Independent with homemaking with ambulation  Receives Help From: Family  ADL Assistance: Independent  Homemaking Assistance: Independent  Ambulatory Assistance: Independent (No AD)  Prior Function Comments: Pt denies recent falls  Precautions:  Precautions  Medical Precautions: Fall precautions    Objective   Pain:  Pain Assessment  Pain Assessment: 0-10  0-10 (Numeric) Pain Score: 0 - No pain  Cognition:  Cognition  Orientation Level: Disoriented to time (Pt reports month as September but able to state the correct year)    General Assessments:  Activity Tolerance  Endurance: Endurance does not limit participation in activity    Sensation  Sensation Comment: Pt reports chronic numbness of the R arm    Coordination  Movements are Fluid and Coordinated: Yes    Postural Control  Postural Control: Within Functional Limits    Static Sitting Balance  Static Sitting-Balance Support: Bilateral upper extremity supported, Feet supported  Static Sitting-Level of Assistance: Independent  Dynamic Sitting Balance  Dynamic Sitting-Balance Support: Bilateral  upper extremity supported (Single LE supported on the ground)  Dynamic Sitting-Level of Assistance: Close supervision  Dynamic Sitting-Comments: During EOB BLE MMT    Static Standing Balance  Static Standing-Balance Support: No upper extremity supported  Static Standing-Level of Assistance: Close supervision  Dynamic Standing Balance  Dynamic Standing-Balance Support: No upper extremity supported  Dynamic Standing-Level of Assistance: Close supervision  Functional Assessments:  Bed Mobility  Bed Mobility: Yes  Bed Mobility 1  Bed Mobility 1: Supine to sitting  Level of Assistance 1: Independent  Bed Mobility Comments 1: HOB flat    Transfers  Transfer: Yes  Transfer 1  Transfer From 1: Bed to  Transfer to 1: Stand  Technique 1: Sit to stand  Transfer Level of Assistance 1: Independent  Trials/Comments 1: Pt demonstrates UE push from the bed to  a reasonable amount of time. No overt LOB observed, and pt denies dizziness in standing.  Transfers 2  Transfer From 2: Stand to  Transfer to 2: Bed  Technique 2: Stand to sit  Transfer Level of Assistance 2: Independent  Trials/Comments 2: Good BLE positioning against the bed before sitting. Pt able to demonstrates UE reach for the bed when sitting with controlled descent.    Ambulation/Gait Training  Ambulation/Gait Training Performed: Yes  Ambulation/Gait Training 1  Surface 1: Level tile  Device 1: No device  Assistance 1: Close supervision  Quality of Gait 1: Decreased step length  Comments/Distance (ft) 1: About 35 ft with a good se. Pt demonstrates a vaulting gait pattern to clear the RLE during swing phase. Pt with increased medial/lateral sway but no overt LOB. VC provided for stable se.    Stairs  Stairs: No  Extremity/Trunk Assessments:  RLE   RLE : Exceptions to WFL  Strength RLE  R Hip Flexion: 4/5  R Knee Extension: 4/5  R Ankle Dorsiflexion: 4-/5  R Ankle Plantar Flexion: 3+/5  LLE   LLE : Exceptions to WFL  Strength LLE  L Hip Flexion: 4/5  L  Knee Extension: 4/5  L Ankle Dorsiflexion: 4/5  L Ankle Plantar Flexion: 3+/5  Outcome Measures:  [facility] Basic Mobility  Turning from your back to your side while in a flat bed without using bedrails: None  Moving from lying on your back to sitting on the side of a flat bed without using bedrails: None  Moving to and from bed to chair (including a wheelchair): None  Standing up from a chair using your arms (e.g. wheelchair or bedside chair): None  To walk in hospital room: A little  Climbing 3-5 steps with railing: A little  Basic Mobility - Total Score: 22    Encounter Problems       Encounter Problems (Active)       Balance       Goal 1 (Progressing)       Start:  08/30/24    Expected End:  09/13/24       Pt performs all dynamic standing balance IND without LOB            Mobility       STG - Patient will ambulate (Progressing)       Start:  08/30/24    Expected End:  09/13/24       150 ft IND         STG - Patient will ascend and descend a flight of stairs (Not Progressing)       Start:  08/30/24    Expected End:  09/13/24       Mod IND with right HR support            PT Problem       PT Goal 1 (Not Progressing)       Start:  08/30/24    Expected End:  09/13/24       Pt demonstrates IND in performing 2 sets of 15 reps of prescribed standing BLE HEP              Education Documentation  Mobility Training, taught by Axel Decker, PT at 8/30/2024  1:15 PM.  Learner: Patient  Readiness: Acceptance  Method: Explanation, Demonstration  Response: Demonstrated Understanding    Education Comments  No comments found.

## 2024-08-30 NOTE — PROGRESS NOTES
Occupational Therapy    Evaluation/Treatment    Patient Name: Juan Marsh  MRN: [id number]  : 1995  Today's Date: 24  Time Calculation  Start Time: 1256  Stop Time: 1331  Time Calculation (min): 35 min       Assessment:  OT Assessment: Patient is doing well with ADL and functional mobility and is performing at/near baseline.  Patient has no further skilled OT needs at this level of care.  Barriers to Discharge: None  Evaluation/Treatment Tolerance: Patient tolerated treatment well  Medical Staff Made Aware: Yes  End of Session Communication: Bedside nurse  End of Session Patient Position: Alarm off, not on at start of session (Seated eob; Communicated with RN; RN cleared patient for no bed alarm)  Strengths: Ability to acquire knowledge, Attitude of self, Premorbid level of function  Barriers to Participation:  (none)    Plan:  No Skilled OT: No acute OT goals identified  OT Frequency: OT eval only  OT Discharge Recommendations: No further acute OT, No OT needed after discharge  Equipment Recommended upon Discharge:  (Shower seat)  OT Recommended Transfer Status: Independent  OT - OK to Discharge: Yes       Subjective   General:   OT Received On: 24  General  Reason for Referral: Behcet's disease with multisystem involvement  Past Medical History Relevant to Rehab: Presented to ED due to c/o headache and worsening right-side weakness; PMH: neuro-Behcet's, 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    Precautions:  Medical Precautions: Fall precautions  Precautions Comment: IV, tele    Pain:  Pain Assessment  Pain Assessment: 0-10  0-10 (Numeric) Pain Score: 4  Pain Location: Neck  Effect of Pain on Daily Activities: Discomfort; able to  participate  Pain Interventions: Ambulation/increased activity  Response to Interventions: No c/o increased pain    Objective   Cognition:  Overall Cognitive Status: Within Functional Limits  Orientation Level: Oriented X4       Home Living:  Type of Home: Apartment  Lives With: Parent(s) (Lives with mother - works during the day)  Home Adaptive Equipment: Crutches  Home Access:  (1.5 flights of stairs to enter single-level apt)  Bathroom Shower/Tub: Tub/shower unit  Bathroom Toilet: Standard  Bathroom Equipment: None    Prior Function:  Level of [place]: Independent with ADLs and functional transfers, Independent with homemaking with ambulation  Receives Help From: Family  ADL Assistance: Independent  Homemaking Assistance: Independent  Ambulatory Assistance: Independent (uses one crutch as needed; hasn't used it since he's been in hospital)  Hand Dominance: Right       ADL:  Eating Assistance: Modified independent (Device) (incr'd time and effort to use RUE)  Grooming Assistance: Modified independent (Device)  Grooming Deficit: Increased time to complete  Bathing Assistance:  (anticipate Mod I - Patient demo'd ability to reach all areas, but did not complete bathing; wanted to wait until family brings change of clothes; provided clean gown and hospital pants for patient)  LE Dressing Assistance: Modified independent (Device) (Able to retrieve items as needed; Wearing pants; able to don/doff socks without difficulty)  Toileting Assistance with Device: Independent (No difficulty per patient)    Activities of Daily Living:    Grooming  Grooming Level of Assistance: Modified independent  Grooming Where Assessed: Standing sinkside  Grooming Comments: Patient able to retrieve items and complete hand hygiene and oral hygiene while standing at sink.  Patient able to use BUE to hold items and open containers.  Demo'd increased effort for use of RUE and intermittent, mild coordination impairments.    Activity  Tolerance:  Endurance: Endurance does not limit participation in activity    Functional Standing Tolerance:  Functional Standing Tolerance Comments: WFL for ADL, including functional mobility to/from bathroom    Bed Mobility/Transfers: Bed Mobility  Bed Mobility: No (Seated eob; expressed no difficulty with bed mobility)    Transfers  Transfer: Yes  Transfer 1  Transfer From 1: Bed to  Transfer to 1: Stand  Technique 1: Sit to stand, Stand to sit  Transfer Device 1:  (no device)  Transfer Level of Assistance 1: Independent  Trials/Comments 1: No LOB upon standing; able to sit safely    Functional Mobility:  Functional Mobility  Functional Mobility Performed: Yes  Functional Mobility 1  Surface 1: Level tile  Device 1: No device (able to manage IV pole and lines without assistance)  Assistance 1: Independent  Comments 1: Able to ambulate to/from bathroom and complete ADL standing at sink    Sitting Balance:  Static Sitting Balance  Static Sitting-Level of Assistance: Independent  Dynamic Sitting Balance  Dynamic Sitting-Level of Assistance: Independent    Standing Balance:  Static Standing Balance  Static Standing-Level of Assistance: Independent  Dynamic Standing Balance  Dynamic Standing-Level of Assistance: Independent  Dynamic Standing-Comments: Able to retrieve items from floor without difficulty; no LOB noted       Therapy/Activity: Therapeutic Exercise  Therapeutic Exercise Performed: Yes  Therapeutic Exercise Activity 1: Provided foam  exerciser and theraputty for hand/finger strengthening.  Instructed on use of tools for finger/thumb opposition, finger flexion and extension, and overall  and UB strength.  Patient expressed interest and understanding and was able to return demo successfully.    Therapeutic Activity  Therapeutic Activity Performed: Yes  Therapeutic Activity 1: Facilitated safe participation in ADL and functional mobility.       Vision:Vision - Basic Assessment  Patient Visual  Report:  (WFL)    Sensation:  Sensation Comment: Chronic RUE numbness; able to use RUE functionally with increased time and effort    Strength:  Strength Comments: WFL       Coordination:  Movements are Fluid and Coordinated: No  Upper Body Coordination: LUE WFL; slight drift RUE with tendency to knock things over during ADL if not paying close attention  Lower Body Coordination: WFL  Coordination Comment: BUE finger opposition WFL; patient reports increased effort req'd for RUE but he routinely completes hand/finger exercises at home     Hand Function:  Hand Function  Gross Grasp: Functional  Coordination: Functional    Extremities: RUE   RUE : Within Functional Limits and LUE   LUE: Within Functional Limits    Outcome Measures: [facility] Daily Activity  Putting on and taking off regular lower body clothing: None  Bathing (including washing, rinsing, drying): None  Putting on and taking off regular upper body clothing: None  Toileting, which includes using toilet, bedpan or urinal: None  Taking care of personal grooming such as brushing teeth: None  Eating Meals: None  Daily Activity - Total Score: 24    Education Documentation  Precautions, taught by Tegan Lopez OT at 8/30/2024  2:11 PM.  Learner: Patient  Readiness: Acceptance  Method: Explanation, Demonstration  Response: Verbalizes Understanding, Demonstrated Understanding    ADL Training, taught by Tegan Lopez OT at 8/30/2024  2:11 PM.  Learner: Patient  Readiness: Acceptance  Method: Explanation, Demonstration  Response: Verbalizes Understanding, Demonstrated Understanding    Education Comments  No comments found.

## 2024-08-31 VITALS
BODY MASS INDEX: 18.96 KG/M2 | RESPIRATION RATE: 18 BRPM | OXYGEN SATURATION: 95 % | TEMPERATURE: 97.4 F | HEIGHT: 69 IN | HEART RATE: 86 BPM | DIASTOLIC BLOOD PRESSURE: 68 MMHG | SYSTOLIC BLOOD PRESSURE: 125 MMHG | WEIGHT: 128 LBS

## 2024-08-31 LAB
ANION GAP SERPL CALC-SCNC: 12 MMOL/L (ref 10–20)
BUN SERPL-MCNC: 25 MG/DL (ref 6–23)
CALCIUM SERPL-MCNC: 9.2 MG/DL (ref 8.6–10.3)
CHLORIDE SERPL-SCNC: 108 MMOL/L (ref 98–107)
CO2 SERPL-SCNC: 23 MMOL/L (ref 21–32)
CREAT SERPL-MCNC: 0.75 MG/DL (ref 0.5–1.3)
EGFRCR SERPLBLD CKD-EPI 2021: >90 ML/MIN/1.73M*2
ERYTHROCYTE [DISTWIDTH] IN BLOOD BY AUTOMATED COUNT: 13.5 % (ref 11.5–14.5)
GLUCOSE SERPL-MCNC: 105 MG/DL (ref 74–99)
HCT VFR BLD AUTO: 35.1 % (ref 41–52)
HGB BLD-MCNC: 11.5 G/DL (ref 13.5–17.5)
MAGNESIUM SERPL-MCNC: 2.3 MG/DL (ref 1.6–2.4)
MCH RBC QN AUTO: 31.9 PG (ref 26–34)
MCHC RBC AUTO-ENTMCNC: 32.8 G/DL (ref 32–36)
MCV RBC AUTO: 98 FL (ref 80–100)
NRBC BLD-RTO: 0 /100 WBCS (ref 0–0)
PLATELET # BLD AUTO: 344 X10*3/UL (ref 150–450)
POTASSIUM SERPL-SCNC: 3.9 MMOL/L (ref 3.5–5.3)
RBC # BLD AUTO: 3.6 X10*6/UL (ref 4.5–5.9)
SODIUM SERPL-SCNC: 139 MMOL/L (ref 136–145)
WBC # BLD AUTO: 18.8 X10*3/UL (ref 4.4–11.3)

## 2024-08-31 PROCEDURE — 2500000004 HC RX 250 GENERAL PHARMACY W/ HCPCS (ALT 636 FOR OP/ED): Performed by: HOSPITALIST

## 2024-08-31 PROCEDURE — 36415 COLL VENOUS BLD VENIPUNCTURE: CPT | Performed by: INTERNAL MEDICINE

## 2024-08-31 PROCEDURE — 80048 BASIC METABOLIC PNL TOTAL CA: CPT | Performed by: INTERNAL MEDICINE

## 2024-08-31 PROCEDURE — 99231 SBSQ HOSP IP/OBS SF/LOW 25: CPT | Performed by: PSYCHIATRY & NEUROLOGY

## 2024-08-31 PROCEDURE — 85027 COMPLETE CBC AUTOMATED: CPT | Performed by: INTERNAL MEDICINE

## 2024-08-31 PROCEDURE — 83735 ASSAY OF MAGNESIUM: CPT | Performed by: INTERNAL MEDICINE

## 2024-08-31 PROCEDURE — 2500000001 HC RX 250 WO HCPCS SELF ADMINISTERED DRUGS (ALT 637 FOR MEDICARE OP): Performed by: HOSPITALIST

## 2024-08-31 RX ORDER — PREDNISONE 10 MG/1
10 TABLET ORAL DAILY
Status: DISCONTINUED | OUTPATIENT
Start: 2024-09-01 | End: 2024-08-31 | Stop reason: HOSPADM

## 2024-08-31 RX ORDER — PREDNISONE 10 MG/1
10 TABLET ORAL DAILY
Qty: 30 TABLET | Refills: 0 | Status: SHIPPED | OUTPATIENT
Start: 2024-08-31

## 2024-08-31 ASSESSMENT — COGNITIVE AND FUNCTIONAL STATUS - GENERAL
WALKING IN HOSPITAL ROOM: A LITTLE
MOBILITY SCORE: 22
WALKING IN HOSPITAL ROOM: A LITTLE
CLIMB 3 TO 5 STEPS WITH RAILING: A LITTLE
CLIMB 3 TO 5 STEPS WITH RAILING: A LITTLE
MOBILITY SCORE: 22
DAILY ACTIVITIY SCORE: 24
DAILY ACTIVITIY SCORE: 24

## 2024-08-31 ASSESSMENT — PAIN SCALES - GENERAL: PAINLEVEL_OUTOF10: 0 - NO PAIN

## 2024-08-31 ASSESSMENT — PAIN - FUNCTIONAL ASSESSMENT: PAIN_FUNCTIONAL_ASSESSMENT: 0-10
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
## 2024-08-31 NOTE — NURSING NOTE
IV in R/AC removed d/t reports of discomfort. IV replacement refused by patient, requesting instead to try again in the morning. Educated on importance of adhering to prescribed treatment plan, continued to refuse. Dr. Carver notified of patient request and refusal. NNO at this time. Call light within reach, patient safety maintained.

## 2024-08-31 NOTE — PROGRESS NOTES
"Juan Marsh is a 29 y.o. male on day 2 of admission presenting with Behcet's disease with multisystem involvement (Multi).      Subjective     No new complaints.  He is eager to be discharged home.  He has completed his fourth dose of intravenous methylprednisolone.  He is walking in the room unassisted and reports strength subjectively about the same in the right hand.  No headache.    He indicates an upcoming follow-up appointment with Dr. Ulloa but this is scheduled for 10/14.       Objective     Last Recorded Vitals  Blood pressure 125/68, pulse 86, temperature 36.3 °C (97.4 °F), temperature source Temporal, resp. rate 18, height 1.753 m (5' 9\"), weight 58.1 kg (128 lb), SpO2 95%.    Physical Exam  Neurological Exam    Walking in room unassisted.  Alert and appropriate in conversation.  Fluent unremarkable speech without paraphasic errors or hesitancy.  No facial motor asymmetry or dysarthria.  Right distal upper extremity strength at least mildly improved compared to yesterday, with him able to give 4-4+ resistance at finger extensors and wrist extensors.      Relevant Results                    [place] Coma Scale  Best Eye Response: Spontaneous  Best Verbal Response: Oriented  Best Motor Response: Follows commands  [place] Coma Scale Score: 15                             Assessment/Plan      Assessment & Plan  Behcet's disease with multisystem involvement (Multi)    He has improved significantly after 4 days of intravenous methylprednisolone for a flare of neuro Behcet's disease.    He is eager to be discharged and I think it is reasonable at this point.    I will order prednisone 10 mg daily which he should continue until he is seen by his neuro immunologist.  I stressed to him the importance of keeping the upcoming appointment.  I will contact his neuro immunologist to see whether the appointment can be moved up.               Leoncio Garcia MD  "
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
## 2024-08-31 NOTE — NURSING NOTE
Patient refusing 0400 vital collection. Educated on importance of close monitoring for prescribed care plan. Dr. Carver notified, NNO at this time. Safety maintained, call light within reach.

## 2024-08-31 NOTE — ASSESSMENT & PLAN NOTE
- Neurology [facility] rec Solumedrol 1g daily x 3-5 days  - to follow up after discharge with his neurologist Dr Ulloa  - Neurology consult: His brain MRI which I reviewed shows significantly more extensive T2/FLAIR signal abnormality in and surrounding the left thalamus, involving multiple surrounding structures and associated with mass effect including 5 mm of left-to-right midline (septal) shift. As such, although his headache has resolved I would favor carrying out intravenous methylprednisolone at least to 4 days so will anticipate dose #4 tomorrow, and then consider discharge on prednisone taper.   - telemetry  - pain control    Prior CVA  - reports prior CVA, however, not on ASA or statin    ETOH abuse history  - Denies ETOH use  - check serum ETOH and drug screen

## 2024-08-31 NOTE — CARE PLAN
The patient's goals for the shift include to remain stable    The clinical goals for the shift include adequate pain management by end of shift      Problem: Pain - Adult  Goal: Verbalizes/displays adequate comfort level or baseline comfort level  Outcome: Progressing     Problem: Safety - Adult  Goal: Free from fall injury  Outcome: Progressing     Problem: Discharge Planning  Goal: Discharge to home or other facility with appropriate resources  Outcome: Progressing     Problem: Chronic Conditions and Co-morbidities  Goal: Patient's chronic conditions and co-morbidity symptoms are monitored and maintained or improved  Outcome: Progressing

## 2024-09-03 ENCOUNTER — PATIENT OUTREACH (OUTPATIENT)
Dept: CARE COORDINATION | Facility: CLINIC | Age: 29
End: 2024-09-03
Payer: COMMERCIAL

## 2024-09-03 NOTE — PROGRESS NOTES
Outreach call to patient to support a smooth transition of care from recent admission. No answer.  Enrolled patient in [employer] chatbot for additional support and patient education through transition period.  Will continue to monitor through transition period.  Von Carrillo RN [facility]  [phone]

## 2024-09-05 ENCOUNTER — HOSPITAL ENCOUNTER (OUTPATIENT)
Dept: RADIOLOGY | Facility: EXTERNAL LOCATION | Age: 29
Discharge: HOME | End: 2024-09-05

## 2024-09-05 ENCOUNTER — OFFICE VISIT (OUTPATIENT)
Dept: ORTHOPEDIC SURGERY | Facility: HOSPITAL | Age: 29
End: 2024-09-05
Payer: COMMERCIAL

## 2024-09-05 DIAGNOSIS — M16.11 ARTHRITIS OF RIGHT HIP: Primary | ICD-10-CM

## 2024-09-05 DIAGNOSIS — R29.898 MUSCULAR DECONDITIONING: ICD-10-CM

## 2024-09-05 DIAGNOSIS — M87.052 AVASCULAR NECROSIS OF LEFT FEMORAL HEAD (MULTI): ICD-10-CM

## 2024-09-05 DIAGNOSIS — M87.051 AVASCULAR NECROSIS OF RIGHT FEMORAL HEAD (MULTI): ICD-10-CM

## 2024-09-05 PROCEDURE — 2500000005 HC RX 250 GENERAL PHARMACY W/O HCPCS: Performed by: FAMILY MEDICINE

## 2024-09-05 PROCEDURE — 2500000004 HC RX 250 GENERAL PHARMACY W/ HCPCS (ALT 636 FOR OP/ED): Performed by: FAMILY MEDICINE

## 2024-09-05 PROCEDURE — 99214 OFFICE O/P EST MOD 30 MIN: CPT | Performed by: FAMILY MEDICINE

## 2024-09-05 PROCEDURE — 20611 DRAIN/INJ JOINT/BURSA W/US: CPT | Mod: RT | Performed by: FAMILY MEDICINE

## 2024-09-05 RX ORDER — TRIAMCINOLONE ACETONIDE 40 MG/ML
80 INJECTION, SUSPENSION INTRA-ARTICULAR; INTRAMUSCULAR
Status: COMPLETED | OUTPATIENT
Start: 2024-09-05 | End: 2024-09-05

## 2024-09-05 RX ORDER — LIDOCAINE HYDROCHLORIDE 10 MG/ML
4 INJECTION INFILTRATION; PERINEURAL
Status: COMPLETED | OUTPATIENT
Start: 2024-09-05 | End: 2024-09-05
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
## 2024-09-10 NOTE — DOCUMENTATION CLARIFICATION NOTE
"    PATIENT:               MADELIN GEE  ACCT #:                  [id number]  MRN:                       [id number]  :                       1995  ADMIT DATE:       2024 3:51 PM  DISCH DATE:        2024 3:04 PM  RESPONDING PROVIDER #:        [id number]          PROVIDER RESPONSE TEXT:    Midline shift or mass effect without brain compression    CDI QUERY TEXT:    Clarification    Instruction:    Based on your assessment of the patient and the clinical information, please provide the requested documentation by clicking on the appropriate radio button and enter any additional information if prompted.    Question: Please further clarify if there is a diagnosis related to the clinical information    When answering this query, please exercise your independent professional judgment. The fact that a question is being asked, does not imply that any particular answer is desired or expected.    The patient's clinical indicators include:  Clinical Information: 29 year old male, BMI 18.89, presenting with a chief complaint of headache in the setting of Behcet's disease.    Clinical Indicators:  Per  H/P: \"HA is diffuse, throbbing, stabbing, with visual changes. States he has occ blurring of the vision, was having visual hallucinations earlier today.\"    Per  MRI: \"there is new rightward bowing of the septum pellucidum by 0.5 cm...and there is mild mass effect on the body and frontal horn of the left lateral ventricle\"    Per  Neuro Consult Note: \"progression of mass effect surrounding longstanding left thalamic/midbrain lesion...recommended initiating high-dose Solu-Medrol\"    Treatment:  Neurology consult, ordered repeat MRI, Solumedrol 1000mg IV x1 (), Solumedrol 1000mg IV q24hrs (), supportive care.    Risk Factors: 29 year old male with a history of Behcet's disease.  Options provided:  -- Brain compression non-traumatic  -- Midline shift or mass effect without brain compression  -- " Other - I will add my own diagnosis  -- Refer to Clinical Documentation Reviewer    Query created by: Florinda Irwin on 9/3/2024 7:22 AM      Electronically signed by:  HARINI MCCORD MD 9/10/2024 11:41 AM

## 2024-09-16 ENCOUNTER — PATIENT OUTREACH (OUTPATIENT)
Dept: CARE COORDINATION | Facility: CLINIC | Age: 29
End: 2024-09-16
Payer: COMMERCIAL

## 2024-09-16 NOTE — PROGRESS NOTES
Outreach call to fu on PCP visit after dc, no answer.  Von Carrillo RN [facility]  [phone]

## 2024-09-20 ENCOUNTER — APPOINTMENT (OUTPATIENT)
Dept: ORTHOPEDIC SURGERY | Facility: HOSPITAL | Age: 29
End: 2024-09-20
Payer: COMMERCIAL

## 2024-09-26 ENCOUNTER — APPOINTMENT (OUTPATIENT)
Dept: PRIMARY CARE | Facility: CLINIC | Age: 29
End: 2024-09-26
Payer: COMMERCIAL

## 2024-09-27 PROCEDURE — RXMED WILLOW AMBULATORY MEDICATION CHARGE

## 2024-09-30 ENCOUNTER — PHARMACY VISIT (OUTPATIENT)
Dept: PHARMACY | Facility: CLINIC | Age: 29
End: 2024-09-30
Payer: MEDICAID

## 2024-10-07 ENCOUNTER — PATIENT OUTREACH (OUTPATIENT)
Dept: CARE COORDINATION | Facility: CLINIC | Age: 29
End: 2024-10-07
Payer: COMMERCIAL

## 2024-10-07 NOTE — PROGRESS NOTES
Outreach call to patient to follow up after 30 days of hospital discharge. Left voicemail.   Von Carrillo RN [facility]  [phone]

## 2024-10-10 ENCOUNTER — HOSPITAL ENCOUNTER (OUTPATIENT)
Dept: RADIOLOGY | Facility: EXTERNAL LOCATION | Age: 29
Discharge: HOME | End: 2024-10-10

## 2024-10-10 ENCOUNTER — OFFICE VISIT (OUTPATIENT)
Dept: ORTHOPEDIC SURGERY | Facility: HOSPITAL | Age: 29
End: 2024-10-10
Payer: COMMERCIAL

## 2024-10-10 DIAGNOSIS — M87.052 AVASCULAR NECROSIS OF LEFT FEMORAL HEAD (MULTI): ICD-10-CM

## 2024-10-10 PROCEDURE — 20611 DRAIN/INJ JOINT/BURSA W/US: CPT | Mod: LT | Performed by: FAMILY MEDICINE

## 2024-10-10 PROCEDURE — 2500000004 HC RX 250 GENERAL PHARMACY W/ HCPCS (ALT 636 FOR OP/ED): Performed by: FAMILY MEDICINE

## 2024-10-10 RX ORDER — TRIAMCINOLONE ACETONIDE 40 MG/ML
80 INJECTION, SUSPENSION INTRA-ARTICULAR; INTRAMUSCULAR
Status: COMPLETED | OUTPATIENT
Start: 2024-10-10 | End: 2024-10-10

## 2024-10-10 RX ORDER — LIDOCAINE HYDROCHLORIDE 10 MG/ML
4 INJECTION, SOLUTION INFILTRATION; PERINEURAL
Status: COMPLETED | OUTPATIENT
Start: 2024-10-10 | End: 2024-10-10
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
## 2024-10-14 ENCOUNTER — OFFICE VISIT (OUTPATIENT)
Dept: NEUROLOGY | Facility: HOSPITAL | Age: 29
End: 2024-10-14
Payer: COMMERCIAL

## 2024-10-14 VITALS
TEMPERATURE: 97.2 F | WEIGHT: 132 LBS | HEIGHT: 69 IN | RESPIRATION RATE: 18 BRPM | BODY MASS INDEX: 19.55 KG/M2 | DIASTOLIC BLOOD PRESSURE: 92 MMHG | SYSTOLIC BLOOD PRESSURE: 132 MMHG | HEART RATE: 88 BPM

## 2024-10-14 DIAGNOSIS — M35.2 BEHCET'S SYNDROME, NEUROLOGIC TYPE (MULTI): ICD-10-CM

## 2024-10-14 PROCEDURE — 3008F BODY MASS INDEX DOCD: CPT | Performed by: PSYCHIATRY & NEUROLOGY

## 2024-10-14 PROCEDURE — 99214 OFFICE O/P EST MOD 30 MIN: CPT | Performed by: PSYCHIATRY & NEUROLOGY

## 2024-10-14 RX ORDER — MYCOPHENOLATE MOFETIL 500 MG/1
500 TABLET ORAL 2 TIMES DAILY
Qty: 60 TABLET | Refills: 3 | Status: SHIPPED | OUTPATIENT
Start: 2024-10-14 | End: 2025-02-11

## 2024-10-14 RX ORDER — PREDNISONE 10 MG/1
10 TABLET ORAL DAILY
Qty: 30 TABLET | Refills: 3 | Status: SHIPPED | OUTPATIENT
Start: 2024-10-14 | End: 2025-02-11

## 2024-10-14 ASSESSMENT — PAIN SCALES - GENERAL: PAINLEVEL: 0-NO PAIN

## 2024-10-14 NOTE — PATIENT INSTRUCTIONS
I'm sorry about your recent hospital admission in August. MRI showed return of the lesion again on the left side of your brain but your symptoms improved again with steroids.     Please make sure you take mycophenolate 500 mg twice daily. I sent a new prescription to your pharmacy.     Please increase prednisone to 10 mg once daily.     Please repeat brain MRI to ensure the lesion improved with steroids.     I will refer you to neuro-oncology to evaluate the possibility of a tumor since the lesion seems to keep coming back in the exact same spot. Lymphoma is a particular concern because this type of tumor improves with steroids just like Behchet related inflammation.     Please continue to cut down on smoking.     Follow up after three months.     Thank you for visiting the clinic today    Juancarlos Ulloa MD
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
## 2024-10-18 ENCOUNTER — APPOINTMENT (OUTPATIENT)
Dept: PHYSICAL THERAPY | Facility: HOSPITAL | Age: 29
End: 2024-10-18
Payer: COMMERCIAL

## 2024-10-29 ENCOUNTER — PATIENT OUTREACH (OUTPATIENT)
Dept: HEMATOLOGY/ONCOLOGY | Facility: HOSPITAL | Age: 29
End: 2024-10-29
Payer: COMMERCIAL

## 2024-11-01 ENCOUNTER — HOSPITAL ENCOUNTER (OUTPATIENT)
Dept: RADIOLOGY | Facility: HOSPITAL | Age: 29
Discharge: HOME | End: 2024-11-01
Payer: COMMERCIAL

## 2024-11-01 DIAGNOSIS — M35.2 BEHCET'S SYNDROME, NEUROLOGIC TYPE (MULTI): ICD-10-CM

## 2024-11-01 PROCEDURE — 70553 MRI BRAIN STEM W/O & W/DYE: CPT

## 2024-11-01 PROCEDURE — A9575 INJ GADOTERATE MEGLUMI 0.1ML: HCPCS | Mod: SE | Performed by: PSYCHIATRY & NEUROLOGY

## 2024-11-01 PROCEDURE — 2550000001 HC RX 255 CONTRASTS: Mod: SE | Performed by: PSYCHIATRY & NEUROLOGY

## 2024-11-01 RX ORDER — GADOTERATE MEGLUMINE 376.9 MG/ML
15 INJECTION INTRAVENOUS
Status: COMPLETED | OUTPATIENT
Start: 2024-11-01 | End: 2024-11-01

## 2024-11-04 ENCOUNTER — HOSPITAL ENCOUNTER (OUTPATIENT)
Dept: RADIOLOGY | Facility: HOSPITAL | Age: 29
Discharge: HOME | End: 2024-11-04
Payer: COMMERCIAL

## 2024-11-04 DIAGNOSIS — M35.2 BEHCET'S SYNDROME, NEUROLOGIC TYPE (MULTI): ICD-10-CM

## 2024-11-04 PROCEDURE — 71260 CT THORAX DX C+: CPT | Performed by: RADIOLOGY

## 2024-11-04 PROCEDURE — 74177 CT ABD & PELVIS W/CONTRAST: CPT | Performed by: RADIOLOGY

## 2024-11-04 PROCEDURE — 2550000001 HC RX 255 CONTRASTS: Mod: SE | Performed by: PSYCHIATRY & NEUROLOGY

## 2024-11-04 PROCEDURE — 74177 CT ABD & PELVIS W/CONTRAST: CPT

## 2024-11-14 ENCOUNTER — TELEPHONE (OUTPATIENT)
Dept: HEMATOLOGY/ONCOLOGY | Facility: HOSPITAL | Age: 29
End: 2024-11-14

## 2024-11-14 ENCOUNTER — OFFICE VISIT (OUTPATIENT)
Dept: HEMATOLOGY/ONCOLOGY | Facility: HOSPITAL | Age: 29
End: 2024-11-14
Payer: COMMERCIAL

## 2024-11-14 VITALS
HEART RATE: 98 BPM | BODY MASS INDEX: 19 KG/M2 | TEMPERATURE: 98.4 F | WEIGHT: 128.3 LBS | DIASTOLIC BLOOD PRESSURE: 77 MMHG | RESPIRATION RATE: 16 BRPM | OXYGEN SATURATION: 100 % | HEIGHT: 69 IN | SYSTOLIC BLOOD PRESSURE: 118 MMHG

## 2024-11-14 DIAGNOSIS — M35.2: ICD-10-CM

## 2024-11-14 PROCEDURE — 99215 OFFICE O/P EST HI 40 MIN: CPT | Performed by: PSYCHIATRY & NEUROLOGY

## 2024-11-14 PROCEDURE — 99205 OFFICE O/P NEW HI 60 MIN: CPT | Performed by: PSYCHIATRY & NEUROLOGY

## 2024-11-14 PROCEDURE — 3008F BODY MASS INDEX DOCD: CPT | Performed by: PSYCHIATRY & NEUROLOGY

## 2024-11-14 ASSESSMENT — PAIN SCALES - GENERAL: PAINLEVEL_OUTOF10: 0-NO PAIN
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
## 2024-11-14 NOTE — PATIENT INSTRUCTIONS
We will discuss your case at tumor board on 11/27/24. You can expect to hear about the discuss in the next few days.     Please call us with any questions or concerns at [phone] opt. 5, opt. 2  For scheduling concerns please call [phone] option 1

## 2024-11-14 NOTE — TELEPHONE ENCOUNTER
Spoke with patient about appointment with Dr. Lepe on 11/27/24.   I explained it was not an appointment but the Tumor Board request scheduled by Dr. Lepe.    I explained the purpose of Tumor board and told him someone would contact him afterward regarding next steps after the group had met.   No further questions at this time.
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
## 2024-11-27 ENCOUNTER — APPOINTMENT (OUTPATIENT)
Dept: HEMATOLOGY/ONCOLOGY | Facility: HOSPITAL | Age: 29
End: 2024-11-27
Payer: COMMERCIAL

## 2024-11-27 DIAGNOSIS — M35.2: Primary | ICD-10-CM

## 2024-11-29 DIAGNOSIS — M35.2: ICD-10-CM

## 2024-12-08 ENCOUNTER — HOSPITAL ENCOUNTER (INPATIENT)
Facility: HOSPITAL | Age: 29
LOS: 3 days | Discharge: HOME | End: 2024-12-11
Attending: EMERGENCY MEDICINE | Admitting: STUDENT IN AN ORGANIZED HEALTH CARE EDUCATION/TRAINING PROGRAM
Payer: COMMERCIAL

## 2024-12-08 ENCOUNTER — CLINICAL SUPPORT (OUTPATIENT)
Dept: EMERGENCY MEDICINE | Facility: HOSPITAL | Age: 29
End: 2024-12-08
Payer: COMMERCIAL

## 2024-12-08 ENCOUNTER — APPOINTMENT (OUTPATIENT)
Dept: RADIOLOGY | Facility: HOSPITAL | Age: 29
End: 2024-12-08
Payer: COMMERCIAL

## 2024-12-08 DIAGNOSIS — M25.511 CHRONIC RIGHT SHOULDER PAIN: ICD-10-CM

## 2024-12-08 DIAGNOSIS — M32.9: Primary | ICD-10-CM

## 2024-12-08 DIAGNOSIS — M35.2 BEHCET'S SYNDROME, NEUROLOGIC TYPE (MULTI): ICD-10-CM

## 2024-12-08 DIAGNOSIS — M87.052 AVASCULAR NECROSIS OF LEFT FEMORAL HEAD (MULTI): ICD-10-CM

## 2024-12-08 DIAGNOSIS — M35.2: ICD-10-CM

## 2024-12-08 DIAGNOSIS — G89.29 CHRONIC RIGHT SHOULDER PAIN: ICD-10-CM

## 2024-12-08 DIAGNOSIS — M87.051 AVASCULAR NECROSIS OF RIGHT FEMORAL HEAD (MULTI): ICD-10-CM

## 2024-12-08 LAB
ALBUMIN SERPL BCP-MCNC: 4.8 G/DL (ref 3.4–5)
ALP SERPL-CCNC: 82 U/L (ref 33–120)
ALT SERPL W P-5'-P-CCNC: 9 U/L (ref 10–52)
ANION GAP SERPL CALC-SCNC: 17 MMOL/L (ref 10–20)
AST SERPL W P-5'-P-CCNC: 12 U/L (ref 9–39)
ATRIAL RATE: 124 BPM
BASOPHILS # BLD AUTO: 0.03 X10*3/UL (ref 0–0.1)
BASOPHILS NFR BLD AUTO: 0.3 %
BILIRUB SERPL-MCNC: 0.6 MG/DL (ref 0–1.2)
BUN SERPL-MCNC: 33 MG/DL (ref 6–23)
CALCIUM SERPL-MCNC: 10.3 MG/DL (ref 8.6–10.6)
CHLORIDE SERPL-SCNC: 99 MMOL/L (ref 98–107)
CO2 SERPL-SCNC: 26 MMOL/L (ref 21–32)
CREAT SERPL-MCNC: 1.01 MG/DL (ref 0.5–1.3)
CRP SERPL-MCNC: 11.75 MG/DL
EGFRCR SERPLBLD CKD-EPI 2021: >90 ML/MIN/1.73M*2
EOSINOPHIL # BLD AUTO: 0.05 X10*3/UL (ref 0–0.7)
EOSINOPHIL NFR BLD AUTO: 0.5 %
ERYTHROCYTE [DISTWIDTH] IN BLOOD BY AUTOMATED COUNT: 14.6 % (ref 11.5–14.5)
GLUCOSE SERPL-MCNC: 105 MG/DL (ref 74–99)
HCT VFR BLD AUTO: 38.8 % (ref 41–52)
HGB BLD-MCNC: 13.6 G/DL (ref 13.5–17.5)
HGB RETIC QN: 32 PG (ref 28–38)
IMM GRANULOCYTES # BLD AUTO: 0.05 X10*3/UL (ref 0–0.7)
IMM GRANULOCYTES NFR BLD AUTO: 0.5 % (ref 0–0.9)
IMMATURE RETIC FRACTION: 6.3 %
LDH SERPL L TO P-CCNC: 111 U/L (ref 84–246)
LYMPHOCYTES # BLD AUTO: 2.68 X10*3/UL (ref 1.2–4.8)
LYMPHOCYTES NFR BLD AUTO: 24.9 %
MAGNESIUM SERPL-MCNC: 2.67 MG/DL (ref 1.6–2.4)
MCH RBC QN AUTO: 31.2 PG (ref 26–34)
MCHC RBC AUTO-ENTMCNC: 35.1 G/DL (ref 32–36)
MCV RBC AUTO: 89 FL (ref 80–100)
MONOCYTES # BLD AUTO: 0.88 X10*3/UL (ref 0.1–1)
MONOCYTES NFR BLD AUTO: 8.2 %
NEUTROPHILS # BLD AUTO: 7.08 X10*3/UL (ref 1.2–7.7)
NEUTROPHILS NFR BLD AUTO: 65.6 %
NRBC BLD-RTO: 0 /100 WBCS (ref 0–0)
P AXIS: 84 DEGREES
P OFFSET: 212 MS
P ONSET: 165 MS
PLATELET # BLD AUTO: 393 X10*3/UL (ref 150–450)
POTASSIUM SERPL-SCNC: 3.7 MMOL/L (ref 3.5–5.3)
PR INTERVAL: 124 MS
PROT SERPL-MCNC: 8.5 G/DL (ref 6.4–8.2)
Q ONSET: 227 MS
QRS COUNT: 21 BEATS
QRS DURATION: 76 MS
QT INTERVAL: 304 MS
QTC CALCULATION(BAZETT): 436 MS
QTC FREDERICIA: 387 MS
R AXIS: 81 DEGREES
RBC # BLD AUTO: 4.36 X10*6/UL (ref 4.5–5.9)
RETICS #: 0.03 X10*6/UL (ref 0.02–0.12)
RETICS/RBC NFR AUTO: 0.8 % (ref 0.5–2)
SODIUM SERPL-SCNC: 138 MMOL/L (ref 136–145)
T AXIS: 73 DEGREES
T OFFSET: 379 MS
VENTRICULAR RATE: 124 BPM
WBC # BLD AUTO: 10.8 X10*3/UL (ref 4.4–11.3)

## 2024-12-08 PROCEDURE — 86140 C-REACTIVE PROTEIN: CPT

## 2024-12-08 PROCEDURE — 93005 ELECTROCARDIOGRAM TRACING: CPT

## 2024-12-08 PROCEDURE — G0378 HOSPITAL OBSERVATION PER HR: HCPCS

## 2024-12-08 PROCEDURE — 85025 COMPLETE CBC W/AUTO DIFF WBC: CPT

## 2024-12-08 PROCEDURE — 2500000004 HC RX 250 GENERAL PHARMACY W/ HCPCS (ALT 636 FOR OP/ED)

## 2024-12-08 PROCEDURE — 2500000004 HC RX 250 GENERAL PHARMACY W/ HCPCS (ALT 636 FOR OP/ED): Mod: SE | Performed by: EMERGENCY MEDICINE

## 2024-12-08 PROCEDURE — 96374 THER/PROPH/DIAG INJ IV PUSH: CPT

## 2024-12-08 PROCEDURE — 99285 EMERGENCY DEPT VISIT HI MDM: CPT | Mod: 25 | Performed by: EMERGENCY MEDICINE

## 2024-12-08 PROCEDURE — 80053 COMPREHEN METABOLIC PANEL: CPT

## 2024-12-08 PROCEDURE — 99223 1ST HOSP IP/OBS HIGH 75: CPT | Performed by: STUDENT IN AN ORGANIZED HEALTH CARE EDUCATION/TRAINING PROGRAM

## 2024-12-08 PROCEDURE — 1100000001 HC PRIVATE ROOM DAILY

## 2024-12-08 PROCEDURE — 99223 1ST HOSP IP/OBS HIGH 75: CPT

## 2024-12-08 PROCEDURE — 83615 LACTATE (LD) (LDH) ENZYME: CPT

## 2024-12-08 PROCEDURE — 2500000001 HC RX 250 WO HCPCS SELF ADMINISTERED DRUGS (ALT 637 FOR MEDICARE OP)

## 2024-12-08 PROCEDURE — 85045 AUTOMATED RETICULOCYTE COUNT: CPT

## 2024-12-08 PROCEDURE — 83735 ASSAY OF MAGNESIUM: CPT

## 2024-12-08 PROCEDURE — 36415 COLL VENOUS BLD VENIPUNCTURE: CPT

## 2024-12-08 PROCEDURE — 73521 X-RAY EXAM HIPS BI 2 VIEWS: CPT

## 2024-12-08 PROCEDURE — 99285 EMERGENCY DEPT VISIT HI MDM: CPT | Performed by: EMERGENCY MEDICINE

## 2024-12-08 PROCEDURE — 93010 ELECTROCARDIOGRAM REPORT: CPT

## 2024-12-08 RX ORDER — PREDNISOLONE ACETATE 10 MG/ML
1 SUSPENSION/ DROPS OPHTHALMIC 4 TIMES DAILY
Status: DISCONTINUED | OUTPATIENT
Start: 2024-12-09 | End: 2024-12-11 | Stop reason: HOSPADM

## 2024-12-08 RX ORDER — PANTOPRAZOLE SODIUM 40 MG/1
40 TABLET, DELAYED RELEASE ORAL DAILY
Status: DISCONTINUED | OUTPATIENT
Start: 2024-12-09 | End: 2024-12-11 | Stop reason: HOSPADM

## 2024-12-08 RX ORDER — NAPROXEN SODIUM 220 MG/1
81 TABLET, FILM COATED ORAL DAILY
Status: DISCONTINUED | OUTPATIENT
Start: 2024-12-09 | End: 2024-12-11 | Stop reason: HOSPADM

## 2024-12-08 RX ORDER — CYCLOBENZAPRINE HCL 10 MG
10 TABLET ORAL 3 TIMES DAILY PRN
Status: DISCONTINUED | OUTPATIENT
Start: 2024-12-08 | End: 2024-12-11 | Stop reason: HOSPADM

## 2024-12-08 RX ORDER — ENOXAPARIN SODIUM 100 MG/ML
40 INJECTION SUBCUTANEOUS EVERY 24 HOURS
Status: DISCONTINUED | OUTPATIENT
Start: 2024-12-08 | End: 2024-12-11 | Stop reason: HOSPADM

## 2024-12-08 RX ORDER — BRIMONIDINE TARTRATE 2 MG/ML
1 SOLUTION/ DROPS OPHTHALMIC EVERY 12 HOURS
Status: DISCONTINUED | OUTPATIENT
Start: 2024-12-09 | End: 2024-12-11 | Stop reason: HOSPADM

## 2024-12-08 RX ORDER — POLYETHYLENE GLYCOL 3350 17 G/17G
17 POWDER, FOR SOLUTION ORAL DAILY PRN
Status: DISCONTINUED | OUTPATIENT
Start: 2024-12-08 | End: 2024-12-11 | Stop reason: HOSPADM

## 2024-12-08 RX ORDER — PREDNISONE 10 MG/1
10 TABLET ORAL DAILY
Status: DISCONTINUED | OUTPATIENT
Start: 2024-12-09 | End: 2024-12-11 | Stop reason: HOSPADM

## 2024-12-08 RX ORDER — MYCOPHENOLATE MOFETIL 500 MG/1
500 TABLET ORAL 2 TIMES DAILY
Status: DISCONTINUED | OUTPATIENT
Start: 2024-12-08 | End: 2024-12-11 | Stop reason: HOSPADM

## 2024-12-08 RX ORDER — ACETAMINOPHEN 325 MG/1
650 TABLET ORAL EVERY 6 HOURS PRN
Status: DISCONTINUED | OUTPATIENT
Start: 2024-12-08 | End: 2024-12-10

## 2024-12-08 RX ORDER — GABAPENTIN 300 MG/1
300 CAPSULE ORAL EVERY 8 HOURS SCHEDULED
Status: DISCONTINUED | OUTPATIENT
Start: 2024-12-08 | End: 2024-12-11 | Stop reason: HOSPADM

## 2024-12-08 RX ORDER — MORPHINE SULFATE 4 MG/ML
4 INJECTION INTRAVENOUS ONCE
Status: COMPLETED | OUTPATIENT
Start: 2024-12-08 | End: 2024-12-08

## 2024-12-08 RX ADMIN — ACETAMINOPHEN 650 MG: 325 TABLET, FILM COATED ORAL at 22:47

## 2024-12-08 RX ADMIN — MORPHINE SULFATE 4 MG: 4 INJECTION INTRAVENOUS at 20:02

## 2024-12-08 RX ADMIN — CYCLOBENZAPRINE 10 MG: 10 TABLET, FILM COATED ORAL at 22:47

## 2024-12-08 RX ADMIN — GABAPENTIN 300 MG: 300 CAPSULE ORAL at 22:47

## 2024-12-08 RX ADMIN — ENOXAPARIN SODIUM 40 MG: 100 INJECTION SUBCUTANEOUS at 20:31

## 2024-12-08 SDOH — ECONOMIC STABILITY: FOOD INSECURITY: WITHIN THE PAST 12 MONTHS, THE FOOD YOU BOUGHT JUST DIDN'T LAST AND YOU DIDN'T HAVE MONEY TO GET MORE.: NEVER TRUE

## 2024-12-08 SDOH — SOCIAL STABILITY: SOCIAL INSECURITY: DO YOU FEEL UNSAFE GOING BACK TO THE PLACE WHERE YOU ARE LIVING?: NO

## 2024-12-08 SDOH — ECONOMIC STABILITY: FOOD INSECURITY: WITHIN THE PAST 12 MONTHS, YOU WORRIED THAT YOUR FOOD WOULD RUN OUT BEFORE YOU GOT THE MONEY TO BUY MORE.: NEVER TRUE

## 2024-12-08 SDOH — SOCIAL STABILITY: SOCIAL INSECURITY
WITHIN THE LAST YEAR, HAVE YOU BEEN KICKED, HIT, SLAPPED, OR OTHERWISE PHYSICALLY HURT BY YOUR PARTNER OR EX-PARTNER?: NO

## 2024-12-08 SDOH — SOCIAL STABILITY: SOCIAL INSECURITY: ARE THERE ANY APPARENT SIGNS OF INJURIES/BEHAVIORS THAT COULD BE RELATED TO ABUSE/NEGLECT?: NO

## 2024-12-08 SDOH — SOCIAL STABILITY: SOCIAL INSECURITY
WITHIN THE LAST YEAR, HAVE YOU BEEN RAPED OR FORCED TO HAVE ANY KIND OF SEXUAL ACTIVITY BY YOUR PARTNER OR EX-PARTNER?: NO

## 2024-12-08 SDOH — SOCIAL STABILITY: SOCIAL INSECURITY: DO YOU FEEL ANYONE HAS EXPLOITED OR TAKEN ADVANTAGE OF YOU FINANCIALLY OR OF YOUR PERSONAL PROPERTY?: NO

## 2024-12-08 SDOH — ECONOMIC STABILITY: INCOME INSECURITY: IN THE PAST 12 MONTHS HAS THE ELECTRIC, GAS, OIL, OR WATER COMPANY THREATENED TO SHUT OFF SERVICES IN YOUR HOME?: NO

## 2024-12-08 SDOH — SOCIAL STABILITY: SOCIAL INSECURITY: HAS ANYONE EVER THREATENED TO HURT YOUR FAMILY OR YOUR PETS?: NO

## 2024-12-08 SDOH — SOCIAL STABILITY: SOCIAL INSECURITY: WITHIN THE LAST YEAR, HAVE YOU BEEN AFRAID OF YOUR PARTNER OR EX-PARTNER?: NO

## 2024-12-08 SDOH — SOCIAL STABILITY: SOCIAL INSECURITY: WITHIN THE LAST YEAR, HAVE YOU BEEN HUMILIATED OR EMOTIONALLY ABUSED IN OTHER WAYS BY YOUR PARTNER OR EX-PARTNER?: NO

## 2024-12-08 SDOH — SOCIAL STABILITY: SOCIAL INSECURITY: ABUSE: ADULT

## 2024-12-08 SDOH — SOCIAL STABILITY: SOCIAL INSECURITY: WERE YOU ABLE TO COMPLETE ALL THE BEHAVIORAL HEALTH SCREENINGS?: YES

## 2024-12-08 SDOH — SOCIAL STABILITY: SOCIAL INSECURITY: HAVE YOU HAD ANY THOUGHTS OF HARMING ANYONE ELSE?: NO

## 2024-12-08 SDOH — SOCIAL STABILITY: SOCIAL INSECURITY: ARE YOU OR HAVE YOU BEEN THREATENED OR ABUSED PHYSICALLY, EMOTIONALLY, OR SEXUALLY BY ANYONE?: NO

## 2024-12-08 SDOH — SOCIAL STABILITY: SOCIAL INSECURITY: DOES ANYONE TRY TO KEEP YOU FROM HAVING/CONTACTING OTHER FRIENDS OR DOING THINGS OUTSIDE YOUR HOME?: NO

## 2024-12-08 SDOH — SOCIAL STABILITY: SOCIAL INSECURITY: HAVE YOU HAD THOUGHTS OF HARMING ANYONE ELSE?: NO

## 2024-12-08 ASSESSMENT — COGNITIVE AND FUNCTIONAL STATUS - GENERAL
CLIMB 3 TO 5 STEPS WITH RAILING: A LOT
STANDING UP FROM CHAIR USING ARMS: A LITTLE
DAILY ACTIVITIY SCORE: 23
MOBILITY SCORE: 20
DRESSING REGULAR LOWER BODY CLOTHING: A LITTLE
WALKING IN HOSPITAL ROOM: A LITTLE
PATIENT BASELINE BEDBOUND: NO

## 2024-12-08 ASSESSMENT — LIFESTYLE VARIABLES
TOTAL SCORE: 0
EVER HAD A DRINK FIRST THING IN THE MORNING TO STEADY YOUR NERVES TO GET RID OF A HANGOVER: NO
HOW MANY STANDARD DRINKS CONTAINING ALCOHOL DO YOU HAVE ON A TYPICAL DAY: PATIENT DECLINED
HAVE PEOPLE ANNOYED YOU BY CRITICIZING YOUR DRINKING: NO
HOW OFTEN DO YOU HAVE A DRINK CONTAINING ALCOHOL: PATIENT DECLINED
AUDIT-C TOTAL SCORE: -1
HAVE YOU EVER FELT YOU SHOULD CUT DOWN ON YOUR DRINKING: NO
EVER FELT BAD OR GUILTY ABOUT YOUR DRINKING: NO
HOW OFTEN DO YOU HAVE 6 OR MORE DRINKS ON ONE OCCASION: PATIENT DECLINED
AUDIT-C TOTAL SCORE: -1
SKIP TO QUESTIONS 9-10: 0

## 2024-12-08 ASSESSMENT — ACTIVITIES OF DAILY LIVING (ADL)
GROOMING: INDEPENDENT
WALKS IN HOME: INDEPENDENT
TOILETING: INDEPENDENT
PATIENT'S MEMORY ADEQUATE TO SAFELY COMPLETE DAILY ACTIVITIES?: YES
DRESSING YOURSELF: INDEPENDENT
ADEQUATE_TO_COMPLETE_ADL: YES
JUDGMENT_ADEQUATE_SAFELY_COMPLETE_DAILY_ACTIVITIES: YES
HEARING - LEFT EAR: FUNCTIONAL
FEEDING YOURSELF: INDEPENDENT
BATHING: INDEPENDENT
ASSISTIVE_DEVICE: CRUTCHES;WHEELCHAIR
LACK_OF_TRANSPORTATION: NO
HEARING - RIGHT EAR: FUNCTIONAL

## 2024-12-08 ASSESSMENT — PAIN DESCRIPTION - LOCATION
LOCATION: LEG
LOCATION: LEG

## 2024-12-08 ASSESSMENT — PAIN SCALES - GENERAL
PAINLEVEL_OUTOF10: 8
PAINLEVEL_OUTOF10: 10 - WORST POSSIBLE PAIN
PAINLEVEL_OUTOF10: 4

## 2024-12-08 ASSESSMENT — PAIN DESCRIPTION - PAIN TYPE: TYPE: ACUTE PAIN

## 2024-12-08 ASSESSMENT — PAIN - FUNCTIONAL ASSESSMENT
PAIN_FUNCTIONAL_ASSESSMENT: 0-10
PAIN_FUNCTIONAL_ASSESSMENT: 0-10
PAIN_FUNCTIONAL_ASSESSMENT: UNABLE TO SELF-REPORT

## 2024-12-08 ASSESSMENT — PAIN DESCRIPTION - ORIENTATION: ORIENTATION: RIGHT;LEFT

## 2024-12-08 ASSESSMENT — PATIENT HEALTH QUESTIONNAIRE - PHQ9
1. LITTLE INTEREST OR PLEASURE IN DOING THINGS: NOT AT ALL
SUM OF ALL RESPONSES TO PHQ9 QUESTIONS 1 & 2: 0
2. FEELING DOWN, DEPRESSED OR HOPELESS: NOT AT ALL

## 2024-12-09 LAB
ALBUMIN SERPL BCP-MCNC: 4 G/DL (ref 3.4–5)
ALP SERPL-CCNC: 70 U/L (ref 33–120)
ALT SERPL W P-5'-P-CCNC: 6 U/L (ref 10–52)
ANION GAP SERPL CALC-SCNC: 13 MMOL/L (ref 10–20)
APTT PPP: 39 SECONDS (ref 27–38)
AST SERPL W P-5'-P-CCNC: 15 U/L (ref 9–39)
BASOPHILS # BLD AUTO: 0.03 X10*3/UL (ref 0–0.1)
BASOPHILS NFR BLD AUTO: 0.4 %
BILIRUB SERPL-MCNC: 0.6 MG/DL (ref 0–1.2)
BUN SERPL-MCNC: 27 MG/DL (ref 6–23)
CALCIUM SERPL-MCNC: 9.5 MG/DL (ref 8.6–10.6)
CHLORIDE SERPL-SCNC: 100 MMOL/L (ref 98–107)
CO2 SERPL-SCNC: 27 MMOL/L (ref 21–32)
CREAT SERPL-MCNC: 1.1 MG/DL (ref 0.5–1.3)
EGFRCR SERPLBLD CKD-EPI 2021: >90 ML/MIN/1.73M*2
EOSINOPHIL # BLD AUTO: 0.11 X10*3/UL (ref 0–0.7)
EOSINOPHIL NFR BLD AUTO: 1.4 %
ERYTHROCYTE [DISTWIDTH] IN BLOOD BY AUTOMATED COUNT: 15 % (ref 11.5–14.5)
GLUCOSE SERPL-MCNC: 94 MG/DL (ref 74–99)
HCT VFR BLD AUTO: 36.3 % (ref 41–52)
HGB BLD-MCNC: 11.9 G/DL (ref 13.5–17.5)
IMM GRANULOCYTES # BLD AUTO: 0.04 X10*3/UL (ref 0–0.7)
IMM GRANULOCYTES NFR BLD AUTO: 0.5 % (ref 0–0.9)
INR PPP: 1 (ref 0.9–1.1)
LYMPHOCYTES # BLD AUTO: 3.14 X10*3/UL (ref 1.2–4.8)
LYMPHOCYTES NFR BLD AUTO: 40.2 %
MAGNESIUM SERPL-MCNC: 2.31 MG/DL (ref 1.6–2.4)
MCH RBC QN AUTO: 30.9 PG (ref 26–34)
MCHC RBC AUTO-ENTMCNC: 32.8 G/DL (ref 32–36)
MCV RBC AUTO: 94 FL (ref 80–100)
MONOCYTES # BLD AUTO: 0.93 X10*3/UL (ref 0.1–1)
MONOCYTES NFR BLD AUTO: 11.9 %
NEUTROPHILS # BLD AUTO: 3.57 X10*3/UL (ref 1.2–7.7)
NEUTROPHILS NFR BLD AUTO: 45.6 %
NRBC BLD-RTO: 0 /100 WBCS (ref 0–0)
PLATELET # BLD AUTO: 341 X10*3/UL (ref 150–450)
POTASSIUM SERPL-SCNC: 3.8 MMOL/L (ref 3.5–5.3)
PROT SERPL-MCNC: 7 G/DL (ref 6.4–8.2)
PROTHROMBIN TIME: 11.7 SECONDS (ref 9.8–12.8)
RBC # BLD AUTO: 3.85 X10*6/UL (ref 4.5–5.9)
SODIUM SERPL-SCNC: 136 MMOL/L (ref 136–145)
WBC # BLD AUTO: 7.8 X10*3/UL (ref 4.4–11.3)

## 2024-12-09 PROCEDURE — 1100000001 HC PRIVATE ROOM DAILY

## 2024-12-09 PROCEDURE — 97161 PT EVAL LOW COMPLEX 20 MIN: CPT | Mod: GP

## 2024-12-09 PROCEDURE — 97530 THERAPEUTIC ACTIVITIES: CPT | Mod: GP

## 2024-12-09 PROCEDURE — 36415 COLL VENOUS BLD VENIPUNCTURE: CPT

## 2024-12-09 PROCEDURE — 2500000001 HC RX 250 WO HCPCS SELF ADMINISTERED DRUGS (ALT 637 FOR MEDICARE OP)

## 2024-12-09 PROCEDURE — 80053 COMPREHEN METABOLIC PANEL: CPT

## 2024-12-09 PROCEDURE — 85610 PROTHROMBIN TIME: CPT

## 2024-12-09 PROCEDURE — 2500000004 HC RX 250 GENERAL PHARMACY W/ HCPCS (ALT 636 FOR OP/ED)

## 2024-12-09 PROCEDURE — 85025 COMPLETE CBC W/AUTO DIFF WBC: CPT

## 2024-12-09 PROCEDURE — 97165 OT EVAL LOW COMPLEX 30 MIN: CPT | Mod: GO

## 2024-12-09 PROCEDURE — 99233 SBSQ HOSP IP/OBS HIGH 50: CPT | Performed by: STUDENT IN AN ORGANIZED HEALTH CARE EDUCATION/TRAINING PROGRAM

## 2024-12-09 PROCEDURE — 83735 ASSAY OF MAGNESIUM: CPT

## 2024-12-09 PROCEDURE — G0378 HOSPITAL OBSERVATION PER HR: HCPCS

## 2024-12-09 PROCEDURE — 2500000004 HC RX 250 GENERAL PHARMACY W/ HCPCS (ALT 636 FOR OP/ED): Performed by: NURSE PRACTITIONER

## 2024-12-09 PROCEDURE — 99222 1ST HOSP IP/OBS MODERATE 55: CPT

## 2024-12-09 PROCEDURE — 2500000005 HC RX 250 GENERAL PHARMACY W/O HCPCS

## 2024-12-09 RX ORDER — MORPHINE SULFATE 4 MG/ML
4 INJECTION INTRAVENOUS ONCE
Status: COMPLETED | OUTPATIENT
Start: 2024-12-09 | End: 2024-12-09

## 2024-12-09 RX ADMIN — BRIMONIDINE TARTRATE 1 DROP: 2 SOLUTION/ DROPS OPHTHALMIC at 20:36

## 2024-12-09 RX ADMIN — PANTOPRAZOLE SODIUM 40 MG: 40 TABLET, DELAYED RELEASE ORAL at 08:52

## 2024-12-09 RX ADMIN — MYCOPHENOLATE MOFETIL 500 MG: 500 TABLET, FILM COATED ORAL at 20:36

## 2024-12-09 RX ADMIN — GABAPENTIN 300 MG: 300 CAPSULE ORAL at 15:37

## 2024-12-09 RX ADMIN — ASPIRIN 81 MG 81 MG: 81 TABLET ORAL at 08:52

## 2024-12-09 RX ADMIN — GABAPENTIN 300 MG: 300 CAPSULE ORAL at 22:10

## 2024-12-09 RX ADMIN — ACETAMINOPHEN 650 MG: 325 TABLET, FILM COATED ORAL at 05:25

## 2024-12-09 RX ADMIN — CYCLOBENZAPRINE 10 MG: 10 TABLET, FILM COATED ORAL at 05:25

## 2024-12-09 RX ADMIN — PREDNISONE 10 MG: 10 TABLET ORAL at 11:57

## 2024-12-09 RX ADMIN — PREDNISOLONE ACETATE 1 DROP: 10 SUSPENSION/ DROPS OPHTHALMIC at 13:55

## 2024-12-09 RX ADMIN — ACETAMINOPHEN 650 MG: 325 TABLET, FILM COATED ORAL at 20:36

## 2024-12-09 RX ADMIN — MYCOPHENOLATE MOFETIL 500 MG: 500 TABLET, FILM COATED ORAL at 01:21

## 2024-12-09 RX ADMIN — MORPHINE SULFATE 4 MG: 4 INJECTION INTRAVENOUS at 10:22

## 2024-12-09 RX ADMIN — BRIMONIDINE TARTRATE 1 DROP: 2 SOLUTION/ DROPS OPHTHALMIC at 08:52

## 2024-12-09 RX ADMIN — GABAPENTIN 300 MG: 300 CAPSULE ORAL at 05:25

## 2024-12-09 RX ADMIN — PREDNISOLONE ACETATE 1 DROP: 10 SUSPENSION/ DROPS OPHTHALMIC at 20:36

## 2024-12-09 RX ADMIN — PREDNISOLONE ACETATE 1 DROP: 10 SUSPENSION/ DROPS OPHTHALMIC at 18:19

## 2024-12-09 ASSESSMENT — ACTIVITIES OF DAILY LIVING (ADL)
ADL_ASSISTANCE: INDEPENDENT
BATHING_ASSISTANCE: STAND BY
LACK_OF_TRANSPORTATION: NO

## 2024-12-09 ASSESSMENT — COGNITIVE AND FUNCTIONAL STATUS - GENERAL
STANDING UP FROM CHAIR USING ARMS: A LITTLE
MOVING TO AND FROM BED TO CHAIR: A LITTLE
TOILETING: A LITTLE
WALKING IN HOSPITAL ROOM: A LITTLE
DAILY ACTIVITIY SCORE: 21
MOBILITY SCORE: 20
CLIMB 3 TO 5 STEPS WITH RAILING: A LITTLE
HELP NEEDED FOR BATHING: A LITTLE
DRESSING REGULAR LOWER BODY CLOTHING: A LITTLE

## 2024-12-09 ASSESSMENT — PAIN SCALES - GENERAL
PAINLEVEL_OUTOF10: 10 - WORST POSSIBLE PAIN
PAINLEVEL_OUTOF10: 9
PAINLEVEL_OUTOF10: 7

## 2024-12-09 ASSESSMENT — PAIN - FUNCTIONAL ASSESSMENT: PAIN_FUNCTIONAL_ASSESSMENT: 0-10

## 2024-12-09 NOTE — PROGRESS NOTES
Physical Therapy    Physical Therapy Evaluation & Treatment    Patient Name: Juan Marsh  MRN: [id number]  Department: [place] [address]  Room: [id number]  Today's Date: 12/9/2024   Time Calculation  Start Time: 1245  Stop Time: 1309  Time Calculation (min): 24 min    Assessment/Plan   PT Assessment  PT Assessment Results: Decreased strength, Pain, Impaired balance  Rehab Prognosis: Good  Barriers to Discharge: pain  Evaluation/Treatment Tolerance: Patient tolerated treatment well  Medical Staff Made Aware: Yes  Strengths: Attitude of self, Premorbid level of function  End of Session Communication: Bedside nurse  Assessment Comment: 30 yo male, typ Ind with no AD.  Presenting with BLE pain.  Currently, limited by pain and deficits in strength, and balance, requiring close supv to ambulate with crutches, CGA to ambulate with no AD.  Would benefit from cont PT while in hosp to address deficits and facilitate return to PLOF  End of Session Patient Position: Bed, 3 rail up, Alarm off, not on at start of session   IP OR SWING BED PT PLAN  Inpatient or Swing Bed: Inpatient  PT Plan  Treatment/Interventions: Transfer training, Gait training, Stair training, Balance training, Therapeutic exercise, Therapeutic activity  PT Plan: Ongoing PT  PT Frequency: 3 times per week  PT Discharge Recommendations: Low intensity level of continued care (Pt reports currently going to OP PT, but expressed interest in home PT)  PT Recommended Transfer Status: Assist x1, Assistive device  PT - OK to Discharge: Yes (Meaning pt has been evaluated and d/c recc is in place)    Subjective     General Visit Information:  General  Reason for Referral: BLE leg pain  Past Medical History Relevant to Rehab: neuro-Behcet's, 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  Pt pleasant, cooperative and agreeable to PT.  Reporting  high level pain, but that he had received pain meds, and it was better than earlier.  Pt able to perform transfers and ambulate with crutches, and navigate 3 steps with close supv.  Tolerated well.  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  Pt had been premedicated.  Provided with cold packs at end of session, and reporting that those were helping.)  Cognition:  Cognition  Overall Cognitive Status: Within Functional Limits  Arousal/Alertness: Appropriate responses to stimuli  Orientation Level: Oriented X4  Following Commands: Follows all commands and directions without difficulty  Attention: Within Functional Limits    General Assessments:    Activity Tolerance  Endurance: Tolerates 10 - 20 min exercise with multiple rests    Sensation  Light Touch: Partial deficits in the RUE    Strength  Strength Comments: RLE hip 3/5, knee/ankle 4/5.  LLE hip 3+/5, knee/ankle 4/5  Strength  Strength Comments: RLE hip " 3/5, knee/ankle 4/5.  LLE hip 3+/5, knee/ankle 4/5    Static Sitting Balance  Static Sitting-Level of Assistance: Independent  Dynamic Sitting Balance  Dynamic Sitting-Level of Assistance: Independent    Static Standing Balance  Static Standing-Level of Assistance: Distant supervision (with crutches)  Dynamic Standing Balance  Dynamic Standing-Level of Assistance: Close supervision (with crutches, CGA with no AD)  Functional Assessments:     Bed Mobility  Bed Mobility: Yes  Bed Mobility 1  Bed Mobility 1: Supine to sitting, Sitting to supine  Level of Assistance 1: Independent    Transfers  Transfer: Yes  Transfer 1  Transfer From 1: Sit to, Stand to  Transfer to 1: Sit  Transfer Device 1: Crutches    Ambulation/Gait Training  Ambulation/Gait Training Performed: Yes  Ambulation/Gait Training 1  Surface 1: Level tile  Device 1: Axillary crutches  Assistance 1: Close supervision, Minimal verbal cues  Quality of Gait 1: Wide base of support, Inconsistent stride length, Decreased step length, Antalgic, Soft knee(s)  Comments/Distance (ft) 1: 75 feet  Ambulation/Gait Training 2  Surface 2: Level tile  Device 2: No device  Assistance 2: Contact guard  Quality of Gait 2: Wide base of support, Inconsistent stride length, Decreased step length, Antalgic, Soft knee(s) (Increased lateral stability with no AD, but no overt LOB)  Comments/Distance (ft) 2: 50 feet    Stairs  Stairs: Yes  Stairs  Rails 1: Bilateral  Device 1: Railing, Single crutch  Assistance 1: Close supervision  Comment/Number of Steps 1: Up/dn 3 steps, 1 HR, 1 crutch, non-reciprocal    Extremity/Trunk Assessments:    RLE   RLE :  (ROM WFL)  LLE   LLE :  (ROM WFL)  Treatments:     Bed Mobility  Bed Mobility: Yes  Bed Mobility 1  Bed Mobility 1: Supine to sitting, Sitting to supine  Level of Assistance 1: Independent    Ambulation/Gait Training  Ambulation/Gait Training Performed: Yes  Ambulation/Gait Training 1  Surface 1: Level tile  Device 1: Axillary  crutches  Assistance 1: Close supervision, Minimal verbal cues  Quality of Gait 1: Wide base of support, Inconsistent stride length, Decreased step length, Antalgic, Soft knee(s)  Comments/Distance (ft) 1: 75 feet  Ambulation/Gait Training 2  Surface 2: Level tile  Device 2: No device  Assistance 2: Contact guard  Quality of Gait 2: Wide base of support, Inconsistent stride length, Decreased step length, Antalgic, Soft knee(s) (Increased lateral stability with no AD, but no overt LOB)  Comments/Distance (ft) 2: 50 feet  Transfers  Transfer: Yes  Transfer 1  Transfer From 1: Sit to, Stand to  Transfer to 1: Sit  Transfer Device 1: Crutches    Stairs  Stairs: Yes  Stairs  Rails 1: Bilateral  Device 1: Railing, Single crutch  Assistance 1: Close supervision  Comment/Number of Steps 1: Up/dn 3 steps, 1 HR, 1 crutch, non-reciprocal    Outcome Measures:    [facility] Basic Mobility  Turning from your back to your side while in a flat bed without using bedrails: None  Moving from lying on your back to sitting on the side of a flat bed without using bedrails: None  Moving to and from bed to chair (including a wheelchair): A little  Standing up from a chair using your arms (e.g. wheelchair or bedside chair): A little  To walk in hospital room: A little  Climbing 3-5 steps with railing: A little  Basic Mobility - Total Score: 20      Encounter Problems       Encounter Problems (Active)       Balance       T[place]>24 to reflect improvement in balance and decreased falls risk  (Progressing)       Start:  12/09/24    Expected End:  12/23/24               Mobility       Ambulate >150 feet, LRD, Ind  (Progressing)       Start:  12/09/24    Expected End:  12/23/24            Up/dn 18 steps, LRD, Ind (Progressing)       Start:  12/09/24    Expected End:  12/23/24               PT Transfers       sit<>stand, bed<>chair, LRD, Ind  (Progressing)       Start:  12/09/24    Expected End:  12/23/24               Pain - Adult               Education Documentation  Body Mechanics, taught by Brenton Batista PT at 12/9/2024  1:38 PM.  Learner: Patient  Readiness: Eager  Method: Explanation  Response: Verbalizes Understanding  Comment: Role of PT, POC, progression of mobility    Mobility Training, taught by Brenton Batista PT at 12/9/2024  1:38 PM.  Learner: Patient  Readiness: Eager  Method: Explanation  Response: Verbalizes Understanding  Comment: Role of PT, POC, progression of mobility    Education Comments  No comments found.

## 2024-12-09 NOTE — PROGRESS NOTES
Pharmacy Medication History Review    Juan Marsh is a 29 y.o. male admitted for Lupus (systemic lupus erythematosus) (Multi). Pharmacy reviewed the patient's [id number] medications and allergies for accuracy.    Medications ADDED:  None  Medications CHANGED:  None  Medications REMOVED:   Aspirin 81mg     The list below reflects the updated PTA list.   Prior to Admission Medications   Prescriptions Last Dose Informant   acetaminophen (TylenoL) 325 mg tablet  Self   Sig: Take 2 tablets (650 mg) by mouth every 6 hours if needed for mild pain (1 - 3) or fever (temp greater than 38.0 C).   brimonidine (AlphaGAN P) 0.2 % ophthalmic solution  Self   Sig: Administer 1 drop into the right eye every 12 hours.   cyclobenzaprine (Flexeril) 10 mg tablet     Sig: Take 1 tablet (10 mg) by mouth 3 times a day as needed for muscle spasms.   ergocalciferol (Vitamin D-2) 1.25 MG (62333 UT) capsule  Self   Sig: Take 1 capsule (1,250 mcg) by mouth 1 (one) time per week.   gabapentin (Neurontin) 300 mg capsule     Sig: Take 1 capsule (300 mg) by mouth every 8 hours.   ibuprofen 600 mg tablet  Self   Sig: Take 1 tablet (600 mg) by mouth every 8 hours if needed for mild pain (1 - 3) or fever (temp greater than 38.0 C).   lidocaine 4 % patch  Self   Sig: Place 2 patches over 12 hours on the skin once daily. Remove & discard patch within 12 hours or as directed by MD.   Patient not taking: Reported on 11/14/2024   mycophenolate (Cellcept) 500 mg tablet  Self   Sig: Take 1 tablet (500 mg) by mouth 2 times a day.   pantoprazole (ProtoNix) 40 mg EC tablet  Self   Sig: Take 1 tablet (40 mg) by mouth once daily.   predniSONE (Deltasone) 10 mg tablet 12/8/2024 Self   Sig: Take 1 tablet (10 mg) by mouth once daily.   prednisoLONE acetate (Pred-Forte) 1 % ophthalmic suspension  Self   Sig: Administer 1 drop into the right eye 4 times a day.      Facility-Administered Medications: None        The list below reflects the updated  "allergy list. Please review each documented allergy for additional clarification and justification.  Allergies  Reviewed by Colby Bustamante on 12/9/2024   No Known Allergies         Patient accepts M2B at discharge.     Sources:   -Patient interview, good historian   -Outpatient pharmacy dispense history  -OARRS     Additional Comments:  None      COLBY BUSTAMANTE  Pharmacy Technician  12/09/24     Secure Chat preferred   If no response call [phone] or Vocera \"Med Rec\"\  "

## 2024-12-09 NOTE — SIGNIFICANT EVENT
[facility] NEUROLOGY UPDATED RECOMMENDATIONS & SIGNOFF  Patient's case presented by night float. Rounded on patient with the attending neurologist. Patient reports ill-defined pain in the entirety of the lower extremities. Exam notable for completely intact strength, small-fiber sensation, and large-fiber sensation with absence of any pathologic signs. There is asymmetrically increased hyperreflexia of the RLE with sustained inducible R ankle clonus which is expected based on the patient's known disease burden.      Assessment:  Juan Marsh is a 29 y.o. male with a history notable for SLE and Neuro-Behcet's who is admitted to the hospitalist service for difficulty ambulating. Neurology is consulted for lower extremity pain. Examination today is unremarkable from a neurologic perspective with no evidence of spinal nor peripheral nerve process. Review of imaging shows improvement of disease burden on serial MRI. Overall presentation is consistent with recovering Neuro-Behcet's with no evidence for new active flare.    Impression: Neuro-Behcet's without Active Flare    Recommendations:  - no additional inpatient neurological workup indicated at this time  - continue prednisone 10mg QDay and MMF 500mg BID  - continue to follow with neuroimmunology as an outpatient.      Thank you for this consult. Neurology will sign off. Please do not hesitate to reach out to our team by page or secure chat with any questions you may have. Patient was seen, examined, and discussed with the attending neurologist/neurohospitalist Dr. Adrian Posadas, who agrees w/ the assessment and plan.    Elder Joyce MD  Neurology Resident PGY-4  [facility] Inpatient Neurology Team  General Neurology Pager [phone]

## 2024-12-09 NOTE — CARE PLAN
The patient's goals for the shift include        Problem: Pain - Adult  Goal: Verbalizes/displays adequate comfort level or baseline comfort level  Outcome: Progressing     Problem: Safety - Adult  Goal: Free from fall injury  Outcome: Progressing     Problem: Discharge Planning  Goal: Discharge to home or other facility with appropriate resources  Outcome: Progressing     Problem: Chronic Conditions and Co-morbidities  Goal: Patient's chronic conditions and co-morbidity symptoms are monitored and maintained or improved  Outcome: Progressing     Problem: Fall/Injury  Goal: Not fall by end of shift  Outcome: Progressing  Goal: Use assistive devices by end of the shift  Outcome: Progressing     Problem: Pain  Goal: Free from opioid side effects throughout the shift  Outcome: Progressing  Goal: Free from acute confusion related to pain meds throughout the shift  Outcome: Progressing

## 2024-12-09 NOTE — PROGRESS NOTES
Occupational Therapy                 Therapy Communication Note    Patient Name: Juan Marsh  MRN: [id number]  Department: [place] [id number]  [facility]  Today's Date: 12/9/2024     Discipline: Occupational Therapy    Missed Visit Reason: Missed Visit Reason: Patient refused (blanket over head refused answering questions and all mob)    Missed Time: Attempt    Comment:

## 2024-12-09 NOTE — ED PROVIDER NOTES
CC: Leg Pain     HPI:   Patient is a 29-year-old male with past medical history of Bechet's disease with multisystem involvement, lupus, avascular necrosis of bilateral femoral heads, stroke with right-sided deficits and tobacco use disorder presenting due to concerns of significant bilateral lower extremity pain that is worse on the right compared to the left for the past 4 days.  Patient reports that he has been using a crutch at home to bear most of his weight on his left lower extremity to try and get around his house.  He has stairs at home.  He has been compliant with his prednisone daily 10 mg that he is on for management of his lupus.  Patient reports that this does not feel like his avascular necrosis is exacerbated however it does feel like a lupus flare to him.  He reports that he has had to call off work for most of last week because he has been unable to ambulate as usual.  This has happened in the past and has seen orthopedic surgery given his bilateral avascular necrosis of his femoral heads.  Patient reports that he is slated to get surgery at some point but has not had a concrete plan.  He denies any acute vision changes, fevers or chills, numbness in his upper or lower extremities.  He just reports significant pain with ambulation.  Patient is weak at his hip flexors and denies any bowel or bladder changes, or recent IV drug use.  He does not have any midline C, T or L-spine tenderness and denies any trauma or falls.    Limitations to History: None  Additional History Obtained from: Patient      PMHx/PSHx:  Per HPI.   - has a past medical history of Uveitis.  - has a past surgical history that includes CT angio neck (9/2/2021) and CT angio head w and wo IV contrast (9/2/2021).    Social History:  - Tobacco:  reports that he has been smoking cigars. He does not have any smokeless tobacco history on file.   - Alcohol:  reports current alcohol use.   - Drugs:  reports current drug use. Drug:  Marijuana.     Medications: Reviewed in EMR.     Allergies:  Patient has no known allergies.    ???????????????????????????????????????????????????????????????  Triage Vitals:  T 37 °C (98.6 °F)  HR (!) 128  /67  RR 16  O2 100 % None (Room air)    Physical Exam  Vitals and nursing note reviewed.   Constitutional:       General: He is not in acute distress.     Appearance: He is well-developed.      Comments: Cachectic appearing   HENT:      Head: Normocephalic and atraumatic.   Eyes:      Conjunctiva/sclera: Conjunctivae normal.   Cardiovascular:      Rate and Rhythm: Normal rate and regular rhythm.      Heart sounds: No murmur heard.  Pulmonary:      Effort: Pulmonary effort is normal. No respiratory distress.      Breath sounds: Normal breath sounds.   Abdominal:      Palpations: Abdomen is soft.      Tenderness: There is no abdominal tenderness.   Musculoskeletal:         General: Tenderness (To palpation of the bilateral hips with worse pain on the right compared to the left) present. No swelling.      Cervical back: Neck supple.      Comments: Significant pain with hip flexion and extension in bilateral lower extremities that is 4 -/5.  5/5 strength in knee flexion extension as well as dorsi and plantar flexion extension.  Intact sensation in all distributions of the bilateral lower extremities.  Denies any saddle anesthesia   Skin:     General: Skin is warm and dry.      Capillary Refill: Capillary refill takes less than 2 seconds.   Neurological:      Mental Status: He is alert and oriented to person, place, and time.      Sensory: No sensory deficit.      Motor: Weakness present.      Gait: Gait abnormal.   Psychiatric:         Mood and Affect: Mood normal.       ???????????????????????????????????????????????????????????????  EKG (per my interpretation): Not obtained    ED Course  Diagnoses as of 12/09/24 1635   Lupus (systemic lupus erythematosus) (Multi)       Medical Decision Making:  Patient  is a 29-year-old male with past medical history of neuro Bechet's disease, lupus, avascular necrosis, CVA presenting due to concern of inability to ambulate for the past 4 days.  Differentials considered not limited to occult fracture, septic joint, worsening of avascular disease, lupus flare, Bechet's disease flare, dislocation.    Based on patient's history and physical exam, basic lab work including inflammatory markers were obtained as well as x-rays of the bilateral hips.  Patient does have significant pain even with hip flexion and extension at baseline while sitting. No effusion or fluctuance noted. CRP is elevated but patient does not have an elevated white count making me think there is an underlying inflammatory process that is flared.  Patient has elevated CRP before up to 16.61.  Patient's x-rays of the hip show similar appearing osteonecrosis of the bilateral femoral heads with articular surface collapse and advanced osteoarthritis that is slightly worse on the right compared to left.  Given difficulty ambulating and completing activities of daily living, patient was admitted for PT, OT and further workup and management.  Patient likely will need to be initiated on a higher steroid burst to help with mobility and to minimize flare symptoms.  Patient care was overseen by attending physician agrees with the plan disposition.    External records reviewed: recent inpatient, clinic, and prior ED notes  Diagnostic imaging independently reviewed/interpreted by me (as reflected in MDM) includes: xray  Social Determinants Affecting Care: Poor health literacy  Discussion of management with other providers: attending  Prescription Drug Consideration: none  Escalation of Care: admitted    Impression:   Bilateral LE pain  Lupus  Bechet's disease     Disposition: Admitted      Procedures ? Smart[employer] last updated 12/8/2024 7:19 PM        Jolie Balderrama MD  Resident  12/09/24 [id number]

## 2024-12-09 NOTE — PROGRESS NOTES
Juan Marsh is a 29 y.o. male on day 1 of admission presenting with Lupus (systemic lupus erythematosus) (Multi).      Subjective   Seen and evaluated at bedside.  No acute events overnight.  Still endorsing pain and difficulty with ambulating.  No other acute complaints.       Objective     Last Recorded Vitals  /63 (BP Location: Left arm, Patient Position: Lying)   Pulse 80   Temp 36.7 °C (98.1 °F) (Temporal)   Resp 16   Wt 58.1 kg (128 lb)   SpO2 100%   Intake/Output last 3 Shifts:  No intake or output data in the 24 hours ending 12/09/24 1746    General Exam  General Appearance: Patient is awake, alert and cooperative, and appears to be in no acute distress.  Head: NCAT  Eyes: PERRL, EOMI. vision is grossly intact.  Cardiac: RRR, NS1S2, no murmurs  Lungs: Phonating clearly, no increased work of breathing no use of accessory muscles.  Musculoskeletal: ROM intact extremities.  5/5 strength in bilateral upper extremities, 4/5 strength in bilateral lower extremities  Extremities: Warm well-perfused, peripheral pulses intact, no edema  Neuro: CN II-XII intact.   Skin: Skin normal color, texture and turgor with no lesions or eruptions.  Psych: Appropriate mood and affect    Admission Weight  Weight: 58.1 kg (128 lb) (12/08/24 1035)    Daily Weight  12/08/24 : 58.1 kg (128 lb)    Image Results  ECG 12 lead  Sinus tachycardia  Biatrial enlargement  Abnormal ECG  When compared with ECG of 28-AUG-2024 16:16,  Vent. rate has increased BY  50 BPM    See ED provider note for full interpretation and clinical correlation  Confirmed by Mary Torres ([id number]) on 12/8/2024 9:01:26 PM  XR hips bilateral 2 VW w pelvis when performed  Narrative: Interpreted By:  Silverio Solis,   STUDY:  XR HIPS BILATERAL 2 VW WITH PELVIS WHEN PERFORMED; ;  12/8/2024 3:33  pm      INDICATION:  Signs/Symptoms:pain with fx of avascular necrosis.          COMPARISON:  08/20/2024.      ACCESSION NUMBER(S):  [id number]      ORDERING  CLINICIAN:  MOHIT HO      FINDINGS:  AP view of the pelvis and two views of the bilateral hips      Similar-appearing sclerotic changes of the bilateral femoral heads  with articular surface collapse, right slightly worsened left. Severe  bilateral hip osteoarthrosis with joint space loss. Partially  visualized intramedullary nail of the left femur. Soft tissues are  within normal limits. No acute fracture. Joint alignment is  maintained.      Impression: 1. Similar-appearing osteonecrosis of the bilateral femoral heads  with articular surface collapse and advanced osteoarthrosis, right  slightly worse than left.              MACRO:  None      Signed by: Silverio Solis 12/8/2024 3:58 PM  Dictation workstation:   [id number]      Physical Exam    Relevant Results               Assessment/Plan                  Assessment & Plan  Lupus (systemic lupus erythematosus) (Multi)    29-year-old M who is now hospital day 1 presenting for chronic bilateral leg pain and difficulty ambulating.  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    #Difficulty ambulating  #Bilateral lower extremity weakness  #AVN right femoral head  #Osteoarthritis  -No acute red flags noted upon evaluation  -Neurology following, appreciate recommendations  -Neuro Behcet's without active flare  -PT OT recommended low intensity  -Multimodal pain regimen  -Patient appropriate for discharge tomorrow     #SLE  #Neuro Behcet's  -Continue prednisone 10 mg daily  -Continue mycophenolate 500 mg twice daily    #History of CVA  -Continue home aspirin 81 mg daily    F: None  E: Replete as needed  FEN: Regular  GI: PPI  DVT: Ambulate  CODE STATUS: Full code    Disposition: Medically cleared for discharge       A total of >75 minutes was spent on this visit reviewing previous notes including inpatient ED/Consult notes, chart review of ambulatory records in  AE and OSH records in [facility], counseling the patient on substance use cessation, ordering tests and add on labs, medication reconciliation and adjusting meds, and documenting the findings in the note.       Ramana Acosta MD

## 2024-12-09 NOTE — CONSULTS
Consults    History Of Present Illness  Juan Marsh is a 29 y.o. 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     Upon initial evaluation, the patient is sleeping in bed comfortably and arousable but declining to cooperate and provide a history. Would intermittently open his eyes and look at me while I asked questions but would not answer and declined exam. Not evidently altered or encephalopathic otherwise. Further HPI limited.    Limited history provided around 6am:  Patient reports pain shooting down legs and bowel/bladder incontinence but only replying with one word answer and asking examiner to go away. No additional details, limited exam of lower extremities acquired as below.    History taken from chart:  HE initially presented in 2021 with uveitis and history of oral and genital ulcer, was [place] [facility] [place] due to blurry vision and R hand weakness    He was was later admitted to neurology service in 5/2024 for headache, tinnitus, diplopia, right hemisensory disturbance, right proximal upper extremity weaknes at which time Imaging findings were concerning for relapse of neuro Bechet vs. Lymphoma. He was treated with a 5-dose course of Solu-Medrol from 5/15 to 5/18. CT CAP on 5/17 showed size increase in bilateral external iliac lymph nodes and interval development of new bilateral femoral head osteonecrosis.  Repeat MRI brain (5/20) showed interval resolution of enhancement in L thalamus/tyrone and improvement of enhancement in L cerebral peduncle.   He was started on mycophenolate mofetil 500 BID and Prednisone 60 taper by 10/week until stable on 10mg daily.    He followed up with Dr Ulloa 7/9/24 and was on prednisone 5mg daily, with plan to stop in November and repeat  MRI.    He later presented 8/28/24 with headache and difficulty walking, and noted right upper extremity weakness which seems to predominantly involve wrist and finger extensors, over 1 week, and CT and MRI of his brain demonstrated worsening left thalamus lesion and midbrain. HE underwent IVMP pulse and had symptomatic improvement. It was unclear wehther he was taking his mycophenolate leading up to this    Dr. Ulloa followup 10/14/24 placed him back on mycophenolate and increased his prednisone back to 10mg daily. He was also referred to neuro-onc due to recurrence in the same spot raising concern for lymphoma, and Dr Lepe felt the presentation less likely to be consistent with neuro-behcets, though possible. Repeat MRI 11/1 showed improvement in prior basal gaalia lesion but new enhancing R cerebellar/ peduncle lesion. Tumor board on 11/27/24 felt best to continue with prednisone and MRI surveillance, for which his next planned scan is 2/7/24.    Labs:  WBC 10.8, Hgb 13.6, plt 393  Na 138, K 3.7, Cl 99, HCO3 26, BUN 33, Cr 1.01, glu 105  Ca 10.3, tprot 8.5, alb 4.8, alkphos 82, AST 12, ALT 9, tbili 0.6  CRP 11.75  Retic absolute 0.033, Retic %: 0.8, Retic HGB 32     Imaging   Bilateral Hip Xray 12/8  -Similar-appearing osteonecrosis of the bilateral femoral heads with articular surface collapse and advanced osteoarthrosis, right slightly worse than left.     Intervention in the ED  -IV Morphine 4 mg once     PMH: As above  SocHx: He continues to smoke daily (smoking for over 15 years). He also smokes 1-2 blunts of marijuana a day. He denies use of other drugs or alcohol. He lives with his mum and independent with his ADL but occasionally needs help.     Home Medications   Tylenol   Aspirin 81mg  Cyclobenzaprine 10 mg TID  Gabapentine 300 mg q8  Mycophenolate 500 mg BID  Pantoprazole 40 mg daily  Prednisone 10 mg daily       Past Medical History  Past Medical History:   Diagnosis Date    Lupus (systemic lupus  erythematosus) (Multi)     Stroke (Multi)     Uveitis      Surgical History  Past Surgical History:   Procedure Laterality Date    CT ANGIO NECK  9/2/2021    CT NECK ANGIO W AND WO IV CONTRAST 9/2/2021 [facility]    CT HEAD ANGIO W AND WO IV CONTRAST  9/2/2021    CT HEAD ANGIO W AND WO IV CONTRAST 9/2/2021 [facility]     Social History  Social History     Tobacco Use    Smoking status: Every Day     Types: Cigars   Vaping Use    Vaping status: Unknown   Substance Use Topics    Alcohol use: Yes    Drug use: Yes     Types: Marijuana     Allergies  Patient has no known allergies.  Medications Prior to Admission   Medication Sig Dispense Refill Last Dose/Taking    predniSONE (Deltasone) 10 mg tablet Take 1 tablet (10 mg) by mouth once daily. 30 tablet 3 12/8/2024    acetaminophen (TylenoL) 325 mg tablet Take 2 tablets (650 mg) by mouth every 6 hours if needed for mild pain (1 - 3) or fever (temp greater than 38.0 C). (Patient not taking: Reported on 11/14/2024) 30 tablet 0     aspirin 81 mg chewable tablet Chew 1 tablet (81 mg) once daily. (Patient not taking: Reported on 11/14/2024)       brimonidine (AlphaGAN P) 0.2 % ophthalmic solution Administer 1 drop into the right eye every 12 hours.       cyclobenzaprine (Flexeril) 10 mg tablet Take 1 tablet (10 mg) by mouth 3 times a day as needed for muscle spasms. 90 tablet 0     ergocalciferol (Vitamin D-2) 1.25 MG (30400 UT) capsule Take 1 capsule (1,250 mcg) by mouth 1 (one) time per week.       gabapentin (Neurontin) 300 mg capsule Take 1 capsule (300 mg) by mouth every 8 hours. 90 capsule 1     ibuprofen 600 mg tablet Take 1 tablet (600 mg) by mouth every 8 hours if needed for mild pain (1 - 3) or fever (temp greater than 38.0 C). 30 tablet 0     lidocaine 4 % patch Place 2 patches over 12 hours on the skin once daily. Remove & discard patch within 12 hours or as directed by MD. (Patient not taking: Reported on 11/14/2024) 28 patch 0      "mycophenolate (Cellcept) 500 mg tablet Take 1 tablet (500 mg) by mouth 2 times a day. 60 tablet 3     pantoprazole (ProtoNix) 40 mg EC tablet Take 1 tablet (40 mg) by mouth once daily.       prednisoLONE acetate (Pred-Forte) 1 % ophthalmic suspension Administer 1 drop into the right eye 4 times a day. 15 mL 1        Review of Systems  Neurological Exam  Physical Exam  In no acute distress  Sleeping comfortably in bed  Patient is arousable from sleep and opening eyes spontaneously  Tracking examiner  Upon initial evaluation at night was refusing to cooperate, answer questions or follow commands to participate in further physical examination    6am exam:  Patient asleep, wakes readily but minimally attends to interview.  Reports pain shooting down legs and bowel/urinary incontinence  Sensation intact to light touch, vibration, and temperature in BLE  Reflexes 2+ at achilles  At least 4/5 BLE    Last Recorded Vitals  Blood pressure 123/75, pulse 82, temperature 36.3 °C (97.3 °F), temperature source Temporal, resp. rate 16, height 1.753 m (5' 9\"), weight 58.1 kg (128 lb), SpO2 98%.    Relevant Results    I have personally reviewed the following imaging results ECG 12 lead    Result Date: 12/8/2024  Sinus tachycardia Biatrial enlargement Abnormal ECG When compared with ECG of 28-AUG-2024 16:16, Vent. rate has increased BY  50 BPM See ED provider note for full interpretation and clinical correlation Confirmed by Mary Torres ([id number]) on 12/8/2024 9:01:26 PM    XR hips bilateral 2 VW w pelvis when performed    Result Date: 12/8/2024  Interpreted By:  Silverio Solis, STUDY: XR HIPS BILATERAL 2 VW WITH PELVIS WHEN PERFORMED; ;  12/8/2024 3:33 pm   INDICATION: Signs/Symptoms:pain with fx of avascular necrosis.     COMPARISON: 08/20/2024.   ACCESSION NUMBER(S): [id number]   ORDERING CLINICIAN: MOHIT HO   FINDINGS: AP view of the pelvis and two views of the bilateral hips   Similar-appearing sclerotic changes of the " bilateral femoral heads with articular surface collapse, right slightly worsened left. Severe bilateral hip osteoarthrosis with joint space loss. Partially visualized intramedullary nail of the left femur. Soft tissues are within normal limits. No acute fracture. Joint alignment is maintained.       1. Similar-appearing osteonecrosis of the bilateral femoral heads with articular surface collapse and advanced osteoarthrosis, right slightly worse than left.       MACRO: None   Signed by: Silverio Solis 12/8/2024 3:58 PM Dictation workstation:   [id number]  .      Assessment/Plan   Assessment & Plan      Juan Marsh is a 29 y.o. male with a PMHx of Lupus and Behcet' syndrome presenting with bilateral leg pain and weakness. Our exam is limited by patient declining at this time, will repeat in AM    His prior episodes concerning for neuro behcet's did not have similar presentations, and without clear evidence of worsening disease we will maintain current prednisone and MMF dosing. If patient shows signs of transverse myelitis or other neurologic deficit in his legs we may consider additional workup, however we are limited at this time without an exam and patient's symptoms are nonspecific. Recommend continued workup of alternative causes of leg pain, at primary team discretion    He has not had symptoms in his bilateral legs previously, and this would most likely localize to his thoracic or lumbar cord, so an MRI of his T spine w and without contrast may be warranted, pending exam    Recommendations:  - Pending further interview and examination (patient declined interview in middle of the night)  - c/w home prednisone 10  - c/w home mycophenolate 500mg BID    Antonio Lieberman  MS3      Senior Resident addendum:  I made significant contributions to the note as written above, reviewed pertinent imaging and records, and discussed the patient with the medical student. Limited by exam at this time, will re-evaluate if  patient willing in the early morning, otherwise will defer to day team examination.    6am examination:  Patient allowed limited exam and provided minimal histoyr of current leg pain. Only answering yes or no to prompts, tucking himself under sheets, annoyed. Laying prone in bed.    Exam still limited but no overt signs of significant neurologic compromise of BLE. Notified day team.    Aron Duarte MD  PGY4 neurology    Pending staffing with attending Dr Posadas in AM        ======================  Post-Staffing Updates  Patient's case presented by night float. Rounded on patient with the attending neurologist. Patient reports ill-defined pain in the entirety of the lower extremities. Agrees to an exam at this time, which is as below:    NEUROLOGICAL:   Cognitive Status Exam:  Orientation: alert and oriented to person, place, time, and situation  Language: expression, naming, repetition, and comprehension intact             Information/Knowledge: can correctly state current & past events  Concentration: can remain focused during interview    Cranial Nerves:  II: Pupils- PERRL (3->2) briskly bilaterally  III, IV, VI: eyes aligned in primary position w/o fixation instability; EOM full-range with smooth pursuits and no gaze-evoked nystagmus; saccades accurate, conjugate, and rapid; intact vergence  V: intact to LT; strong mandibular opening  VII: intact facial strength and symmetry; nasolabial folds symmetric; no facial droop  VIII: intact hearing to conversation  IX and X: clear speech; no dysarthria; symmetric palatal rise  XI: SCM and trapezius have 5/5 strength  XII: midline tongue position at rest and intact movement, bulk, and strength    Motor:   Bulk: Normal    Tone: Normal  Tremor: Absent  Adventitious Movements: Absent  Pronator Drift: Absent     Strength:    R L  Deltoid  5 5  Biceps  5 5  Triceps  5 5    5 5  FDI  5 5  ADM  5 5  Iliopsoas  5 5  Glut  Max 5 5  Quadriceps 5 5  Hamstrings 5 5  TA  5 5  GSC  5 5  EHL  5 5    Reflexes:    R L  Biceps  3+ 3+  Triceps  3+ 3+  Brachioradialis 3+ 3+  Patellar  3-4 3+  Achilles 3-4 3+                                 Toes: flexor plantar bilaterally  Major's: absent  Ankle Clonus: present on right, absent on left    Sensory:   intact and symmetric to pinprick and proprioceptive sense on both the upper and lower extremities, with attention to each individual dermatome on the lower extremities.    Coordination:   Finger-to-Nose: no ataxia; no intention or action tremor; normokinetic; no dysmetria or overshoot  Heel-to-Shin: no ataxia; no intention or action tremor; normokinetic    Rapid Movements:  deferred    Gait/Stance:   Straightaway gait is antalgic over the LLE with decreased step width (significantly less than pelvic girth) and reduced stride length (< 40% of height). The walk is steady w/o scissoring, shuffling, foot drop, steppage, circumduction, or ataxic movements.    Add'l Maneuvers:   Romberg is negative       ==  No exam evidence at this time to imply or even suggest a spinal manifestation of Neuro-Behcet's. No further neurologic workup indicated at this time.    Elder Joyce MD  Neurology Resident PGY-4  [facility] Inpatient Neurology Team  General Neurology Pager [phone]

## 2024-12-09 NOTE — H&P
MEDICINE ADMISSION NOTE    History of Present Illness  Juan Marsh is a 29 y.o. 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    According to the patient, within the past 3-4 days, he has had 10/10 bilateral leg pain, radiating down to his toes, with no relieving or aggravating factor. He has not used any pain medications but says the pain has prevented him from carrying out his usual activities including going to work. He endorses associated bilateral leg weakness but denies any history of preceding trauma or fall. He says this is similar to what he has experienced in the past but feels this time, pain and weakness is more. He denies any headache, dizziness, blurry vision, chest pain, SOB, fever, chills, n/v, abd pain, change in dietary or bowel habit or lower extremity swelling. He is not complaint with his medications. He last took his steroid (prednisone) on Wednesday (12/4/24). He said he ran out of the medication.    Of note, patient was recently discharged 8/2024 following complaints of worsening right sided weakness concerning for Behcets disease. Neurology was consulted and recommended Solumedrol 1g daily x 3-5 days. He had a brain MRI which showed significantly more extensive T2/FLAIR signal abnormality in and surrounding the left thalamus, involving multiple surrounding structures and associated with mass effect including 5 mm of left-to-right midline (septal) shift. He was subsequently discharged on prednisone taper with plan to follow up with neurology OP    His case was presented by Dr. Anthony Lepe at the CNS tumor board 11/27 and it was concluded that patient should continue treatment with prednisone and MR surveillance.     In the ED  Vitals:  T 37,  , /67, RR 16, SpO2 100 on RA    Labs:  WBC 10.8, Hgb 13.6, plt 393  Na 138, K 3.7, Cl 99, HCO3 26, BUN 33, Cr 1.01, glu 105  Ca 10.3, tprot 8.5, alb 4.8, alkphos 82, AST 12, ALT 9, tbili 0.6  CRP 11.75  Retic absolute 0.033, Retic %: 0.8, Retic HGB 32    Imaging   Bilateral Hip Xray 12/8  -Similar-appearing osteonecrosis of the bilateral femoral heads with articular surface collapse and advanced osteoarthrosis, right  slightly worse than left.    Intervention in the ED  -IV Morphine 4 mg once    PMH: As above  SocHx: He continuee to smoke 1-2 sticks of miles daily (smoking for over 15 years). He also smokes 1-2 blunts of marijuana a day. He denies use of other drugs or alcohol. He lives with his mum and independent with his ADL but occasionally needs help.    Home Medications   Tylenol   Aspirin 81mg  Cyclobenzaprine 10 mg TID  Gabapentine 300 mg q8  Mycophenolate 500 mg BID  Pantoprazole 40 mg daily  Prednisone 10 mg daily     Physical Exam   Constitutional: Alert, in no acute distress, cooperative  HEENT: Normocephalic, atraumatic, PERRLA, EOMI, moist mucous membranes, no pharyngeal erythema  Cardiovascular: RRR, normal S1/S2, no murmurs noted  Pulmonary: Clear to auscultation b/l, no wheezes/crackles/rhonchi, no increased work of breathing, no supplemental oxygen  GI: Soft, non-tender, non-distended, normoactive bowel sounds  : No charles catheter  Lower extremities: Warm and well perfused, no lower extremity edema, bilateral hip tenderness (chronic)  Neuro: A&O x3, able to move all 4 extremities spontaneously, power 3-4/5 on BLE and 4/5 for BUE  Psych: Depressed mood and affect     Assessment/Plan   29 y.o. 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 Similar complaint and presentation in the past. Tachycardic otherwise HDS on arrival in the ED. Labs with elevated inflammatory marker (CRP 11.75), no leukocytosis. Bilateral Hip Xray with osteonecrosis of the bilateral femoral heads with articular surface collapse and advanced osteoarthrosis (chronic). No obvious effusion noted on exam. Etiology of patients symptoms unclear at this time how possible differential at this time include Lupus/Bahcet flare vs spinal compression. Less concern for septic arthritis at this time. Will resume home prednisone and consult neurology in the AM.    #Bilateral leg pain and weakness  #Possible Lupus / Neuro-Bahcets Disease Flare  -Ddx: Lupus/Bahcet flare vs spinal compression. Less concern for septic arthritis  -s/p IV Morphine 4mg once  -c/o bilateral leg pain and weakness. Similar complaint in the past  -Non compliance with meds. Last took prednisone 12/4  -Recently presented at CNS tumor board 11/27 by Dr. Anthony Lepe, with plan to continue treatment with prednisone and MR surveillance.   Plan  -Resume prednisone 10 mg daily   -Continue Mycophenolate 500 mg BID  -Continue pantoprazole 40 mg daily   -Tylenol 650 mg q6 PRN  -Consult Neurology in the morning  -PT/OT consult     #Arthritis  #Chronic avascular necrosis of right femoral head  -Denies history of Sickle cell disease  -Bilateral Hip Xray 12/8: similar-appearing osteonecrosis of the bilateral femoral heads with articular surface collapse and advanced osteoarthrosis, right slightly worse than left.  Plan  -Continue home Cyclobenzaprine 10 mg TID PRN  -Continue home Gabapentine 300 mg q8    #Prior CVA  -residual right sided weakness and slurred speech  -continue ASA 81mg daily     #ETOH abuse history  #Tobacco use  -Denies ETOH use  -check serum ETOH and drug screen    #Bilateral uveitis   #Retinal vasculitis  -Continue home eye drops    F : PRN  E: PRN  N: Regular   A: PIV    DVT prophylaxis:  Lovenox subq    Code Status: Full Code (confirmed on admission)   NOK: Extended Emergency Contact Information  Primary Emergency Contact: Nayla Marsh  Home Phone: [phone]  Relation: Parent (Mum)    Oleksandr Delacruz MD MPH
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
## 2024-12-10 ENCOUNTER — HOME HEALTH ADMISSION (OUTPATIENT)
Dept: HOME HEALTH SERVICES | Facility: HOME HEALTH | Age: 29
End: 2024-12-10
Payer: COMMERCIAL

## 2024-12-10 VITALS
HEIGHT: 69 IN | DIASTOLIC BLOOD PRESSURE: 73 MMHG | TEMPERATURE: 97.3 F | BODY MASS INDEX: 18.96 KG/M2 | WEIGHT: 128 LBS | OXYGEN SATURATION: 99 % | SYSTOLIC BLOOD PRESSURE: 124 MMHG | HEART RATE: 77 BPM | RESPIRATION RATE: 16 BRPM

## 2024-12-10 LAB
ALBUMIN SERPL BCP-MCNC: 4 G/DL (ref 3.4–5)
ALP SERPL-CCNC: 69 U/L (ref 33–120)
ALT SERPL W P-5'-P-CCNC: 10 U/L (ref 10–52)
ANION GAP SERPL CALC-SCNC: 14 MMOL/L (ref 10–20)
AST SERPL W P-5'-P-CCNC: 18 U/L (ref 9–39)
BASOPHILS # BLD AUTO: 0.03 X10*3/UL (ref 0–0.1)
BASOPHILS NFR BLD AUTO: 0.3 %
BILIRUB SERPL-MCNC: 0.5 MG/DL (ref 0–1.2)
BUN SERPL-MCNC: 17 MG/DL (ref 6–23)
CALCIUM SERPL-MCNC: 9.6 MG/DL (ref 8.6–10.6)
CHLORIDE SERPL-SCNC: 103 MMOL/L (ref 98–107)
CO2 SERPL-SCNC: 24 MMOL/L (ref 21–32)
CREAT SERPL-MCNC: 1.05 MG/DL (ref 0.5–1.3)
EGFRCR SERPLBLD CKD-EPI 2021: >90 ML/MIN/1.73M*2
EOSINOPHIL # BLD AUTO: 0.05 X10*3/UL (ref 0–0.7)
EOSINOPHIL NFR BLD AUTO: 0.5 %
ERYTHROCYTE [DISTWIDTH] IN BLOOD BY AUTOMATED COUNT: 14.6 % (ref 11.5–14.5)
GLUCOSE SERPL-MCNC: 89 MG/DL (ref 74–99)
HCT VFR BLD AUTO: 34.9 % (ref 41–52)
HGB BLD-MCNC: 11.2 G/DL (ref 13.5–17.5)
IMM GRANULOCYTES # BLD AUTO: 0.04 X10*3/UL (ref 0–0.7)
IMM GRANULOCYTES NFR BLD AUTO: 0.4 % (ref 0–0.9)
LYMPHOCYTES # BLD AUTO: 3.16 X10*3/UL (ref 1.2–4.8)
LYMPHOCYTES NFR BLD AUTO: 34.4 %
MAGNESIUM SERPL-MCNC: 2.59 MG/DL (ref 1.6–2.4)
MCH RBC QN AUTO: 30.5 PG (ref 26–34)
MCHC RBC AUTO-ENTMCNC: 32.1 G/DL (ref 32–36)
MCV RBC AUTO: 95 FL (ref 80–100)
MONOCYTES # BLD AUTO: 0.71 X10*3/UL (ref 0.1–1)
MONOCYTES NFR BLD AUTO: 7.7 %
NEUTROPHILS # BLD AUTO: 5.19 X10*3/UL (ref 1.2–7.7)
NEUTROPHILS NFR BLD AUTO: 56.7 %
NRBC BLD-RTO: 0 /100 WBCS (ref 0–0)
PLATELET # BLD AUTO: 328 X10*3/UL (ref 150–450)
POTASSIUM SERPL-SCNC: 4 MMOL/L (ref 3.5–5.3)
PROT SERPL-MCNC: 7 G/DL (ref 6.4–8.2)
RBC # BLD AUTO: 3.67 X10*6/UL (ref 4.5–5.9)
SODIUM SERPL-SCNC: 137 MMOL/L (ref 136–145)
WBC # BLD AUTO: 9.2 X10*3/UL (ref 4.4–11.3)

## 2024-12-10 PROCEDURE — 2500000004 HC RX 250 GENERAL PHARMACY W/ HCPCS (ALT 636 FOR OP/ED)

## 2024-12-10 PROCEDURE — 2500000001 HC RX 250 WO HCPCS SELF ADMINISTERED DRUGS (ALT 637 FOR MEDICARE OP)

## 2024-12-10 PROCEDURE — 36415 COLL VENOUS BLD VENIPUNCTURE: CPT

## 2024-12-10 PROCEDURE — 83735 ASSAY OF MAGNESIUM: CPT

## 2024-12-10 PROCEDURE — G0378 HOSPITAL OBSERVATION PER HR: HCPCS

## 2024-12-10 PROCEDURE — 2500000004 HC RX 250 GENERAL PHARMACY W/ HCPCS (ALT 636 FOR OP/ED): Performed by: STUDENT IN AN ORGANIZED HEALTH CARE EDUCATION/TRAINING PROGRAM

## 2024-12-10 PROCEDURE — 85025 COMPLETE CBC W/AUTO DIFF WBC: CPT

## 2024-12-10 PROCEDURE — RXMED WILLOW AMBULATORY MEDICATION CHARGE

## 2024-12-10 PROCEDURE — 99233 SBSQ HOSP IP/OBS HIGH 50: CPT | Performed by: STUDENT IN AN ORGANIZED HEALTH CARE EDUCATION/TRAINING PROGRAM

## 2024-12-10 PROCEDURE — 80053 COMPREHEN METABOLIC PANEL: CPT

## 2024-12-10 PROCEDURE — 1100000001 HC PRIVATE ROOM DAILY

## 2024-12-10 RX ORDER — ACETAMINOPHEN 325 MG/1
650 TABLET ORAL EVERY 6 HOURS PRN
Status: DISCONTINUED | OUTPATIENT
Start: 2024-12-10 | End: 2024-12-11 | Stop reason: HOSPADM

## 2024-12-10 RX ORDER — BRIMONIDINE TARTRATE 2 MG/ML
1 SOLUTION/ DROPS OPHTHALMIC EVERY 12 HOURS
Qty: 15 ML | Refills: 0 | Status: SHIPPED | OUTPATIENT
Start: 2024-12-10

## 2024-12-10 RX ORDER — MYCOPHENOLATE MOFETIL 500 MG/1
500 TABLET ORAL 2 TIMES DAILY
Qty: 60 TABLET | Refills: 0 | Status: SHIPPED | OUTPATIENT
Start: 2024-12-10 | End: 2025-01-10

## 2024-12-10 RX ORDER — KETOROLAC TROMETHAMINE 30 MG/ML
30 INJECTION, SOLUTION INTRAMUSCULAR; INTRAVENOUS EVERY 6 HOURS PRN
Status: DISCONTINUED | OUTPATIENT
Start: 2024-12-10 | End: 2024-12-11 | Stop reason: HOSPADM

## 2024-12-10 RX ORDER — NAPROXEN SODIUM 220 MG/1
81 TABLET, FILM COATED ORAL DAILY
Qty: 30 TABLET | Refills: 0 | Status: SHIPPED | OUTPATIENT
Start: 2024-12-10 | End: 2025-01-10

## 2024-12-10 RX ORDER — TRAMADOL HYDROCHLORIDE 50 MG/1
50 TABLET ORAL EVERY 6 HOURS PRN
Status: DISCONTINUED | OUTPATIENT
Start: 2024-12-10 | End: 2024-12-10

## 2024-12-10 RX ORDER — ACETAMINOPHEN 325 MG/1
650 TABLET ORAL EVERY 6 HOURS PRN
Qty: 20 TABLET | Refills: 0 | Status: SHIPPED | OUTPATIENT
Start: 2024-12-10 | End: 2024-12-11

## 2024-12-10 RX ORDER — PREDNISONE 10 MG/1
10 TABLET ORAL DAILY
Qty: 21 TABLET | Refills: 1 | Status: SHIPPED | OUTPATIENT
Start: 2024-12-10 | End: 2025-01-24

## 2024-12-10 RX ORDER — PANTOPRAZOLE SODIUM 40 MG/1
40 TABLET, DELAYED RELEASE ORAL DAILY
Qty: 30 TABLET | Refills: 0 | Status: SHIPPED | OUTPATIENT
Start: 2024-12-10 | End: 2025-01-10

## 2024-12-10 RX ORDER — CYCLOBENZAPRINE HCL 10 MG
10 TABLET ORAL 3 TIMES DAILY PRN
Qty: 20 TABLET | Refills: 0 | Status: SHIPPED | OUTPATIENT
Start: 2024-12-10 | End: 2024-12-24 | Stop reason: SDUPTHER

## 2024-12-10 RX ORDER — PREDNISOLONE ACETATE 10 MG/ML
1 SUSPENSION/ DROPS OPHTHALMIC 4 TIMES DAILY
Qty: 15 ML | Refills: 1 | Status: SHIPPED | OUTPATIENT
Start: 2024-12-10

## 2024-12-10 RX ORDER — GABAPENTIN 300 MG/1
300 CAPSULE ORAL EVERY 8 HOURS SCHEDULED
Qty: 90 CAPSULE | Refills: 0 | Status: SHIPPED | OUTPATIENT
Start: 2024-12-10 | End: 2025-01-10

## 2024-12-10 RX ADMIN — MYCOPHENOLATE MOFETIL 500 MG: 500 TABLET, FILM COATED ORAL at 09:22

## 2024-12-10 RX ADMIN — GABAPENTIN 300 MG: 300 CAPSULE ORAL at 05:53

## 2024-12-10 RX ADMIN — BRIMONIDINE TARTRATE 1 DROP: 2 SOLUTION/ DROPS OPHTHALMIC at 09:24

## 2024-12-10 RX ADMIN — ACETAMINOPHEN 650 MG: 325 TABLET, FILM COATED ORAL at 05:53

## 2024-12-10 RX ADMIN — BRIMONIDINE TARTRATE 1 DROP: 2 SOLUTION/ DROPS OPHTHALMIC at 20:49

## 2024-12-10 RX ADMIN — GABAPENTIN 300 MG: 300 CAPSULE ORAL at 14:26

## 2024-12-10 RX ADMIN — PREDNISOLONE ACETATE 1 DROP: 10 SUSPENSION/ DROPS OPHTHALMIC at 14:26

## 2024-12-10 RX ADMIN — PREDNISONE 10 MG: 10 TABLET ORAL at 09:22

## 2024-12-10 RX ADMIN — MYCOPHENOLATE MOFETIL 500 MG: 500 TABLET, FILM COATED ORAL at 20:49

## 2024-12-10 RX ADMIN — PANTOPRAZOLE SODIUM 40 MG: 40 TABLET, DELAYED RELEASE ORAL at 09:22

## 2024-12-10 RX ADMIN — PREDNISOLONE ACETATE 1 DROP: 10 SUSPENSION/ DROPS OPHTHALMIC at 20:49

## 2024-12-10 RX ADMIN — POLYETHYLENE GLYCOL 3350 17 G: 17 POWDER, FOR SOLUTION ORAL at 09:23

## 2024-12-10 RX ADMIN — PREDNISOLONE ACETATE 1 DROP: 10 SUSPENSION/ DROPS OPHTHALMIC at 16:56

## 2024-12-10 RX ADMIN — PREDNISOLONE ACETATE 1 DROP: 10 SUSPENSION/ DROPS OPHTHALMIC at 06:00

## 2024-12-10 RX ADMIN — KETOROLAC TROMETHAMINE 30 MG: 30 INJECTION, SOLUTION INTRAMUSCULAR; INTRAVENOUS at 20:48

## 2024-12-10 RX ADMIN — ACETAMINOPHEN 650 MG: 325 TABLET, FILM COATED ORAL at 14:30

## 2024-12-10 RX ADMIN — GABAPENTIN 300 MG: 300 CAPSULE ORAL at 22:19

## 2024-12-10 RX ADMIN — ASPIRIN 81 MG 81 MG: 81 TABLET ORAL at 09:22

## 2024-12-10 ASSESSMENT — COGNITIVE AND FUNCTIONAL STATUS - GENERAL
DRESSING REGULAR LOWER BODY CLOTHING: A LITTLE
MOBILITY SCORE: 20
DAILY ACTIVITIY SCORE: 23
CLIMB 3 TO 5 STEPS WITH RAILING: A LOT
WALKING IN HOSPITAL ROOM: A LITTLE
STANDING UP FROM CHAIR USING ARMS: A LITTLE

## 2024-12-10 ASSESSMENT — PAIN SCALES - GENERAL
PAINLEVEL_OUTOF10: 9
PAINLEVEL_OUTOF10: 0 - NO PAIN
PAINLEVEL_OUTOF10: 6

## 2024-12-10 ASSESSMENT — PAIN - FUNCTIONAL ASSESSMENT: PAIN_FUNCTIONAL_ASSESSMENT: 0-10

## 2024-12-10 NOTE — CARE PLAN
Problem: Pain - Adult  Goal: Verbalizes/displays adequate comfort level or baseline comfort level  Outcome: Progressing   The patient's goals for the shift include      The clinical goals for the shift include pain control

## 2024-12-10 NOTE — PROGRESS NOTES
12/09/24 1100   Discharge Planning   Living Arrangements Family members   Support Systems Family members   Assistance Needed no   Type of Residence Private residence   Number of Stairs to Enter Residence 24   Number of Stairs Within Residence 0   Do you have animals or pets at home? No   Who is requesting discharge planning? Provider   Home or Post Acute Services None   Expected Discharge Disposition Home   Does the patient need discharge transport arranged? No   Financial Resource Strain   How hard is it for you to pay for the very basics like food, housing, medical care, and heating? Somewhat   Housing Stability   In the last 12 months, was there a time when you were not able to pay the mortgage or rent on time? N   In the past 12 months, how many times have you moved where you were living? 0   At any time in the past 12 months, were you homeless or living in a shelter (including now)? N   Transportation Needs   In the past 12 months, has lack of transportation kept you from medical appointments or from getting medications? no   In the past 12 months, has lack of transportation kept you from meetings, work, or from getting things needed for daily living? No   Patient Choice   Patient / Family choosing to utilize agency / facility established prior to hospitalization No   Stroke Family Assessment   Stroke Family Assessment Needed No   Intensity of Service   Intensity of Service 0-30 min     Assessment Note:  Met with patient and Introduced myself as care coordinator and member of the Care Transitions team for discharge planning. Patient lives at home with his mom.  Transportation: Patients mom will take him home at time of discharge.  Pharmacy: [facility] on [facility]  DME: Has Crutches at home.  Previous home care: No  Falls: No  PCP: Eva Glez MD- patients mom said she will make his follow up appointment with his PCP did not need me to do it.  Dialysis: No  Oxygen:  No    Confirmed patients address, phone number and emergency contact. All questions and concerns addressed. Will continue to follow for discharge needs.      Transitional Care Coordination Progress Note:  Patient discussed during interdisciplinary rounds.   Team members present: MD and TCC  Plan per Medical/Surgical team: Patient would like to be a little stronger and have pain under control before discharge.  Payor: [facility]  Discharge disposition: Home with [facility] for PT/OT. SOC 12/13/24.  Potential Barriers: None  ADOD: 12/11/24

## 2024-12-11 ENCOUNTER — DOCUMENTATION (OUTPATIENT)
Dept: HOME HEALTH SERVICES | Facility: HOME HEALTH | Age: 29
End: 2024-12-11
Payer: COMMERCIAL

## 2024-12-11 ENCOUNTER — PHARMACY VISIT (OUTPATIENT)
Dept: PHARMACY | Facility: CLINIC | Age: 29
End: 2024-12-11
Payer: MEDICAID

## 2024-12-11 LAB
ALBUMIN SERPL BCP-MCNC: 3.9 G/DL (ref 3.4–5)
ALP SERPL-CCNC: 66 U/L (ref 33–120)
ALT SERPL W P-5'-P-CCNC: 15 U/L (ref 10–52)
ANION GAP SERPL CALC-SCNC: 12 MMOL/L (ref 10–20)
AST SERPL W P-5'-P-CCNC: 18 U/L (ref 9–39)
BASOPHILS # BLD AUTO: 0.02 X10*3/UL (ref 0–0.1)
BASOPHILS NFR BLD AUTO: 0.2 %
BILIRUB SERPL-MCNC: 0.4 MG/DL (ref 0–1.2)
BUN SERPL-MCNC: 27 MG/DL (ref 6–23)
CALCIUM SERPL-MCNC: 9.6 MG/DL (ref 8.6–10.6)
CHLORIDE SERPL-SCNC: 102 MMOL/L (ref 98–107)
CO2 SERPL-SCNC: 26 MMOL/L (ref 21–32)
CREAT SERPL-MCNC: 1.29 MG/DL (ref 0.5–1.3)
EGFRCR SERPLBLD CKD-EPI 2021: 78 ML/MIN/1.73M*2
EOSINOPHIL # BLD AUTO: 0.06 X10*3/UL (ref 0–0.7)
EOSINOPHIL NFR BLD AUTO: 0.6 %
ERYTHROCYTE [DISTWIDTH] IN BLOOD BY AUTOMATED COUNT: 14.6 % (ref 11.5–14.5)
GLUCOSE SERPL-MCNC: 94 MG/DL (ref 74–99)
HCT VFR BLD AUTO: 31.9 % (ref 41–52)
HGB BLD-MCNC: 10.5 G/DL (ref 13.5–17.5)
IMM GRANULOCYTES # BLD AUTO: 0.03 X10*3/UL (ref 0–0.7)
IMM GRANULOCYTES NFR BLD AUTO: 0.3 % (ref 0–0.9)
LYMPHOCYTES # BLD AUTO: 3.46 X10*3/UL (ref 1.2–4.8)
LYMPHOCYTES NFR BLD AUTO: 33.2 %
MCH RBC QN AUTO: 30.9 PG (ref 26–34)
MCHC RBC AUTO-ENTMCNC: 32.9 G/DL (ref 32–36)
MCV RBC AUTO: 94 FL (ref 80–100)
MONOCYTES # BLD AUTO: 0.73 X10*3/UL (ref 0.1–1)
MONOCYTES NFR BLD AUTO: 7 %
NEUTROPHILS # BLD AUTO: 6.11 X10*3/UL (ref 1.2–7.7)
NEUTROPHILS NFR BLD AUTO: 58.7 %
NRBC BLD-RTO: 0 /100 WBCS (ref 0–0)
PLATELET # BLD AUTO: 353 X10*3/UL (ref 150–450)
POTASSIUM SERPL-SCNC: 4.1 MMOL/L (ref 3.5–5.3)
PROT SERPL-MCNC: 6.7 G/DL (ref 6.4–8.2)
RBC # BLD AUTO: 3.4 X10*6/UL (ref 4.5–5.9)
SODIUM SERPL-SCNC: 136 MMOL/L (ref 136–145)
WBC # BLD AUTO: 10.4 X10*3/UL (ref 4.4–11.3)

## 2024-12-11 PROCEDURE — 2500000004 HC RX 250 GENERAL PHARMACY W/ HCPCS (ALT 636 FOR OP/ED)

## 2024-12-11 PROCEDURE — 97110 THERAPEUTIC EXERCISES: CPT | Mod: GP,CQ

## 2024-12-11 PROCEDURE — G0378 HOSPITAL OBSERVATION PER HR: HCPCS

## 2024-12-11 PROCEDURE — 97116 GAIT TRAINING THERAPY: CPT | Mod: GP,CQ

## 2024-12-11 PROCEDURE — 2500000001 HC RX 250 WO HCPCS SELF ADMINISTERED DRUGS (ALT 637 FOR MEDICARE OP)

## 2024-12-11 PROCEDURE — 85025 COMPLETE CBC W/AUTO DIFF WBC: CPT

## 2024-12-11 PROCEDURE — 97530 THERAPEUTIC ACTIVITIES: CPT | Mod: GO

## 2024-12-11 PROCEDURE — 36415 COLL VENOUS BLD VENIPUNCTURE: CPT

## 2024-12-11 PROCEDURE — RXMED WILLOW AMBULATORY MEDICATION CHARGE

## 2024-12-11 PROCEDURE — 2500000004 HC RX 250 GENERAL PHARMACY W/ HCPCS (ALT 636 FOR OP/ED): Performed by: STUDENT IN AN ORGANIZED HEALTH CARE EDUCATION/TRAINING PROGRAM

## 2024-12-11 PROCEDURE — 80053 COMPREHEN METABOLIC PANEL: CPT

## 2024-12-11 RX ORDER — ACETAMINOPHEN 325 MG/1
650 TABLET ORAL EVERY 6 HOURS PRN
Qty: 20 TABLET | Refills: 0 | Status: SHIPPED | OUTPATIENT
Start: 2024-12-11 | End: 2024-12-24 | Stop reason: SDUPTHER

## 2024-12-11 RX ORDER — KETOROLAC TROMETHAMINE 10 MG/1
10 TABLET, FILM COATED ORAL EVERY 6 HOURS PRN
Qty: 20 TABLET | Refills: 0 | Status: SHIPPED | OUTPATIENT
Start: 2024-12-11 | End: 2024-12-24 | Stop reason: ALTCHOICE

## 2024-12-11 RX ADMIN — BRIMONIDINE TARTRATE 1 DROP: 2 SOLUTION/ DROPS OPHTHALMIC at 10:46

## 2024-12-11 RX ADMIN — GABAPENTIN 300 MG: 300 CAPSULE ORAL at 06:11

## 2024-12-11 RX ADMIN — PANTOPRAZOLE SODIUM 40 MG: 40 TABLET, DELAYED RELEASE ORAL at 10:47

## 2024-12-11 RX ADMIN — KETOROLAC TROMETHAMINE 30 MG: 30 INJECTION, SOLUTION INTRAMUSCULAR; INTRAVENOUS at 10:46

## 2024-12-11 RX ADMIN — PREDNISOLONE ACETATE 1 DROP: 10 SUSPENSION/ DROPS OPHTHALMIC at 06:11

## 2024-12-11 RX ADMIN — MYCOPHENOLATE MOFETIL 500 MG: 500 TABLET, FILM COATED ORAL at 10:47

## 2024-12-11 RX ADMIN — PREDNISONE 10 MG: 10 TABLET ORAL at 10:47

## 2024-12-11 RX ADMIN — ASPIRIN 81 MG 81 MG: 81 TABLET ORAL at 10:46

## 2024-12-11 ASSESSMENT — COGNITIVE AND FUNCTIONAL STATUS - GENERAL
CLIMB 3 TO 5 STEPS WITH RAILING: A LITTLE
MOBILITY SCORE: 23
DAILY ACTIVITIY SCORE: 23
DRESSING REGULAR UPPER BODY CLOTHING: A LITTLE
HELP NEEDED FOR BATHING: A LITTLE
HELP NEEDED FOR BATHING: A LITTLE
DAILY ACTIVITIY SCORE: 21
DRESSING REGULAR LOWER BODY CLOTHING: A LITTLE

## 2024-12-11 ASSESSMENT — PAIN SCALES - GENERAL: PAINLEVEL_OUTOF10: 8

## 2024-12-11 NOTE — DISCHARGE SUMMARY
Discharge Diagnosis  Lupus (systemic lupus erythematosus) (Multi)    Issues Requiring Follow-Up  PCP    Discharge Meds     Medication List      START taking these medications     ketorolac 10 mg tablet; Commonly known as: Toradol; Take 1 tablet (10   mg) by mouth every 6 hours if needed for moderate pain (4 - 6) or severe   pain (7 - 10).     CONTINUE taking these medications     acetaminophen 325 mg tablet; Commonly known as: TylenoL; Take 2 tablets   (650 mg) by mouth every 6 hours if needed for mild pain (1 - 3) or fever   (temp greater than 38.0 C).   aspirin 81 mg chewable tablet; Chew 1 tablet (81 mg) once daily.   brimonidine 0.2 % ophthalmic solution; Commonly known as: AlphaGAN;   Administer 1 drop into the right eye every 12 hours.   cyclobenzaprine 10 mg tablet; Commonly known as: Flexeril; Take 1 tablet   (10 mg) by mouth 3 times a day as needed for muscle spasms.   ergocalciferol 1.25 MG (47258 UT) capsule; Commonly known as: Vitamin   D-2   gabapentin 300 mg capsule; Commonly known as: Neurontin; Take 1 capsule   (300 mg) by mouth every 8 hours.   mycophenolate 500 mg tablet; Commonly known as: Cellcept; Take 1 tablet   (500 mg) by mouth 2 times a day.   pantoprazole 40 mg EC tablet; Commonly known as: ProtoNix; Take 1 tablet   (40 mg) by mouth once daily.   prednisoLONE acetate 1 % ophthalmic suspension; Commonly known as:   Pred-Forte; Administer 1 drop into the right eye 4 times a day.   predniSONE 10 mg tablet; Commonly known as: Deltasone; Take 1 tablet (10   mg) by mouth once daily.     STOP taking these medications     ibuprofen 600 mg tablet   lidocaine 4 % patch       Test Results Pending At Discharge  Pending Labs       No current pending labs.            Hospital Course         29-year-old M presenting for chronic bilateral leg pain and difficulty ambulating.  Seen by neurology who is of the medical opinion that presentation is consistent with recovering neuro Behcet's with no evidence for  new flare. [place] here with bilateral lower extremity weakness and difficulty walking.  Neuro not concerned that this is active neuro Bechet but resolving flare.  He is okay to go home. Pain is controlled.       #Difficulty ambulating  #Bilateral lower extremity weakness  #AVN right femoral head  #Osteoarthritis  -No acute red flags noted upon evaluation  -Neurology following, appreciate recommendations  -Neuro Behcet's without active flare  -PT OT recommended low intensity  -Multimodal pain regimen  -Patient appropriate for discharge tomorrow      #SLE  #Neuro Behcet's  -Continue prednisone 10 mg daily  -Continue mycophenolate 500 mg twice daily     #History of CVA  -Continue home aspirin 81 mg daily         50 min     Pertinent Physical Exam At Time of Discharge  Physical Exam    Outpatient Follow-Up  Future Appointments   Date Time Provider Department Center   2/7/2025  2:00 PM [facility]   2/13/2025 12:00 PM Anthony Lepe MD [id number] Academic         Randi Sanchez MD

## 2024-12-11 NOTE — CARE PLAN
Problem: Pain - Adult  Goal: Verbalizes/displays adequate comfort level or baseline comfort level  Outcome: Progressing     Problem: Safety - Adult  Goal: Free from fall injury  Outcome: Progressing     Problem: Discharge Planning  Goal: Discharge to home or other facility with appropriate resources  Outcome: Progressing     Problem: Chronic Conditions and Co-morbidities  Goal: Patient's chronic conditions and co-morbidity symptoms are monitored and maintained or improved  Outcome: Progressing     Problem: Fall/Injury  Goal: Not fall by end of shift  Outcome: Progressing  Goal: Be free from injury by end of the shift  Outcome: Progressing  Goal: Verbalize understanding of personal risk factors for fall in the hospital  Outcome: Progressing  Goal: Verbalize understanding of risk factor reduction measures to prevent injury from fall in the home  Outcome: Progressing  Goal: Use assistive devices by end of the shift  Outcome: Progressing  Goal: Pace activities to prevent fatigue by end of the shift  Outcome: Progressing     Problem: Pain  Goal: Takes deep breaths with improved pain control throughout the shift  Outcome: Progressing  Goal: Turns in bed with improved pain control throughout the shift  Outcome: Progressing  Goal: Walks with improved pain control throughout the shift  Outcome: Progressing  Goal: Performs ADL's with improved pain control throughout shift  Outcome: Progressing  Goal: Participates in PT with improved pain control throughout the shift  Outcome: Progressing  Goal: Free from opioid side effects throughout the shift  Outcome: Progressing  Goal: Free from acute confusion related to pain meds throughout the shift  Outcome: Progressing   The patient's goals for the shift include      The clinical goals for the shift include pt will maintain safety, free from falls and injuries throughout this shift

## 2024-12-11 NOTE — PROGRESS NOTES
Physical Therapy                 Therapy Communication Note    Patient Name: Juan Marsh  MRN: [id number]  Department: [place] [address]  Room: 6076/6076-A  Today's Date: 12/11/2024     Discipline: Physical Therapy    Missed Visit Reason: Missed Visit Reason:  (Attempted follow up however pt. with OT at this time - Pt. received breakfast after therefore will return another time.)    Missed Time: Attempt    Comment:

## 2024-12-11 NOTE — CARE PLAN
0000: Pt refuses 12am VS.    The clinical goals for the shift include pt will maintain safety, free from falls and injuries throughout this shift    Over the shift, the patient did not make progress toward the following goals.       Problem: Pain - Adult  Goal: Verbalizes/displays adequate comfort level or baseline comfort level  Outcome: Progressing     Problem: Safety - Adult  Goal: Free from fall injury  Outcome: Progressing     Problem: Discharge Planning  Goal: Discharge to home or other facility with appropriate resources  Outcome: Progressing     Problem: Chronic Conditions and Co-morbidities  Goal: Patient's chronic conditions and co-morbidity symptoms are monitored and maintained or improved  Outcome: Progressing     Problem: Fall/Injury  Goal: Not fall by end of shift  Outcome: Progressing  Goal: Be free from injury by end of the shift  Outcome: Progressing  Goal: Verbalize understanding of personal risk factors for fall in the hospital  Outcome: Progressing  Goal: Verbalize understanding of risk factor reduction measures to prevent injury from fall in the home  Outcome: Progressing  Goal: Use assistive devices by end of the shift  Outcome: Progressing  Goal: Pace activities to prevent fatigue by end of the shift  Outcome: Progressing     Problem: Pain  Goal: Takes deep breaths with improved pain control throughout the shift  Outcome: Progressing  Goal: Turns in bed with improved pain control throughout the shift  Outcome: Progressing  Goal: Walks with improved pain control throughout the shift  Outcome: Progressing  Goal: Performs ADL's with improved pain control throughout shift  Outcome: Progressing  Goal: Participates in PT with improved pain control throughout the shift  Outcome: Progressing  Goal: Free from opioid side effects throughout the shift  Outcome: Progressing  Goal: Free from acute confusion related to pain meds throughout the shift  Outcome: Progressing

## 2024-12-11 NOTE — PROGRESS NOTES
Juan Marsh is a 29 y.o. male on day 3 of admission presenting with Lupus (systemic lupus erythematosus) (Multi).    Transitional Care Coordination Progress Note:  Patient discussed during interdisciplinary rounds.   Team members present: MD and TCC  Plan per Medical/Surgical team: Patient medically ready for discharge home with [facility].  Payor: [facility]  Discharge disposition: Home with [facility] SOC 12/13/24 for PT/OT.  Potential Barriers: None  ADOD: 12/11/24    SHINE MILLER RN
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
## 2024-12-11 NOTE — HH CARE COORDINATION
Home Care received a Referral for Physical Therapy and Occupational Therapy. We have processed the referral for a Start of Care on 12/13.     If you have any questions or concerns, please feel free to contact us at [phone]. Follow the prompts, enter your five digit zip code, and you will be directed to your care team on [facility] 1.

## 2024-12-11 NOTE — PROGRESS NOTES
Juan Marsh is a 29 y.o. male on day 2 of admission presenting with Lupus (systemic lupus erythematosus) (Multi).      Subjective   Seen and evaluated at bedside.  No acute events overnight.       Objective     Last Recorded Vitals  /73 (BP Location: Left arm, Patient Position: Sitting)   Pulse 77   Temp 36.3 °C (97.3 °F) (Temporal)   Resp 16   Wt 58.1 kg (128 lb)   SpO2 99%   Intake/Output last 3 Shifts:  No intake or output data in the 24 hours ending 12/10/24 1943    General Exam  General Appearance: Patient is awake, alert and cooperative, and appears to be in no acute distress.  Head: NCAT  Eyes: PERRL, EOMI. vision is grossly intact.  Cardiac: RRR, NS1S2, no murmurs  Lungs: Phonating clearly, no increased work of breathing no use of accessory muscles.  Musculoskeletal: ROM intact extremities.  5/5 strength in bilateral upper extremities, 4/5 strength in bilateral lower extremities  Extremities: Warm well-perfused, peripheral pulses intact, no edema  Neuro: CN II-XII intact.   Skin: Skin normal color, texture and turgor with no lesions or eruptions.  Psych: Appropriate mood and affect    Admission Weight  Weight: 58.1 kg (128 lb) (12/08/24 1035)    Daily Weight  12/08/24 : 58.1 kg (128 lb)    Image Results  ECG 12 lead  Sinus tachycardia  Biatrial enlargement  Abnormal ECG  When compared with ECG of 28-AUG-2024 16:16,  Vent. rate has increased BY  50 BPM    See ED provider note for full interpretation and clinical correlation  Confirmed by Mary Torres ([id number]) on 12/8/2024 9:01:26 PM  XR hips bilateral 2 VW w pelvis when performed  Narrative: Interpreted By:  Silverio Solis,   STUDY:  XR HIPS BILATERAL 2 VW WITH PELVIS WHEN PERFORMED; ;  12/8/2024 3:33  pm      INDICATION:  Signs/Symptoms:pain with fx of avascular necrosis.          COMPARISON:  08/20/2024.      ACCESSION NUMBER(S):  [id number]      ORDERING CLINICIAN:  MOHIT HO      FINDINGS:  AP view of the pelvis and two views  of the bilateral hips      Similar-appearing sclerotic changes of the bilateral femoral heads  with articular surface collapse, right slightly worsened left. Severe  bilateral hip osteoarthrosis with joint space loss. Partially  visualized intramedullary nail of the left femur. Soft tissues are  within normal limits. No acute fracture. Joint alignment is  maintained.      Impression: 1. Similar-appearing osteonecrosis of the bilateral femoral heads  with articular surface collapse and advanced osteoarthrosis, right  slightly worse than left.              MACRO:  None      Signed by: Silverio Solis 12/8/2024 3:58 PM  Dictation workstation:   [id number]      Physical Exam    Relevant Results               Assessment/Plan                  Assessment & Plan  Lupus (systemic lupus erythematosus) (Multi)    29-year-old M who is now hospital day 1 presenting for chronic bilateral leg pain and difficulty ambulating.  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      [place] here with bilateral lower extremity weakness and difficulty walking.  Neuro not concerned that this is active neuro Bechet but resolving flare.  He is okay to go home tomorrow  Patient said he is still in a lot of pain. Added tylenol for mild pain and toradol for mod and severe pain. Will reassess      #Difficulty ambulating  #Bilateral lower extremity weakness  #AVN right femoral head  #Osteoarthritis  -No acute red flags noted upon evaluation  -Neurology following, appreciate recommendations  -Neuro Behcet's without active flare  -PT OT recommended low intensity  -Multimodal pain regimen  -Patient appropriate for discharge tomorrow     #SLE  #Neuro Behcet's  -Continue prednisone 10 mg daily  -Continue mycophenolate 500 mg twice daily    #History of CVA  -Continue home aspirin 81 mg daily    F: None  E: Replete as needed  FEN: Regular  GI: PPI  DVT:  Ambulate  CODE STATUS: Full code    Disposition: Medically cleared for discharge       A total of >75 minutes was spent on this visit reviewing previous notes including inpatient ED/Consult notes, chart review of ambulatory records in [facility] and OSH records in [facility], counseling the patient on substance use cessation, ordering tests and add on labs, medication reconciliation and adjusting meds, and documenting the findings in the note.       Randi Sanchez MD

## 2024-12-12 NOTE — CARE PLAN
Authorization of Work or School Absence      Date:    12/12/2024  [facility]  Address: [facility] [place] [id number]  Phone: [phone]         RE: Name: Juan Marsh, YOB: 1995    To Whom It May Concern,  Please excuse    Juan Marsh    from 12/11/2024   through 12/13/2024 . I have examined the patient and determined that Pt needs 3 days off for rest and recovery.  Dates of hospitalization:  12/11/2024  Return to work date:  12/13/2024    Sincerely,      Dr. ESTRELLITA Sanchez MD

## 2024-12-13 ENCOUNTER — HOME CARE VISIT (OUTPATIENT)
Dept: HOME HEALTH SERVICES | Facility: HOME HEALTH | Age: 29
End: 2024-12-13
Payer: COMMERCIAL

## 2024-12-13 PROCEDURE — G0299 HHS/HOSPICE OF RN EA 15 MIN: HCPCS

## 2024-12-13 ASSESSMENT — PAIN SCALES - PAIN ASSESSMENT IN ADVANCED DEMENTIA (PAINAD)
BREATHING: 0
FACIALEXPRESSION: 1
NEGVOCALIZATION: 0
BODYLANGUAGE: 0
FACIALEXPRESSION: 1 - SAD. FRIGHTENED. FROWN.
BODYLANGUAGE: 0 - RELAXED.
NEGVOCALIZATION: 0 - NONE.

## 2024-12-13 ASSESSMENT — ENCOUNTER SYMPTOMS
PAIN: 1
LOWEST PAIN SEVERITY IN PAST 24 HOURS: 7/10
HIGHEST PAIN SEVERITY IN PAST 24 HOURS: 8/10
LAST BOWEL MOVEMENT: 67187
PERSON REPORTING PAIN: PATIENT
APPETITE LEVEL: GOOD

## 2024-12-13 ASSESSMENT — ACTIVITIES OF DAILY LIVING (ADL)
OASIS_M1830: 03
ENTERING_EXITING_HOME: NEEDS ASSISTANCE

## 2024-12-16 ENCOUNTER — HOME CARE VISIT (OUTPATIENT)
Dept: HOME HEALTH SERVICES | Facility: HOME HEALTH | Age: 29
End: 2024-12-16
Payer: COMMERCIAL

## 2024-12-16 PROCEDURE — G0151 HHCP-SERV OF PT,EA 15 MIN: HCPCS

## 2024-12-16 PROCEDURE — G0152 HHCP-SERV OF OT,EA 15 MIN: HCPCS

## 2024-12-16 SDOH — HEALTH STABILITY: PHYSICAL HEALTH: EXERCISE ACTIVITIES SETS: 2

## 2024-12-16 SDOH — HEALTH STABILITY: PHYSICAL HEALTH: PHYSICAL EXERCISE: 10

## 2024-12-16 SDOH — HEALTH STABILITY: PHYSICAL HEALTH: EXERCISE ACTIVITY: HIP 3 WAY, TKE, SLR, HS,

## 2024-12-16 SDOH — HEALTH STABILITY: PHYSICAL HEALTH: EXERCISE TYPE: B LE

## 2024-12-16 SDOH — HEALTH STABILITY: PHYSICAL HEALTH: PHYSICAL EXERCISE: SIT, SUPINE, STAND

## 2024-12-16 ASSESSMENT — ENCOUNTER SYMPTOMS
LOWEST PAIN SEVERITY IN PAST 24 HOURS: 2/10
PERSON REPORTING PAIN: PATIENT
PAIN LOCATION - PAIN QUALITY: ACHE
PAIN SEVERITY GOAL: 0/10
PAIN LOCATION - PAIN SEVERITY: 5/10
HIGHEST PAIN SEVERITY IN PAST 24 HOURS: 5/10
PAIN LOCATION - RELIEVING FACTORS: MEDS
SUBJECTIVE PAIN PROGRESSION: WAXING AND WANING
MUSCLE WEAKNESS: 1
PAIN LOCATION: GENERALIZED
PAIN LOCATION - PAIN FREQUENCY: INTERMITTENT
PAIN LOCATION - EXACERBATING FACTORS: MVT
PAIN: 1
PAIN LOCATION - PAIN DURATION: MIN
PERSON REPORTING PAIN: PATIENT
PAIN: 1
HIGHEST PAIN SEVERITY IN PAST 24 HOURS: 8/10
PAIN LOCATION: RIGHT LEG
PAIN LOCATION: LEFT LEG

## 2024-12-16 ASSESSMENT — ACTIVITIES OF DAILY LIVING (ADL)
DRESSING_UB_CURRENT_FUNCTION: INDEPENDENT
DRESSING_LB_CURRENT_FUNCTION: INDEPENDENT
PHYSICAL TRANSFERS ASSESSED: 1
AMBULATION ASSISTANCE ON FLAT SURFACES: 1
BATHING ASSESSED: 1
BATHING_CURRENT_FUNCTION: INDEPENDENT
AMBULATION_DISTANCE/DURATION_TOLERATED: 100 FT
CURRENT_FUNCTION: INDEPENDENT
TOILETING: INDEPENDENT
TOILETING: 1

## 2024-12-17 ENCOUNTER — HOME CARE VISIT (OUTPATIENT)
Dept: HOME HEALTH SERVICES | Facility: HOME HEALTH | Age: 29
End: 2024-12-17
Payer: COMMERCIAL

## 2024-12-17 SDOH — HEALTH STABILITY: MENTAL HEALTH: SOCIAL ISOLATION: 1

## 2024-12-17 SDOH — HEALTH STABILITY: MENTAL HEALTH: STRESS FACTORS COMMENTS: RECENT ONSET OF ILLNESS

## 2024-12-17 ASSESSMENT — ACTIVITIES OF DAILY LIVING (ADL)
LAUNDRY_REQUIRES_ASSISTANCE: 1
SHOPPING_REQUIRES_ASSISTANCE: 1

## 2024-12-17 ASSESSMENT — ENCOUNTER SYMPTOMS: AGITATION: 1

## 2024-12-20 ENCOUNTER — HOME CARE VISIT (OUTPATIENT)
Dept: HOME HEALTH SERVICES | Facility: HOME HEALTH | Age: 29
End: 2024-12-20
Payer: COMMERCIAL

## 2024-12-20 VITALS — SYSTOLIC BLOOD PRESSURE: 122 MMHG | HEART RATE: 100 BPM | TEMPERATURE: 98.8 F | DIASTOLIC BLOOD PRESSURE: 82 MMHG

## 2024-12-20 VITALS — RESPIRATION RATE: 16 BRPM

## 2024-12-20 PROCEDURE — G0151 HHCP-SERV OF PT,EA 15 MIN: HCPCS

## 2024-12-20 PROCEDURE — G0299 HHS/HOSPICE OF RN EA 15 MIN: HCPCS

## 2024-12-20 SDOH — HEALTH STABILITY: PHYSICAL HEALTH: EXERCISE ACTIVITY: OPEN/ CLOSED

## 2024-12-20 SDOH — HEALTH STABILITY: PHYSICAL HEALTH: PHYSICAL EXERCISE: STAND, SIT, SUPINE

## 2024-12-20 SDOH — HEALTH STABILITY: PHYSICAL HEALTH: EXERCISE TYPE: B LE

## 2024-12-20 SDOH — HEALTH STABILITY: PHYSICAL HEALTH: EXERCISE ACTIVITIES SETS: 2

## 2024-12-20 SDOH — HEALTH STABILITY: PHYSICAL HEALTH: PHYSICAL EXERCISE: 10

## 2024-12-20 ASSESSMENT — ENCOUNTER SYMPTOMS
PAIN LOCATION - PAIN FREQUENCY: INTERMITTENT
MUSCLE WEAKNESS: 1
PAIN: 1
HIGHEST PAIN SEVERITY IN PAST 24 HOURS: 5/10
PAIN SEVERITY GOAL: 0/10
LAST BOWEL MOVEMENT: 67194
PAIN LOCATION - RELIEVING FACTORS: MEDS
PAIN LOCATION - EXACERBATING FACTORS: ROM
LOWEST PAIN SEVERITY IN PAST 24 HOURS: 8/10
HIGHEST PAIN SEVERITY IN PAST 24 HOURS: 10/10
ARTHRALGIAS: 1
PAIN LOCATION: GENERALIZED
LIMITED RANGE OF MOTION: 1
PAIN LOCATION - PAIN QUALITY: ACHE
PAIN LOCATION - PAIN SEVERITY: 5/10
PAIN: 1
PERSON REPORTING PAIN: PATIENT
LOWEST PAIN SEVERITY IN PAST 24 HOURS: 2/10
SUBJECTIVE PAIN PROGRESSION: WAXING AND WANING
PAIN LOCATION - PAIN DURATION: HR
PERSON REPORTING PAIN: PATIENT
APPETITE LEVEL: GOOD

## 2024-12-20 ASSESSMENT — ACTIVITIES OF DAILY LIVING (ADL)
CURRENT_FUNCTION: INDEPENDENT
AMBULATION ASSISTANCE ON FLAT SURFACES: 1
AMBULATION_DISTANCE/DURATION_TOLERATED: 60 FT
PHYSICAL TRANSFERS ASSESSED: 1

## 2024-12-24 ENCOUNTER — HOME CARE VISIT (OUTPATIENT)
Dept: HOME HEALTH SERVICES | Facility: HOME HEALTH | Age: 29
End: 2024-12-24
Payer: COMMERCIAL

## 2024-12-24 ENCOUNTER — OFFICE VISIT (OUTPATIENT)
Dept: PRIMARY CARE | Facility: HOSPITAL | Age: 29
End: 2024-12-24
Payer: COMMERCIAL

## 2024-12-24 VITALS
SYSTOLIC BLOOD PRESSURE: 110 MMHG | HEART RATE: 114 BPM | HEIGHT: 69 IN | DIASTOLIC BLOOD PRESSURE: 72 MMHG | WEIGHT: 130 LBS | BODY MASS INDEX: 19.26 KG/M2 | TEMPERATURE: 97 F | OXYGEN SATURATION: 100 %

## 2024-12-24 VITALS — RESPIRATION RATE: 16 BRPM | HEART RATE: 114 BPM | SYSTOLIC BLOOD PRESSURE: 172 MMHG

## 2024-12-24 DIAGNOSIS — E55.9 VITAMIN D DEFICIENCY: ICD-10-CM

## 2024-12-24 DIAGNOSIS — M87.052 AVASCULAR NECROSIS OF LEFT FEMORAL HEAD (MULTI): ICD-10-CM

## 2024-12-24 DIAGNOSIS — M25.511 CHRONIC RIGHT SHOULDER PAIN: ICD-10-CM

## 2024-12-24 DIAGNOSIS — Z00.00 ANNUAL PHYSICAL EXAM: Primary | ICD-10-CM

## 2024-12-24 DIAGNOSIS — G89.29 CHRONIC RIGHT SHOULDER PAIN: ICD-10-CM

## 2024-12-24 DIAGNOSIS — M87.051 AVASCULAR NECROSIS OF RIGHT FEMORAL HEAD (MULTI): ICD-10-CM

## 2024-12-24 PROCEDURE — 99214 OFFICE O/P EST MOD 30 MIN: CPT | Performed by: INTERNAL MEDICINE

## 2024-12-24 PROCEDURE — 3008F BODY MASS INDEX DOCD: CPT | Performed by: INTERNAL MEDICINE

## 2024-12-24 PROCEDURE — 99214 OFFICE O/P EST MOD 30 MIN: CPT | Mod: GC

## 2024-12-24 PROCEDURE — G0151 HHCP-SERV OF PT,EA 15 MIN: HCPCS

## 2024-12-24 RX ORDER — DICLOFENAC SODIUM 10 MG/G
4 GEL TOPICAL 4 TIMES DAILY
Qty: 100 G | Refills: 1 | Status: SHIPPED | OUTPATIENT
Start: 2024-12-24

## 2024-12-24 RX ORDER — CYCLOBENZAPRINE HCL 10 MG
10 TABLET ORAL 3 TIMES DAILY PRN
Qty: 20 TABLET | Refills: 0 | Status: SHIPPED | OUTPATIENT
Start: 2024-12-24

## 2024-12-24 RX ORDER — ACETAMINOPHEN 325 MG/1
650 TABLET ORAL EVERY 6 HOURS PRN
Qty: 20 TABLET | Refills: 0 | Status: SHIPPED | OUTPATIENT
Start: 2024-12-24

## 2024-12-24 RX ORDER — ERGOCALCIFEROL 1.25 MG/1
1 CAPSULE ORAL
Qty: 90 CAPSULE | Refills: 1 | Status: SHIPPED | OUTPATIENT
Start: 2024-12-24

## 2024-12-24 SDOH — HEALTH STABILITY: PHYSICAL HEALTH: EXERCISE ACTIVITIES SETS: 2

## 2024-12-24 SDOH — HEALTH STABILITY: PHYSICAL HEALTH: PHYSICAL EXERCISE: SIT, SUPINE, STAND

## 2024-12-24 SDOH — HEALTH STABILITY: PHYSICAL HEALTH: PHYSICAL EXERCISE: 15

## 2024-12-24 SDOH — HEALTH STABILITY: PHYSICAL HEALTH: EXERCISE ACTIVITY: OPEN/ CLOSED CHAIN EX

## 2024-12-24 SDOH — HEALTH STABILITY: PHYSICAL HEALTH: EXERCISE TYPE: B LE/ TORSO

## 2024-12-24 ASSESSMENT — ENCOUNTER SYMPTOMS
LOWEST PAIN SEVERITY IN PAST 24 HOURS: 2/10
PAIN SEVERITY GOAL: 0/10
MUSCLE WEAKNESS: 1
PAIN LOCATION - RELIEVING FACTORS: MEDS
PERSON REPORTING PAIN: PATIENT
OCCASIONAL FEELINGS OF UNSTEADINESS: 0
HIGHEST PAIN SEVERITY IN PAST 24 HOURS: 4/10
LOSS OF SENSATION IN FEET: 0
PAIN LOCATION - PAIN QUALITY: ACHE
PAIN LOCATION - PAIN SEVERITY: 4/10
SUBJECTIVE PAIN PROGRESSION: WAXING AND WANING
PAIN: 1
DEPRESSION: 0
PAIN LOCATION: GENERALIZED
LIMITED RANGE OF MOTION: 1
PAIN LOCATION - PAIN FREQUENCY: INTERMITTENT
PAIN LOCATION - PAIN DURATION: MIN

## 2024-12-24 ASSESSMENT — ACTIVITIES OF DAILY LIVING (ADL)
PHYSICAL TRANSFERS ASSESSED: 1
AMBULATION_DISTANCE/DURATION_TOLERATED: 100 FT
CURRENT_FUNCTION: INDEPENDENT

## 2024-12-24 ASSESSMENT — PATIENT HEALTH QUESTIONNAIRE - PHQ9
2. FEELING DOWN, DEPRESSED OR HOPELESS: NOT AT ALL
1. LITTLE INTEREST OR PLEASURE IN DOING THINGS: NOT AT ALL
SUM OF ALL RESPONSES TO PHQ9 QUESTIONS 1 AND 2: 0

## 2024-12-24 ASSESSMENT — PAIN SCALES - GENERAL: PAINLEVEL_OUTOF10: 10-WORST PAIN EVER

## 2024-12-24 NOTE — PROGRESS NOTES
Chief complaint:  Establish Care     Background:  Juan Marsh is a 29 y.o. 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    Subjective:   - He is reporting that he have chronic lower back pain and Leg pains since his diagnosis with AVN and he was followed by Orthopedics during that time and he was offered some intra articular injections to help with the pain.   - He tried toradol and Ms relaxant in the past and it seems to be ineffective and he also had lidocaine patch that did not help much as per him.   - He reports that he does not have any current oral or genital ulcers and he does not have any changes in bowel habits.   - He is followed by [facility] and needed primary care physician to help with the order and that was one of the resons he was referred to our clinic.   - He have been on steroids for almost a year now and he was not any vitamin d supplements.   - He is reporting occasional alcohol use and current smoker ( 2 cigarettes a day), used to smoke marijuana.   - Review of system is negative.     Medications:  Current Outpatient Medications   Medication Instructions    acetaminophen (TYLENOL) 650 mg, oral, Every 6 hours PRN    aspirin 81 mg, oral, Daily    brimonidine (AlphaGAN) 0.2 % ophthalmic solution 1 drop, Right Eye, Every 12 hours    cyclobenzaprine (FLEXERIL) 10 mg, oral, 3 times daily PRN    diclofenac sodium (VOLTAREN) 4 g, Topical, 4 times daily    ergocalciferol (VITAMIN D-2) 1,250 mcg, oral, Once Weekly    gabapentin (NEURONTIN) 300 mg, oral, Every 8 hours scheduled    mycophenolate (CELLCEPT) 500 mg, oral, 2 times daily    pantoprazole (PROTONIX) 40 mg, oral, Daily    prednisoLONE acetate (Pred-Forte) 1 % ophthalmic suspension 1 drop, Right Eye, 4 times daily    predniSONE (DELTASONE) 10 mg, oral, Daily       Allergies:  No Known Allergies    Past medical  history:  Past Medical History:   Diagnosis Date    Lupus (systemic lupus erythematosus) (Multi)     Stroke (Multi)     Uveitis        Surgical history:  Past Surgical History:   Procedure Laterality Date    CT ANGIO NECK  9/2/2021    CT NECK ANGIO W AND WO IV CONTRAST 9/2/2021 [facility] CLINICAL LEGACY    CT HEAD ANGIO W AND WO IV CONTRAST  9/2/2021    CT HEAD ANGIO W AND WO IV CONTRAST 9/2/2021 [facility] CLINICAL LEGACY       Family history:  Family History   Problem Relation Name Age of Onset    Other (Diabetes mellitus) Other Multiple Family Members     Hypertension Other Multiple Family Members     Cancer Other Multiple Family Members        Social history:   reports that he has been smoking cigars. He has never used smokeless tobacco. He reports that he does not currently use alcohol. He reports current drug use. Drug: Marijuana.    Health maintenance:  Health Maintenance   Topic Date Due    Yearly Adult Physical  Never done    COVID-19 Vaccine (1) Never done    HPV Vaccines (3 - Male 2-dose series) 05/16/2012    Pneumococcal Vaccine: Pediatrics and At-Risk Adult Patients (1 of 2 - PCV) Never done    Zoster Vaccines (1 of 2) 06/22/2016    Influenza Vaccine (1) 09/01/2024    Lipid Panel  05/25/2026    DTaP/Tdap/Td Vaccines (9 - Td or Tdap) 10/02/2029    HIB Vaccines  Completed    Hepatitis B Vaccines  Completed    IPV Vaccines  Completed    Hepatitis A Vaccines  Completed    MMR Vaccines  Completed    Varicella Vaccines  Completed    HIV Screening  Completed    Hepatitis C Screening  Completed    Meningococcal Vaccine  Aged Out    Rotavirus Vaccines  Aged Out                 Vitals:  Vitals:    12/24/24 1031   BP: 110/72   Pulse: (!) 114   Temp: 36.1 °C (97 °F)   SpO2: 100%       Physical exam:  General examinations: comfortable, oriented to time person and place, not in distress. Using Crutches to mobilize   Respiratory examinations: normal vesicular breathing equal air entry no wheezing or crackles.   Cardiovascular  "examinations: S1+S2+0 murmur no LL edema   Gastrointestinal examinations: soft and lax no tenderness or rigidity.   CNS examinations: GCS 15/15 4/5 in the BLE and he does not have sensory symptoms.     Labs:  Lab Results   Component Value Date    WBC 10.4 12/11/2024    HGB 10.5 (L) 12/11/2024    HCT 31.9 (L) 12/11/2024    MCV 94 12/11/2024     12/11/2024       Lab Results   Component Value Date    GLUCOSE 94 12/11/2024    CALCIUM 9.6 12/11/2024     12/11/2024    K 4.1 12/11/2024    CO2 26 12/11/2024     12/11/2024    BUN 27 (H) 12/11/2024    CREATININE 1.29 12/11/2024       Lab Results   Component Value Date    HGBA1C 5.4 05/25/2021        Lab Results   Component Value Date    CHOL 142 05/25/2021     Lab Results   Component Value Date    HDL 40.1 05/25/2021     No results found for: \"LDLCALC\"  Lab Results   Component Value Date    TRIG 85 05/25/2021     No components found for: \"CHOLHDL\"    Imaging:  ECG 12 lead      Assessment and plan:  Juan Marsh is a 29 y.o. 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    #Chronic Bilateral hip pain secondary to known AVN of the bilateral femoral heads.  - On Tylonl and Ms relaxant.   - Used to follow with Ortho for Intra articular injection.   Plan:   - Educated about the pain and he was referred for orthopedics.   - Prescribed Dilofenac gel as needed.   - Use tylonol daily to help with the pain and regularly till seen with orthopedics       #Neuro Behcet's   - Followed with Neurology and Rheumatology   - On  BID and Pred 10 mg     #History of CVA   - On ASA 81       HEALTH MAINTENANCE   Antibody Testing   HIV: negative 5/2024  Syphilis: negative 5/2024   Hepatitis C: Negative 5/2024   Vaccines  Influenza: Declined   Lipid Screening ( Sent and ordered)  Vitamin D levels ( Sent and ordered)  Plan "     Follow-up in 3 months .    Patient and plan discussed with attending physician Dr. Glez.    Joi Randle MD

## 2024-12-24 NOTE — LETTER
To Whom It May Concern:     Juan Marsh was seen in my clinic on 12/24/2024 at . Please excuse Juan for his absence from work as he still have persistent uncontrolled bilateral hip pain and weakness secondary to bone disease and behcet disease that will require further intervention and follow up, he will be seen in clinic in 1/13/2024 for reassessment of pain and lower limb weakness.      If you have any questions or concerns, please don't hesitate to call.           Sincerely,            Joi Randle MD           CC: No Recipients

## 2024-12-24 NOTE — PATIENT INSTRUCTIONS
As discussed today, our plan is:     Labs - to be sent and taken prior to you visit in neurology and will call you with any abnormal results. If you have any questions or concerns prior to us calling feel free to call our office to have your questions addressed.   Medication changes: Added Diclofenac Gel and Acetaminophen for pain   Consultations - you were referred to see the following specialists: Orthopedics  You should receive a call from central scheduling in the next few days if you do not receive a call within 3-5 business days please call [phone] to schedule your appointment.   4.   If you smoke or use other tobacco products, take steps to quit. Call [phone] for more information or to set up an appointment with  [facility]. The [place] Tobacco Quit Line is a free resource for people who don’t have insurance, receive Medicaid, pregnant women, or members of the [place] [facility]. Call 1-827-QUIT-NOW or 1-230.723.3625.    Please come back to see us in: 3 Months    ------  If you have any problems or questions, please contact the clinic at [phone] to leave a message. Our fax number is [phone]. If your issue cannot wait until the next business day, please go to urgent care or the emergency department.     I also strongly urge all of my patients to register for [facility] by going to: [email or web address]  (The  staff can also send you a text/email link to register when you check out).    No shows: It is understandable if you are unable to make it to a visit, but please cancel your appointment instead of not showing up. This helps to give other patients access to primary care and keeps wait times low.        [facility]   [phone]

## 2024-12-24 NOTE — LETTER
December 24, 2024     Patient: Juan Marsh   YOB: 1995   Date of Visit: 12/24/2024       To Whom It May Concern:    Juan Marsh was seen in my clinic on 12/24/2024 at 10:15 am. Please excuse Juan for his absence from work on this day to make the appointment.    If you have any questions or concerns, please don't hesitate to call.         Sincerely,         Joi Randle MD        CC: No Recipients

## 2024-12-27 ENCOUNTER — HOME CARE VISIT (OUTPATIENT)
Dept: HOME HEALTH SERVICES | Facility: HOME HEALTH | Age: 29
End: 2024-12-27
Payer: COMMERCIAL

## 2024-12-27 VITALS
DIASTOLIC BLOOD PRESSURE: 74 MMHG | RESPIRATION RATE: 18 BRPM | OXYGEN SATURATION: 100 % | TEMPERATURE: 94.9 F | SYSTOLIC BLOOD PRESSURE: 107 MMHG | HEART RATE: 102 BPM

## 2024-12-27 PROCEDURE — G0300 HHS/HOSPICE OF LPN EA 15 MIN: HCPCS

## 2024-12-27 NOTE — PROGRESS NOTES
I saw and evaluated the patient. I personally obtained the key and critical portions of the history and physical exam or was physically present for key and critical portions performed by the resident/fellow. I reviewed the resident/fellow's documentation and discussed the patient with the resident/fellow. I agree with the resident/fellow's medical decision making as documented in the note.    Eva Glez MD

## 2024-12-28 ASSESSMENT — PAIN SCALES - PAIN ASSESSMENT IN ADVANCED DEMENTIA (PAINAD)
CONSOLABILITY: 0 - NO NEED TO CONSOLE.
TOTALSCORE: 0
CONSOLABILITY: 0
FACIALEXPRESSION: 0 - SMILING OR INEXPRESSIVE.
FACIALEXPRESSION: 0
NEGVOCALIZATION: 0 - NONE.
BREATHING: 0
BODYLANGUAGE: 0 - RELAXED.
BODYLANGUAGE: 0
NEGVOCALIZATION: 0

## 2024-12-28 ASSESSMENT — ENCOUNTER SYMPTOMS
DEPRESSION: 0
LOSS OF SENSATION IN FEET: 0
DENIES PAIN: 1
CHANGE IN APPETITE: UNCHANGED
OCCASIONAL FEELINGS OF UNSTEADINESS: 0
LAST BOWEL MOVEMENT: 67201
APPETITE LEVEL: GOOD

## 2024-12-28 ASSESSMENT — ACTIVITIES OF DAILY LIVING (ADL)
TRANSPORTATION ASSESSED: 1
TRANSPORTATION: DEPENDENT
LAUNDRY ASSESSED: 1
SHOPPING: DEPENDENT
LAUNDRY: DEPENDENT
SHOPPING ASSESSED: 1

## 2024-12-30 ENCOUNTER — TELEPHONE (OUTPATIENT)
Dept: PRIMARY CARE | Facility: HOSPITAL | Age: 29
End: 2024-12-30
Payer: COMMERCIAL

## 2024-12-30 ENCOUNTER — HOME CARE VISIT (OUTPATIENT)
Dept: HOME HEALTH SERVICES | Facility: HOME HEALTH | Age: 29
End: 2024-12-30
Payer: COMMERCIAL

## 2024-12-30 DIAGNOSIS — R53.1 WEAKNESS: Primary | ICD-10-CM

## 2024-12-30 PROCEDURE — G0151 HHCP-SERV OF PT,EA 15 MIN: HCPCS

## 2024-12-30 SDOH — HEALTH STABILITY: PHYSICAL HEALTH: EXERCISE ACTIVITIES SETS: 2

## 2024-12-30 SDOH — HEALTH STABILITY: PHYSICAL HEALTH: EXERCISE ACTIVITY: OPEN/ CLOSED CHAIN EX

## 2024-12-30 SDOH — HEALTH STABILITY: PHYSICAL HEALTH: PHYSICAL EXERCISE: 15

## 2024-12-30 SDOH — HEALTH STABILITY: PHYSICAL HEALTH: EXERCISE TYPE: B LE/ TORSO

## 2024-12-30 SDOH — HEALTH STABILITY: PHYSICAL HEALTH: PHYSICAL EXERCISE: SIT, STAND, SUPINE

## 2024-12-30 ASSESSMENT — ACTIVITIES OF DAILY LIVING (ADL)
PHYSICAL TRANSFERS ASSESSED: 1
AMBULATION ASSISTANCE ON FLAT SURFACES: 1
CURRENT_FUNCTION: INDEPENDENT
AMBULATION_DISTANCE/DURATION_TOLERATED: 100 FT

## 2024-12-30 ASSESSMENT — ENCOUNTER SYMPTOMS
PAIN LOCATION - EXACERBATING FACTORS: MVT
PAIN LOCATION: GENERALIZED
MUSCLE WEAKNESS: 1
SUBJECTIVE PAIN PROGRESSION: GRADUALLY IMPROVING
PAIN: 1
PAIN LOCATION - PAIN DURATION: MIN
PAIN LOCATION - RELIEVING FACTORS: MEDS
PAIN SEVERITY GOAL: 0/10
PERSON REPORTING PAIN: PATIENT
LOWEST PAIN SEVERITY IN PAST 24 HOURS: 1/10
HIGHEST PAIN SEVERITY IN PAST 24 HOURS: 5/10
PAIN LOCATION - PAIN FREQUENCY: INTERMITTENT
PAIN LOCATION - PAIN QUALITY: ACHE
PAIN LOCATION - PAIN SEVERITY: 5/10

## 2024-12-30 NOTE — TELEPHONE ENCOUNTER
Received a call from  [facility] requesting a refill be placed for Outpatient Physical Therapy for this patient.

## 2025-01-01 PROCEDURE — RXMED WILLOW AMBULATORY MEDICATION CHARGE

## 2025-01-03 ENCOUNTER — HOME CARE VISIT (OUTPATIENT)
Dept: HOME HEALTH SERVICES | Facility: HOME HEALTH | Age: 30
End: 2025-01-03
Payer: COMMERCIAL

## 2025-01-03 ENCOUNTER — PHARMACY VISIT (OUTPATIENT)
Dept: PHARMACY | Facility: CLINIC | Age: 30
End: 2025-01-03
Payer: MEDICAID

## 2025-01-03 VITALS — HEART RATE: 102 BPM | OXYGEN SATURATION: 100 % | RESPIRATION RATE: 16 BRPM

## 2025-01-03 PROCEDURE — G0151 HHCP-SERV OF PT,EA 15 MIN: HCPCS

## 2025-01-03 PROCEDURE — G0299 HHS/HOSPICE OF RN EA 15 MIN: HCPCS

## 2025-01-03 SDOH — HEALTH STABILITY: PHYSICAL HEALTH: PHYSICAL EXERCISE: SIT, SUPINE, STAND

## 2025-01-03 SDOH — HEALTH STABILITY: PHYSICAL HEALTH: EXERCISE ACTIVITIES SETS: 2

## 2025-01-03 SDOH — HEALTH STABILITY: PHYSICAL HEALTH: PHYSICAL EXERCISE: 10

## 2025-01-03 SDOH — HEALTH STABILITY: PHYSICAL HEALTH: EXERCISE TYPE: B LE/ TORSO

## 2025-01-03 SDOH — HEALTH STABILITY: PHYSICAL HEALTH: EXERCISE ACTIVITY: OPEN/ CLOSED CHAIN EX

## 2025-01-03 ASSESSMENT — ENCOUNTER SYMPTOMS
SUBJECTIVE PAIN PROGRESSION: GRADUALLY IMPROVING
PAIN: 1
PAIN: 1
PAIN LOCATION - PAIN FREQUENCY: INTERMITTENT
PAIN LOCATION - PAIN DURATION: HR
LOWEST PAIN SEVERITY IN PAST 24 HOURS: 1/10
HIGHEST PAIN SEVERITY IN PAST 24 HOURS: 8/10
PERSON REPORTING PAIN: PATIENT
APPETITE LEVEL: GOOD
HIGHEST PAIN SEVERITY IN PAST 24 HOURS: 4/10
PAIN LOCATION - PAIN SEVERITY: 4/10
MUSCLE WEAKNESS: 1
PAIN LOCATION: GENERALIZED
LAST BOWEL MOVEMENT: 67207
PAIN LOCATION - EXACERBATING FACTORS: MVT
PERSON REPORTING PAIN: PATIENT
LOWEST PAIN SEVERITY IN PAST 24 HOURS: 4/10
PAIN SEVERITY GOAL: 0/10
HEADACHES: 1
PAIN LOCATION - PAIN QUALITY: ACHE
PAIN LOCATION - RELIEVING FACTORS: MEDS

## 2025-01-03 ASSESSMENT — ACTIVITIES OF DAILY LIVING (ADL)
AMBULATION_DISTANCE/DURATION_TOLERATED: 100 FT
CURRENT_FUNCTION: INDEPENDENT
PHYSICAL TRANSFERS ASSESSED: 1

## 2025-01-05 DIAGNOSIS — R29.898 WEAKNESS OF BOTH LOWER LIMBS: Primary | ICD-10-CM

## 2025-01-09 ENCOUNTER — HOME CARE VISIT (OUTPATIENT)
Dept: HOME HEALTH SERVICES | Facility: HOME HEALTH | Age: 30
End: 2025-01-09
Payer: COMMERCIAL

## 2025-01-13 ENCOUNTER — APPOINTMENT (OUTPATIENT)
Dept: NEUROLOGY | Facility: CLINIC | Age: 30
End: 2025-01-13
Payer: COMMERCIAL

## 2025-01-14 ENCOUNTER — OFFICE VISIT (OUTPATIENT)
Dept: ORTHOPEDIC SURGERY | Facility: CLINIC | Age: 30
End: 2025-01-14
Payer: COMMERCIAL

## 2025-01-14 ENCOUNTER — HOSPITAL ENCOUNTER (OUTPATIENT)
Dept: RADIOLOGY | Facility: EXTERNAL LOCATION | Age: 30
Discharge: HOME | End: 2025-01-14

## 2025-01-14 DIAGNOSIS — M87.051 AVASCULAR NECROSIS OF RIGHT FEMORAL HEAD (MULTI): ICD-10-CM

## 2025-01-14 PROCEDURE — 99214 OFFICE O/P EST MOD 30 MIN: CPT | Performed by: FAMILY MEDICINE

## 2025-01-14 PROCEDURE — 20611 DRAIN/INJ JOINT/BURSA W/US: CPT | Performed by: FAMILY MEDICINE

## 2025-01-14 RX ORDER — TRIAMCINOLONE ACETONIDE 40 MG/ML
80 INJECTION, SUSPENSION INTRA-ARTICULAR; INTRAMUSCULAR
Status: COMPLETED | OUTPATIENT
Start: 2025-01-14 | End: 2025-01-14

## 2025-01-14 RX ORDER — LIDOCAINE HYDROCHLORIDE 10 MG/ML
4 INJECTION, SOLUTION INFILTRATION; PERINEURAL
Status: COMPLETED | OUTPATIENT
Start: 2025-01-14 | End: 2025-01-14

## 2025-01-14 RX ADMIN — TRIAMCINOLONE ACETONIDE 80 MG: 40 INJECTION, SUSPENSION INTRA-ARTICULAR; INTRAMUSCULAR at 15:11

## 2025-01-14 RX ADMIN — LIDOCAINE HYDROCHLORIDE 4 ML: 10 INJECTION, SOLUTION INFILTRATION; PERINEURAL at 15:11
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
## 2025-01-16 ENCOUNTER — APPOINTMENT (OUTPATIENT)
Dept: ORTHOPEDIC SURGERY | Facility: HOSPITAL | Age: 30
End: 2025-01-16
Payer: COMMERCIAL

## 2025-01-17 ENCOUNTER — HOME CARE VISIT (OUTPATIENT)
Dept: HOME HEALTH SERVICES | Facility: HOME HEALTH | Age: 30
End: 2025-01-17
Payer: COMMERCIAL

## 2025-01-17 PROCEDURE — G0299 HHS/HOSPICE OF RN EA 15 MIN: HCPCS

## 2025-01-18 VITALS — HEART RATE: 100 BPM | TEMPERATURE: 98.3 F | SYSTOLIC BLOOD PRESSURE: 121 MMHG | DIASTOLIC BLOOD PRESSURE: 86 MMHG

## 2025-01-18 ASSESSMENT — ENCOUNTER SYMPTOMS
DENIES PAIN: 1
PERSON REPORTING PAIN: PATIENT

## 2025-01-18 ASSESSMENT — ACTIVITIES OF DAILY LIVING (ADL)
HOME_HEALTH_OASIS: 01
OASIS_M1830: 02

## 2025-01-28 ENCOUNTER — APPOINTMENT (OUTPATIENT)
Dept: PRIMARY CARE | Facility: CLINIC | Age: 30
End: 2025-01-28
Payer: COMMERCIAL

## 2025-02-05 ENCOUNTER — OFFICE VISIT (OUTPATIENT)
Dept: PRIMARY CARE | Facility: HOSPITAL | Age: 30
End: 2025-02-05
Payer: COMMERCIAL

## 2025-02-05 VITALS
TEMPERATURE: 97.9 F | BODY MASS INDEX: 19.4 KG/M2 | HEIGHT: 68 IN | SYSTOLIC BLOOD PRESSURE: 116 MMHG | DIASTOLIC BLOOD PRESSURE: 76 MMHG | HEART RATE: 116 BPM | WEIGHT: 128 LBS | OXYGEN SATURATION: 100 %

## 2025-02-05 DIAGNOSIS — M35.2 BEHCET'S SYNDROME, NEUROLOGIC TYPE (MULTI): ICD-10-CM

## 2025-02-05 DIAGNOSIS — M35.2: ICD-10-CM

## 2025-02-05 PROCEDURE — 3008F BODY MASS INDEX DOCD: CPT

## 2025-02-05 PROCEDURE — 99215 OFFICE O/P EST HI 40 MIN: CPT

## 2025-02-05 PROCEDURE — 99215 OFFICE O/P EST HI 40 MIN: CPT | Mod: GC

## 2025-02-05 RX ORDER — PREDNISOLONE ACETATE 10 MG/ML
1 SUSPENSION/ DROPS OPHTHALMIC 4 TIMES DAILY
Qty: 15 ML | Refills: 1 | Status: SHIPPED | OUTPATIENT
Start: 2025-02-05 | End: 2025-02-05

## 2025-02-05 RX ORDER — PREDNISONE 10 MG/1
TABLET ORAL
Qty: 30 TABLET | Refills: 0 | Status: ON HOLD | OUTPATIENT
Start: 2025-02-05 | End: 2025-04-16

## 2025-02-05 RX ORDER — PANTOPRAZOLE SODIUM 40 MG/1
40 TABLET, DELAYED RELEASE ORAL DAILY
Qty: 30 TABLET | Refills: 0 | Status: ON HOLD | OUTPATIENT
Start: 2025-02-05 | End: 2025-03-07

## 2025-02-05 RX ORDER — MYCOPHENOLATE MOFETIL 500 MG/1
500 TABLET ORAL 2 TIMES DAILY
Qty: 60 TABLET | Refills: 11 | Status: ON HOLD | OUTPATIENT
Start: 2025-02-05 | End: 2026-02-05

## 2025-02-05 RX ORDER — PREDNISOLONE ACETATE 10 MG/ML
1 SUSPENSION/ DROPS OPHTHALMIC 4 TIMES DAILY
Qty: 15 ML | Refills: 1 | Status: ON HOLD | OUTPATIENT
Start: 2025-02-05

## 2025-02-05 RX ORDER — PREDNISONE 10 MG/1
TABLET ORAL
Qty: 30 TABLET | Refills: 0 | Status: SHIPPED | OUTPATIENT
Start: 2025-02-05 | End: 2025-02-05

## 2025-02-05 RX ORDER — GABAPENTIN 300 MG/1
300 CAPSULE ORAL EVERY 8 HOURS SCHEDULED
Qty: 90 CAPSULE | Refills: 0 | Status: ON HOLD | OUTPATIENT
Start: 2025-02-05 | End: 2025-03-07

## 2025-02-05 ASSESSMENT — COLUMBIA-SUICIDE SEVERITY RATING SCALE - C-SSRS
2. HAVE YOU ACTUALLY HAD ANY THOUGHTS OF KILLING YOURSELF?: NO
6. HAVE YOU EVER DONE ANYTHING, STARTED TO DO ANYTHING, OR PREPARED TO DO ANYTHING TO END YOUR LIFE?: NO
1. IN THE PAST MONTH, HAVE YOU WISHED YOU WERE DEAD OR WISHED YOU COULD GO TO SLEEP AND NOT WAKE UP?: NO

## 2025-02-05 ASSESSMENT — PATIENT HEALTH QUESTIONNAIRE - PHQ9
SUM OF ALL RESPONSES TO PHQ9 QUESTIONS 1 AND 2: 0
1. LITTLE INTEREST OR PLEASURE IN DOING THINGS: NOT AT ALL
2. FEELING DOWN, DEPRESSED OR HOPELESS: NOT AT ALL

## 2025-02-05 ASSESSMENT — ENCOUNTER SYMPTOMS
DEPRESSION: 1
LOSS OF SENSATION IN FEET: 0
OCCASIONAL FEELINGS OF UNSTEADINESS: 1

## 2025-02-05 ASSESSMENT — PAIN SCALES - GENERAL: PAINLEVEL_OUTOF10: 10-WORST PAIN EVER

## 2025-02-05 NOTE — PATIENT INSTRUCTIONS
As discussed today, our plan is:   Dear Mr Marsh.     You came in today with significant symptoms of fatigue and weakness and you have been off the Prednisone and MMF. We have re prescribed your Prednisone and you will need to take 20mg for 10 days then cont the 10mg till you see the Neurology team.   If symptoms did not improve please come to the ED for assessment.       Please come back to see us in: 6 weeks    ------  If you have any problems or questions, please contact the clinic at [phone] to leave a message. Our fax number is [phone]. If your issue cannot wait until the next business day, please go to urgent care or the emergency department.     I also strongly urge all of my patients to register for [facility] by going to: [email or web address]  (The  staff can also send you a text/email link to register when you check out).    No shows: It is understandable if you are unable to make it to a visit, but please cancel your appointment instead of not showing up. This helps to give other patients access to primary care and keeps wait times low.        [facility]   [phone]

## 2025-02-05 NOTE — PROGRESS NOTES
Chief complaint:  FUV     Background:  Juan Marsh is a 29 y.o. 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     Subjective:   - He came in today with profuse weakness and fatigue, he reported that he missed his prednisone and MMF for the last 1 week, he was supposed to follow with neurology and rheumatology and he missed or did not confirm the follow ups.   - He denied any Changes in terms of new limb weakness or new onset ulcers in the mouth or genitals. He reports hx of Headaches as well.   - He was seen by the orthopedics with a plan to initiate the Intraarticular injections and planned for possible joint replacement.   - He does still report that he have pain and he is following with neurology and orthopedics      Medications:  Current Outpatient Medications   Medication Instructions    acetaminophen (TYLENOL) 650 mg, oral, Every 6 hours PRN    brimonidine (AlphaGAN) 0.2 % ophthalmic solution 1 drop, Right Eye, Every 12 hours    cyclobenzaprine (FLEXERIL) 10 mg, oral, 3 times daily PRN    diclofenac sodium (VOLTAREN) 4 g, Topical, 4 times daily    ergocalciferol (VITAMIN D-2) 1,250 mcg, oral, Once Weekly    gabapentin (NEURONTIN) 300 mg, oral, Every 8 hours scheduled    pantoprazole (PROTONIX) 40 mg, oral, Daily    prednisoLONE acetate (Pred-Forte) 1 % ophthalmic suspension 1 drop, Right Eye, 4 times daily       Allergies:  No Known Allergies    Past medical history:  Past Medical History:   Diagnosis Date    Lupus (systemic lupus erythematosus) (Multi)     Stroke (Multi)     Uveitis        Surgical history:  Past Surgical History:   Procedure Laterality Date    CT ANGIO NECK  9/2/2021    CT NECK ANGIO W AND WO IV CONTRAST 9/2/2021 [facility] CLINICAL LEGACY    CT HEAD ANGIO W AND WO IV CONTRAST  9/2/2021    CT HEAD ANGIO W AND WO IV CONTRAST 9/2/2021 [facility] CLINICAL LEGACY       Family  history:  Family History   Problem Relation Name Age of Onset    Other (Diabetes mellitus) Other Multiple Family Members     Hypertension Other Multiple Family Members     Cancer Other Multiple Family Members        Social history:   reports that he has been smoking cigars. He has never used smokeless tobacco. He reports that he does not currently use alcohol. He reports current drug use. Drug: Marijuana.    Health maintenance:  Health Maintenance   Topic Date Due    Yearly Adult Physical  Never done    HPV Vaccines (3 - Male 2-dose series) 05/16/2012    Pneumococcal Vaccine: Pediatrics and At-Risk Adult Patients (1 of 2 - PCV) Never done    Influenza Vaccine (1) 09/01/2024    COVID-19 Vaccine (1 - 2024-25 season) Never done    Lipid Panel  05/25/2026    DTaP/Tdap/Td Vaccines (9 - Td or Tdap) 10/02/2029    Zoster Vaccines (1 of 2) 06/22/2047    HIB Vaccines  Completed    Hepatitis B Vaccines  Completed    IPV Vaccines  Completed    Hepatitis A Vaccines  Completed    MMR Vaccines  Completed    Varicella Vaccines  Completed    HIV Screening  Completed    Hepatitis C Screening  Completed    Meningococcal Vaccine  Aged Out    Rotavirus Vaccines  Aged Out     Physical exam:  General examinations: Weak and lethargic , oriented to time person and place, not in distress. Using Crutches to mobilize   Respiratory examinations: normal vesicular breathing equal air entry no wheezing or crackles.   Cardiovascular examinations: S1+S2+0 murmur no LL edema   Gastrointestinal examinations: soft and lax no tenderness or rigidity.   CNS examinations: GCS 15/15 4/5 in the BLE and he does not have sensory symptoms. ULE 5/5 but he have shoulder pains when moving his upper extremities. CN were intact and visual field was intact as well    Labs:  Lab Results   Component Value Date    WBC 10.4 12/11/2024    HGB 10.5 (L) 12/11/2024    HCT 31.9 (L) 12/11/2024    MCV 94 12/11/2024     12/11/2024       Lab Results   Component Value Date  "   GLUCOSE 94 12/11/2024    CALCIUM 9.6 12/11/2024     12/11/2024    K 4.1 12/11/2024    CO2 26 12/11/2024     12/11/2024    BUN 27 (H) 12/11/2024    CREATININE 1.29 12/11/2024       Lab Results   Component Value Date    HGBA1C 5.4 05/25/2021        Lab Results   Component Value Date    CHOL 142 05/25/2021     Lab Results   Component Value Date    HDL 40.1 05/25/2021     No results found for: \"LDLCALC\"  Lab Results   Component Value Date    TRIG 85 05/25/2021     No components found for: \"CHOLHDL\"    Imaging:  ECG 12 lead      Assessment and plan:  Juan Marsh is a 29 y.o. 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      Updates 2/5/2025:   - Generalized weakness for the last few days due to missed prednisone and MMF doses, Missed his Neurology OPD.   - Examination was reassuring in terms of neurology, matching his BL when last seen  -[facility] signed off and he is currently following with PT   - Orthopedics did IA injections and wanted to pursue surgery for his AVNs       #Chronic Bilateral hip pain secondary to known AVN of the bilateral femoral heads.  - On Tylonl and Ms relaxant.   - Used to follow with Ortho for Intra articular injection.   - Following with Ortho  - [facility] signed off and Recommended PT and Assisted devices.   - He will require Walker and Commode as well, Considering his weakness and back pains        #Neuro Behcet's   - Followed with Neurology and Rheumatology   - On  BID and Pred 10 mg   Missed his doses for the last 5 days.   Plan:   - Prednisone 20 mg for 10 days then 10mg daily till seen in Neurology clinic   - If symptoms of weakness did not improve, he should report back to ED.   - Confirmed with his mother to bring his medication and follow up on that.       #History of CVA   - On ASA 81       HEALTH MAINTENANCE   Antibody " Testing   HIV: negative 5/2024  Syphilis: negative 5/2024   Hepatitis C: Negative 5/2024   Vaccines  Influenza: Declined   Lipid Screening ( ordered)  Vitamin D levels ( ordered)      Follow-up in 6 weeks, to make sure about his medications and neurology follow ups     Patient and plan discussed with attending physician   Joi Randle MD

## 2025-02-06 ENCOUNTER — TELEPHONE (OUTPATIENT)
Dept: PRIMARY CARE | Facility: HOSPITAL | Age: 30
End: 2025-02-06
Payer: COMMERCIAL

## 2025-02-06 ENCOUNTER — APPOINTMENT (OUTPATIENT)
Dept: RADIOLOGY | Facility: HOSPITAL | Age: 30
End: 2025-02-06
Payer: COMMERCIAL

## 2025-02-06 LAB
ALBUMIN SERPL BCP-MCNC: 5.2 G/DL (ref 3.4–5)
ALP SERPL-CCNC: 83 U/L (ref 33–120)
ALT SERPL W P-5'-P-CCNC: 8 U/L (ref 10–52)
ANION GAP SERPL CALC-SCNC: 16 MMOL/L (ref 10–20)
AST SERPL W P-5'-P-CCNC: 16 U/L (ref 9–39)
BASOPHILS # BLD AUTO: 0.05 X10*3/UL (ref 0–0.1)
BASOPHILS NFR BLD AUTO: 0.6 %
BILIRUB SERPL-MCNC: 0.4 MG/DL (ref 0–1.2)
BUN SERPL-MCNC: 22 MG/DL (ref 6–23)
CALCIUM SERPL-MCNC: 10.3 MG/DL (ref 8.6–10.6)
CARDIAC TROPONIN I PNL SERPL HS: 3 NG/L (ref 0–53)
CHLORIDE SERPL-SCNC: 103 MMOL/L (ref 98–107)
CO2 SERPL-SCNC: 23 MMOL/L (ref 21–32)
CREAT SERPL-MCNC: 1.14 MG/DL (ref 0.5–1.3)
EGFRCR SERPLBLD CKD-EPI 2021: 90 ML/MIN/1.73M*2
EOSINOPHIL # BLD AUTO: 0.05 X10*3/UL (ref 0–0.7)
EOSINOPHIL NFR BLD AUTO: 0.6 %
ERYTHROCYTE [DISTWIDTH] IN BLOOD BY AUTOMATED COUNT: 13.2 % (ref 11.5–14.5)
GLUCOSE BLD MANUAL STRIP-MCNC: 136 MG/DL (ref 74–99)
GLUCOSE SERPL-MCNC: 112 MG/DL (ref 74–99)
HCT VFR BLD AUTO: 38.4 % (ref 41–52)
HGB BLD-MCNC: 13.6 G/DL (ref 13.5–17.5)
IMM GRANULOCYTES # BLD AUTO: 0.02 X10*3/UL (ref 0–0.7)
IMM GRANULOCYTES NFR BLD AUTO: 0.2 % (ref 0–0.9)
LYMPHOCYTES # BLD AUTO: 2.65 X10*3/UL (ref 1.2–4.8)
LYMPHOCYTES NFR BLD AUTO: 29.9 %
MAGNESIUM SERPL-MCNC: 2.63 MG/DL (ref 1.6–2.4)
MCH RBC QN AUTO: 31.7 PG (ref 26–34)
MCHC RBC AUTO-ENTMCNC: 35.4 G/DL (ref 32–36)
MCV RBC AUTO: 90 FL (ref 80–100)
MONOCYTES # BLD AUTO: 0.68 X10*3/UL (ref 0.1–1)
MONOCYTES NFR BLD AUTO: 7.7 %
NEUTROPHILS # BLD AUTO: 5.42 X10*3/UL (ref 1.2–7.7)
NEUTROPHILS NFR BLD AUTO: 61 %
NRBC BLD-RTO: 0 /100 WBCS (ref 0–0)
PLATELET # BLD AUTO: 319 X10*3/UL (ref 150–450)
POTASSIUM SERPL-SCNC: 4.1 MMOL/L (ref 3.5–5.3)
PROT SERPL-MCNC: 8.2 G/DL (ref 6.4–8.2)
RBC # BLD AUTO: 4.29 X10*6/UL (ref 4.5–5.9)
SODIUM SERPL-SCNC: 138 MMOL/L (ref 136–145)
WBC # BLD AUTO: 8.9 X10*3/UL (ref 4.4–11.3)

## 2025-02-06 PROCEDURE — 70450 CT HEAD/BRAIN W/O DYE: CPT

## 2025-02-06 PROCEDURE — 36415 COLL VENOUS BLD VENIPUNCTURE: CPT | Performed by: EMERGENCY MEDICINE

## 2025-02-06 PROCEDURE — 99285 EMERGENCY DEPT VISIT HI MDM: CPT | Mod: 25 | Performed by: STUDENT IN AN ORGANIZED HEALTH CARE EDUCATION/TRAINING PROGRAM

## 2025-02-06 PROCEDURE — 70450 CT HEAD/BRAIN W/O DYE: CPT | Performed by: RADIOLOGY

## 2025-02-06 PROCEDURE — 83735 ASSAY OF MAGNESIUM: CPT | Performed by: EMERGENCY MEDICINE

## 2025-02-06 PROCEDURE — 85025 COMPLETE CBC W/AUTO DIFF WBC: CPT | Performed by: EMERGENCY MEDICINE

## 2025-02-06 PROCEDURE — 80053 COMPREHEN METABOLIC PANEL: CPT | Performed by: EMERGENCY MEDICINE

## 2025-02-06 PROCEDURE — 99285 EMERGENCY DEPT VISIT HI MDM: CPT | Performed by: STUDENT IN AN ORGANIZED HEALTH CARE EDUCATION/TRAINING PROGRAM

## 2025-02-06 PROCEDURE — 82947 ASSAY GLUCOSE BLOOD QUANT: CPT

## 2025-02-06 PROCEDURE — 84484 ASSAY OF TROPONIN QUANT: CPT | Performed by: EMERGENCY MEDICINE

## 2025-02-06 ASSESSMENT — PAIN DESCRIPTION - LOCATION: LOCATION: LEG

## 2025-02-06 ASSESSMENT — PAIN - FUNCTIONAL ASSESSMENT: PAIN_FUNCTIONAL_ASSESSMENT: 0-10

## 2025-02-06 NOTE — PROGRESS NOTES
I saw and evaluated the patient. I personally obtained the key and critical portions of the history and physical exam or was physically present for key and critical portions performed by the resident/fellow. I reviewed the resident/fellow's documentation and discussed the patient with the resident/fellow. I agree with the resident/fellow's medical decision making as documented in the note.    Paulo Manning MD MPH

## 2025-02-07 ENCOUNTER — APPOINTMENT (OUTPATIENT)
Dept: RADIOLOGY | Facility: HOSPITAL | Age: 30
End: 2025-02-07
Payer: COMMERCIAL

## 2025-02-07 ENCOUNTER — HOSPITAL ENCOUNTER (INPATIENT)
Facility: HOSPITAL | Age: 30
End: 2025-02-07
Attending: STUDENT IN AN ORGANIZED HEALTH CARE EDUCATION/TRAINING PROGRAM | Admitting: STUDENT IN AN ORGANIZED HEALTH CARE EDUCATION/TRAINING PROGRAM
Payer: COMMERCIAL

## 2025-02-07 ENCOUNTER — CLINICAL SUPPORT (OUTPATIENT)
Dept: EMERGENCY MEDICINE | Facility: HOSPITAL | Age: 30
End: 2025-02-07
Payer: COMMERCIAL

## 2025-02-07 DIAGNOSIS — M35.2: ICD-10-CM

## 2025-02-07 DIAGNOSIS — M35.2 BEHCET'S SYNDROME, NEUROLOGIC TYPE (MULTI): Primary | ICD-10-CM

## 2025-02-07 LAB
ATRIAL RATE: 100 BPM
P AXIS: 72 DEGREES
P OFFSET: 201 MS
P ONSET: 154 MS
PR INTERVAL: 136 MS
Q ONSET: 222 MS
QRS COUNT: 16 BEATS
QRS DURATION: 76 MS
QT INTERVAL: 328 MS
QTC CALCULATION(BAZETT): 423 MS
QTC FREDERICIA: 389 MS
R AXIS: 44 DEGREES
T AXIS: 66 DEGREES
T OFFSET: 386 MS
VENTRICULAR RATE: 100 BPM

## 2025-02-07 PROCEDURE — 2500000004 HC RX 250 GENERAL PHARMACY W/ HCPCS (ALT 636 FOR OP/ED): Mod: SE | Performed by: STUDENT IN AN ORGANIZED HEALTH CARE EDUCATION/TRAINING PROGRAM

## 2025-02-07 PROCEDURE — 2500000004 HC RX 250 GENERAL PHARMACY W/ HCPCS (ALT 636 FOR OP/ED): Mod: SE

## 2025-02-07 PROCEDURE — 2500000005 HC RX 250 GENERAL PHARMACY W/O HCPCS: Mod: SE

## 2025-02-07 PROCEDURE — 1100000001 HC PRIVATE ROOM DAILY

## 2025-02-07 PROCEDURE — 70450 CT HEAD/BRAIN W/O DYE: CPT

## 2025-02-07 PROCEDURE — 70553 MRI BRAIN STEM W/O & W/DYE: CPT | Performed by: RADIOLOGY

## 2025-02-07 PROCEDURE — 93010 ELECTROCARDIOGRAM REPORT: CPT | Performed by: NURSE PRACTITIONER

## 2025-02-07 PROCEDURE — 70551 MRI BRAIN STEM W/O DYE: CPT

## 2025-02-07 PROCEDURE — 70553 MRI BRAIN STEM W/O & W/DYE: CPT

## 2025-02-07 PROCEDURE — A9575 INJ GADOTERATE MEGLUMI 0.1ML: HCPCS | Mod: SE | Performed by: STUDENT IN AN ORGANIZED HEALTH CARE EDUCATION/TRAINING PROGRAM

## 2025-02-07 PROCEDURE — 99223 1ST HOSP IP/OBS HIGH 75: CPT

## 2025-02-07 PROCEDURE — 2550000001 HC RX 255 CONTRASTS: Mod: SE | Performed by: STUDENT IN AN ORGANIZED HEALTH CARE EDUCATION/TRAINING PROGRAM

## 2025-02-07 PROCEDURE — 99222 1ST HOSP IP/OBS MODERATE 55: CPT | Performed by: NEUROLOGICAL SURGERY

## 2025-02-07 PROCEDURE — 93005 ELECTROCARDIOGRAM TRACING: CPT

## 2025-02-07 PROCEDURE — 2500000001 HC RX 250 WO HCPCS SELF ADMINISTERED DRUGS (ALT 637 FOR MEDICARE OP): Mod: SE

## 2025-02-07 RX ORDER — LORAZEPAM 2 MG/ML
1 INJECTION INTRAMUSCULAR ONCE
Status: COMPLETED | OUTPATIENT
Start: 2025-02-07 | End: 2025-02-07

## 2025-02-07 RX ORDER — GABAPENTIN 300 MG/1
300 CAPSULE ORAL EVERY 8 HOURS SCHEDULED
Status: DISPENSED | OUTPATIENT
Start: 2025-02-07

## 2025-02-07 RX ORDER — POLYETHYLENE GLYCOL 3350 17 G/17G
17 POWDER, FOR SOLUTION ORAL DAILY
Status: DISPENSED | OUTPATIENT
Start: 2025-02-07

## 2025-02-07 RX ORDER — ERGOCALCIFEROL 1.25 MG/1
1250 CAPSULE ORAL
Status: DISPENSED | OUTPATIENT
Start: 2025-02-09

## 2025-02-07 RX ORDER — GADOTERATE MEGLUMINE 376.9 MG/ML
10 INJECTION INTRAVENOUS
Status: COMPLETED | OUTPATIENT
Start: 2025-02-07 | End: 2025-02-07

## 2025-02-07 RX ORDER — MYCOPHENOLATE MOFETIL 500 MG/1
500 TABLET ORAL 2 TIMES DAILY
Status: DISPENSED | OUTPATIENT
Start: 2025-02-07

## 2025-02-07 RX ORDER — BRIMONIDINE TARTRATE 2 MG/ML
1 SOLUTION/ DROPS OPHTHALMIC EVERY 12 HOURS
Status: DISPENSED | OUTPATIENT
Start: 2025-02-07

## 2025-02-07 RX ORDER — LORAZEPAM 2 MG/ML
1 INJECTION INTRAMUSCULAR ONCE
Status: DISCONTINUED | OUTPATIENT
Start: 2025-02-07 | End: 2025-02-07

## 2025-02-07 RX ORDER — ONDANSETRON HYDROCHLORIDE 2 MG/ML
4 INJECTION, SOLUTION INTRAVENOUS EVERY 8 HOURS PRN
Status: DISPENSED | OUTPATIENT
Start: 2025-02-07

## 2025-02-07 RX ORDER — PANTOPRAZOLE SODIUM 40 MG/1
40 TABLET, DELAYED RELEASE ORAL DAILY
Status: DISPENSED | OUTPATIENT
Start: 2025-02-07

## 2025-02-07 RX ORDER — SODIUM CHLORIDE 9 MG/ML
100 INJECTION, SOLUTION INTRAVENOUS CONTINUOUS
Status: ACTIVE | OUTPATIENT
Start: 2025-02-07 | End: 2025-02-08

## 2025-02-07 RX ORDER — PREDNISOLONE ACETATE 10 MG/ML
1 SUSPENSION/ DROPS OPHTHALMIC 4 TIMES DAILY
Status: DISPENSED | OUTPATIENT
Start: 2025-02-07

## 2025-02-07 RX ORDER — LIDOCAINE HYDROCHLORIDE 10 MG/ML
20 INJECTION, SOLUTION INFILTRATION; PERINEURAL ONCE
Status: DISCONTINUED | OUTPATIENT
Start: 2025-02-07 | End: 2025-02-07

## 2025-02-07 RX ORDER — ACETAMINOPHEN 325 MG/1
975 TABLET ORAL EVERY 8 HOURS PRN
Status: DISPENSED | OUTPATIENT
Start: 2025-02-07

## 2025-02-07 RX ORDER — ONDANSETRON 4 MG/1
4 TABLET, FILM COATED ORAL EVERY 8 HOURS PRN
Status: ACTIVE | OUTPATIENT
Start: 2025-02-07

## 2025-02-07 RX ADMIN — LORAZEPAM 1 MG: 2 INJECTION INTRAMUSCULAR; INTRAVENOUS at 13:36

## 2025-02-07 RX ADMIN — SODIUM CHLORIDE 100 ML/HR: 9 INJECTION, SOLUTION INTRAVENOUS at 05:24

## 2025-02-07 RX ADMIN — GABAPENTIN 300 MG: 300 CAPSULE ORAL at 15:58

## 2025-02-07 RX ADMIN — PANTOPRAZOLE SODIUM 40 MG: 40 TABLET, DELAYED RELEASE ORAL at 15:58

## 2025-02-07 RX ADMIN — PREDNISOLONE ACETATE 1 DROP: 10 SUSPENSION/ DROPS OPHTHALMIC at 15:58

## 2025-02-07 RX ADMIN — ONDANSETRON 4 MG: 2 INJECTION INTRAMUSCULAR; INTRAVENOUS at 15:58

## 2025-02-07 RX ADMIN — BRIMONIDINE TARTRATE 1 DROP: 2 SOLUTION/ DROPS OPHTHALMIC at 15:58

## 2025-02-07 RX ADMIN — GADOTERATE MEGLUMINE 10 ML: 376.9 INJECTION INTRAVENOUS at 14:32

## 2025-02-07 RX ADMIN — SODIUM CHLORIDE 100 ML/HR: 9 INJECTION, SOLUTION INTRAVENOUS at 21:30

## 2025-02-07 RX ADMIN — PREDNISOLONE ACETATE 1 DROP: 10 SUSPENSION/ DROPS OPHTHALMIC at 17:30

## 2025-02-07 RX ADMIN — PREDNISOLONE ACETATE 1 DROP: 10 SUSPENSION/ DROPS OPHTHALMIC at 21:50

## 2025-02-07 ASSESSMENT — PAIN - FUNCTIONAL ASSESSMENT
PAIN_FUNCTIONAL_ASSESSMENT: WONG-BAKER FACES
PAIN_FUNCTIONAL_ASSESSMENT: 0-10

## 2025-02-07 ASSESSMENT — COGNITIVE AND FUNCTIONAL STATUS - GENERAL
DAILY ACTIVITIY SCORE: 6
CLIMB 3 TO 5 STEPS WITH RAILING: TOTAL
HELP NEEDED FOR BATHING: TOTAL
MOVING TO AND FROM BED TO CHAIR: TOTAL
PERSONAL GROOMING: TOTAL
MOBILITY SCORE: 10
DRESSING REGULAR LOWER BODY CLOTHING: TOTAL
DRESSING REGULAR UPPER BODY CLOTHING: TOTAL
TOILETING: TOTAL
EATING MEALS: TOTAL
TURNING FROM BACK TO SIDE WHILE IN FLAT BAD: A LITTLE
WALKING IN HOSPITAL ROOM: TOTAL
STANDING UP FROM CHAIR USING ARMS: TOTAL
MOVING FROM LYING ON BACK TO SITTING ON SIDE OF FLAT BED WITH BEDRAILS: A LITTLE

## 2025-02-07 ASSESSMENT — PAIN SCALES - GENERAL
PAINLEVEL_OUTOF10: 2
PAINLEVEL_OUTOF10: 10 - WORST POSSIBLE PAIN
PAINLEVEL_OUTOF10: 0 - NO PAIN

## 2025-02-07 ASSESSMENT — PAIN SCALES - WONG BAKER: WONGBAKER_NUMERICALRESPONSE: NO HURT
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
## 2025-02-07 NOTE — PROGRESS NOTES
Pharmacy Medication History Review    Juan Marsh is a 29 y.o. male admitted for Behcet's syndrome, neurologic type (Multi). Pharmacy reviewed the patient's [id number] medications and allergies for accuracy.    Medications ADDED:  N/A  Medications CHANGED:  N/A  Medications REMOVED:   N/A     The list below reflects the updated PTA list.   Prior to Admission Medications   Prescriptions Last Dose Informant   acetaminophen (TylenoL) 325 mg tablet 2/6/2025 Self   Sig: Take 2 tablets (650 mg) by mouth every 6 hours if needed for mild pain (1 - 3) or fever (temp greater than 38.0 C).   brimonidine (AlphaGAN) 0.2 % ophthalmic solution 2/6/2025 Self   Sig: Administer 1 drop into the right eye every 12 hours.   cyclobenzaprine (Flexeril) 10 mg tablet Past Week Self   Sig: Take 1 tablet (10 mg) by mouth 3 times a day as needed for muscle spasms.   diclofenac sodium (Voltaren) 1 % gel Past Week Self   Sig: Apply 4.5 inches (4 g) topically 4 times a day.   ergocalciferol (Vitamin D-2) 1.25 MG (78970 UT) capsule Past Month Self   Sig: Take 1 capsule (1,250 mcg) by mouth 1 (one) time per week.   gabapentin (Neurontin) 300 mg capsule 2/6/2025 Self   Sig: Take 1 capsule (300 mg) by mouth every 8 hours.   mycophenolate (Cellcept) 500 mg tablet 2/6/2025 Self   Sig: Take 1 tablet (500 mg) by mouth 2 times a day.   pantoprazole (ProtoNix) 40 mg EC tablet Past Week Self   Sig: Take 1 tablet (40 mg) by mouth once daily.   predniSONE (Deltasone) 10 mg tablet  Self   Sig: Take 2 tablets (20 mg) by mouth once daily for 10 days, THEN 1 tablet (10 mg) once daily.   prednisoLONE acetate (Pred-Forte) 1 % ophthalmic suspension 2/6/2025 Self   Sig: Administer 1 drop into the right eye 4 times a day.      Facility-Administered Medications: None         The list below reflects the updated allergy list. Please review each documented allergy for additional clarification and justification.  Allergies  Reviewed by Vero Crews on  "2/7/2025   No Known Allergies         Patient accepts M2B at discharge.     Sources:   [facility]  Pharmacy dispense history  Patient interview Moderate historian     Additional Comments:  Patient reported that he hasn't started taking Prednisone 10 mg at home yet.      JOCELYN BANDA  Pharmacy Technician  02/07/25     Secure Chat preferred   If no response call [phone] or [employer] \"Med Rec\"    "

## 2025-02-07 NOTE — H&P
History Of Present Illness:      Juan Marsh is a 29 y.o. 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 He reported that his prednisone was not sent to his pharmacy and has been off prednisone for at least a week. He denied having fever, chills, sore throat. He endorses headache. The patient was not cooperative in providing history.       Neuro - Immunology   Date of onset: 9/2021   Date of diagnosis: 8/2021   Last MRI brain: 8/2024   Last MRI cervical: 9/2021   Last OCT (Optical Coherence Tomography/Visual Evoked Potential): 9/2021    Possible NEUROBEHCET.      Disease Course at Onset: RR.   Disease Course Now: RR.   Current DMT (Disease Modifying Therapies): MMF + prednisone.   Previous DMT (Disease Modifying Therapies): Humira + immuran    CSF (Cerebrospinal Fluid): done in September of 2021, bland with negative OCBs and IGG index. Repeated 5/2024: glucose 63, protein 48 WBC 12, negative OCBs and IGG studies, negative cytology and flow  JCV (Lawrence Cunningham Virus): not done.   VZV (Varicella Zoster Virus): negative 1/2022.   Hep Panel: negative hepatitis C.   NMO (Neuromyelitis Optica): negative 11/2021.   MOG (Myelin Oligodendrocyte Glycoprotein): negative 11/2021.       Per outpatient note:     1/18/2021: No new symptoms. still has some minor right leg weakness. repeat brain MRI showed significant improvement in the left ganglionic/thalamic lesion. AQP4 and MOG antibodies came back negative. he starting cutting down on smoking. He ran out of prednisone. will refill. He should overlap AZA and  prednisone for at least six months.     05/02/2022:No new symptoms. due to progression of retinal vasculitis, rheumatology started him on humira in January of 2022. He complains of some numbness in the feet that started before starting humira. Humira can cause iatrogenic CNS inflammation and we have to watch him closely while on this medication.      7/9/2024: admitted to the hospital under my care in May 2024 due to right hemibody weakness and hyposthesia along with worsening headache and diplopia. He had stopped humira in 2022 and later stopped AZA and prednisone. Found to have recurrent enhancing lesion in the left thalamus extending to the left midbrain. CSF showed pleocytosis (12) with negative OCBs, IGG studies, cytology, and flow. HLAB51 was tested and came back negative. Suspicion of lymphoma arose given lesion recurrence in the same location. Ophthalmology also suspected sarcoidosis. CT C/A/P showed enlarging external iliac lymph nodes but deemed not amenable to biopsy by IR. He improved clinically with IVMP and repeat imaging showed resolution of the abnormal enhancement but persistent FLAIR changes. He was started on MMF before discharge with good tolerability. His right hip pain is worsening requiring crutches. He is off prednisone now.      10/14/2024: Shortly after his visit with me in July, he started noticing worsening headaches, cold feeling over the left side of the head and face, and worsening right sided weakness. He presented to [facility] where brain MRI showed significant worsening of the left sided enhancing lesion in the thalamus, BG, and brainstem with new extension to the optic tract and the internal/external capsules. He was treated with IVMP with subsequent clinical improvement. It was unclear whether he has been taking his MMF or not and he still cannot confirm to me that he is taking it currently. I will prescribe MMF again and will increase his prednisone. Will repeat his brain MRI for  lesion monitoring. Given several recurrences in the same spot, I will refer him to neuro-oncology to evaluate the possibility of CNS lymphoma.         Past Medical History  Past Medical History:   Diagnosis Date    Lupus (systemic lupus erythematosus) (Multi)     Stroke (Multi)     Uveitis      Surgical History  Past Surgical History:   Procedure Laterality Date    CT ANGIO NECK  9/2/2021    CT NECK ANGIO W AND WO IV CONTRAST 9/2/2021 [facility] CLINICAL LEGACY    CT HEAD ANGIO W AND WO IV CONTRAST  9/2/2021    CT HEAD ANGIO W AND WO IV CONTRAST 9/2/2021 [facility] CLINICAL LEGACY     Social History  Social History     Tobacco Use    Smoking status: Every Day     Types: Cigars    Smokeless tobacco: Never   Vaping Use    Vaping status: Unknown   Substance Use Topics    Alcohol use: Not Currently    Drug use: Yes     Types: Marijuana       Reported that last drink was a month ago     Allergies  Patient has no known allergies.  (Not in a hospital admission)      Review of Systems  Neurological Exam  Mental Status  Awake. Level of consciousness: Sleepy but arousable follow simple commands (limited cooperation) . Oriented only to person and place. Speech: Very muffled hypophonic. Able to name objects, name parts of objects, repeat and read.    Cranial Nerves  CN III, IV, VI: Extraocular movements intact bilaterally.   Right pupil: 3 mm. Round.   Left pupil: 3 mm. Round.  CN V: Facial sensation is normal.  CN VII:  Left: There is central facial weakness.  CN XI: Shoulder shrug strength is normal.  CN XII: Tongue midline without atrophy or fasciculations.    Motor   No pronator drift.  Strength: RUE 5/5 proximally and distally  LUE: 5/5 proximally and distally     RLE: proximally 4/5, distally 5/5  LLE proximally 4/5, distally 5/5  .    Sensory  Light touch is normal in upper and lower extremities.     Physical Exam  Constitutional:       General: He is awake.   Eyes:      Extraocular Movements: Extraocular movements intact.  "      Last Recorded Vitals  Blood pressure 114/79, pulse 82, temperature 36.2 °C (97.2 °F), resp. rate 16, height 1.727 m (5' 8\"), weight 58.1 kg (128 lb), SpO2 99%.    Relevant Results                    Noemi Coma Scale  Best Eye Response: Spontaneous  Best Verbal Response: Oriented  Best Motor Response: Follows commands  Noemi Coma Scale Score: 15                     Imaging:     [facility] 2/6/2025:    IMPRESSION:  1. Geographic area of hypoattenuation favored to represent edema  within the right basal ganglia extending inferiorly to the level of  the tyrone. There is associated mass effect resulting in 3 mm leftward  midline shift and partial effacement of the frontal horn of the right  lateral ventricle. When compared to prior imaging, this finding is  favored to represent an acute flare of patient's neuro Behcet's  syndrome. MRI is recommended for further evaluation.  2. No acute intracranial hemorrhage.  3. Hypoattenuation is seen within the left corona radiata extending  inferiorly into the left basal ganglia, improved when compared to  08/28/2024.          Assessment/Plan   Assessment & Plan          Juan Marsh is a 29 y.o. 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 He reported that his prednisone was not sent to his pharmacy and has been off prednisone for at least a week but he reported taking his MMF. CTH showed new R BG hypodensity with 3mm midline shift c/f neuro-Behcet flare.           # New R BG c/f Neuro-Behcet flare    - Admit with neurology floor status  - Obtain MRI w wo  - Lower " suspicion for infectious process at this time given normal WBC, afebrile . Will likely start 1g IV methyl prednisone for 3-5 days  - Continue on mycophenolate for now  - Consider LP (no more than 15cc) given slight midline shift to send for cytology/infectious/basic csf analysis  - PPI/Vit D  - continue home gabapentin 300mg TIID         Janet Craig MD  PGY-4 Neurology

## 2025-02-07 NOTE — ED TRIAGE NOTES
Patient presents to the ED for weakness and facial droop x1 week. Patient has Bechet's disease. Has been off prednisone for one week d/t prescription issues and was unable to fill out prescription. Sent to ED by  Patient has positive left side facial droop. Dr. Balderrama Decined BAT activation in triage.

## 2025-02-07 NOTE — PROGRESS NOTES
I received this patient during signout at 0200.   Please see previous provider's note for detailed H&P, labs and imaging.      Under my care, patient reassessed and remains clinically stable. Previous ED provider had already ordered MRI brain for further evaluation. 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 See ED course below.    @  ED Course as of 02/07/25 0439 Fri Feb 07, 2025   0207 Labs reviewed and overall wnl. Magnesium slightly elevated at 2.63 [SA]   0208 CTH reviewed:  IMPRESSION:  1. Geographic area of hypoattenuation favored to represent edema  within the right basal ganglia extending inferiorly to the level of  the tyrone. There is associated mass effect resulting in 3 mm leftward  midline shift and partial effacement of the frontal horn of the right  lateral ventricle. When compared to prior imaging, this finding is  favored to represent an acute flare of patient's neuro Behcet's  syndrome. MRI is recommended for further evaluation.  2. No acute intracranial hemorrhage.  3. Hypoattenuation is seen within the left corona radiata extending  inferiorly into the left basal ganglia, improved when compared to  08/28/2024.   [SA]   0208 Follow up MRI ordered [SA]   0209 Neurology consulted [SA]   0439 Patient admitted to neurology [SA]      ED Course User Index  [SA] Ashley Ann DO         Diagnoses as of 02/07/25 0439   Behcet's syndrome, neurologic type (Multi)   @    Disposition: Per discussion with Neurology team, patient was accepted for admission to Neurology       Patient seen and staffed with attending physician.     Ashley Ann DO   EM PGY3

## 2025-02-07 NOTE — Clinical Note
ED  Recommendation:  ED- Rec. (02/07/25 0411 : Brenda Olmedo RN)  
pt complaining of right neck swelling that happened all of the sudden after eating dinner around 8:30 PM. pt denies any shortness of breath. no lip swelling.

## 2025-02-07 NOTE — ED PROVIDER NOTES
HPI   Chief Complaint   Patient presents with    Weakness, Gen    Facial Droop       HPI  29-year-old male patient who was seen in triage by previous team, was not a BAT activation has had symptoms of left facial droop for approximately 1 week, concern for flareup of neuro Behcet's.  He has been off his prednisone secondary to prescription issues.  I was made aware of patient at time of signout by offgoing team, he was in waiting room, had not been formally picked up, put my name on patient, he was roomed several hours later, I went to bedside and attempted to get history at time of electronic rooming.    Patient has blanket over head and is sleeping. He was arousable to voice and me removing blanket from his head, tracks with eyes. Is not answering my questions, keeps putting the blanket back on his head.  I apologized for the delay in patient's care, and asked patient if he was having a headache. He is not answering, and continues to pull blanket over head and trying to go back to sleep, not able to get ROS or a history otherwise. He has normal strength, does not have an obvious facial droop on my exam, I discussed with patient the findings on his CT head that were concerning for mass effect, 3 mm leftward midline shift, partial effacement of frontal horn concerning for an acute flareup of patient's neuro Behcet's syndrome. Patient did not appear to understand this discussion.    From chart review, appears to have run out of his steroids.      Patient History   Past Medical History:   Diagnosis Date    Lupus (systemic lupus erythematosus) (Multi)     Stroke (Multi)     Uveitis      Past Surgical History:   Procedure Laterality Date    CT ANGIO NECK  9/2/2021    CT NECK ANGIO W AND WO IV CONTRAST 9/2/2021 [facility]    CT HEAD ANGIO W AND WO IV CONTRAST  9/2/2021    CT HEAD ANGIO W AND WO IV CONTRAST 9/2/2021 [facility] CLINICAL LEGAC[facility]     Family History   Problem Relation Name Age of Onset    Other (Diabetes  mellitus) Other Multiple Family Members     Hypertension Other Multiple Family Members     Cancer Other Multiple Family Members      Social History     Tobacco Use    Smoking status: Every Day     Types: Cigars    Smokeless tobacco: Never   Vaping Use    Vaping status: Unknown   Substance Use Topics    Alcohol use: Not Currently    Drug use: Yes     Types: Marijuana       Physical Exam   ED Triage Vitals [02/06/25 1953]   Temperature Heart Rate Respirations BP   36.1 °C (97 °F) 98 18 114/79      Pulse Ox Temp Source Heart Rate Source Patient Position   98 % Temporal Monitor --      BP Location FiO2 (%)     -- --       Physical Exam  Vitals and nursing note reviewed.   Constitutional:       General: He is not in acute distress.     Appearance: He is well-developed.   HENT:      Head: Normocephalic and atraumatic.   Eyes:      Conjunctiva/sclera: Conjunctivae normal.   Cardiovascular:      Rate and Rhythm: Normal rate and regular rhythm.      Pulses: Normal pulses.      Heart sounds: Normal heart sounds.   Pulmonary:      Effort: Pulmonary effort is normal. No respiratory distress.      Breath sounds: Normal breath sounds.   Abdominal:      Palpations: Abdomen is soft.      Tenderness: There is no abdominal tenderness.   Musculoskeletal:         General: No swelling.      Cervical back: Neck supple.   Skin:     General: Skin is warm and dry.      Capillary Refill: Capillary refill takes less than 2 seconds.   Neurological:      Mental Status: He is alert.      Comments: Patient is not cooperative with my neuro exam, reportedly had a left facial droop that he could overcome in the waiting room. He is not answering questions, keeps pulling blanket over his head. Moving arms and legs and has good strength, no signs of truncal ataxia   Psychiatric:         Mood and Affect: Mood normal.       ED Course & MDM   ED Course as of 02/07/25 0806   Fri Feb 07, 2025   0207 Labs reviewed and overall wnl. Magnesium slightly elevated  at 2.63 [SA]   0208 CTH reviewed:  IMPRESSION:  1. Geographic area of hypoattenuation favored to represent edema  within the right basal ganglia extending inferiorly to the level of  the tyrone. There is associated mass effect resulting in 3 mm leftward  midline shift and partial effacement of the frontal horn of the right  lateral ventricle. When compared to prior imaging, this finding is  favored to represent an acute flare of patient's neuro Behcet's  syndrome. MRI is recommended for further evaluation.  2. No acute intracranial hemorrhage.  3. Hypoattenuation is seen within the left corona radiata extending  inferiorly into the left basal ganglia, improved when compared to  08/28/2024.   [SA]   0208 Follow up MRI ordered [SA]   0209 Neurology consulted [SA]   0439 Patient admitted to neurology [SA]      ED Course User Index  [SA] Ashley Ann DO         Diagnoses as of 02/07/25 0806   Behcet's syndrome, neurologic type (Multi)                 No data recorded     Tina Coma Scale Score: 15 (02/06/25 1955 : Oriana Phillips RN)                           Medical Decision Making  29-year-old male patient with known history of lupus, concern for neuro Behcet's, was admitted 12/11/2024 in the setting of bilateral leg pain, difficulty ambulating at that time, and was generally considered to be recovering rather than a new flare per neurology.    Patient is not a good historian on my evaluation, I believe based on the conversation with previous team and triage documentation on patient arrival that this may be secondary to medication noncompliance/barriers to access to meds.    Patient was roomed many hours after his initial evaluation by our team in triage, I came to bedside, patient is not answering questions, he does not have an obvious neurodeficit other than his altered mental status.  He was roomed outside of my Blue pod, in Green 19 across the department, ordering an MRI brain with and without contrast  preliminarily as these have been the MRIs ordered in the past per chart review, and placing consult to neurology. Signed out patient's care to the green pod team so his continuity of care can be performed by patient's able to watch this altered patient closely on the monitor.    CI:  1. Behcet's syndrome, neurologic type (Multi)        Dispo:  Signed out pending MRI, neuro recs    Procedure  Procedures     Leo Tai MD  02/07/25 0859

## 2025-02-08 LAB
ALBUMIN SERPL BCP-MCNC: 4.1 G/DL (ref 3.4–5)
ANION GAP SERPL CALC-SCNC: 15 MMOL/L (ref 10–20)
APPEARANCE CSF: CLEAR
APPEARANCE CSF: CLEAR
APTT PPP: 39 SECONDS (ref 27–38)
BASOPHILS # BLD AUTO: 0.03 X10*3/UL (ref 0–0.1)
BASOPHILS NFR BLD AUTO: 0.3 %
BASOPHILS NFR CSF MANUAL: 0 %
BASOPHILS NFR CSF MANUAL: 0 %
BLASTS CSF MANUAL: 0 %
BLASTS CSF MANUAL: 0 %
BUN SERPL-MCNC: 23 MG/DL (ref 6–23)
C GATTII+NEOFOR DNA CSF QL NAA+NON-PROBE: NOT DETECTED
CALCIUM SERPL-MCNC: 9.6 MG/DL (ref 8.6–10.6)
CHLORIDE SERPL-SCNC: 106 MMOL/L (ref 98–107)
CMV DNA CSF QL NAA+NON-PROBE: NOT DETECTED
CO2 SERPL-SCNC: 23 MMOL/L (ref 21–32)
COLOR CSF: COLORLESS
COLOR CSF: COLORLESS
COLOR SPUN CSF: COLORLESS
COLOR SPUN CSF: COLORLESS
CREAT SERPL-MCNC: 1.06 MG/DL (ref 0.5–1.3)
E COLI K1 DNA CSF QL NAA+NON-PROBE: NOT DETECTED
EGFRCR SERPLBLD CKD-EPI 2021: >90 ML/MIN/1.73M*2
EOSINOPHIL # BLD AUTO: 0.03 X10*3/UL (ref 0–0.7)
EOSINOPHIL NFR BLD AUTO: 0.3 %
EOSINOPHIL NFR CSF MANUAL: 0 %
EOSINOPHIL NFR CSF MANUAL: 0 %
ERYTHROCYTE [DISTWIDTH] IN BLOOD BY AUTOMATED COUNT: 13.6 % (ref 11.5–14.5)
EV RNA CSF QL NAA+NON-PROBE: NOT DETECTED
GLUCOSE BLD MANUAL STRIP-MCNC: 115 MG/DL (ref 74–99)
GLUCOSE BLD MANUAL STRIP-MCNC: 83 MG/DL (ref 74–99)
GLUCOSE CSF-MCNC: 58 MG/DL (ref 40–70)
GLUCOSE SERPL-MCNC: 90 MG/DL (ref 74–99)
GP B STREP DNA CSF QL NAA+NON-PROBE: NOT DETECTED
HAEM INFLU DNA CSF QL NAA+NON-PROBE: NOT DETECTED
HCT VFR BLD AUTO: 35.3 % (ref 41–52)
HGB BLD-MCNC: 11.6 G/DL (ref 13.5–17.5)
HHV6 DNA CSF QL NAA+NON-PROBE: NOT DETECTED
HOLD SPECIMEN: NORMAL
HSV1 DNA CSF QL NAA+NON-PROBE: NOT DETECTED
HSV2 DNA CSF QL NAA+NON-PROBE: NOT DETECTED
IMM GRANULOCYTES # BLD AUTO: 0.03 X10*3/UL (ref 0–0.7)
IMM GRANULOCYTES NFR BLD AUTO: 0.3 % (ref 0–0.9)
IMM GRANULOCYTES NFR CSF: 0 %
IMM GRANULOCYTES NFR CSF: 0 %
INR PPP: 1.1 (ref 0.9–1.1)
L MONOCYTOG DNA CSF QL NAA+NON-PROBE: NOT DETECTED
LDH CSF L TO P-CCNC: <25 U/L
LYMPHOCYTES # BLD AUTO: 2.09 X10*3/UL (ref 1.2–4.8)
LYMPHOCYTES NFR BLD AUTO: 23.7 %
LYMPHOCYTES NFR CSF MANUAL: 31 % (ref 28–96)
LYMPHOCYTES NFR CSF MANUAL: 39 % (ref 28–96)
MAGNESIUM SERPL-MCNC: 2.14 MG/DL (ref 1.6–2.4)
MCH RBC QN AUTO: 31 PG (ref 26–34)
MCHC RBC AUTO-ENTMCNC: 32.9 G/DL (ref 32–36)
MCV RBC AUTO: 94 FL (ref 80–100)
MONOCYTES # BLD AUTO: 0.6 X10*3/UL (ref 0.1–1)
MONOCYTES NFR BLD AUTO: 6.8 %
MONOS+MACROS NFR CSF MANUAL: 1 % (ref 16–56)
MONOS+MACROS NFR CSF MANUAL: 10 % (ref 16–56)
N MEN DNA CSF QL NAA+NON-PROBE: NOT DETECTED
NEUTROPHILS # BLD AUTO: 6.03 X10*3/UL (ref 1.2–7.7)
NEUTROPHILS NFR BLD AUTO: 68.6 %
NEUTS SEG NFR CSF MANUAL: 51 % (ref 0–5)
NEUTS SEG NFR CSF MANUAL: 68 % (ref 0–5)
NRBC BLD-RTO: 0 /100 WBCS (ref 0–0)
OTHER CELLS NFR CSF MANUAL: 0 %
OTHER CELLS NFR CSF MANUAL: 0 %
PARECHOVIRUS A RNA CSF QL NAA+NON-PROBE: NOT DETECTED
PHOSPHATE SERPL-MCNC: 3.5 MG/DL (ref 2.5–4.9)
PLASMA CELLS NFR CSF MICRO: 0 %
PLASMA CELLS NFR CSF MICRO: 0 %
PLATELET # BLD AUTO: 290 X10*3/UL (ref 150–450)
POTASSIUM SERPL-SCNC: 3.9 MMOL/L (ref 3.5–5.3)
PROT CSF-MCNC: 57 MG/DL (ref 15–45)
PROTHROMBIN TIME: 12.5 SECONDS (ref 9.8–12.8)
RBC # BLD AUTO: 3.74 X10*6/UL (ref 4.5–5.9)
RBC # CSF AUTO: 0 /UL (ref 0–5)
RBC # CSF AUTO: 612 /UL (ref 0–5)
S PNEUM DNA CSF QL NAA+NON-PROBE: NOT DETECTED
SODIUM SERPL-SCNC: 140 MMOL/L (ref 136–145)
TOTAL CELLS COUNTED CSF: 100
TOTAL CELLS COUNTED CSF: 100
TUBE # CSF: ABNORMAL
TUBE # CSF: ABNORMAL
VZV DNA CSF QL NAA+NON-PROBE: NOT DETECTED
WBC # BLD AUTO: 8.8 X10*3/UL (ref 4.4–11.3)
WBC # CSF AUTO: 74 /UL (ref 1–5)
WBC # CSF AUTO: 86 /UL (ref 1–5)

## 2025-02-08 PROCEDURE — 85730 THROMBOPLASTIN TIME PARTIAL: CPT

## 2025-02-08 PROCEDURE — 82947 ASSAY GLUCOSE BLOOD QUANT: CPT

## 2025-02-08 PROCEDURE — 85610 PROTHROMBIN TIME: CPT

## 2025-02-08 PROCEDURE — 87070 CULTURE OTHR SPECIMN AEROBIC: CPT

## 2025-02-08 PROCEDURE — 2500000004 HC RX 250 GENERAL PHARMACY W/ HCPCS (ALT 636 FOR OP/ED): Mod: SE

## 2025-02-08 PROCEDURE — 89051 BODY FLUID CELL COUNT: CPT

## 2025-02-08 PROCEDURE — 84157 ASSAY OF PROTEIN OTHER: CPT

## 2025-02-08 PROCEDURE — 2500000001 HC RX 250 WO HCPCS SELF ADMINISTERED DRUGS (ALT 637 FOR MEDICARE OP): Mod: SE

## 2025-02-08 PROCEDURE — 82040 ASSAY OF SERUM ALBUMIN: CPT

## 2025-02-08 PROCEDURE — 83615 LACTATE (LD) (LDH) ENZYME: CPT

## 2025-02-08 PROCEDURE — 86255 FLUORESCENT ANTIBODY SCREEN: CPT

## 2025-02-08 PROCEDURE — 82945 GLUCOSE OTHER FLUID: CPT

## 2025-02-08 PROCEDURE — 88185 FLOWCYTOMETRY/TC ADD-ON: CPT | Mod: TC

## 2025-02-08 PROCEDURE — 83735 ASSAY OF MAGNESIUM: CPT

## 2025-02-08 PROCEDURE — 99233 SBSQ HOSP IP/OBS HIGH 50: CPT | Performed by: STUDENT IN AN ORGANIZED HEALTH CARE EDUCATION/TRAINING PROGRAM

## 2025-02-08 PROCEDURE — 2500000004 HC RX 250 GENERAL PHARMACY W/ HCPCS (ALT 636 FOR OP/ED): Mod: SE | Performed by: STUDENT IN AN ORGANIZED HEALTH CARE EDUCATION/TRAINING PROGRAM

## 2025-02-08 PROCEDURE — 009U3ZX DRAINAGE OF SPINAL CANAL, PERCUTANEOUS APPROACH, DIAGNOSTIC: ICD-10-PCS

## 2025-02-08 PROCEDURE — 88112 CYTOPATH CELL ENHANCE TECH: CPT | Mod: TC,MCY

## 2025-02-08 PROCEDURE — 36415 COLL VENOUS BLD VENIPUNCTURE: CPT

## 2025-02-08 PROCEDURE — 1100000001 HC PRIVATE ROOM DAILY

## 2025-02-08 PROCEDURE — 3E03305 INTRODUCTION OF OTHER ANTINEOPLASTIC INTO PERIPHERAL VEIN, PERCUTANEOUS APPROACH: ICD-10-PCS

## 2025-02-08 PROCEDURE — 85025 COMPLETE CBC W/AUTO DIFF WBC: CPT

## 2025-02-08 PROCEDURE — 70450 CT HEAD/BRAIN W/O DYE: CPT | Performed by: RADIOLOGY

## 2025-02-08 PROCEDURE — 88112 CYTOPATH CELL ENHANCE TECH: CPT | Performed by: PATHOLOGY

## 2025-02-08 PROCEDURE — 87799 DETECT AGENT NOS DNA QUANT: CPT

## 2025-02-08 PROCEDURE — 80069 RENAL FUNCTION PANEL: CPT

## 2025-02-08 PROCEDURE — 87483 CNS DNA AMP PROBE TYPE 12-25: CPT

## 2025-02-08 RX ORDER — INSULIN LISPRO 100 [IU]/ML
0-5 INJECTION, SOLUTION INTRAVENOUS; SUBCUTANEOUS
Status: DISPENSED | OUTPATIENT
Start: 2025-02-08

## 2025-02-08 RX ORDER — SODIUM CHLORIDE 9 MG/ML
5 INJECTION, SOLUTION INTRAVENOUS CONTINUOUS
Status: ACTIVE | OUTPATIENT
Start: 2025-02-08 | End: 2025-02-09

## 2025-02-08 RX ADMIN — GABAPENTIN 300 MG: 300 CAPSULE ORAL at 14:53

## 2025-02-08 RX ADMIN — MYCOPHENOLATE MOFETIL 500 MG: 500 TABLET, FILM COATED ORAL at 08:28

## 2025-02-08 RX ADMIN — PREDNISOLONE ACETATE 1 DROP: 10 SUSPENSION/ DROPS OPHTHALMIC at 21:22

## 2025-02-08 RX ADMIN — SODIUM CHLORIDE 5 ML/HR: 9 INJECTION, SOLUTION INTRAVENOUS at 12:25

## 2025-02-08 RX ADMIN — PREDNISOLONE ACETATE 1 DROP: 10 SUSPENSION/ DROPS OPHTHALMIC at 14:53

## 2025-02-08 RX ADMIN — BRIMONIDINE TARTRATE 1 DROP: 2 SOLUTION/ DROPS OPHTHALMIC at 14:53

## 2025-02-08 RX ADMIN — PANTOPRAZOLE SODIUM 40 MG: 40 TABLET, DELAYED RELEASE ORAL at 08:28

## 2025-02-08 RX ADMIN — PREDNISOLONE ACETATE 1 DROP: 10 SUSPENSION/ DROPS OPHTHALMIC at 08:28

## 2025-02-08 RX ADMIN — METHYLPREDNISOLONE SODIUM SUCCINATE 1000 MG: 1 INJECTION INTRAMUSCULAR; INTRAVENOUS at 18:40

## 2025-02-08 ASSESSMENT — PAIN SCALES - GENERAL
PAINLEVEL_OUTOF10: 0 - NO PAIN

## 2025-02-08 ASSESSMENT — PAIN - FUNCTIONAL ASSESSMENT
PAIN_FUNCTIONAL_ASSESSMENT: 0-10
PAIN_FUNCTIONAL_ASSESSMENT: WONG-BAKER FACES
PAIN_FUNCTIONAL_ASSESSMENT: 0-10
PAIN_FUNCTIONAL_ASSESSMENT: 0-10

## 2025-02-08 ASSESSMENT — COGNITIVE AND FUNCTIONAL STATUS - GENERAL
WALKING IN HOSPITAL ROOM: TOTAL
STANDING UP FROM CHAIR USING ARMS: A LITTLE
HELP NEEDED FOR BATHING: A LOT
DRESSING REGULAR UPPER BODY CLOTHING: TOTAL
CLIMB 3 TO 5 STEPS WITH RAILING: TOTAL
HELP NEEDED FOR BATHING: TOTAL
WALKING IN HOSPITAL ROOM: A LOT
TURNING FROM BACK TO SIDE WHILE IN FLAT BAD: A LOT
MOVING FROM LYING ON BACK TO SITTING ON SIDE OF FLAT BED WITH BEDRAILS: A LITTLE
EATING MEALS: A LITTLE
MOBILITY SCORE: 16
DAILY ACTIVITIY SCORE: 13
MOBILITY SCORE: 10
EATING MEALS: TOTAL
MOVING TO AND FROM BED TO CHAIR: A LITTLE
MOVING TO AND FROM BED TO CHAIR: A LOT
DRESSING REGULAR LOWER BODY CLOTHING: A LOT
TURNING FROM BACK TO SIDE WHILE IN FLAT BAD: A LITTLE
DRESSING REGULAR LOWER BODY CLOTHING: TOTAL
PERSONAL GROOMING: TOTAL
MOVING FROM LYING ON BACK TO SITTING ON SIDE OF FLAT BED WITH BEDRAILS: A LOT
DRESSING REGULAR UPPER BODY CLOTHING: A LOT
TOILETING: TOTAL
PERSONAL GROOMING: A LOT
TOILETING: A LOT
CLIMB 3 TO 5 STEPS WITH RAILING: A LOT
DAILY ACTIVITIY SCORE: 6
STANDING UP FROM CHAIR USING ARMS: A LOT

## 2025-02-08 ASSESSMENT — PAIN SCALES - WONG BAKER: WONGBAKER_NUMERICALRESPONSE: NO HURT

## 2025-02-08 NOTE — SIGNIFICANT EVENT
Neurosurgery update    Given location of lesion, (R BG, midbrain,) there is not an safely accessible area to biopsy for histopathologic diagnosis. Therefore, it is prudent for full CSF sampling in semiurgent fashion prior to initiation of empiric treatment.    Omar Saravia MD  PGY6, Dept of Neurosurgery

## 2025-02-08 NOTE — CARE PLAN
The patient's goals for the shift include RICK    The clinical goals for the shift include safety      Problem: Fall/Injury  Goal: Not fall by end of shift  Outcome: Progressing  Goal: Be free from injury by end of the shift  Outcome: Progressing  Goal: Verbalize understanding of personal risk factors for fall in the hospital  Outcome: Progressing  Goal: Verbalize understanding of risk factor reduction measures to prevent injury from fall in the home  Outcome: Progressing  Goal: Use assistive devices by end of the shift  Outcome: Progressing  Goal: Pace activities to prevent fatigue by end of the shift  Outcome: Progressing

## 2025-02-08 NOTE — PROCEDURES
NEUROLOGY PROCEDURE NOTE  Procedure Name: Lumbar Puncture  Date: 2/8/25  Time of /Procedure: 12:15  Proceduralist(s): Shawn  Assistant(s): Maria Del Carmen  Indication(s): For cytology ddx lymphoma and Behcet  Consented?: Yes  Pre-Procedure Verification?: Yes    I reviewed the patient's CBC, coagulation profile, and active meds prior to the procedure.    The patient was positioned sitting up. The lower back was prepped and draped in the usual sterile fashion. A solution of 1% lidocaine was used to numb the region. Using landmarks, a 22-gauge Quincke spinal needle was inserted in the L3-4 innerspace and advanced into the subarachnoid space on 1  attempt(s). The stylet was removed. Four tubes containing a total approximate volume of 16 cc of clear fluid were collected and sent for analysis. Patient's serum POCT glucose was 83 at the time of the LP. The patient tolerated the procedure well; there were no immediate complications. Patient was instructed to rest supine for 1 hour.    Brian Escobar MD PhD  Neurology resident, PGY-2

## 2025-02-08 NOTE — PROGRESS NOTES
Juan Marsh is a 29 y.o. male on day 1 of admission presenting with Behcet's syndrome, neurologic type (Multi).    Subjective/Overnight Events:   Overnight had CT head performed d/t c/f lethargy. CT head showed no major changes or worsening midline shift/hydrocephalus. He reports HA and hiccupping has improved since yesterday.     ---------------------------------------------------    Objective:   24 Hour Vitals  Temp:  [36.2 °C (97.2 °F)-37.8 °C (100 °F)] 37.3 °C (99.1 °F)  Heart Rate:  [] 84  Resp:  [14-19] 18  BP: (100-127)/(67-95) 119/78    Physical Exam   Gen: lethargic but would engage with repetition  Pulm: No incr WOB, on RA    Neuro:  Drowsy but arousable, oriented to self, location, date  Naming intact but mild dysarthria   PERRL, EOMI, R eye esotropia, VFF, eyebrow raise/eye closure symmetric but L facial droop that that is symmetric with activation, shoulder shrug/neck turn 5/5    Motor:   Deltoids R5 L5  Triceps R5 L5  Biceps R5 L5  Hip flex R5L4  Knee ext R5 L5  Knee flex R5 L5  DF R5 L5  PF R5 L5    Reflexes  Biceps R3 L2  Triceps R3 L2  Achilles R3 L2  Patellar R2 L2  Toes downgoing bilaterally  R sided sustained clonus, L no clonus    Sensation intact BUE and BLE  RUE > RLE ataxia, L hemibody without ataxia    Assessment/Plan   29 y.o. 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 He reported that his prednisone was not sent to his pharmacy and has been off prednisone for at least a week but he reported taking his MMF. CTH showed new R BG hypodensity with 3mm midline shift c/f  neuro-Behcet flare vs. Lymphoma. Admitted for further work up. NSGY consulted and deemed lesions are too high risk of hemorrhage for biopsy.     Update 2/8:  - bedside LP successful, CSF pending (per discussion with pathology/lab, able to run cytology and cytometry today)  - likely start IVMP pending prelim CSF      # New R BG c/f Neuro-Behcet flare vs. lymphoma  :: MRI brain w wo: FLAIR hyperintensity of R middle cerebellar peduncle through midbrain, R cerebral peduncle, thalamus, internal capsule and some of lentiform nucleus; lesser signal in L cerebral peduncle, thalamus and internal capsule; extension along R optic tract to R optic chiasm and prechiasmatic segment of R optic nerve  - NSGY consult, current lesions too high risk to biopsy  - LP on 2/8, CSF pending  - will start IVMP x5 with steroid taper  - Continue on mycophenolate for now  - PPI/Vit D  - continue home gabapentin 300mg TID  - PT/OT evaluation    F: PO PRN  E: replete PRN  N: regular  Access: PIV  Ppx: SCD    Code status: FULL  NOK: Nayla ([facility] [place]) [phone]

## 2025-02-08 NOTE — CONSULTS
"Reason For Consult  Rule out ocular lyphoma    History Of Present Illness  Juan Marsh is a 29 y.o. male with POHx of history neuro-Behcet's, bilateral uveitis with history of inferior snowballs, retinal vasculitis with CRVO/CME right eye (s/p Kenalogg OD 9/21, Avastin OD 9/21; Avastin OD 11/21; Eylea OD 8/22; and Eylea OD 9/22)  last saw Dr. Chavez 06/2024, now admitted for exacerbation of Behcet's.     2/7/25 MRI brain with and without read by radiologist as \"strongly suggestive of a flare-up of Behcet  syndrome. Other process such as glioma or other active primary demyelinating disease process considered much less likely.\"    Patient currently on pred QID OD and brimonidine BID OD in setting of known history of   #Posterior +/- intermediate uveitis right eye for which he follows with Dr. Chavez (retina/uveitis).    Patient denies any ocular concerns. Denies any visual complaints including eye pain, diplopia, flashes, floaters, or sudden vision loss.      Past Medical History  He has a past medical history of Lupus (systemic lupus erythematosus) (Multi), Stroke (Multi), and Uveitis.    Physical Exam  Base Eye Exam       Visual Acuity (Snellen - Linear)         Right Left    Near sc 20/20-2 20/20              Tonometry (Goldmann Applanation, 4:59 PM)         Right Left    Pressure 17 23   Squeezing OU             Pupils         Dark Light Shape React APD    Right 5 3 Round Brisk -    Left 5 3 Round Brisk -              Visual Fields (Counting fingers)         Left Right                                Extraocular Movement         Right Left     Full, Ortho Full, Ortho              Neuro/Psych       Oriented x3: Yes    Mood/Affect: Normal              Dilation       Both eyes: 1% Mydriacyl & 2.5% David  @ 4:59 PM                  Additional Tests       Color         Right Left    Ishihara 10/12 12/12                  Slit Lamp and Fundus Exam       External Exam         Right Left    External Normal Normal      " "        Slit Lamp Exam         Right Left    Lids/Lashes Normal Normal    Conjunctiva/Sclera Melanosis Melanosis    Cornea Clear Clear    Anterior Chamber Deep and quiet, no cell or hypopyon Deep and quiet, no cell or hypopyon    Iris Round and reactive Round and reactive    Lens Clear Clear    Anterior Vitreous Normal, no cell Normal, no cell              Fundus Exam         Right Left    Disc Pink and sharp Pink and sharp    C/D Ratio .3 .3    Macula Dull reflex Normal    Vessels attenuation, inferior ghost vessel attenuation    Periphery Pigmentary changes in periphery where patient had snowballs previously, no snowballs today Nasal dark without pressure                     Last Recorded Vitals  Blood pressure 121/86, pulse 83, temperature 36.5 °C (97.7 °F), temperature source Temporal, resp. rate 18, height 1.727 m (5' 8\"), weight 58.1 kg (128 lb), SpO2 100%.    Relevant Results  MRI brain with and without IV contrast 2/7/25:  1.  Slightly expansile FLAIR hyperintense partially enhancing process  extends from the tyrone and right middle cerebellar peduncle through  the midbrain, right cerebral peduncle, right thalamus and internal  capsule with some involvement of the right lentiform nucleus. There  is lesser signal abnormality involving the left cerebral peduncle,  thalamus and internal capsule. There is also some extension along the  right optic tract to the right side of the optic chiasm and possibly  minimally into the prechiasmatic segment of the right optic nerve.  The appearance is strongly suggestive of a flare-up of Behcet  syndrome. Other process such as glioma or other active primary  demyelinating disease process considered much less likely.     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   - Eye exam normal and no symptoms, no further treatment from eye standpoint. Please let us know if patient develops visual symptoms.  - Page ophtho upon discharge to schedule follow up with Dr. Kathy Vallecillo MD  PGY2 - Ophthalmology     Ophthalmology Adult Pager - [phone]  Ophthalmology Pediatrics Pager - [phone]    For adult follow-up appointments, call: [phone]  For pediatric follow-up appointments, call: [phone]    NOTE: This note is not finalized until attending reviews and signs.

## 2025-02-09 LAB
ALBUMIN SERPL BCP-MCNC: 4.5 G/DL (ref 3.4–5)
ANION GAP SERPL CALC-SCNC: 15 MMOL/L (ref 10–20)
BACTERIA CSF CULT: NORMAL
BASOPHILS # BLD AUTO: 0.01 X10*3/UL (ref 0–0.1)
BASOPHILS NFR BLD AUTO: 0.1 %
BUN SERPL-MCNC: 23 MG/DL (ref 6–23)
CALCIUM SERPL-MCNC: 10 MG/DL (ref 8.6–10.6)
CHLORIDE SERPL-SCNC: 104 MMOL/L (ref 98–107)
CO2 SERPL-SCNC: 23 MMOL/L (ref 21–32)
CREAT SERPL-MCNC: 0.98 MG/DL (ref 0.5–1.3)
EGFRCR SERPLBLD CKD-EPI 2021: >90 ML/MIN/1.73M*2
EOSINOPHIL # BLD AUTO: 0.01 X10*3/UL (ref 0–0.7)
EOSINOPHIL NFR BLD AUTO: 0.1 %
ERYTHROCYTE [DISTWIDTH] IN BLOOD BY AUTOMATED COUNT: 13 % (ref 11.5–14.5)
GLUCOSE BLD MANUAL STRIP-MCNC: 109 MG/DL (ref 74–99)
GLUCOSE BLD MANUAL STRIP-MCNC: 128 MG/DL (ref 74–99)
GLUCOSE BLD MANUAL STRIP-MCNC: 153 MG/DL (ref 74–99)
GLUCOSE BLD MANUAL STRIP-MCNC: 157 MG/DL (ref 74–99)
GLUCOSE SERPL-MCNC: 147 MG/DL (ref 74–99)
GRAM STN SPEC: NORMAL
GRAM STN SPEC: NORMAL
HCT VFR BLD AUTO: 36.3 % (ref 41–52)
HGB BLD-MCNC: 12.1 G/DL (ref 13.5–17.5)
IMM GRANULOCYTES # BLD AUTO: 0.01 X10*3/UL (ref 0–0.7)
IMM GRANULOCYTES NFR BLD AUTO: 0.1 % (ref 0–0.9)
LYMPHOCYTES # BLD AUTO: 0.96 X10*3/UL (ref 1.2–4.8)
LYMPHOCYTES NFR BLD AUTO: 13.6 %
MAGNESIUM SERPL-MCNC: 2.18 MG/DL (ref 1.6–2.4)
MCH RBC QN AUTO: 31.1 PG (ref 26–34)
MCHC RBC AUTO-ENTMCNC: 33.3 G/DL (ref 32–36)
MCV RBC AUTO: 93 FL (ref 80–100)
MONOCYTES # BLD AUTO: 0.18 X10*3/UL (ref 0.1–1)
MONOCYTES NFR BLD AUTO: 2.6 %
NEUTROPHILS # BLD AUTO: 5.87 X10*3/UL (ref 1.2–7.7)
NEUTROPHILS NFR BLD AUTO: 83.5 %
NRBC BLD-RTO: 0 /100 WBCS (ref 0–0)
PHOSPHATE SERPL-MCNC: 3.9 MG/DL (ref 2.5–4.9)
PLATELET # BLD AUTO: 341 X10*3/UL (ref 150–450)
POTASSIUM SERPL-SCNC: 4.1 MMOL/L (ref 3.5–5.3)
RBC # BLD AUTO: 3.89 X10*6/UL (ref 4.5–5.9)
SODIUM SERPL-SCNC: 138 MMOL/L (ref 136–145)
WBC # BLD AUTO: 7 X10*3/UL (ref 4.4–11.3)

## 2025-02-09 PROCEDURE — 2500000004 HC RX 250 GENERAL PHARMACY W/ HCPCS (ALT 636 FOR OP/ED): Mod: SE | Performed by: STUDENT IN AN ORGANIZED HEALTH CARE EDUCATION/TRAINING PROGRAM

## 2025-02-09 PROCEDURE — 1100000001 HC PRIVATE ROOM DAILY

## 2025-02-09 PROCEDURE — 82947 ASSAY GLUCOSE BLOOD QUANT: CPT

## 2025-02-09 PROCEDURE — 85025 COMPLETE CBC W/AUTO DIFF WBC: CPT

## 2025-02-09 PROCEDURE — 84100 ASSAY OF PHOSPHORUS: CPT

## 2025-02-09 PROCEDURE — 99233 SBSQ HOSP IP/OBS HIGH 50: CPT | Performed by: STUDENT IN AN ORGANIZED HEALTH CARE EDUCATION/TRAINING PROGRAM

## 2025-02-09 PROCEDURE — 2500000002 HC RX 250 W HCPCS SELF ADMINISTERED DRUGS (ALT 637 FOR MEDICARE OP, ALT 636 FOR OP/ED): Mod: SE | Performed by: STUDENT IN AN ORGANIZED HEALTH CARE EDUCATION/TRAINING PROGRAM

## 2025-02-09 PROCEDURE — 2500000004 HC RX 250 GENERAL PHARMACY W/ HCPCS (ALT 636 FOR OP/ED): Mod: SE

## 2025-02-09 PROCEDURE — 2500000001 HC RX 250 WO HCPCS SELF ADMINISTERED DRUGS (ALT 637 FOR MEDICARE OP): Mod: SE

## 2025-02-09 PROCEDURE — 83735 ASSAY OF MAGNESIUM: CPT

## 2025-02-09 PROCEDURE — 36415 COLL VENOUS BLD VENIPUNCTURE: CPT

## 2025-02-09 RX ADMIN — PREDNISOLONE ACETATE 1 DROP: 10 SUSPENSION/ DROPS OPHTHALMIC at 08:02

## 2025-02-09 RX ADMIN — GABAPENTIN 300 MG: 300 CAPSULE ORAL at 20:30

## 2025-02-09 RX ADMIN — PANTOPRAZOLE SODIUM 40 MG: 40 TABLET, DELAYED RELEASE ORAL at 08:01

## 2025-02-09 RX ADMIN — GABAPENTIN 300 MG: 300 CAPSULE ORAL at 13:13

## 2025-02-09 RX ADMIN — BRIMONIDINE TARTRATE 1 DROP: 2 SOLUTION/ DROPS OPHTHALMIC at 13:53

## 2025-02-09 RX ADMIN — ERGOCALCIFEROL 1250 MCG: 1.25 CAPSULE ORAL at 08:01

## 2025-02-09 RX ADMIN — MYCOPHENOLATE MOFETIL 500 MG: 500 TABLET, FILM COATED ORAL at 08:01

## 2025-02-09 RX ADMIN — POLYETHYLENE GLYCOL 3350 17 G: 17 POWDER, FOR SOLUTION ORAL at 08:01

## 2025-02-09 RX ADMIN — PREDNISOLONE ACETATE 1 DROP: 10 SUSPENSION/ DROPS OPHTHALMIC at 13:13

## 2025-02-09 RX ADMIN — MYCOPHENOLATE MOFETIL 500 MG: 500 TABLET, FILM COATED ORAL at 20:30

## 2025-02-09 RX ADMIN — INSULIN LISPRO 1 UNITS: 100 INJECTION, SOLUTION INTRAVENOUS; SUBCUTANEOUS at 16:07

## 2025-02-09 RX ADMIN — PREDNISOLONE ACETATE 1 DROP: 10 SUSPENSION/ DROPS OPHTHALMIC at 16:09

## 2025-02-09 RX ADMIN — ACETAMINOPHEN 975 MG: 325 TABLET ORAL at 18:21

## 2025-02-09 RX ADMIN — METHYLPREDNISOLONE SODIUM SUCCINATE 1000 MG: 1 INJECTION INTRAMUSCULAR; INTRAVENOUS at 11:29

## 2025-02-09 ASSESSMENT — COGNITIVE AND FUNCTIONAL STATUS - GENERAL
TOILETING: A LOT
HELP NEEDED FOR BATHING: A LITTLE
DRESSING REGULAR LOWER BODY CLOTHING: A LITTLE
TURNING FROM BACK TO SIDE WHILE IN FLAT BAD: A LITTLE
CLIMB 3 TO 5 STEPS WITH RAILING: TOTAL
MOBILITY SCORE: 16
TOILETING: A LOT
MOVING TO AND FROM BED TO CHAIR: A LITTLE
TURNING FROM BACK TO SIDE WHILE IN FLAT BAD: A LITTLE
MOVING TO AND FROM BED TO CHAIR: A LITTLE
DAILY ACTIVITIY SCORE: 16
DRESSING REGULAR UPPER BODY CLOTHING: A LITTLE
HELP NEEDED FOR BATHING: A LITTLE
DAILY ACTIVITIY SCORE: 16
PERSONAL GROOMING: A LOT
DRESSING REGULAR LOWER BODY CLOTHING: A LITTLE
WALKING IN HOSPITAL ROOM: A LOT
WALKING IN HOSPITAL ROOM: A LOT
MOBILITY SCORE: 16
STANDING UP FROM CHAIR USING ARMS: A LOT
DAILY ACTIVITIY SCORE: 16
TURNING FROM BACK TO SIDE WHILE IN FLAT BAD: A LITTLE
HELP NEEDED FOR BATHING: A LITTLE
STANDING UP FROM CHAIR USING ARMS: A LITTLE
WALKING IN HOSPITAL ROOM: A LOT
DRESSING REGULAR UPPER BODY CLOTHING: A LITTLE
DRESSING REGULAR UPPER BODY CLOTHING: A LITTLE
EATING MEALS: A LITTLE
DRESSING REGULAR LOWER BODY CLOTHING: A LITTLE
PERSONAL GROOMING: A LOT
CLIMB 3 TO 5 STEPS WITH RAILING: TOTAL
TOILETING: A LOT
MOVING TO AND FROM BED TO CHAIR: A LITTLE
PERSONAL GROOMING: A LOT
MOBILITY SCORE: 15
STANDING UP FROM CHAIR USING ARMS: A LITTLE
EATING MEALS: A LITTLE
EATING MEALS: A LITTLE
CLIMB 3 TO 5 STEPS WITH RAILING: TOTAL

## 2025-02-09 ASSESSMENT — PAIN SCALES - GENERAL
PAINLEVEL_OUTOF10: 3
PAINLEVEL_OUTOF10: 0 - NO PAIN

## 2025-02-09 ASSESSMENT — PAIN - FUNCTIONAL ASSESSMENT
PAIN_FUNCTIONAL_ASSESSMENT: 0-10

## 2025-02-09 ASSESSMENT — PAIN SCALES - WONG BAKER: WONGBAKER_NUMERICALRESPONSE: NO HURT

## 2025-02-09 ASSESSMENT — PAIN DESCRIPTION - LOCATION: LOCATION: HIP

## 2025-02-09 NOTE — PROGRESS NOTES
During bedside shift report, pt lethargic, arousable only to painful stimuli.  Pt non-verbal, unable to assess cognition.  Pt briefly awake to follows motor commands before drifting back asleep.  Gen Neuro team at bedside to assess pt.  No further orders at this time.  Will continue to closely monitor pt.

## 2025-02-09 NOTE — CARE PLAN
The patient's goals for the shift include RICK    The clinical goals for the shift include safety      Problem: Fall/Injury  Goal: Not fall by end of shift  Outcome: Progressing  Goal: Be free from injury by end of the shift  Outcome: Progressing  Goal: Verbalize understanding of personal risk factors for fall in the hospital  Outcome: Progressing  Goal: Verbalize understanding of risk factor reduction measures to prevent injury from fall in the home  Outcome: Progressing  Goal: Use assistive devices by end of the shift  Outcome: Progressing  Goal: Pace activities to prevent fatigue by end of the shift  Outcome: Progressing     Problem: Skin  Goal: Decreased wound size/increased tissue granulation at next dressing change  Outcome: Progressing  Flowsheets (Taken 2/8/2025 2112)  Decreased wound size/increased tissue granulation at next dressing change:   Promote sleep for wound healing   Utilize specialty bed per algorithm  Goal: Participates in plan/prevention/treatment measures  Outcome: Progressing  Goal: Prevent/manage excess moisture  Outcome: Progressing  Goal: Prevent/minimize sheer/friction injuries  Outcome: Progressing  Goal: Promote/optimize nutrition  Outcome: Progressing  Goal: Promote skin healing  Outcome: Progressing

## 2025-02-09 NOTE — PROGRESS NOTES
Juan Marsh is a 29 y.o. male on day 1 of admission presenting with Behcet's syndrome, neurologic type (Multi).    Subjective/Overnight Events:   Had episode somnolence and mutism ovn. Per pt has been waxing waning for past week. No complaints  Pt states has had difficulty w/speech as well as RH, R foot, for past week. Sx in R cheek has been going on for longer     ---------------------------------------------------    Objective:   24 Hour Vitals  Temp:  [36.2 °C (97.2 °F)-36.7 °C (98.1 °F)] 36.7 °C (98.1 °F)  Heart Rate:  [65-96] 96  Resp:  [16-23] 23  BP: ()/(63-91) 122/75    Physical Exam   Gen: NAD  Pulm: No incr WOB, on RA    Neuro:  Lethargic but would be able to repeat after some time and decreased speech output, including on naming. Overall appears slow to respond as opposed to pressured to try to get out words. Oriented to location, month, year  EOMI, eyebrow raise/eye closure symmetric but L facial droop that that is mostly improved with activation (some residual FD)    Motor:   BUE EF, EE, HG 5/5. No orbiting when testing forearm, but L orbits R on thumb twiddle  BLE 5/5 HF    Sens: reports diminished sens on R face, RUE, RLE, but on simultaneous LT, states sens symmetric.     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 Compliant w/MMF. CTH showed new R BG hypodensity with 3mm MLS. MRI w/FLAIR hyperintensity of R middle cerebellar peduncle through midbrain, R cerebral peduncle, thalamus, w/lesser signal along R optic tract to R optic chiasm, prechiasmatic seg of R optic nerve. Location too high risk for bx per NSGY.    Current c/f lymphoma vs neuro-Behcet flare. Will c/w steroids w/plan for ~6w taper ending w/maintenance pred 10 continued, as well as eval w/whole body PET for lymphoma.     Update 2/9:  - PET Scan 2/10   - c/w IVMP w/plan for pred taper     # New R BG c/f Neuro-Behcet flare vs. lymphoma  :: MRI brain w wo: FLAIR hyperintensity of R middle cerebellar peduncle through midbrain, R cerebral peduncle, thalamus, internal capsule and some of lentiform nucleus; lesser signal in L cerebral peduncle, thalamus and internal capsule; extension along R optic tract to R optic chiasm and prechiasmatic segment of R optic nerve  :: CSF 2/8: ->0, WBC 86 -> 74 (N68%, L31%, M 1%), protein 57, glucose 58  Neg biofire  :: Per NSGY - current lesions too high risk to biopsy  - IVMP x 5 -> steroid taper decreasing by 10mg q1w and maintain on pred 10 afterwards  - Bactrim, PPI, Vit D  - Continue on mycophenolate for now  - Continue home gabapentin 300mg TID  - PT/OT   [ ] Pending: CSF  ENC, IgG index, VZV, OCB, cytology/flow  [ ] PET Scan 2/10 ([facility]O approved)   - NPO @ [facility]; SSI held @ [facility]    F: PO PRN  E: replete PRN  N: regular  Access: PIV  Ppx: SCD     Code status: FULL  NOK: Nayla ([facility] [place]) [phone]

## 2025-02-10 ENCOUNTER — APPOINTMENT (OUTPATIENT)
Dept: RADIOLOGY | Facility: HOSPITAL | Age: 30
End: 2025-02-10
Payer: COMMERCIAL

## 2025-02-10 VITALS
HEART RATE: 68 BPM | OXYGEN SATURATION: 98 % | DIASTOLIC BLOOD PRESSURE: 68 MMHG | TEMPERATURE: 96.8 F | RESPIRATION RATE: 19 BRPM | SYSTOLIC BLOOD PRESSURE: 103 MMHG | BODY MASS INDEX: 19.4 KG/M2 | WEIGHT: 128 LBS | HEIGHT: 68 IN

## 2025-02-10 LAB
ALBUMIN SERPL BCP-MCNC: 4.6 G/DL (ref 3.4–5)
ANION GAP SERPL CALC-SCNC: 16 MMOL/L (ref 10–20)
BASOPHILS # BLD AUTO: 0.01 X10*3/UL (ref 0–0.1)
BASOPHILS NFR BLD AUTO: 0.1 %
BUN SERPL-MCNC: 32 MG/DL (ref 6–23)
CALCIUM SERPL-MCNC: 10.1 MG/DL (ref 8.6–10.6)
CHLORIDE SERPL-SCNC: 105 MMOL/L (ref 98–107)
CO2 SERPL-SCNC: 23 MMOL/L (ref 21–32)
CREAT SERPL-MCNC: 0.98 MG/DL (ref 0.5–1.3)
EGFRCR SERPLBLD CKD-EPI 2021: >90 ML/MIN/1.73M*2
EOSINOPHIL # BLD AUTO: 0.01 X10*3/UL (ref 0–0.7)
EOSINOPHIL NFR BLD AUTO: 0.1 %
ERYTHROCYTE [DISTWIDTH] IN BLOOD BY AUTOMATED COUNT: 13.2 % (ref 11.5–14.5)
GLUCOSE BLD MANUAL STRIP-MCNC: 130 MG/DL (ref 74–99)
GLUCOSE BLD MANUAL STRIP-MCNC: 132 MG/DL (ref 74–99)
GLUCOSE SERPL-MCNC: 120 MG/DL (ref 74–99)
HCT VFR BLD AUTO: 34.2 % (ref 41–52)
HGB BLD-MCNC: 11.4 G/DL (ref 13.5–17.5)
IMM GRANULOCYTES # BLD AUTO: 0.04 X10*3/UL (ref 0–0.7)
IMM GRANULOCYTES NFR BLD AUTO: 0.4 % (ref 0–0.9)
LYMPHOCYTES # BLD AUTO: 0.73 X10*3/UL (ref 1.2–4.8)
LYMPHOCYTES NFR BLD AUTO: 7 %
MAGNESIUM SERPL-MCNC: 2.42 MG/DL (ref 1.6–2.4)
MCH RBC QN AUTO: 30.6 PG (ref 26–34)
MCHC RBC AUTO-ENTMCNC: 33.3 G/DL (ref 32–36)
MCV RBC AUTO: 92 FL (ref 80–100)
MONOCYTES # BLD AUTO: 0.21 X10*3/UL (ref 0.1–1)
MONOCYTES NFR BLD AUTO: 2 %
NEUTROPHILS # BLD AUTO: 9.5 X10*3/UL (ref 1.2–7.7)
NEUTROPHILS NFR BLD AUTO: 90.4 %
NRBC BLD-RTO: 0 /100 WBCS (ref 0–0)
PATH REVIEW-CELL CT,CSF: NORMAL
PATH REVIEW-CELL CT,CSF: NORMAL
PHOSPHATE SERPL-MCNC: 3.8 MG/DL (ref 2.5–4.9)
PLATELET # BLD AUTO: 341 X10*3/UL (ref 150–450)
POTASSIUM SERPL-SCNC: 4.3 MMOL/L (ref 3.5–5.3)
RBC # BLD AUTO: 3.72 X10*6/UL (ref 4.5–5.9)
SODIUM SERPL-SCNC: 140 MMOL/L (ref 136–145)
WBC # BLD AUTO: 10.5 X10*3/UL (ref 4.4–11.3)

## 2025-02-10 PROCEDURE — 36415 COLL VENOUS BLD VENIPUNCTURE: CPT

## 2025-02-10 PROCEDURE — 2500000001 HC RX 250 WO HCPCS SELF ADMINISTERED DRUGS (ALT 637 FOR MEDICARE OP): Mod: SE

## 2025-02-10 PROCEDURE — 82947 ASSAY GLUCOSE BLOOD QUANT: CPT

## 2025-02-10 PROCEDURE — 78816 PET IMAGE W/CT FULL BODY: CPT

## 2025-02-10 PROCEDURE — 2500000004 HC RX 250 GENERAL PHARMACY W/ HCPCS (ALT 636 FOR OP/ED): Mod: SE

## 2025-02-10 PROCEDURE — 80069 RENAL FUNCTION PANEL: CPT

## 2025-02-10 PROCEDURE — 97165 OT EVAL LOW COMPLEX 30 MIN: CPT | Mod: GO

## 2025-02-10 PROCEDURE — 85025 COMPLETE CBC W/AUTO DIFF WBC: CPT

## 2025-02-10 PROCEDURE — 70371 SPEECH EVALUATION COMPLEX: CPT | Performed by: SPEECH-LANGUAGE PATHOLOGIST

## 2025-02-10 PROCEDURE — A9552 F18 FDG: HCPCS | Mod: SE | Performed by: STUDENT IN AN ORGANIZED HEALTH CARE EDUCATION/TRAINING PROGRAM

## 2025-02-10 PROCEDURE — 1200000002 HC GENERAL ROOM WITH TELEMETRY DAILY

## 2025-02-10 PROCEDURE — 2500000004 HC RX 250 GENERAL PHARMACY W/ HCPCS (ALT 636 FOR OP/ED): Mod: SE | Performed by: STUDENT IN AN ORGANIZED HEALTH CARE EDUCATION/TRAINING PROGRAM

## 2025-02-10 PROCEDURE — 97530 THERAPEUTIC ACTIVITIES: CPT | Mod: GP

## 2025-02-10 PROCEDURE — 83735 ASSAY OF MAGNESIUM: CPT

## 2025-02-10 PROCEDURE — 99223 1ST HOSP IP/OBS HIGH 75: CPT

## 2025-02-10 PROCEDURE — 97162 PT EVAL MOD COMPLEX 30 MIN: CPT | Mod: GP

## 2025-02-10 PROCEDURE — 78816 PET IMAGE W/CT FULL BODY: CPT | Performed by: RADIOLOGY

## 2025-02-10 PROCEDURE — 3430000001 HC RX 343 DIAGNOSTIC RADIOPHARMACEUTICALS: Mod: SE | Performed by: STUDENT IN AN ORGANIZED HEALTH CARE EDUCATION/TRAINING PROGRAM

## 2025-02-10 PROCEDURE — 97530 THERAPEUTIC ACTIVITIES: CPT | Mod: GO

## 2025-02-10 RX ORDER — ENOXAPARIN SODIUM 100 MG/ML
40 INJECTION SUBCUTANEOUS DAILY
Status: DISCONTINUED | OUTPATIENT
Start: 2025-02-10 | End: 2025-02-13 | Stop reason: HOSPADM

## 2025-02-10 RX ORDER — FLUDEOXYGLUCOSE F 18 200 MCI/ML
9.2 INJECTION, SOLUTION INTRAVENOUS
Status: COMPLETED | OUTPATIENT
Start: 2025-02-10 | End: 2025-02-10

## 2025-02-10 RX ADMIN — ENOXAPARIN SODIUM 40 MG: 100 INJECTION SUBCUTANEOUS at 15:11

## 2025-02-10 RX ADMIN — PREDNISOLONE ACETATE 1 DROP: 10 SUSPENSION/ DROPS OPHTHALMIC at 20:28

## 2025-02-10 RX ADMIN — BRIMONIDINE TARTRATE 1 DROP: 2 SOLUTION/ DROPS OPHTHALMIC at 15:12

## 2025-02-10 RX ADMIN — MYCOPHENOLATE MOFETIL 500 MG: 500 TABLET, FILM COATED ORAL at 20:26

## 2025-02-10 RX ADMIN — GABAPENTIN 300 MG: 300 CAPSULE ORAL at 06:28

## 2025-02-10 RX ADMIN — PREDNISOLONE ACETATE 1 DROP: 10 SUSPENSION/ DROPS OPHTHALMIC at 18:18

## 2025-02-10 RX ADMIN — ACETAMINOPHEN 975 MG: 325 TABLET ORAL at 20:26

## 2025-02-10 RX ADMIN — PREDNISOLONE ACETATE 1 DROP: 10 SUSPENSION/ DROPS OPHTHALMIC at 09:36

## 2025-02-10 RX ADMIN — METHYLPREDNISOLONE SODIUM SUCCINATE 1000 MG: 1 INJECTION INTRAMUSCULAR; INTRAVENOUS at 06:23

## 2025-02-10 RX ADMIN — GABAPENTIN 300 MG: 300 CAPSULE ORAL at 20:26

## 2025-02-10 RX ADMIN — MYCOPHENOLATE MOFETIL 500 MG: 500 TABLET, FILM COATED ORAL at 09:36

## 2025-02-10 RX ADMIN — PREDNISOLONE ACETATE 1 DROP: 10 SUSPENSION/ DROPS OPHTHALMIC at 13:49

## 2025-02-10 RX ADMIN — METHYLPREDNISOLONE SODIUM SUCCINATE 1000 MG: 1 INJECTION INTRAMUSCULAR; INTRAVENOUS at 22:45

## 2025-02-10 RX ADMIN — GABAPENTIN 300 MG: 300 CAPSULE ORAL at 13:49

## 2025-02-10 RX ADMIN — PANTOPRAZOLE SODIUM 40 MG: 40 TABLET, DELAYED RELEASE ORAL at 09:36

## 2025-02-10 RX ADMIN — BRIMONIDINE TARTRATE 1 DROP: 2 SOLUTION/ DROPS OPHTHALMIC at 03:19

## 2025-02-10 RX ADMIN — FLUDEOXYGLUCOSE F 18 9.2 MILLICURIE: 200 INJECTION, SOLUTION INTRAVENOUS at 11:16

## 2025-02-10 ASSESSMENT — COGNITIVE AND FUNCTIONAL STATUS - GENERAL
DAILY ACTIVITIY SCORE: 19
CLIMB 3 TO 5 STEPS WITH RAILING: TOTAL
MOBILITY SCORE: 15
CLIMB 3 TO 5 STEPS WITH RAILING: TOTAL
MOBILITY SCORE: 16
PERSONAL GROOMING: A LITTLE
DRESSING REGULAR UPPER BODY CLOTHING: A LITTLE
TOILETING: A LOT
DRESSING REGULAR UPPER BODY CLOTHING: A LITTLE
DRESSING REGULAR LOWER BODY CLOTHING: A LITTLE
MOVING FROM LYING ON BACK TO SITTING ON SIDE OF FLAT BED WITH BEDRAILS: A LITTLE
PERSONAL GROOMING: A LOT
HELP NEEDED FOR BATHING: A LITTLE
STANDING UP FROM CHAIR USING ARMS: A LITTLE
MOVING TO AND FROM BED TO CHAIR: A LITTLE
EATING MEALS: A LITTLE
MOVING TO AND FROM BED TO CHAIR: A LITTLE
DAILY ACTIVITIY SCORE: 16
TURNING FROM BACK TO SIDE WHILE IN FLAT BAD: A LITTLE
TOILETING: A LITTLE
STANDING UP FROM CHAIR USING ARMS: A LITTLE
DRESSING REGULAR LOWER BODY CLOTHING: A LITTLE
WALKING IN HOSPITAL ROOM: A LOT
TURNING FROM BACK TO SIDE WHILE IN FLAT BAD: A LITTLE
HELP NEEDED FOR BATHING: A LITTLE
WALKING IN HOSPITAL ROOM: A LOT

## 2025-02-10 ASSESSMENT — PAIN - FUNCTIONAL ASSESSMENT
PAIN_FUNCTIONAL_ASSESSMENT: 0-10

## 2025-02-10 ASSESSMENT — ACTIVITIES OF DAILY LIVING (ADL)
ADL_ASSISTANCE: INDEPENDENT
BATHING_ASSISTANCE: MINIMAL

## 2025-02-10 ASSESSMENT — PAIN SCALES - GENERAL
PAINLEVEL_OUTOF10: 0 - NO PAIN
PAINLEVEL_OUTOF10: 8

## 2025-02-10 ASSESSMENT — PAIN DESCRIPTION - ORIENTATION: ORIENTATION: LEFT

## 2025-02-10 ASSESSMENT — PAIN DESCRIPTION - LOCATION: LOCATION: LEG

## 2025-02-10 NOTE — PROGRESS NOTES
Juan Marsh is a 29 y.o. male on day 1 of admission presenting with Behcet's syndrome, neurologic type (Multi).    Subjective/Overnight Events:   NAEON. Denies headaches, changes in vision, pain, worsening weakness, n/v.     ---------------------------------------------------    Objective:   24 Hour Vitals  Temp:  [36 °C (96.8 °F)-36.6 °C (97.9 °F)] 36.5 °C (97.7 °F)  Heart Rate:  [68-99] 83  Resp:  [18-22] 22  BP: (103-126)/(68-93) 126/93    Physical Exam   Gen: NAD  Pulm: No incr WOB, on RA    Neuro:  Wakens to verbal stimuli. A&Ox3, follows commands. Noticeable dysarthria and some aphasia, patient has to put in effort to speak somewhat coherently. Questions and concerns are context appropriate.   EOMI, eyebrow raise/eye closure symmetric but L facial droop that that is mostly improved with activation (some residual FD)    Motor:   BUE EF, EE, HG 5/5. No orbiting when testing forearm, but L orbits R on thumb twiddle  BLE 5/5 HF    Sens: reports diminished sens on R face, RUE, RLE, but on simultaneous LT, states sens symmetric.     Results from last 72 hours   Lab Units 02/10/25  0900 02/09/25  0918   WBC AUTO x10*3/uL 10.5 7.0   NRBC AUTO /100 WBCs 0.0 0.0   RBC AUTO x10*6/uL 3.72* 3.89*   HEMOGLOBIN g/dL 11.4* 12.1*   HEMATOCRIT % 34.2* 36.3*   MCV fL 92 93   MCH pg 30.6 31.1   MCHC g/dL 33.3 33.3   RDW % 13.2 13.0   PLATELETS AUTO x10*3/uL 341 341   NEUTROS PCT AUTO % 90.4 83.5   IG PCT AUTO % 0.4 0.1   LYMPHS PCT AUTO % 7.0 13.6   MONOS PCT AUTO % 2.0 2.6   EOS PCT AUTO % 0.1 0.1   BASOS PCT AUTO % 0.1 0.1   NEUTROS ABS x10*3/uL 9.50* 5.87   IG AUTO x10*3/uL 0.04 0.01   LYMPHS ABS AUTO x10*3/uL 0.73* 0.96*   MONOS ABS AUTO x10*3/uL 0.21 0.18   EOS ABS AUTO x10*3/uL 0.01 0.01   BASOS ABS AUTO x10*3/uL 0.01 0.01      Results from last 72 hours   Lab Units 02/10/25  0900 02/09/25  0918   GLUCOSE mg/dL 120* 147*   SODIUM mmol/L 140 138   POTASSIUM mmol/L 4.3 4.1   CHLORIDE mmol/L 105 104   CO2 mmol/L 23 23    ANION GAP mmol/L 16 15   BUN mg/dL 32* 23   CREATININE mg/dL 0.98 0.98   EGFR mL/min/1.73m*2 >90 >90   CALCIUM mg/dL 10.1 10.0   PHOSPHORUS mg/dL 3.8 3.9   ALBUMIN g/dL 4.6 4.5   MAGNESIUM mg/dL 2.42* 2.18      NM PET CT Whole Body 2/10/25  No definite evidence of an active lymphomatous process.     CT Head wo IV contrast 2/8/25  1. No significant change in areas of geographic hypoattenuation  within bilateral cerebral hemispheres, right-greater-than-left as  described above. There is persistent mass effect with effacement of  the lateral ventricle and 0.3 cm leftward midline shift. Findings are  in keeping with patient's history of neuro Behcet's syndrome better  evaluated brain MRI.  2. No acute infarct or acute intracranial hemorrhage.    MRI brain w and wo IV contrast 2/7/25  1.  Slightly expansile FLAIR hyperintense partially enhancing process  extends from the tyrone and right middle cerebellar peduncle through  the midbrain, right cerebral peduncle, right thalamus and internal  capsule with some involvement of the right lentiform nucleus. There  is lesser signal abnormality involving the left cerebral peduncle,  thalamus and internal capsule. There is also some extension along the  right optic tract to the right side of the optic chiasm and possibly  minimally into the prechiasmatic segment of the right optic nerve.  The appearance is strongly suggestive of a flare-up of Behcet  syndrome. Other process such as glioma or other active primary  demyelinating disease process considered much less likely.    Scheduled medications  brimonidine, 1 drop, Right Eye, q12h  ergocalciferol, 1,250 mcg, oral, Every Sunday  gabapentin, 300 mg, oral, q8h [facility]  [Held by provider] insulin lispro, 0-5 Units, subcutaneous, TID AC  methylPREDNISolone sodium succinate (PF), 1,000 mg, intravenous, q18h  mycophenolate, 500 mg, oral, BID  pantoprazole, 40 mg, oral, Daily  polyethylene glycol, 17 g, oral, Daily  prednisoLONE acetate, 1  drop, Right Eye, 4x daily      Continuous medications     PRN medications  PRN medications: acetaminophen, ondansetron **OR** ondansetron      Assessment/Plan   29RHM hx smoking, heavy EtOH use, systemic (pericarditis, arthralgias, skin rash, recurrent orogenital ulcers, uveitis, retinal vasculitis, but -ve HLAB51) and 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 Compliant w/MMF. CTH showed new R BG hypodensity with 3mm MLS. MRI w/FLAIR hyperintensity of R middle cerebellar peduncle through midbrain, R cerebral peduncle, thalamus, w/lesser signal along R optic tract to R optic chiasm, prechiasmatic seg of R optic nerve. Location too high risk for bx per NSGY.   Current c/f lymphoma vs neuro-Behcet flare. Will c/w steroids w/plan for ~6w taper ending w/maintenance pred 10 continued. PET scan at this time without evidence of lymphoma.    Update 2/10:  - PET Scan 2/10 without evidence of lymphoma  - c/w IVMP w/plan for pred taper  - will reach out to Dr Ulloa regarding patient     # New R BG c/f Neuro-Behcet flare vs. lymphoma  :: MRI brain w wo: FLAIR hyperintensity of R middle cerebellar peduncle through midbrain, R cerebral peduncle, thalamus, internal capsule and some of lentiform nucleus; lesser signal in L cerebral peduncle, thalamus and internal capsule; extension along R optic tract to R optic chiasm and prechiasmatic segment of R optic nerve  :: CSF 2/8: ->0, WBC 86 -> 74 (N68%, L31%, M 1%), protein 57, glucose 58  Neg biofire  :: PET Scan 2/10 no definitive evidence of active lymphomatous process  :: Per NSGY - current lesions too high risk to biopsy  - IVMP x 5 -> steroid taper decreasing by 10mg q1w and maintain on pred 10 afterwards  - Bactrim, PPI, Vit D  - Continue on mycophenolate for now  - Continue  home gabapentin 300mg TID  - PT/OT   [ ] Pending: CSF  ENC, IgG index, VZV, OCB, cytology/flow    F: PO PRN  E: replete PRN  N: regular  Access: PIV  Ppx: SCD     Code status: FULL  NOK: Nayla (mom) [phone]    Marsha Echeverria MD  Department of Neurology, PGY-2  [place] [phone]

## 2025-02-10 NOTE — PROGRESS NOTES
"Speech-Language Pathology    SLP Adult Inpatient Speech-Language Cognition    Patient Name: Juan Marsh  MRN: [id number]  Today's Date: 2/10/2025   Time Calculation  Start Time: 1442  Stop Time: 1502  Time Calculation (min): 20 min             SLP Assessment:   SLP consulted for speech-language cognitive assessment in setting of new Right BG c/f Neuro-Behcet flare vs. Lymphoma. History of CVA with unilateral weakness. Pt reports slowed speech and changes in \"quality of my voice\". His mother reports concern with swallowing, specifically, coughing during meals, particularly with liquids.     # dysarthria, mixed  Speech characterized by decreased rate, respiratory insufficiency (reduced phrase length), and irregular resonance. Articulatory accuracy is also reduced due to unilateral oral-motor control. Severity is moderate, however, strategies such upright positioning, increasing volume and coordinating breath support/timing with phrases did improve intelligibility. With basic language comprehension and expression tasks, Juan showed high accuracy across all tasks, including appropriate responses in conversation, albeit benefiting from additional processing time.     #cognition  Using the CogLog, which provides a brief measure of cognitive function in the acute care setting, Pt demonstrated 100% acc across all tested domains. This test does have limitations in sensitivity however, can provide information on areas to further assess. Juan does report changes in cognition (problem solving, processing speed) the past several months and wishes to pursue additional testing and tx        SLP Plan:  Plan  Inpatient/Swing Bed or Outpatient: Inpatient  SLP Plan: Skilled SLP  SLP Frequency: 2x per week  Duration: 2 weeks  SLP Discharge Recommendations: Continue skilled SLP services at the next level of care  SLP - OK to Discharge: Yes      Subjective   Resting in bed. A/O x 4. Dysarthric speech    Most Recent " 02-Dec-2023 19:35 Visit:  SLP Most Recent Visit  SLP Received On: 02/10/25      General Visit Information:  General Information  Chart Reviewed: Yes  Arrival: Accompanied by: __ (mother and friend)  Prior to Session Communication: Bedside nurse    MRI:  FLAIR hyperintensity of R middle cerebellar peduncle through midbrain, R cerebral peduncle, thalamus, internal capsule and some of lentiform nucleus; lesser signal in L cerebral peduncle, thalamus and internal capsule; extension along R optic tract to R optic chiasm and prechiasmatic segment of R optic nerve     Objective       Cognition:  Cognition  Overall Cognitive Status: Within Functional Limits (pt reports changes in attention and concentration)  Orientation Level: Oriented X4  Processing Speed: Delayed        Score: Items:   9 Date, Time, and Hospital Name:  These are components of orientation that may present problems for even those  who otherwise are oriented.    3 Repeat Address:The person is asked to repeat one of the following addresses based on the day of the week. Monday--[facility], [address] [place] [address]     3     20-1: The person is asked to count backwards from 20 to 1.    3   Months Reversed: The person is asked to say the months in reverse order beginning with December.      3     Fist-Edge-Palm:The examiner demonstrates the hand positions of fist, edge, and palm two times telling the person  to “Watch what I do.”   3 30 Second Test: Without the benefit of a time piece the person is asked to estimate when 30 seconds has passed  with the examiner stating “Beginning now.”   3     Go/No-Go:The examiner instructs the person to “Raise your finger when I say red and then put it down. Do  nothing if I say green.”    3 Address Recall: The person is asked to recall the address presented earlier   Total: 30/ 30        The items of the Cog-Log include the three most difficult items from the O-Log (name of facility, date, and time of day), as well as items requiring  concentration, memory, and executive skills.    The Cognitive Log (Cog-Log) is designed to be a quick quantitative measure of cognition for use at bedside with rehabilitation patients. It is intended for individuals who have achieved consistent accurate orientation, such as measured by the Orientation Log (O-Log). The Cog-Log can be used to document cognitive progress on a daily basis when time is short, such as when rounding on patients. All items are scored from 0 to 3 for a total possible score of 30, which can be graphed for quick reference       Auditory Comprehension:   Auditory Comprehension  Yes/No Questions: Within Functional Limits        Verbal:  Verbal Expression  Primary Mode of Expression: Verbal  Primary Language: English    /p/, /t/, /k/:  - shortened lengthy, slow rate, losing breath quickly.       Encounter Problems       Encounter Problems (Active)       MOTOR SPEECH PRODUCTION       LTG - Patient will utilize compensatory intervention for intelligibility       Start:  02/10/25            STG - Patient will utilize compensatory speech strategies to increase intelligility to 90% in order to communicate daily wants/needs across all environments       Start:  02/10/25                *Cognitive goals to follow additional assessment        Inpatient:  Education Documentation  Communication, taught by JENNY Martinez at 2/10/2025  3:43 PM.  Learner: Family, Patient  Readiness: Acceptance  Method: Explanation  Response: Verbalizes Understanding, Demonstrated Understanding    Speech/Language, taught by JENNY Martinez at 2/10/2025  3:43 PM.  Learner: Family, Patient  Readiness: Acceptance  Method: Explanation  Response: Verbalizes Understanding, Demonstrated Understanding    Cognition, taught by JENNY Martinez at 2/10/2025  3:43 PM.  Learner: Family, Patient  Readiness: Acceptance  Method: Explanation  Response: Verbalizes Understanding, Demonstrated Understanding    Education Comments  No comments  found.

## 2025-02-10 NOTE — PROGRESS NOTES
Physical Therapy    Physical Therapy Evaluation & Treatment    Patient Name: Juan Marsh  MRN: [id number]  Department: [place] [address]  Room: [address] [place] [address]  Today's Date: 2/10/2025   Time Calculation  Start Time: 0908  Stop Time: 0931  Time Calculation (min): 23 min    Assessment/Plan   PT Assessment  PT Assessment Results: Decreased strength, Decreased endurance, Impaired balance, Decreased mobility, Decreased coordination, Impaired judgement, Decreased safety awareness, Impaired sensation, Pain  Rehab Prognosis: Excellent  Barriers to Discharge Home: Caregiver assistance, Physical needs  Caregiver Assistance: Caregiver assistance needed per identified barriers - however, level of patient's required assistance exceeds assistance available at home  Physical Needs: Stair navigation into home limited by function/safety, Stair navigation to access bed limited by function/safety, Stair navigation to access bath limited by function/safety, Ambulating household distances limited by function/safety, 24hr mobility assistance needed, 24hr ADL assistance needed, High falls risk due to function or environment  Evaluation/Treatment Tolerance: Patient tolerated treatment well  End of Session Communication: Bedside nurse  Assessment Comment: 28 yo male presenting w/ decreased endurance, balance, mobility, & ataxic movements. Pt would benefit from skilled therapy services in order to return to baseline functioning. Recommending high intensity level of PT following discharge from acute hospital (when medically ready).  End of Session Patient Position: Bed, 3 rail up, Alarm off, not on at start of session (Friend present, RN entering room.)   IP OR SWING BED PT PLAN  Inpatient or Swing Bed: Inpatient  PT Plan  Treatment/Interventions: Bed mobility, Transfer training, Stair training, Gait training, Balance training, Neuromuscular re-education, Neurodevelopmental intervention, Strengthening, Endurance training, Range of motion,  Therapeutic exercise, Home exercise program, Therapeutic activity  PT Plan: Ongoing PT  PT Frequency: 5 times per week  PT Discharge Recommendations: High intensity level of continued care  Equipment Recommended upon Discharge:  (TBD pending discharge.)  PT Recommended Transfer Status: Assist x1, Assistive device  PT - OK to Discharge: Yes (Meaning pt evaluated & discharge rec made.)    General Visit Information:  General  Reason for Referral: 30 yo male presenting with Behcet's syndrome, neurologic type.  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  Family/Caregiver Present: Yes  Caregiver Feedback: Pt's good friend present in room during session.  Co-Treatment: OT  Co-Treatment Reason: To maximize safety & participation.  Prior to Session Communication: Bedside nurse  Patient Position Received: Bed, 3 rail up, Alarm off, not on at start of session  General Comment: Pt lying in bed upon PT/OT entry into room. Agreeable to therapy. Pt's friend present in room during session. Noted tele & L facial droop.    Home Living:  Home Living  Type of Home: Apartment (2nd floor)  Lives With: Parent(s) (Mother- works FT)  Home Adaptive Equipment: Cane  Home Layout: One level  Home Access: Stairs to enter with rails  Entrance Stairs-Rails: Both  Entrance Stairs-Number of Steps: 4  Bathroom Shower/Tub: Tub/shower unit  Bathroom Toilet: Standard  Bathroom Equipment: None  Home Living Comments: Pt lives on the 2nd floor of an apartment building w/ his mother. An elevator is not available- pt has to negotiate 2 flights of stairs (B rails) to unit.    Prior Level of Function:  Prior Function Per  Pt/Caregiver Report  Level of [place]: Independent with ADLs and functional transfers, Needs assistance with homemaking  Receives Help From: Family (Mother)  ADL Assistance: Independent  Homemaking Assistance: Needs assistance  Homemaking Assistance Comments: Pt's mother does the cooking & cleaning.  Ambulatory Assistance: Independent (With cane)  Vocational:  (Works as a . Unsure how often.)  Hand Dominance: Right  Prior Function Comments: Pt reports being indep w/ functional transfers & ambulation w/ his cane. However, he does report more than 3 falls recently. His mother does the cooking & cleaning. Pt works as a  but does not drive.    Precautions:  Precautions  Hearing/Visual Limitations: + glasses  Medical Precautions: Fall precautions     Date/Time Vitals Session Patient Position Pulse Resp SpO2 BP MAP (mmHg)    02/10/25 0842 --  --  --  --  98 %  118/83  --     02/10/25 0908 Pre PT  Lying  83  22  99 %  121/86  --     02/10/25 0920 During PT  Sitting  --  --  --  126/93  --           Vital Signs Comment: Pt reporting fatigue/ dizziness.    Objective     Pain:  Pain Assessment  Pain Assessment: 0-10  0-10 (Numeric) Pain Score: 8  Pain Type: Acute pain  Pain Location: Hip  Pain Orientation: Left  Pain Interventions: Ambulation/increased activity, Distraction, Repositioned  Response to Interventions: Decrease in pain    Cognition:  Cognition  Overall Cognitive Status: Within Functional Limits  Orientation Level: Oriented X4  Safety/Judgement:  (Impaired judgement/ safety awareness)  Insight: Mild  Processing Speed: Delayed    General Assessments:     Activity Tolerance  Endurance: Tolerates 10 - 20 min exercise with multiple rests  Activity Tolerance Comments: Pt fatigues quickly    Sensation  Light Touch: Partial deficits in the RUE (Pt reports numbness/ tingling in his right hand & right side of face.)    Coordination  Coordination Comment: Noted BUE/LE ataxia during  mobility.    Postural Control  Postural Control: Impaired  Head Control: WFL  Trunk Control: Lateral lean  Posture Comment: Occasional lateral lean that requires CGA/min to correct.    Static Sitting Balance  Static Sitting-Balance Support: No upper extremity supported, Feet supported  Static Sitting-Level of Assistance: Contact guard, Minimum assistance  Static Sitting-Comment/Number of Minutes: 8-10 min- occasional lateral lean requiring CGA/min A to correct.    Static Standing Balance  Static Standing-Balance Support: Bilateral upper extremity supported  Static Standing-Level of Assistance: Contact guard, Minimum assistance (+1)  Static Standing-Comment/Number of Minutes: 2-3 min, FWW  Dynamic Standing Balance  Dynamic Standing-Balance Support: Bilateral upper extremity supported  Dynamic Standing-Level of Assistance: Contact guard, Minimum assistance  Dynamic Standing-Balance:  (Marching in place, heel/toe raises)  Dynamic Standing-Comments: FWW    Functional Assessments:  Bed Mobility  Bed Mobility: Yes  Bed Mobility 1  Bed Mobility 1: Supine to sitting  Level of Assistance 1: Contact guard  Bed Mobility Comments 1: HOB slightly elevated  Bed Mobility 2  Bed Mobility  2: Sitting to supine  Level of Assistance 2: Contact guard (+1)  Bed Mobility Comments 2: HOB slightly elevated    Transfers  Transfer: Yes  Transfer 1  Transfer From 1: Sit to  Transfer to 1: Stand  Transfer Device 1: Walker  Transfer Level of Assistance 1: Contact guard, Minimum assistance, Minimal verbal cues (+1)  Trials/Comments 1: x2  Transfers 2  Transfer From 2: Stand to  Transfer to 2: Sit  Transfer Device 2: Walker  Transfer Level of Assistance 2: Contact guard, Minimum assistance, Minimal verbal cues (+1)  Trials/Comments 2: Verbal cues for technique & controlled lowering. (x2)    Ambulation/Gait Training  Ambulation/Gait Training Performed: No    Stairs  Stairs: No    Extremity/Trunk Assessments:  FRANCO ROJASE : Within Functional  Limits  LUE   LUE: Within Functional Limits  RLE   RLE : Within Functional Limits (AROM WFL; MMT grossly 5/5 based off of function.)  LLE   LLE : Within Functional Limits (AROM WFL; MMT grossly 5/5 based off of function.)    Treatments:  Therapeutic Activity  Therapeutic Activity Performed: Yes  Therapeutic Activity 1: Lateral stepping w/ FWW towards the left for improved positioning in bed, x3 reps, min Ax1 + cues for sequencing.       Bed Mobility  Bed Mobility: Yes  Bed Mobility 1  Bed Mobility 1: Supine to sitting  Level of Assistance 1: Contact guard  Bed Mobility Comments 1: HOB slightly elevated  Bed Mobility 2  Bed Mobility  2: Sitting to supine  Level of Assistance 2: Contact guard (+1)  Bed Mobility Comments 2: HOB slightly elevated    Ambulation/Gait Training  Ambulation/Gait Training Performed: No    Transfers  Transfer: Yes  Transfer 1  Transfer From 1: Sit to  Transfer to 1: Stand  Transfer Device 1: Walker  Transfer Level of Assistance 1: Contact guard, Minimum assistance, Minimal verbal cues (+1)  Trials/Comments 1: x2  Transfers 2  Transfer From 2: Stand to  Transfer to 2: Sit  Transfer Device 2: Walker  Transfer Level of Assistance 2: Contact guard, Minimum assistance, Minimal verbal cues (+1)  Trials/Comments 2: Verbal cues for technique & controlled lowering. (x2)    Stairs  Stairs: No    Outcome Measures:  [facility] Basic Mobility  Turning from your back to your side while in a flat bed without using bedrails: A little  Moving from lying on your back to sitting on the side of a flat bed without using bedrails: A little  Moving to and from bed to chair (including a wheelchair): A little  Standing up from a chair using your arms (e.g. wheelchair or bedside chair): A little  To walk in hospital room: A lot  Climbing 3-5 steps with railing: Total  Basic Mobility - Total Score: 15    Encounter Problems       Encounter Problems (Active)       PT Problem       Pt will perform all aspects of bed mobility at  mod indep by discharge. (Progressing)       Start:  02/10/25    Expected End:  02/24/25            Pt will perform all transfers at mod indep w/ LRAD by discharge. (Progressing)       Start:  02/10/25    Expected End:  02/24/25            Pt will amb 150' w/ LRAD at mod indep by discharge. (Progressing)       Start:  02/10/25    Expected End:  02/24/25            Pt will negotiate 24 steps w/ B rails in order to simulate home environment, close sup by discharge.  (Progressing)       Start:  02/10/25    Expected End:  02/24/25                   Education Documentation  Body Mechanics, taught by Jud Cortez, PT at 2/10/2025 10:21 AM.  Learner: Patient  Readiness: Acceptance  Method: Explanation, Demonstration  Response: Verbalizes Understanding, Needs Reinforcement  Comment: Education regarding POC & discharge rec.    Mobility Training, taught by Jud Cortez, PT at 2/10/2025 10:21 AM.  Learner: Patient  Readiness: Acceptance  Method: Explanation, Demonstration  Response: Verbalizes Understanding, Needs Reinforcement  Comment: Education regarding POC & discharge rec.    Education Comments  No comments found.      Jud Cortez, PT, DPT

## 2025-02-10 NOTE — CARE PLAN
The patient's goals for the shift include RICK    The clinical goals for the shift include pt will remain safe in hospital    Problem: Fall/Injury  Goal: Not fall by end of shift  Outcome: Progressing  Goal: Be free from injury by end of the shift  Outcome: Progressing  Goal: Verbalize understanding of personal risk factors for fall in the hospital  Outcome: Progressing  Goal: Verbalize understanding of risk factor reduction measures to prevent injury from fall in the home  Outcome: Progressing  Goal: Use assistive devices by end of the shift  Outcome: Progressing  Goal: Pace activities to prevent fatigue by end of the shift  Outcome: Progressing     Problem: Skin  Goal: Decreased wound size/increased tissue granulation at next dressing change  Outcome: Progressing  Goal: Participates in plan/prevention/treatment measures  Outcome: Progressing  Goal: Prevent/manage excess moisture  Outcome: Progressing  Goal: Prevent/minimize sheer/friction injuries  Outcome: Progressing  Goal: Promote/optimize nutrition  Outcome: Progressing  Goal: Promote skin healing  Outcome: Progressing

## 2025-02-10 NOTE — PROGRESS NOTES
Occupational Therapy    Evaluation/Treatment    Patient Name: Juan Marsh  MRN: [id number]  Department: [facility]  Room: 4056/4056-A  Today's Date: 02/10/25  Time Calculation  Start Time: 0908  Stop Time: 0931  Time Calculation (min): 23 min       Assessment:  OT Assessment: difficulty I/ADLS, safety, fxnl mob. Pt would benefit from OT to increase safety and return to PLOF  Prognosis: Good  Barriers to Discharge Home: Physical needs, Caregiver assistance  Caregiver Assistance: Caregiver assistance needed per identified barriers - however, level of patient's required assistance exceeds assistance available at home  Physical Needs: Stair navigation into home limited by function/safety, Ambulating household distances limited by function/safety, 24hr mobility assistance needed, 24hr ADL assistance needed, High falls risk due to function or environment  Evaluation/Treatment Tolerance: Patient tolerated treatment well  Medical Staff Made Aware: Yes  End of Session Communication: Bedside nurse  End of Session Patient Position: Alarm off, caregiver present, Bed, 3 rail up  OT Assessment Results: Decreased ADL status, Decreased upper extremity strength, Decreased safe judgment during ADL, Decreased endurance, Decreased functional mobility, Decreased gross motor control, Decreased IADLs, Decreased fine motor control  Prognosis: Good  Evaluation/Treatment Tolerance: Patient tolerated treatment well  Medical Staff Made Aware: Yes  Strengths: Attitude of self  Barriers to Participation: Comorbidities, Housing layout, Support of Caregivers  Plan:  Treatment Interventions: ADL retraining, Functional transfer training, UE strengthening/ROM, Endurance training, Patient/family training, Equipment evaluation/education, Neuromuscular reeducation, Fine motor coordination activities, Compensatory technique education  OT Frequency: 3 times per week  OT Discharge Recommendations: High intensity level of continued care  Equipment  Recommended upon Discharge:  (tub bench)  OT Recommended Transfer Status: Assist of 1  OT - OK to Discharge: Yes  Treatment Interventions: ADL retraining, Functional transfer training, UE strengthening/ROM, Endurance training, Patient/family training, Equipment evaluation/education, Neuromuscular reeducation, Fine motor coordination activities, Compensatory technique education    Subjective   Current Problem:  1. Behcet's syndrome, neurologic type (Multi)          General:   OT Received On: 02/10/25  General  Reason for Referral: 30 yo male presenting with Behcet's syndrome, neurologic type.  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  Family/Caregiver Present: Yes  Caregiver Feedback: friend  Co-Treatment: PT  Co-Treatment Reason: To maximize safety & participation.  Prior to Session Communication: Bedside nurse  Patient Position Received: Bed, 3 rail up, Alarm off, caregiver present  General Comment: pt reports talking better this date +facial droop and drooling with activity, pleasant, cooperative   Precautions:  Medical Precautions: Fall precautions      Vital Signs Comment: seated /93,      Pain:  Pain Assessment  Pain Assessment: 0-10  0-10 (Numeric) Pain Score: 8  Pain Location:  (LLE)    Objective   Cognition:  Overall Cognitive Status: Within Functional Limits  Orientation Level: Oriented X4  Safety/Judgement: Exceptions to WFL  Complex Functional Tasks: Minimal  Novel Situations: Minimal  Routine Tasks: Minimal  Unable to Self-Monitor and Self-Correct Consistently: Minimal  Insight: Mild  Impulsive: Mildly  Processing Speed: Delayed           Home Living:  Type  of Home: Apartment  Lives With:  (Mom who works)  Home Adaptive Equipment: Cane (WhW)  Home Layout: One level  Home Access:  (2 flights to enter with rails, 4 KYLIE outside)  Bathroom Shower/Tub: Tub/shower unit  Bathroom Toilet: Standard  Prior Function:  Level of [place]: Independent with ADLs and functional transfers, Needs assistance with homemaking  Receives Help From: Family  Ambulatory Assistance:  (cane)  Vocational:  ()  Hand Dominance: Right  IADL History:  IADL Comments: A cook/cleaning, A driving, - pets, >3 falls recently 1 in bathroom  ADL:  Eating Assistance: Stand by (anticipated setup)  Grooming Assistance:  (min A anticipated standing)  Bathing Assistance: Minimal (anticiapted)  UE Dressing Assistance: Minimal (anticipated)  LE Dressing Assistance:  (min A kezia socks supine)  Toileting Assistance with Device:  (min A anticipated WhW)  Functional Deficit: Setup, Steadying, Verbal cueing, Supervision/safety, Increased time to complete       Activity Tolerance:  Endurance:  (fair, pt fatigues quickly)  Bed Mobility/Transfers: Bed Mobility  Bed Mobility:  (sup/sit CGA, +ataxia)    Transfers  Transfer:  (2x sit/stand min A WhW RUE ataxia)      Functional Mobility:  Functional Mobility  Functional Mobility Performed:  (pt performed fxnl mob in room min A, 3 side steps min A WhW)       Standing Balance:  Dynamic Standing Balance  Dynamic Standing-Level of Assistance: Minimum assistance (WhW)     Therapy/Activity: Therapeutic Activity  Therapeutic Activity Performed:  (seated EOB ~7 min CGA-MIN lateral lean, pt on phone seated, 2x sit to stand min A WhW cues RUE ataxia, 2X Fxnl  mob in room min A)     Vision:Vision - Basic Assessment  Current Vision: Wears glasses all the time  Sensation:  Light Touch:  (n/t RUE, R face)  Strength:  Strength Comments: RUE grossly 4-/5, LUE 4+/5       Coordination:  Movements are Fluid and Coordinated:  (RUE ataxia)   Hand Function:     Extremities: RUE   RUE  : Within Functional Limits and LUE   LUE: Within Functional Limits      Outcome Measures: [facility] Daily Activity  Putting on and taking off regular lower body clothing: A little  Bathing (including washing, rinsing, drying): A little  Putting on and taking off regular upper body clothing: A little  Toileting, which includes using toilet, bedpan or urinal: A little  Taking care of personal grooming such as brushing teeth: A little  Eating Meals: None  Daily Activity - Total Score: 19         and OT Adult Other Outcome Measures  4AT: -    Education Documentation  Body Mechanics, taught by Latanya Ashley OT at 2/10/2025 12:17 PM.  Learner: Other, Patient  Readiness: Acceptance  Method: Explanation, Demonstration  Response: Verbalizes Understanding, Needs Reinforcement  Comment: jordan garcia ADLs    Precautions, taught by Latanya Ashley OT at 2/10/2025 12:17 PM.  Learner: Other, Patient  Readiness: Acceptance  Method: Explanation, Demonstration  Response: Verbalizes Understanding, Needs Reinforcement  Comment: jordan garcia ADLs    ADL Training, taught by Latanya Ashley OT at 2/10/2025 12:17 PM.  Learner: Other, Patient  Readiness: Acceptance  Method: Explanation, Demonstration  Response: Verbalizes Understanding, Needs Reinforcement  Comment: jordan garcia ADLs    Education Comments  No comments found.        Encounter Problems       Encounter Problems (Active)       ADLs       Patient will perform UB and LB bathing  with stand by assist level of assistance and ae. (Progressing)       Start:  02/10/25    Expected End:  03/03/25            Patient with complete upper body dressing with independent level of assistance (Progressing)       Start:  02/10/25    Expected End:  03/03/25            Patient with complete lower body dressing with stand by assist level of assistance donning and doffing all LE clothes  with PRN adaptive equipment  (Progressing)       Start:  02/10/25    Expected End:  03/03/25            Patient will  complete daily grooming tasks  with independent level of assistance  (Progressing)       Start:  02/10/25    Expected End:  03/03/25            Patient will complete toileting including hygiene clothing management/hygiene with stand by assist level of assistance and lrd. (Progressing)       Start:  02/10/25    Expected End:  03/03/25               COGNITION/SAFETY       Patient will score WFL on standardized cognitive assessment with min cues and within reasonable time frame (Progressing)       Start:  02/10/25    Expected End:  03/03/25               EXERCISE/STRENGTHENING       Patient with increase BUE to wfl strength. (Progressing)       Start:  02/10/25    Expected End:  03/03/25               MOBILITY       Patient will perform Functional mobility max Household distances/Community Distances with stand by assist level of assistance and least restrictive device in order to improve safety and functional mobility. (Progressing)       Start:  02/10/25    Expected End:  03/03/25               TRANSFERS       Patient will perform bed mobility independent level of assistance  (Progressing)       Start:  02/10/25    Expected End:  03/03/25            Patient will complete functional transferleast restrictive device with stand by assist level of assistance. (Progressing)       Start:  02/10/25    Expected End:  03/03/25

## 2025-02-10 NOTE — PROGRESS NOTES
Transitional Care Coordination Progress Note:  This nurse attempted to speak with pt to discuss discharge planning needs, pt off of unit for test. Will re-attempt.     Cherry Stapleton, RN, BSN  Transitional Care Coordinator  Office: [phone]  Secure chat via [employer]

## 2025-02-11 ENCOUNTER — APPOINTMENT (OUTPATIENT)
Dept: RADIOLOGY | Facility: HOSPITAL | Age: 30
End: 2025-02-11
Payer: COMMERCIAL

## 2025-02-11 LAB
ALBUMIN SERPL BCP-MCNC: 4.6 G/DL (ref 3.4–5)
ANION GAP SERPL CALC-SCNC: 16 MMOL/L (ref 10–20)
BASOPHILS # BLD AUTO: 0.01 X10*3/UL (ref 0–0.1)
BASOPHILS NFR BLD AUTO: 0.1 %
BUN SERPL-MCNC: 36 MG/DL (ref 6–23)
CALCIUM SERPL-MCNC: 10.2 MG/DL (ref 8.6–10.6)
CHLORIDE SERPL-SCNC: 106 MMOL/L (ref 98–107)
CO2 SERPL-SCNC: 23 MMOL/L (ref 21–32)
CREAT SERPL-MCNC: 1.1 MG/DL (ref 0.5–1.3)
EGFRCR SERPLBLD CKD-EPI 2021: >90 ML/MIN/1.73M*2
EOSINOPHIL # BLD AUTO: 0 X10*3/UL (ref 0–0.7)
EOSINOPHIL NFR BLD AUTO: 0 %
ERYTHROCYTE [DISTWIDTH] IN BLOOD BY AUTOMATED COUNT: 13.4 % (ref 11.5–14.5)
GLUCOSE BLD MANUAL STRIP-MCNC: 124 MG/DL (ref 74–99)
GLUCOSE BLD MANUAL STRIP-MCNC: 142 MG/DL (ref 74–99)
GLUCOSE BLD MANUAL STRIP-MCNC: 158 MG/DL (ref 74–99)
GLUCOSE SERPL-MCNC: 137 MG/DL (ref 74–99)
HCT VFR BLD AUTO: 34.1 % (ref 41–52)
HGB BLD-MCNC: 11.5 G/DL (ref 13.5–17.5)
IMM GRANULOCYTES # BLD AUTO: 0.06 X10*3/UL (ref 0–0.7)
IMM GRANULOCYTES NFR BLD AUTO: 0.4 % (ref 0–0.9)
LYMPHOCYTES # BLD AUTO: 0.75 X10*3/UL (ref 1.2–4.8)
LYMPHOCYTES NFR BLD AUTO: 5.3 %
MAGNESIUM SERPL-MCNC: 2.55 MG/DL (ref 1.6–2.4)
MCH RBC QN AUTO: 31.3 PG (ref 26–34)
MCHC RBC AUTO-ENTMCNC: 33.7 G/DL (ref 32–36)
MCV RBC AUTO: 93 FL (ref 80–100)
MONOCYTES # BLD AUTO: 0.13 X10*3/UL (ref 0.1–1)
MONOCYTES NFR BLD AUTO: 0.9 %
NEUTROPHILS # BLD AUTO: 13.19 X10*3/UL (ref 1.2–7.7)
NEUTROPHILS NFR BLD AUTO: 93.3 %
NRBC BLD-RTO: 0 /100 WBCS (ref 0–0)
PHOSPHATE SERPL-MCNC: 4.2 MG/DL (ref 2.5–4.9)
PLATELET # BLD AUTO: 371 X10*3/UL (ref 150–450)
POTASSIUM SERPL-SCNC: 4.2 MMOL/L (ref 3.5–5.3)
RBC # BLD AUTO: 3.67 X10*6/UL (ref 4.5–5.9)
SODIUM SERPL-SCNC: 141 MMOL/L (ref 136–145)
VARICELLA ZOSTER VIRUS DNA PCR, QUANTITATIVE (NON-BLOOD: NOT DETECTED COPIES/ML
WBC # BLD AUTO: 14.1 X10*3/UL (ref 4.4–11.3)

## 2025-02-11 PROCEDURE — 71045 X-RAY EXAM CHEST 1 VIEW: CPT | Performed by: RADIOLOGY

## 2025-02-11 PROCEDURE — 80069 RENAL FUNCTION PANEL: CPT

## 2025-02-11 PROCEDURE — 85025 COMPLETE CBC W/AUTO DIFF WBC: CPT

## 2025-02-11 PROCEDURE — 99232 SBSQ HOSP IP/OBS MODERATE 35: CPT

## 2025-02-11 PROCEDURE — 36415 COLL VENOUS BLD VENIPUNCTURE: CPT

## 2025-02-11 PROCEDURE — 71045 X-RAY EXAM CHEST 1 VIEW: CPT

## 2025-02-11 PROCEDURE — 1200000002 HC GENERAL ROOM WITH TELEMETRY DAILY

## 2025-02-11 PROCEDURE — 92610 EVALUATE SWALLOWING FUNCTION: CPT | Mod: GN | Performed by: SPEECH-LANGUAGE PATHOLOGIST

## 2025-02-11 PROCEDURE — 2500000004 HC RX 250 GENERAL PHARMACY W/ HCPCS (ALT 636 FOR OP/ED): Mod: SE

## 2025-02-11 PROCEDURE — 83735 ASSAY OF MAGNESIUM: CPT

## 2025-02-11 PROCEDURE — 97116 GAIT TRAINING THERAPY: CPT | Mod: GP

## 2025-02-11 PROCEDURE — 2500000001 HC RX 250 WO HCPCS SELF ADMINISTERED DRUGS (ALT 637 FOR MEDICARE OP): Mod: SE

## 2025-02-11 PROCEDURE — 82947 ASSAY GLUCOSE BLOOD QUANT: CPT

## 2025-02-11 RX ORDER — GUAIFENESIN 600 MG/1
600 TABLET, EXTENDED RELEASE ORAL 2 TIMES DAILY PRN
Status: DISCONTINUED | OUTPATIENT
Start: 2025-02-11 | End: 2025-02-13 | Stop reason: HOSPADM

## 2025-02-11 RX ADMIN — METHYLPREDNISOLONE SODIUM SUCCINATE 1000 MG: 1 INJECTION INTRAMUSCULAR; INTRAVENOUS at 18:04

## 2025-02-11 RX ADMIN — POLYETHYLENE GLYCOL 3350 17 G: 17 POWDER, FOR SOLUTION ORAL at 08:45

## 2025-02-11 RX ADMIN — PREDNISOLONE ACETATE 1 DROP: 10 SUSPENSION/ DROPS OPHTHALMIC at 18:06

## 2025-02-11 RX ADMIN — MYCOPHENOLATE MOFETIL 500 MG: 500 TABLET, FILM COATED ORAL at 21:15

## 2025-02-11 RX ADMIN — ENOXAPARIN SODIUM 40 MG: 100 INJECTION SUBCUTANEOUS at 08:45

## 2025-02-11 RX ADMIN — PREDNISOLONE ACETATE 1 DROP: 10 SUSPENSION/ DROPS OPHTHALMIC at 13:30

## 2025-02-11 RX ADMIN — ACETAMINOPHEN 975 MG: 325 TABLET ORAL at 14:37

## 2025-02-11 RX ADMIN — GABAPENTIN 300 MG: 300 CAPSULE ORAL at 14:42

## 2025-02-11 RX ADMIN — PREDNISOLONE ACETATE 1 DROP: 10 SUSPENSION/ DROPS OPHTHALMIC at 21:20

## 2025-02-11 RX ADMIN — PREDNISOLONE ACETATE 1 DROP: 10 SUSPENSION/ DROPS OPHTHALMIC at 10:23

## 2025-02-11 RX ADMIN — MYCOPHENOLATE MOFETIL 500 MG: 500 TABLET, FILM COATED ORAL at 08:45

## 2025-02-11 RX ADMIN — PANTOPRAZOLE SODIUM 40 MG: 40 TABLET, DELAYED RELEASE ORAL at 08:45

## 2025-02-11 RX ADMIN — GABAPENTIN 300 MG: 300 CAPSULE ORAL at 06:26

## 2025-02-11 RX ADMIN — BRIMONIDINE TARTRATE 1 DROP: 2 SOLUTION/ DROPS OPHTHALMIC at 14:45

## 2025-02-11 ASSESSMENT — PAIN - FUNCTIONAL ASSESSMENT
PAIN_FUNCTIONAL_ASSESSMENT: 0-10
PAIN_FUNCTIONAL_ASSESSMENT: 0-10

## 2025-02-11 ASSESSMENT — COGNITIVE AND FUNCTIONAL STATUS - GENERAL
CLIMB 3 TO 5 STEPS WITH RAILING: TOTAL
MOBILITY SCORE: 14
MOVING TO AND FROM BED TO CHAIR: A LITTLE
STANDING UP FROM CHAIR USING ARMS: A LOT
WALKING IN HOSPITAL ROOM: A LOT
MOVING FROM LYING ON BACK TO SITTING ON SIDE OF FLAT BED WITH BEDRAILS: A LITTLE
TURNING FROM BACK TO SIDE WHILE IN FLAT BAD: A LITTLE

## 2025-02-11 ASSESSMENT — PAIN DESCRIPTION - LOCATION: LOCATION: LEG

## 2025-02-11 ASSESSMENT — ACTIVITIES OF DAILY LIVING (ADL): LACK_OF_TRANSPORTATION: NO

## 2025-02-11 ASSESSMENT — PAIN DESCRIPTION - ORIENTATION: ORIENTATION: LEFT;RIGHT

## 2025-02-11 ASSESSMENT — PAIN SCALES - GENERAL
PAINLEVEL_OUTOF10: 0 - NO PAIN
PAINLEVEL_OUTOF10: 8
PAINLEVEL_OUTOF10: 6

## 2025-02-11 NOTE — CARE PLAN
The patient's goals for the shift include RICK    The clinical goals for the shift include remain safe    Problem: Pain - Adult  Goal: Verbalizes/displays adequate comfort level or baseline comfort level  2/10/2025 2126 by Gertrude Patel RN  Outcome: Progressing  2/10/2025 2126 by Gertrude Patel RN  Outcome: Progressing     Problem: Safety - Adult  Goal: Free from fall injury  2/10/2025 2126 by Gertrude Patel RN  Outcome: Progressing  2/10/2025 2126 by Gertrude Patel RN  Outcome: Progressing     Problem: Discharge Planning  Goal: Discharge to home or other facility with appropriate resources  2/10/2025 2126 by Gertrude Patel RN  Outcome: Progressing  2/10/2025 2126 by Gertrude Patel RN  Outcome: Progressing     Problem: Nutrition  Goal: Nutrient intake appropriate for maintaining nutritional needs  2/10/2025 2126 by Gertrude Patel RN  Outcome: Progressing  2/10/2025 2126 by Gertrude Patel RN  Outcome: Progressing

## 2025-02-11 NOTE — PROGRESS NOTES
SLP Adult Inpatient Speech-Language Pathology Clinical Swallow Evaluation    Patient Name: Juan Marsh  MRN: [id number]  Today's Date: 2/11/2025   Time Calculation  Start Time: 1340  Stop Time: 1358  Time Calculation (min): 18 min       Assessment/Plan:  #dysphagia   SLP consulted due to concerns with swallowing in setting of new Right BG c/f Neuro-Behcet flare vs. Lymphoma. Pt accepted and tolerated numerous trials of food and liquids. Following 3 ounces, pt passed swallow evaluation however abrupt overt coughing noted following successive food and liquids challenges. Pt needs additional imaging utilizing MBS to objectively assess oropharyngeal swallow function and reported concerns from pt and family members -- plan for 02/12 AM. Updated primary team.    Recommendations:  NPO  Frequent, aggressive oral care as tolerated to improve infection control, as well as to reduce dental plaque and bacteria on oropharyngeal surfaces which may increase the risk nosocomial infections, including pneumonia.  OK for sips of water  Modified Barium Swallow Study for further assessment of oropharyngeal swallow and to guide diet recommendations.  D/w team.  Goal to complete tomorrow AM       Inpatient/Swing Bed or Outpatient: Inpatient  SLP Plan: Skilled SLP  SLP Frequency: 2x per week  Duration: 2 weeks  SLP Discharge Recommendations: Continue skilled SLP services at the next level of care  SLP - OK to Discharge: Yes        Goals:  Encounter Problems       Encounter Problems (Active)       MOTOR SPEECH PRODUCTION       LTG - Patient will utilize compensatory intervention for intelligibility       Start:  02/10/25    Expected End:  02/24/25            STG - Patient will utilize compensatory speech strategies to increase intelligility to 90% in order to communicate daily wants/needs across all environments       Start:  02/10/25    Expected End:  02/24/25               Swallowing       LTG - Patient will tolerate the least  "restrictive diet consistency to allow for safe consumption of daily meals       Start:  02/11/25    Expected End:  02/25/25            SLP Goal 1       Start:  02/11/25    Expected End:  02/25/25       Patient will tolerate therapeutic trials of recommended consistency without clinical signs and symptoms of aspiration on 100% of trials                 Subjective   Pt received resting in bed. Pt pleasant and cooperative throughout session. A&O x4. Demonstrated great insight into current scenario by sustaining topic maintenance and asking appropriate questions.      Baseline Assessment:  Respiratory Status: Room air  History of Intubation: No  Behavior/Cognition: Alert, Cooperative, Pleasant mood  Vision: Functional for self-feeding  Patient Positioning: Upright in Bed    History and Physical:    Per H&P:  \"Juan Marsh is a 29 y.o. 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  and most recently on prednisone 10mg daily and MMF 500mg BID who presented to the ED with 1 week history of generalized weakness and facial droop.\"    Past Medical History:   Diagnosis Date    Lupus (systemic lupus erythematosus) (Multi)     Stroke (Multi)     Uveitis      Family History   Problem Relation Name Age of Onset    Other (Diabetes mellitus) Other Multiple Family Members     Hypertension Other Multiple Family Members     Cancer Other Multiple Family Members        No Known Allergies      Relevant Results  XR chest 2 views 05/15/2024    Narrative  Interpreted By:  Aubree العلي and Baker Zachary  STUDY:  XR CHEST 2 VIEWS; ;  5/15/2024 12:44 am    INDICATION:  Signs/Symptoms:TB r/o.    COMPARISON:  Chest radiograph on 11/02/2022.    ACCESSION " NUMBER(S):  [id number]    ORDERING CLINICIAN:  TREMAINE CABALLERO    FINDINGS:  PA and lateral views of the chest.    The cardiomediastinal silhouette is normal in size and configuration.    No consolidation, pleural effusion, or pneumothorax.    No acute osseous abnormality.    Impression  No acute cardiopulmonary process.    I personally reviewed the images/study and I agree with the findings  as stated by Dr. Pedro Good M.D. This study was interpreted at  [facility], [place], [place].    MACRO:  None    Signed by: Aubree العلي 5/15/2024 2:24 AM  Dictation workstation:   [id number]        Objective   Pt preferred assistance from SLP with feeding. Consistency trials included sips of water via straw and cup, applesauce via teaspoon (x4), and solid textures (yuliet crackers).  Oral/Motor Assessment:  Oral Hygiene:  (WNL)  Dentition: Adequate/Natural  Oral Motor:  (Noted left facial droop)    Clinical Observations:    The 3 oz sequential drinks of thin liquid water was utilized as a reliable, evidence based test to rule out silent aspiration and determine need for additional testing, such as the MBS or FEES (fiberoptic endoscopic evaluation of swallow), if the test is equivocal, incomplete or pt shows s/sx of aspiration,  prior to recommending a oral diet    Patient Positioning: Upright in Bed  Was The 3 oz Swallow Protocol Completed: Yes    Consistencies Trialed: Yes  Consistencies Trialed: Thin (IDDSI Level 0) - Straw, Thin (IDDSI Level 0) - Cup, Pureed/extremely thick (IDDSI Level 4), Regular (IDDSI Level 7)    Oral phase: Noted left facial droop at rest but did not adversely affect oral stage of swallow. Pt presented with sufficient mastication and complete bolus extraction. No obvious anterior spillage. Noted pocketing in upper left buccal cavity with solid textures.     Pharyngeal Phase: Appreciated timely swallow onset with all PO challenges. Following 3 ounces  successive sips via straw, immediate coughing for several seconds. Following 3 ounces successive sips via cup, no obsereved overt indicators of aspiration.    Inpatient Education:  Education Documentation  Modified Diet Training, taught by SAW Palmer-SLP at 2/11/2025  3:33 PM.  Learner: Patient  Readiness: Acceptance  Method: Explanation  Response: Verbalizes Understanding  Comment: Reviewed pt's results of bedside examination and need for further instrumental evaluation. Verbal agreement on all accounts.    Education Comments  No comments found.

## 2025-02-11 NOTE — PROGRESS NOTES
Physical Therapy    Physical Therapy Treatment    Patient Name: Juan Marsh  MRN: [id number]  Department: [place] [id number]  Room: [address] [place] [address]  Today's Date: 2/11/2025  Time Calculation  Start Time: 1038  Stop Time: 1053  Time Calculation (min): 15 min       Assessment/Plan   PT Assessment  PT Assessment Results: Decreased strength, Decreased endurance, Impaired balance, Decreased mobility, Decreased coordination, Impaired judgement, Decreased safety awareness, Impaired sensation, Pain  Rehab Prognosis: Excellent  Barriers to Discharge Home: Caregiver assistance, Physical needs  Caregiver Assistance: Caregiver assistance needed per identified barriers - however, level of patient's required assistance exceeds assistance available at home  Physical Needs: Stair navigation into home limited by function/safety, Stair navigation to access bed limited by function/safety, Stair navigation to access bath limited by function/safety, Ambulating household distances limited by function/safety, 24hr mobility assistance needed, 24hr ADL assistance needed, High falls risk due to function or environment  End of Session Communication: Bedside nurse  Assessment Comment: Pt continues to demonstrate impaired strength and function to that of baseline. Pt remains appropriate for continued PT in house and after discharge at HIGH intensity to restore function.  End of Session Patient Position: Up in chair, Alarm off, not on at start of session  PT Plan  Inpatient/Swing Bed or Outpatient: Inpatient  PT Plan  Treatment/Interventions: Bed mobility, Transfer training, Gait training, Balance training, Strengthening, Therapeutic exercise, Therapeutic activity  PT Plan: Ongoing PT  PT Frequency: 5 times per week  PT Discharge Recommendations: High intensity level of continued care  Equipment Recommended upon Discharge:  (TBD pending discharge.)  PT Recommended Transfer Status: Assist x1  PT - OK to Discharge: Yes      General Visit Information:   PT   Visit  PT Received On: 02/11/25  General  Family/Caregiver Present: No  Prior to Session Communication: Bedside nurse  Patient Position Received: Up in chair, Alarm off, not on at start of session  Preferred Learning Style: verbal, auditory  General Comment: Pt seated in chair pre/post visit. Pt reporting wanting to attempt ambulation/mobility on this date. Reports feeling slightly better today from previous visits.    Subjective   Precautions:  Precautions  Medical Precautions: Fall precautions    Objective   Pain:  Pain Assessment  Pain Assessment: 0-10  0-10 (Numeric) Pain Score: 8  Pain Type:  (Increased LLE pain with ambulation), not at rest  Cognition:  Cognition  Overall Cognitive Status: Within Functional Limits  Orientation Level: Oriented X4  Coordination:  Movements are Fluid and Coordinated: No  Coordination Comment: Continues to demonstrate BUE/LE ataxia during mobility.  Postural Control:  Postural Control  Postural Control: Impaired  Posture Comment: Patient with flexed posture and tendency to lean.  Static Sitting Balance  Static Sitting-Balance Support: Feet supported  Static Sitting-Level of Assistance: Close supervision  Static Standing Balance  Static Standing-Balance Support: Bilateral upper extremity supported (on a wheeled walker)  Static Standing-Level of Assistance: Minimum assistance    Activity Tolerance:  Activity Tolerance  Endurance: Tolerates 10 - 20 min exercise with multiple rests, Decreased tolerance for upright activites  Activity Tolerance Comments: Improved from previous distance.  Treatments:    Therapeutic Activity  Therapeutic Activity Performed: Yes  Therapeutic Activity 1: sit-stand transfers, static stance, gait    Bed Mobility  Bed Mobility: No  Bed Mobility 1  Bed Mobility Comments 1: Pt in chair pre/post visit.    Ambulation/Gait Training  Ambulation/Gait Training Performed: Yes  Ambulation/Gait Training 1  Surface 1: Level tile  Device 1: Rolling walker  Assistance 1:  Minimum assistance, Minimal verbal cues, Minimal tactile cues  Quality of Gait 1: Inconsistent stride length, Decreased step length, Foot sweep, Forward flexed posture  Comments/Distance (ft) 1: 40ft x 2. Patient with occasional LOB when he'd experience strong hiccups. Able to tolerate increased ambulation on this date though remains at high falls risk.  Transfers  Transfer: Yes  Transfer 1  Transfer From 1: Sit to, Stand to  Transfer to 1: Stand, Sit  Transfer Device 1: Walker  Transfer Level of Assistance 1: Minimum assistance, Minimal verbal cues, Minimal tactile cues    Other Activity  Other Activity Performed: Yes    Outcome Measures:  [facility] Basic Mobility  Turning from your back to your side while in a flat bed without using bedrails: A little  Moving from lying on your back to sitting on the side of a flat bed without using bedrails: A little  Moving to and from bed to chair (including a wheelchair): A little  Standing up from a chair using your arms (e.g. wheelchair or bedside chair): A lot  To walk in hospital room: A lot  Climbing 3-5 steps with railing: Total  Basic Mobility - Total Score: 14    Education Documentation  Precautions, taught by Phil Mccormick PT at 2/11/2025 11:52 AM.  Learner: Patient  Readiness: Acceptance  Method: Explanation  Response: Verbalizes Understanding    Mobility Training, taught by Phil Mccormick PT at 2/11/2025 11:52 AM.  Learner: Patient  Readiness: Acceptance  Method: Explanation  Response: Verbalizes Understanding    Education Comments  No comments found.      Encounter Problems       Encounter Problems (Active)       PT Problem       Pt will perform all aspects of bed mobility at [facility] by discharge. (Progressing)       Start:  02/10/25    Expected End:  02/24/25            Pt will perform all transfers at [facility] w/ LRAD by discharge. (Progressing)       Start:  02/10/25    Expected End:  02/24/25            Pt will amb 150' w/ LRAD at [facility] by discharge.  (Progressing)       Start:  02/10/25    Expected End:  02/24/25            Pt will negotiate 24 steps w/ B rails in order to simulate home environment, close sup by discharge.  (Progressing)       Start:  02/10/25    Expected End:  02/24/25               Pain - Adult

## 2025-02-11 NOTE — CARE PLAN
The patient's goals for the shift include RICK    The clinical goals for the shift include remain safe    Problem: Pain - Adult  Goal: Verbalizes/displays adequate comfort level or baseline comfort level  Outcome: Progressing     Problem: Nutrition  Goal: Nutrient intake appropriate for maintaining nutritional needs  Outcome: Progressing     Problem: Discharge Planning  Goal: Discharge to home or other facility with appropriate resources  Outcome: Progressing

## 2025-02-11 NOTE — PROGRESS NOTES
Juan Marsh is a 29 y.o. male on day 1 of admission presenting with Behcet's syndrome, neurologic type (Multi).    Subjective/Overnight Events:   NAEON. No acute concerns or complaints.    ---------------------------------------------------    Objective:   24 Hour Vitals  Temp:  [36.1 °C (97 °F)-37.1 °C (98.8 °F)] 37.1 °C (98.8 °F)  Heart Rate:  [] 105  Resp:  [15-22] 22  BP: (103-123)/(52-91) 121/91    Physical Exam   Gen: NAD  Pulm: No incr WOB, on RA    Neuro:  Wakens to verbal stimuli. A&Ox3, follows commands. Noticeable dysarthria with improved aphasia, patient speaks more easily today. Questions and concerns are context appropriate.   EOMI, eyebrow raise/eye closure symmetric but L facial droop that that is mostly improved with activation (some residual FD)    Motor:   BUE EF, EE, HG 5/5. Left pronator drift.  BLE 5/5 HF    Sens: reports diminished sens on R face, RUE, RLE, but on simultaneous LT, states sens symmetric.     Results from last 72 hours   Lab Units 02/11/25  0843 02/10/25  0900   WBC AUTO x10*3/uL 14.1* 10.5   NRBC AUTO /100 WBCs 0.0 0.0   RBC AUTO x10*6/uL 3.67* 3.72*   HEMOGLOBIN g/dL 11.5* 11.4*   HEMATOCRIT % 34.1* 34.2*   MCV fL 93 92   MCH pg 31.3 30.6   MCHC g/dL 33.7 33.3   RDW % 13.4 13.2   PLATELETS AUTO x10*3/uL 371 341   NEUTROS PCT AUTO % 93.3 90.4   IG PCT AUTO % 0.4 0.4   LYMPHS PCT AUTO % 5.3 7.0   MONOS PCT AUTO % 0.9 2.0   EOS PCT AUTO % 0.0 0.1   BASOS PCT AUTO % 0.1 0.1   NEUTROS ABS x10*3/uL 13.19* 9.50*   IG AUTO x10*3/uL 0.06 0.04   LYMPHS ABS AUTO x10*3/uL 0.75* 0.73*   MONOS ABS AUTO x10*3/uL 0.13 0.21   EOS ABS AUTO x10*3/uL 0.00 0.01   BASOS ABS AUTO x10*3/uL 0.01 0.01      Results from last 72 hours   Lab Units 02/11/25  0843 02/10/25  0900   GLUCOSE mg/dL 137* 120*   SODIUM mmol/L 141 140   POTASSIUM mmol/L 4.2 4.3   CHLORIDE mmol/L 106 105   CO2 mmol/L 23 23   ANION GAP mmol/L 16 16   BUN mg/dL 36* 32*   CREATININE mg/dL 1.10 0.98   EGFR mL/min/1.73m*2  >90 >90   CALCIUM mg/dL 10.2 10.1   PHOSPHORUS mg/dL 4.2 3.8   ALBUMIN g/dL 4.6 4.6   MAGNESIUM mg/dL 2.55* 2.42*      NM PET CT Whole Body 2/10/25  No definite evidence of an active lymphomatous process.     CT Head wo IV contrast 2/8/25  1. No significant change in areas of geographic hypoattenuation  within bilateral cerebral hemispheres, right-greater-than-left as  described above. There is persistent mass effect with effacement of  the lateral ventricle and 0.3 cm leftward midline shift. Findings are  in keeping with patient's history of neuro Behcet's syndrome better  evaluated brain MRI.  2. No acute infarct or acute intracranial hemorrhage.    MRI brain w and wo IV contrast 2/7/25  1.  Slightly expansile FLAIR hyperintense partially enhancing process  extends from the tyrone and right middle cerebellar peduncle through  the midbrain, right cerebral peduncle, right thalamus and internal  capsule with some involvement of the right lentiform nucleus. There  is lesser signal abnormality involving the left cerebral peduncle,  thalamus and internal capsule. There is also some extension along the  right optic tract to the right side of the optic chiasm and possibly  minimally into the prechiasmatic segment of the right optic nerve.  The appearance is strongly suggestive of a flare-up of Behcet  syndrome. Other process such as glioma or other active primary  demyelinating disease process considered much less likely.    Scheduled medications  brimonidine, 1 drop, Right Eye, q12h  enoxaparin, 40 mg, subcutaneous, Daily  ergocalciferol, 1,250 mcg, oral, Every Sunday  gabapentin, 300 mg, oral, q8h YONNY  insulin lispro, 0-5 Units, subcutaneous, TID AC  methylPREDNISolone sodium succinate (PF), 1,000 mg, intravenous, q18h  mycophenolate, 500 mg, oral, BID  pantoprazole, 40 mg, oral, Daily  polyethylene glycol, 17 g, oral, Daily  prednisoLONE acetate, 1 drop, Right Eye, 4x daily      Continuous medications     PRN  medications  PRN medications: acetaminophen, ondansetron **OR** ondansetron      Assessment/Plan   29RHM hx smoking, heavy EtOH use, systemic (pericarditis, arthralgias, skin rash, recurrent orogenital ulcers, uveitis, retinal vasculitis, but -ve HLAB51) and 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 Compliant w/MMF. CTH showed new R BG hypodensity with 3mm MLS. MRI w/FLAIR hyperintensity of R middle cerebellar peduncle through midbrain, R cerebral peduncle, thalamus, w/lesser signal along R optic tract to R optic chiasm, prechiasmatic seg of R optic nerve. Location too high risk for bx per NSGY.   Current c/f lymphoma vs neuro-Behcet flare. Will c/w steroids w/plan for ~6w taper ending w/maintenance pred 10 continued. PET scan at this time without evidence of lymphoma.    Update 2/11:  - c/w IVMP w/plan for pred taper  - last day of IVIG  - brain MRI w and wo IV contrast on 2/12, will reach out to Dr Ulloa after  - new plan for prednisone taper after IVMP: 60mg prednisone daily, go down by 10mg [id number] and then maintain 10mg daily dosing     #New R BG c/f Neuro-Behcet flare vs. lymphoma  :: MRI brain w wo: FLAIR hyperintensity of R middle cerebellar peduncle through midbrain, R cerebral peduncle, thalamus, internal capsule and some of lentiform nucleus; lesser signal in L cerebral peduncle, thalamus and internal capsule; extension along R optic tract to R optic chiasm and prechiasmatic segment of R optic nerve  :: CSF 2/8: ->0, WBC 86 -> 74 (N68%, L31%, M 1%), protein 57, glucose 58  Neg biofire  :: PET Scan 2/10 no definitive evidence of active lymphomatous process  :: Per NSGY - current lesions too high risk to biopsy  - IVMP x 5 -> steroid taper decreasing by 10mg q2w and maintain on pred 10 afterwards  -  Repeat Brain MRI w and wo IV contrast 2/12, reach out to Dr Ulloa after  - Bactrim, PPI, Vit D  - Continue on mycophenolate for now  - Continue home gabapentin 300mg TID  - PT/OT   [ ] Pending: CSF  ENC, IgG index, VZV, OCB, cytology/flow    #Cough, secretions  #Leukocytosis  ::ddx aspiration pneumonia vs steroid induced leukocytosis  ::HDS otherwise  - swallow evaluation with SLP  - CXR    F: PO PRN  E: replete PRN  N: regular  Access: PIV  Ppx: Lovenox, SCDs    Code status: FULL  NOK: Nayla (mom) [phone]    Marsha Echeverria MD  Department of Neurology, PGY-2  General [phone]

## 2025-02-11 NOTE — SIGNIFICANT EVENT
Continue to recommend medical management of concern for neurobechet's vs lymphoma. Due to location of lesion patient is at high risk for neurosurgical intervention. At this time we recommend against biopsy. Please call us back with any questions or concerns or with disease progression requiring biopsy. Thank you for allowing us to participate in the care of this patient. Will sign off at this time. Please page [phone] with any questions or concerns.    Kathi Adam MD  PGY-3 Neurosurgery  9:37 PM

## 2025-02-11 NOTE — PROGRESS NOTES
02/11/25 1044   Discharge Planning   Living Arrangements Family members   Support Systems Family members   Assistance Needed Per patient previously independent of ADLs iADLS, drives himself to appts, if assistance is needed his mother assists.   Type of Residence Private residence   Number of Stairs to Enter Residence   (Two flights of stairs. Pt ambulates with his cane.)   Number of Stairs Within Residence   (0)   Do you have animals or pets at home? No   Who is requesting discharge planning? Provider   Home or Post Acute Services Post acute facilities (Rehab/SNF/etc)   Expected Discharge Disposition Rehab   Does the patient need discharge transport arranged? Yes   RoundTrip coordination needed? Yes   Financial Resource Strain   How hard is it for you to pay for the very basics like food, housing, medical care, and heating? Not very   Housing Stability   In the last 12 months, was there a time when you were not able to pay the mortgage or rent on time? N   At any time in the past 12 months, were you homeless or living in a shelter (including now)? N   Transportation Needs   In the past 12 months, has lack of transportation kept you from medical appointments or from getting medications? no   In the past 12 months, has lack of transportation kept you from meetings, work, or from getting things needed for daily living? No   Patient Choice   Provider Choice list and CMS website (https://medicare.gov/care-compare#search) for post-acute Quality and Resource Measure Data were provided and reviewed with: Patient     Met with pt and introduced myself as Care Coordinator with Care Transitions Team for Discharge Planning. Pt stated he feels safe at home. Either pt or his mother drives to drs appts.  Pt's address, phone number and contact information was verified. Pt denies any social or financial concerns at this time.  Discharge disposition: Acute rehab  Home Healthcare: none active  DME: derek  PCP: Eva  [facility]  Pharmacy: Pt requesting that pharmacy be switched to [facility] mail order.  Patient notified of PT/OT recommendation for high intensity therapy at the time of discharge. Referral process and freedom of choice explained to pt. AR list provided, pt to review and call this nurse with [facility].   Pt requested that this nurse call his mother to discuss discharge planning. This nurse attempted to speak with pts mother, no answer, VM placed.    Addendum: Telephone calll from pt who stated F[facility] are   1) [facility]  2) [facility].  Referrals submitted via [facility].    Addendum 1429:  This nurse requested that [facility] support team submit auth to [facility]. Pts ADOD 2/14.    Cherry Stapleton, RN, BSN  Transitional Care Coordinator   Office: [phone]  Secure chat via [employer]iku

## 2025-02-12 ENCOUNTER — APPOINTMENT (OUTPATIENT)
Dept: RADIOLOGY | Facility: HOSPITAL | Age: 30
End: 2025-02-12
Payer: COMMERCIAL

## 2025-02-12 LAB
ALB CSF/SERPL: 6.3 RATIO (ref 0–9)
ALBUMIN CSF-MCNC: 30 MG/DL (ref 0–35)
ALBUMIN SERPL BCP-MCNC: 4.3 G/DL (ref 3.4–5)
ALBUMIN SERPL-MCNC: 4771 MG/DL (ref 3500–5200)
ANION GAP SERPL CALC-SCNC: 13 MMOL/L (ref 10–20)
BASOPHILS # BLD AUTO: 0.01 X10*3/UL (ref 0–0.1)
BASOPHILS NFR BLD AUTO: 0.1 %
BUN SERPL-MCNC: 34 MG/DL (ref 6–23)
CALCIUM SERPL-MCNC: 9.9 MG/DL (ref 8.6–10.6)
CELL POPULATIONS: NORMAL
CHLORIDE SERPL-SCNC: 104 MMOL/L (ref 98–107)
CO2 SERPL-SCNC: 27 MMOL/L (ref 21–32)
CREAT SERPL-MCNC: 0.89 MG/DL (ref 0.5–1.3)
DIAGNOSIS: NORMAL
EGFRCR SERPLBLD CKD-EPI 2021: >90 ML/MIN/1.73M*2
EOSINOPHIL # BLD AUTO: 0 X10*3/UL (ref 0–0.7)
EOSINOPHIL NFR BLD AUTO: 0 %
ERYTHROCYTE [DISTWIDTH] IN BLOOD BY AUTOMATED COUNT: 13.3 % (ref 11.5–14.5)
FLOW DIFFERENTIAL: NORMAL
FLOW TEST ORDERED: NORMAL
FLUID CELL COUNT: 74 /UL
GLUCOSE BLD MANUAL STRIP-MCNC: 120 MG/DL (ref 74–99)
GLUCOSE BLD MANUAL STRIP-MCNC: 137 MG/DL (ref 74–99)
GLUCOSE SERPL-MCNC: 123 MG/DL (ref 74–99)
HCT VFR BLD AUTO: 32.6 % (ref 41–52)
HGB BLD-MCNC: 11 G/DL (ref 13.5–17.5)
IGG CSF-MCNC: 3 MG/DL (ref 0–6)
IGG SERPL-MCNC: 706 MG/DL (ref 768–1632)
IGG SYNTH RATE SER+CSF CALC-MRATE: 2.5 MG/D
IGG/ALB CLEAR SER+CSF-RTO: 0.68 RATIO (ref 0.28–0.66)
IGG/ALB CSF: 0.1 RATIO (ref 0.09–0.25)
IMM GRANULOCYTES # BLD AUTO: 0.08 X10*3/UL (ref 0–0.7)
IMM GRANULOCYTES NFR BLD AUTO: 0.7 % (ref 0–0.9)
LAB TEST METHOD: NORMAL
LYMPHOCYTES # BLD AUTO: 0.78 X10*3/UL (ref 1.2–4.8)
LYMPHOCYTES NFR BLD AUTO: 6.7 %
MAGNESIUM SERPL-MCNC: 2.61 MG/DL (ref 1.6–2.4)
MCH RBC QN AUTO: 31.3 PG (ref 26–34)
MCHC RBC AUTO-ENTMCNC: 33.7 G/DL (ref 32–36)
MCV RBC AUTO: 93 FL (ref 80–100)
MONOCYTES # BLD AUTO: 0.5 X10*3/UL (ref 0.1–1)
MONOCYTES NFR BLD AUTO: 4.3 %
NEUTROPHILS # BLD AUTO: 10.35 X10*3/UL (ref 1.2–7.7)
NEUTROPHILS NFR BLD AUTO: 88.2 %
NRBC BLD-RTO: 0 /100 WBCS (ref 0–0)
NUMBER OF CELLS COLLECTED: NORMAL
OLIGOCLONAL BANDS CSF ELPH-IMP: ABNORMAL
OLIGOCLONAL BANDS CSF ELPH-IMP: NEGATIVE
OLIGOCLONAL BANDS CSF IEF: 1 BANDS (ref 0–1)
PATH REPORT.TOTAL CANCER: NORMAL
PHOSPHATE SERPL-MCNC: 3.2 MG/DL (ref 2.5–4.9)
PLATELET # BLD AUTO: 336 X10*3/UL (ref 150–450)
POTASSIUM SERPL-SCNC: 3.9 MMOL/L (ref 3.5–5.3)
RBC # BLD AUTO: 3.51 X10*6/UL (ref 4.5–5.9)
SIGNATURE COMMENT: NORMAL
SODIUM SERPL-SCNC: 140 MMOL/L (ref 136–145)
SPECIMEN VIABILITY: NORMAL
WBC # BLD AUTO: 11.7 X10*3/UL (ref 4.4–11.3)

## 2025-02-12 PROCEDURE — 2500000001 HC RX 250 WO HCPCS SELF ADMINISTERED DRUGS (ALT 637 FOR MEDICARE OP): Mod: SE

## 2025-02-12 PROCEDURE — 82947 ASSAY GLUCOSE BLOOD QUANT: CPT

## 2025-02-12 PROCEDURE — 2500000005 HC RX 250 GENERAL PHARMACY W/O HCPCS: Mod: SE | Performed by: PSYCHIATRY & NEUROLOGY

## 2025-02-12 PROCEDURE — 74230 X-RAY XM SWLNG FUNCJ C+: CPT | Performed by: RADIOLOGY

## 2025-02-12 PROCEDURE — 70553 MRI BRAIN STEM W/O & W/DYE: CPT | Performed by: RADIOLOGY

## 2025-02-12 PROCEDURE — 70553 MRI BRAIN STEM W/O & W/DYE: CPT

## 2025-02-12 PROCEDURE — 1200000002 HC GENERAL ROOM WITH TELEMETRY DAILY

## 2025-02-12 PROCEDURE — 2550000001 HC RX 255 CONTRASTS: Mod: SE | Performed by: PSYCHIATRY & NEUROLOGY

## 2025-02-12 PROCEDURE — A9575 INJ GADOTERATE MEGLUMI 0.1ML: HCPCS | Mod: SE | Performed by: PSYCHIATRY & NEUROLOGY

## 2025-02-12 PROCEDURE — 80069 RENAL FUNCTION PANEL: CPT

## 2025-02-12 PROCEDURE — 92611 MOTION FLUOROSCOPY/SWALLOW: CPT | Mod: GN | Performed by: SPEECH-LANGUAGE PATHOLOGIST

## 2025-02-12 PROCEDURE — 99232 SBSQ HOSP IP/OBS MODERATE 35: CPT

## 2025-02-12 PROCEDURE — 85025 COMPLETE CBC W/AUTO DIFF WBC: CPT

## 2025-02-12 PROCEDURE — 97116 GAIT TRAINING THERAPY: CPT | Mod: GP,CQ

## 2025-02-12 PROCEDURE — 2500000004 HC RX 250 GENERAL PHARMACY W/ HCPCS (ALT 636 FOR OP/ED): Mod: SE

## 2025-02-12 PROCEDURE — 36415 COLL VENOUS BLD VENIPUNCTURE: CPT

## 2025-02-12 PROCEDURE — 83735 ASSAY OF MAGNESIUM: CPT

## 2025-02-12 PROCEDURE — 74230 X-RAY XM SWLNG FUNCJ C+: CPT

## 2025-02-12 RX ORDER — GADOTERATE MEGLUMINE 376.9 MG/ML
12 INJECTION INTRAVENOUS
Status: COMPLETED | OUTPATIENT
Start: 2025-02-12 | End: 2025-02-12

## 2025-02-12 RX ORDER — PREDNISONE 20 MG/1
60 TABLET ORAL DAILY
Status: DISCONTINUED | OUTPATIENT
Start: 2025-02-12 | End: 2025-02-13 | Stop reason: HOSPADM

## 2025-02-12 RX ORDER — DIPHENHYDRAMINE HCL 25 MG
25 CAPSULE ORAL ONCE
Status: COMPLETED | OUTPATIENT
Start: 2025-02-12 | End: 2025-02-12

## 2025-02-12 RX ADMIN — DIPHENHYDRAMINE HYDROCHLORIDE 25 MG: 25 CAPSULE ORAL at 08:28

## 2025-02-12 RX ADMIN — PREDNISOLONE ACETATE 1 DROP: 10 SUSPENSION/ DROPS OPHTHALMIC at 21:02

## 2025-02-12 RX ADMIN — MYCOPHENOLATE MOFETIL 500 MG: 500 TABLET, FILM COATED ORAL at 08:22

## 2025-02-12 RX ADMIN — PREDNISONE 60 MG: 20 TABLET ORAL at 08:22

## 2025-02-12 RX ADMIN — BARIUM SULFATE 20 ML: 400 PASTE ORAL at 11:37

## 2025-02-12 RX ADMIN — PREDNISOLONE ACETATE 1 DROP: 10 SUSPENSION/ DROPS OPHTHALMIC at 17:20

## 2025-02-12 RX ADMIN — DEXAMETHASONE SODIUM PHOSPHATE 12 MG: 10 INJECTION, SOLUTION INTRAMUSCULAR; INTRAVENOUS at 19:14

## 2025-02-12 RX ADMIN — GABAPENTIN 300 MG: 300 CAPSULE ORAL at 21:01

## 2025-02-12 RX ADMIN — GABAPENTIN 300 MG: 300 CAPSULE ORAL at 14:25

## 2025-02-12 RX ADMIN — ACETAMINOPHEN 975 MG: 325 TABLET ORAL at 21:01

## 2025-02-12 RX ADMIN — MYCOPHENOLATE MOFETIL 500 MG: 500 TABLET, FILM COATED ORAL at 21:01

## 2025-02-12 RX ADMIN — POLYETHYLENE GLYCOL 3350 17 G: 17 POWDER, FOR SOLUTION ORAL at 08:22

## 2025-02-12 RX ADMIN — BARIUM SULFATE 10 ML: 400 SUSPENSION ORAL at 11:37

## 2025-02-12 RX ADMIN — GADOTERATE MEGLUMINE 12 ML: 376.9 INJECTION INTRAVENOUS at 08:58

## 2025-02-12 RX ADMIN — PREDNISOLONE ACETATE 1 DROP: 10 SUSPENSION/ DROPS OPHTHALMIC at 06:54

## 2025-02-12 RX ADMIN — BRIMONIDINE TARTRATE 1 DROP: 2 SOLUTION/ DROPS OPHTHALMIC at 14:25

## 2025-02-12 RX ADMIN — CYCLOPHOSPHAMIDE 700 MG: 1 INJECTION, POWDER, FOR SOLUTION INTRAVENOUS; ORAL at 19:54

## 2025-02-12 RX ADMIN — BARIUM SULFATE 50 ML: 400 SUSPENSION ORAL at 11:34

## 2025-02-12 RX ADMIN — PREDNISOLONE ACETATE 1 DROP: 10 SUSPENSION/ DROPS OPHTHALMIC at 14:25

## 2025-02-12 RX ADMIN — BARIUM SULFATE 50 ML: 0.81 POWDER, FOR SUSPENSION ORAL at 11:33

## 2025-02-12 RX ADMIN — PANTOPRAZOLE SODIUM 40 MG: 40 TABLET, DELAYED RELEASE ORAL at 08:22

## 2025-02-12 RX ADMIN — ONDANSETRON 16 MG: 2 INJECTION, SOLUTION INTRAMUSCULAR; INTRAVENOUS at 19:36

## 2025-02-12 ASSESSMENT — COGNITIVE AND FUNCTIONAL STATUS - GENERAL
MOVING TO AND FROM BED TO CHAIR: A LITTLE
STANDING UP FROM CHAIR USING ARMS: A LITTLE
TOILETING: A LITTLE
MOVING TO AND FROM BED TO CHAIR: A LOT
TURNING FROM BACK TO SIDE WHILE IN FLAT BAD: A LITTLE
MOVING FROM LYING ON BACK TO SITTING ON SIDE OF FLAT BED WITH BEDRAILS: A LITTLE
MOVING FROM LYING ON BACK TO SITTING ON SIDE OF FLAT BED WITH BEDRAILS: A LITTLE
PERSONAL GROOMING: A LITTLE
WALKING IN HOSPITAL ROOM: A LOT
MOBILITY SCORE: 15
CLIMB 3 TO 5 STEPS WITH RAILING: TOTAL
CLIMB 3 TO 5 STEPS WITH RAILING: TOTAL
HELP NEEDED FOR BATHING: A LITTLE
TURNING FROM BACK TO SIDE WHILE IN FLAT BAD: A LITTLE
MOBILITY SCORE: 13
DAILY ACTIVITIY SCORE: 19
DRESSING REGULAR UPPER BODY CLOTHING: A LITTLE
DRESSING REGULAR LOWER BODY CLOTHING: A LITTLE
STANDING UP FROM CHAIR USING ARMS: A LOT
WALKING IN HOSPITAL ROOM: A LOT

## 2025-02-12 ASSESSMENT — COLUMBIA-SUICIDE SEVERITY RATING SCALE - C-SSRS
1. SINCE LAST CONTACT, HAVE YOU WISHED YOU WERE DEAD OR WISHED YOU COULD GO TO SLEEP AND NOT WAKE UP?: NO
6. HAVE YOU EVER DONE ANYTHING, STARTED TO DO ANYTHING, OR PREPARED TO DO ANYTHING TO END YOUR LIFE?: NO
2. HAVE YOU ACTUALLY HAD ANY THOUGHTS OF KILLING YOURSELF?: NO

## 2025-02-12 ASSESSMENT — PAIN - FUNCTIONAL ASSESSMENT
PAIN_FUNCTIONAL_ASSESSMENT: 0-10
PAIN_FUNCTIONAL_ASSESSMENT: 0-10

## 2025-02-12 ASSESSMENT — PAIN SCALES - GENERAL
PAINLEVEL_OUTOF10: 0 - NO PAIN
PAINLEVEL_OUTOF10: 0 - NO PAIN

## 2025-02-12 NOTE — PROGRESS NOTES
Transitional Care Coordination Progress Note:  Neuroligy team notified that pts auth for [facility] approved, per [facility] support team approved 2/12-2/19 auth#[id number]     Cherry Stapleton, RN, BSN  Transitional Care Coordinator  Office: [phone]  Secure chat via [employer]iku

## 2025-02-12 NOTE — PROGRESS NOTES
Juan Marsh is a 29 y.o. male on day 1 of admission presenting with Behcet's syndrome, neurologic type (Multi).    Subjective/Overnight Events:   NAEON. Patient doing well this morning.    ---------------------------------------------------    Objective:   24 Hour Vitals  Temp:  [35.3 °C (95.5 °F)-37.1 °C (98.8 °F)] 35.8 °C (96.4 °F)  Heart Rate:  [] 89  Resp:  [16-19] 17  BP: (114-131)/(70-91) 131/73    Physical Exam   Gen: NAD  Pulm: No incr WOB, on RA    Neuro:  Alert. A&Ox3, follows commands. Noticeable dysarthria with improved aphasia, patient speaks more easily today. Questions and concerns are context appropriate.   EOMI, eyebrow raise/eye closure symmetric but L facial droop that that is mostly improved with activation (some residual FD)    Motor:   BUE EF, EE, HG 5/5. Left pronator drift.  BLE 5/5 HF    RUE dysmetria on FTN.    Sens: symmetric to light touch on both sides and face and body per patient    Results from last 72 hours   Lab Units 02/11/25  0843 02/10/25  0900   WBC AUTO x10*3/uL 14.1* 10.5   NRBC AUTO /100 WBCs 0.0 0.0   RBC AUTO x10*6/uL 3.67* 3.72*   HEMOGLOBIN g/dL 11.5* 11.4*   HEMATOCRIT % 34.1* 34.2*   MCV fL 93 92   MCH pg 31.3 30.6   MCHC g/dL 33.7 33.3   RDW % 13.4 13.2   PLATELETS AUTO x10*3/uL 371 341   NEUTROS PCT AUTO % 93.3 90.4   IG PCT AUTO % 0.4 0.4   LYMPHS PCT AUTO % 5.3 7.0   MONOS PCT AUTO % 0.9 2.0   EOS PCT AUTO % 0.0 0.1   BASOS PCT AUTO % 0.1 0.1   NEUTROS ABS x10*3/uL 13.19* 9.50*   IG AUTO x10*3/uL 0.06 0.04   LYMPHS ABS AUTO x10*3/uL 0.75* 0.73*   MONOS ABS AUTO x10*3/uL 0.13 0.21   EOS ABS AUTO x10*3/uL 0.00 0.01   BASOS ABS AUTO x10*3/uL 0.01 0.01      Results from last 72 hours   Lab Units 02/11/25  0843 02/10/25  0900   GLUCOSE mg/dL 137* 120*   SODIUM mmol/L 141 140   POTASSIUM mmol/L 4.2 4.3   CHLORIDE mmol/L 106 105   CO2 mmol/L 23 23   ANION GAP mmol/L 16 16   BUN mg/dL 36* 32*   CREATININE mg/dL 1.10 0.98   EGFR mL/min/1.73m*2 >90 >90   CALCIUM  mg/dL 10.2 10.1   PHOSPHORUS mg/dL 4.2 3.8   ALBUMIN g/dL 4.6 4.6   MAGNESIUM mg/dL 2.55* 2.42*      MRI Brain w and wo IV contrast 2/12/25  The current study is somewhat degraded by motion.      When compared with the prior study dated 02/07/2025, there has been  interval resolution of the abnormal enhancement noted within the  right basal ganglia/posterior limb of the right internal capsule and  right thalamus as well as residual but but significantly improved  enhancement within the right aspect of the midbrain and tyrone. No new  abnormal enhancement is identified on the current study.      There has been mild interval increased nonenhancing bright signal on  the FLAIR and T2 images noted within the right temporal lobe and  right cerebellar peduncle when compared with 12/07/2025. The  previously noted nonenhancing abnormal bright signal on the FLAIR and  T2 images within the right corona radiata, right basal ganglia,  posterior limb of the right internal capsule, right thalamus and  brainstem right greater than left is similar in appearance when  compared with 02/07/2025. There is similar associated mass effect  within the right basal ganglia, right thalamus, and right brainstem.  There is mild proximally 3 mm of bowing of the midline structures  from right to left at the level of the thalami.      The diffusion-weighted images demonstrate a new small 3-4 mm focus of  asymmetric increased diffusion signal corresponding diminished signal  on the ADC map overlying the right aspect of the midbrain as seen on  axial diffusion slice 64 of 76 corresponding to a focus of diminished  signal on the ADC map raising the possibility of a small focus of  acute to early subacute infarction.      There is similar nonenhancing abnormal bright signal on the FLAIR and  T2 images noted within the left corona radiata, posterior limb of the  left internal capsule, and left thalamus when compared with  02/07/2025.      There is  persistent abnormal increased signal on the FLAIR and T2  images infiltrating the optic apparatus including the optic chiasm  and optic tracts right greater than left.    NM PET CT Whole Body 2/10/25  No definite evidence of an active lymphomatous process.     CT Head wo IV contrast 2/8/25  1. No significant change in areas of geographic hypoattenuation  within bilateral cerebral hemispheres, right-greater-than-left as  described above. There is persistent mass effect with effacement of  the lateral ventricle and 0.3 cm leftward midline shift. Findings are  in keeping with patient's history of neuro Behcet's syndrome better  evaluated brain MRI.  2. No acute infarct or acute intracranial hemorrhage.    MRI brain w and wo IV contrast 2/7/25  1.  Slightly expansile FLAIR hyperintense partially enhancing process  extends from the tyrone and right middle cerebellar peduncle through  the midbrain, right cerebral peduncle, right thalamus and internal  capsule with some involvement of the right lentiform nucleus. There  is lesser signal abnormality involving the left cerebral peduncle,  thalamus and internal capsule. There is also some extension along the  right optic tract to the right side of the optic chiasm and possibly  minimally into the prechiasmatic segment of the right optic nerve.  The appearance is strongly suggestive of a flare-up of Behcet  syndrome. Other process such as glioma or other active primary  demyelinating disease process considered much less likely.    Scheduled medications  brimonidine, 1 drop, Right Eye, q12h  diphenhydrAMINE, 25 mg, oral, Once  enoxaparin, 40 mg, subcutaneous, Daily  ergocalciferol, 1,250 mcg, oral, Every Sunday  gabapentin, 300 mg, oral, q8h YONNY  insulin lispro, 0-5 Units, subcutaneous, TID AC  mycophenolate, 500 mg, oral, BID  pantoprazole, 40 mg, oral, Daily  polyethylene glycol, 17 g, oral, Daily  prednisoLONE acetate, 1 drop, Right Eye, 4x daily  predniSONE, 60 mg, oral,  Daily      Continuous medications     PRN medications  PRN medications: acetaminophen, guaiFENesin, ondansetron **OR** ondansetron      Assessment/Plan   29RHM hx smoking, heavy EtOH use, systemic (pericarditis, arthralgias, skin rash, recurrent orogenital ulcers, uveitis, retinal vasculitis, but -ve HLAB51) and 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 Compliant w/MMF. CTH showed new R BG hypodensity with 3mm MLS. MRI w/FLAIR hyperintensity of R middle cerebellar peduncle through midbrain, R cerebral peduncle, thalamus, w/lesser signal along R optic tract to R optic chiasm, prechiasmatic seg of R optic nerve. Location too high risk for bx per NSGY.   Current c/f lymphoma vs neuro-Behcet flare. Will c/w steroids w/plan for ~6w taper ending w/maintenance pred 10 continued. PET scan at this time without evidence of lymphoma.    Update 2/12:  - prednisone 60mg for two weeks  - brain MRI w and wo IV contrast on 2/12 with similar to more hyperintense lesion, spoke to Dr Ulloa who would like to start cyclophosphamide inpatient      - patient and family aware of risks of a cumulative dose (10g) of cyclophosphamide, patient consented to a one time inpatient dose of 700mg IV  - MBS today -> regular diet and mildly thick liquids     #New R BG c/f Neuro-Behcet flare vs. lymphoma  :: MRI brain w wo: FLAIR hyperintensity of R middle cerebellar peduncle through midbrain, R cerebral peduncle, thalamus, internal capsule and some of lentiform nucleus; lesser signal in L cerebral peduncle, thalamus and internal capsule; extension along R optic tract to R optic chiasm and prechiasmatic segment of R optic nerve  :: CSF 2/8: ->0, WBC 86 -> 74 (N68%, L31%, M 1%), protein 57, glucose 58  Neg biofire  VZV not detected  1 OCB in CSF,  elevated IgG index  :: PET Scan 2/10 no definitive evidence of active lymphomatous process  :: Per NSGY - current lesions too high risk to biopsy  - IVMP x 5 -> steroid taper decreasing by 10mg q2w and maintain on pred 10 afterwards  - Repeat Brain MRI w and wo IV contrast 2/12, reach out to Dr Ulloa after  - Bactrim, PPI, Vit D  - Continue on mycophenolate 500mg BID  - will order cyclophosphamide 700mg IV one time  - Continue home gabapentin 300mg TID  - PT/OT   [ ] Pending: CSF ENC, cytology/flow    #Cough, secretions  #Leukocytosis, improving  ::ddx aspiration pneumonia vs steroid induced leukocytosis  ::HDS otherwise  ::CXR 2/12 without acute cardiopulmonary process  - completed MBSS -> regular diet with mildly thick liquids; follow up with SLP outpatient    F: PO PRN  E: replete PRN  N: regular diet with mildly thick liquids  Access: PIV  Ppx: Lovenox, SCDs    Code status: FULL  NOK: Nayla (mom) [phone]    Marsha Echeverria MD  Department of Neurology, PGY-2  General [id number]

## 2025-02-12 NOTE — PROGRESS NOTES
Physical Therapy    Physical Therapy Treatment    Patient Name: Juan Marsh  MRN: [id number]  Department: [place] [address]  Room: [address] [place] [address]  Today's Date: 2/12/2025  Time Calculation  Start Time: 1054  Stop Time: 1105  Time Calculation (min): 11 min         Assessment/Plan   PT Assessment  PT Assessment Results: Decreased strength, Decreased endurance, Impaired balance, Decreased mobility, Decreased coordination  Rehab Prognosis: Excellent  Barriers to Discharge Home: Caregiver assistance, Physical needs  Caregiver Assistance: Caregiver assistance needed per identified barriers - however, level of patient's required assistance exceeds assistance available at home  Physical Needs: Stair navigation into home limited by function/safety, Stair navigation to access bed limited by function/safety, Stair navigation to access bath limited by function/safety, Ambulating household distances limited by function/safety, 24hr mobility assistance needed, 24hr ADL assistance needed, High falls risk due to function or environment  Evaluation/Treatment Tolerance: Patient limited by fatigue  Medical Staff Made Aware: Yes  End of Session Communication: Bedside nurse  Assessment Comment: Pt demos significant balance and endurance deficits, requires Juice throughout mobility with high fall risk and multiple +LOB noted. Remains appropriate for high intensity therapy  End of Session Patient Position: Bed, 3 rail up, Alarm on  PT Plan  Inpatient/Swing Bed or Outpatient: Inpatient  PT Plan  Treatment/Interventions: Bed mobility, Transfer training, Gait training, Balance training, Strengthening, Therapeutic exercise, Therapeutic activity  PT Plan: Ongoing PT  PT Frequency: 5 times per week  PT Discharge Recommendations: High intensity level of continued care  Equipment Recommended upon Discharge:  (TBD pending discharge.)  PT Recommended Transfer Status: Assist x1  PT - OK to Discharge: Yes      General Visit Information:   PT  Visit  PT  Received On: 02/12/25  Response to Previous Treatment: Patient with no complaints from previous session.  General  Prior to Session Communication: Bedside nurse  Patient Position Received: Bed, 3 rail up, Alarm on  General Comment: Pt supine in bed, reports significant fatigue but agreeable to therapy  Per handoff with RN, pt is appropriate for therapy, vitals are stable and pain is controlled. Other concerns prior to tx are: none    Subjective   Precautions:  Precautions  Medical Precautions: Fall precautions    Objective   Pain:  Pain Assessment  Pain Assessment: 0-10  0-10 (Numeric) Pain Score: 0 - No pain  Cognition:  Cognition  Overall Cognitive Status: Within Functional Limits  Orientation Level: Oriented X4    Treatments:     Therapeutic Activity  Therapeutic Activity Performed: Yes  Therapeutic Activity 1: Dynamic sitting balance while donning socks with SBA. Pt then attempting to reach behind trunk for cell phone, resultant L sided LOB to sidelying in bed, Juice to recover    Bed Mobility  Bed Mobility: Yes  Bed Mobility 1  Bed Mobility 1: Supine to sitting, Sitting to supine  Level of Assistance 1: Close supervision    Ambulation/Gait Training  Ambulation/Gait Training Performed: Yes  Ambulation/Gait Training 1  Surface 1: Level tile  Device 1: Rolling walker  Assistance 1: Minimum assistance  Quality of Gait 1: Narrow base of support, Diminished heel strike, Inconsistent stride length, Listing (L sided lean)  Comments/Distance (ft) 1: 50'x2, primarily CGA/Juice, +LOB x4 to L with min/modA to recover  Transfers  Transfer: Yes  Transfer 1  Transfer From 1: Sit to, Stand to  Transfer to 1: Sit  Technique 1: Sit to stand, Stand to sit  Transfer Device 1: Walker  Transfer Level of Assistance 1: Minimum assistance    Outcome Measures:     [facility] Basic Mobility  Turning from your back to your side while in a flat bed without using bedrails: A little  Moving from lying on your back to sitting on the side of a flat  bed without using bedrails: A little  Moving to and from bed to chair (including a wheelchair): A little  Standing up from a chair using your arms (e.g. wheelchair or bedside chair): A little  To walk in hospital room: A lot  Climbing 3-5 steps with railing: Total  Basic Mobility - Total Score: 15    Education Documentation  Precautions, taught by Berenice Piña PTA at 2/12/2025 12:09 PM.  Learner: Patient  Readiness: Acceptance  Method: Explanation  Response: Verbalizes Understanding, Needs Reinforcement  Comment: precautions, safe mobility    Body Mechanics, taught by Berenice Piña PTA at 2/12/2025 12:09 PM.  Learner: Patient  Readiness: Acceptance  Method: Explanation  Response: Verbalizes Understanding, Needs Reinforcement  Comment: precautions, safe mobility    Mobility Training, taught by Berenice Piña PTA at 2/12/2025 12:09 PM.  Learner: Patient  Readiness: Acceptance  Method: Explanation  Response: Verbalizes Understanding, Needs Reinforcement  Comment: precautions, safe mobility    Education Comments  No comments found.        OP EDUCATION:       Encounter Problems       Encounter Problems (Active)       PT Problem       Pt will perform all aspects of bed mobility at mod indep by discharge. (Progressing)       Start:  02/10/25    Expected End:  02/24/25            Pt will perform all transfers at mod indep w/ LRAD by discharge. (Progressing)       Start:  02/10/25    Expected End:  02/24/25            Pt will amb 150' w/ LRAD at mod indep by discharge. (Progressing)       Start:  02/10/25    Expected End:  02/24/25            Pt will negotiate 24 steps w/ B rails in order to simulate home environment, close sup by discharge.  (Progressing)       Start:  02/10/25    Expected End:  02/24/25               Pain - Adult            SEAN Gallardo

## 2025-02-12 NOTE — CARE PLAN
Problem: Pain - Adult  Goal: Verbalizes/displays adequate comfort level or baseline comfort level  Outcome: Progressing     Problem: Safety - Adult  Goal: Free from fall injury  Outcome: Progressing     Problem: Discharge Planning  Goal: Discharge to home or other facility with appropriate resources  Outcome: Progressing     Problem: Chronic Conditions and Co-morbidities  Goal: Patient's chronic conditions and co-morbidity symptoms are monitored and maintained or improved  Outcome: Progressing     Problem: Nutrition  Goal: Nutrient intake appropriate for maintaining nutritional needs  Outcome: Progressing     Problem: Swallowing  Goal: LTG - Patient will tolerate the least restrictive diet consistency to allow for safe consumption of daily meals  Outcome: Progressing

## 2025-02-12 NOTE — PROGRESS NOTES
Occupational Therapy                 Therapy Communication Note    Patient Name: Juan Marsh  MRN: [id number]  Department: [facility]  Room: [facility]  Today's Date: 2/12/2025     Discipline: Occupational Therapy    OT Missed Visit: Yes     Missed Visit Reason: Missed Visit Reason: Patient in a medical procedure    Missed Time: Attempt    Comment:

## 2025-02-12 NOTE — CARE PLAN
The patient's goals for the shift include RICK    The clinical goals for the shift include remain safe during shift      Problem: Swallowing  Goal: LTG - Patient will tolerate the least restrictive diet consistency to allow for safe consumption of daily meals  Outcome: Progressing     Problem: Pain - Adult  Goal: Verbalizes/displays adequate comfort level or baseline comfort level  Outcome: Progressing     Problem: Safety - Adult  Goal: Free from fall injury  Outcome: Progressing     Problem: Discharge Planning  Goal: Discharge to home or other facility with appropriate resources  Outcome: Progressing     Problem: Chronic Conditions and Co-morbidities  Goal: Patient's chronic conditions and co-morbidity symptoms are monitored and maintained or improved  Outcome: Progressing     Problem: Chronic Conditions and Co-morbidities  Goal: Patient's chronic conditions and co-morbidity symptoms are monitored and maintained or improved  Outcome: Progressing     Problem: Nutrition  Goal: Nutrient intake appropriate for maintaining nutritional needs  Outcome: Progressing

## 2025-02-12 NOTE — PROGRESS NOTES
Speech-Language Pathology    Inpatient Modified Barium Swallow Study    Patient Name: Juan Marsh  MRN: [id number]  : 1995  Today's Date: 25  Time Calculation  Start Time: 0945  Stop Time: 1000  Time Calculation (min): 15 min        Modified Barium Swallow Study completed. Informed verbal consent obtained prior to completion of exam. Trials of thin, nectar/mildly thick liquid, honey/moderately thick liquid, puree, and regular solids were given.       SLP: DAQUAN RESTREPO S-SLP   Contact info: [employer] secure chat      Reason for Referral: c/f aspiration/oropharyngeal dysphagia   Patient Hx: New right BG c/f Neuro-Behcet flare vs. Lymphoma   Respiratory Status: Room air  Current diet: Regular solids and thin liquids       DIET RECOMMENDATIONS:   - Regular (IDDSI Level 7)  - Mildly/nectar thick liquids (IDDSI Level 2)      STRATEGIES:  - Small bites  - Small, single sips  - Upright for all PO intake  - Complete oral care frequently throughout the day  -Intermittent supervision with meals      SLP PLAN:  Skilled SLP Services: Skilled SLP intervention for dysphagia is warranted.  SLP Frequency: 2x per week  Duration: 2 weeks  Treatment/Interventions:   - Oropharyngeal exercises  - Bolus trials  - Compensatory strategy training  - Diet tolerance/advancement  - Patient/caregiver education    Discussed POC: Patient  Discussed Risks/Benefits: Yes  Patient/Caregiver Agreeable: Yes    Short term goals established 25:   -Patient will implement safe swallowing strategy of 2 second bolus hold with thin liquids to reduce risk of aspiration with use of compensatory strategies during 90% of therapeutic trials.     Long term goals 25:   -Patient will tolerate the least restrictive diet without overt difficulty or further pulmonary compromise by time of discharge.      Education Provided: Results and recommendations per MBSS, with video review; recommendations and POC at this time. Verbal understanding  and agreement given on all accounts.     Repeat Study: Per MD or treating SLP       Mechanics of the Swallow Summary:  ORAL PHASE:  Lip Closure - Escape progressing to mid-chin   Tongue Control During Bolus Hold - Escape to lateral buccal cavity and/or floor of mouth   Bolus prep/mastication - Timely and efficient mastication skills   Bolus transport/lingual motion - Slowed Tongue Motion for A-P movement of the bolus   Oral residue - Trace residue lining oral structures     PHARYNGEAL PHASE:  Initiation of pharyngeal swallow - Bolus head at pit of pyriforms   Soft palate elevation - No bolus between soft palate/pharyngeal wall   Laryngeal elevation - Complete superior movement of thyroid cartilage with contact of arytenoids to epiglottic petiole   Anterior hyoid excursion - Complete anterior movement   Epiglottic movement - Complete inversion    Laryngeal vestibule closure - Complete - no air/contrast in laryngeal vestibule   Pharyngeal stripping wave - Complete  Pharyngeal contraction (A/P view) - Not tested       Pharyngoesophageal segment opening - Complete distension and complete duration/no obstruction of flow of bolus   Tongue base retraction - No bolus between tongue base and posterior pharyngeal wall   Pharyngeal residue - Complete pharyngeal clearance     ESOPHAGEAL PHASE:  Esophageal clearance - Not evaluated       SLP Impressions with Severity Rating:   Pt presents with mild oropharyngeal dysphagia upon completion of modified barium swallow study this date. Swallowing physiology is detailed above. Impairments during the oral stage most impacting swallowing safety and efficiency include mild anterior spillage, reduced strength resulting in premature spillage, mild oral statis/pocketing in lateral buccal cavity. Impairments during the pharyngeal phase most impacting swallowing safety and efficiency include delayed initiation of pharyngeal swallow resulting in large volumes of food and liquid textures to  pool in pyriform sinuses. Residue in pyriform results in materials entering airway. With trial of thin liquids via cup, noted trace amount of aspiration before swallow with complete ejection of material. A further trial of thin liquids via straw, noted silent aspiration during swallow. Patient demonstrated weakened cough response and decreased sensory function. No further penetration or aspiration was observed for any other consistency during study.     Strategies attempted- Chin tuck, however did not create a significant difference in clearance of thin liquids.       OUTCOME MEASURES:  Functional Oral Intake Scale  Functional Oral Intake Scale: Level 6        total oral diet with multiple consistencies without special preparations but specific food limitations       Eating Assessment Tool (EAT-10)   0=No problem, 1=Mild problem, 2=Mild to moderate problem, 3=Moderate problem, 4=Severe problem         A total score of 3 or above may indicate difficulty with swallowing safely and/or efficiently      Rosenbek's Penetration Aspiration Scale  Thin Liquids: 8. SILENT ASPIRATION - contrast passes glottis, visible residue, NO pt response]   During the Swallow  Nectar Thick Liquids: 1. NO ASPIRATION & NO PENETRATION - no aspiration, contrast does not enter airway  Honey Thick Liquids: 1. NO ASPIRATION & NO PENETRATION - no aspiration, contrast does not enter airway  Puree: 1. NO ASPIRATION & NO PENETRATION - no aspiration, contrast does not enter airway  Soft Solid: 1. NO ASPIRATION & NO PENETRATION - no aspiration, contrast does not enter airway  Solids: 1. NO ASPIRATION & NO PENETRATION - no aspiration, contrast does not enter airway

## 2025-02-13 ENCOUNTER — APPOINTMENT (OUTPATIENT)
Dept: HEMATOLOGY/ONCOLOGY | Facility: HOSPITAL | Age: 30
End: 2025-02-13
Payer: COMMERCIAL

## 2025-02-13 VITALS
DIASTOLIC BLOOD PRESSURE: 80 MMHG | OXYGEN SATURATION: 96 % | WEIGHT: 128 LBS | TEMPERATURE: 97.2 F | SYSTOLIC BLOOD PRESSURE: 124 MMHG | BODY MASS INDEX: 19.4 KG/M2 | HEART RATE: 96 BPM | HEIGHT: 68 IN | RESPIRATION RATE: 18 BRPM

## 2025-02-13 LAB
ALBUMIN SERPL BCP-MCNC: 4.1 G/DL (ref 3.4–5)
ANION GAP SERPL CALC-SCNC: 14 MMOL/L (ref 10–20)
BASOPHILS # BLD AUTO: 0.01 X10*3/UL (ref 0–0.1)
BASOPHILS NFR BLD AUTO: 0.1 %
BUN SERPL-MCNC: 34 MG/DL (ref 6–23)
CALCIUM SERPL-MCNC: 9.7 MG/DL (ref 8.6–10.6)
CHLORIDE SERPL-SCNC: 105 MMOL/L (ref 98–107)
CO2 SERPL-SCNC: 24 MMOL/L (ref 21–32)
CREAT SERPL-MCNC: 0.99 MG/DL (ref 0.5–1.3)
EGFRCR SERPLBLD CKD-EPI 2021: >90 ML/MIN/1.73M*2
EOSINOPHIL # BLD AUTO: 0.02 X10*3/UL (ref 0–0.7)
EOSINOPHIL NFR BLD AUTO: 0.2 %
ERYTHROCYTE [DISTWIDTH] IN BLOOD BY AUTOMATED COUNT: 13.3 % (ref 11.5–14.5)
GLUCOSE BLD MANUAL STRIP-MCNC: 115 MG/DL (ref 74–99)
GLUCOSE BLD MANUAL STRIP-MCNC: 152 MG/DL (ref 74–99)
GLUCOSE BLD MANUAL STRIP-MCNC: 171 MG/DL (ref 74–99)
GLUCOSE SERPL-MCNC: 112 MG/DL (ref 74–99)
HCT VFR BLD AUTO: 34.1 % (ref 41–52)
HGB BLD-MCNC: 11.3 G/DL (ref 13.5–17.5)
IMM GRANULOCYTES # BLD AUTO: 0.09 X10*3/UL (ref 0–0.7)
IMM GRANULOCYTES NFR BLD AUTO: 0.7 % (ref 0–0.9)
LYMPHOCYTES # BLD AUTO: 1.28 X10*3/UL (ref 1.2–4.8)
LYMPHOCYTES NFR BLD AUTO: 9.6 %
MAGNESIUM SERPL-MCNC: 2.68 MG/DL (ref 1.6–2.4)
MCH RBC QN AUTO: 31.3 PG (ref 26–34)
MCHC RBC AUTO-ENTMCNC: 33.1 G/DL (ref 32–36)
MCV RBC AUTO: 95 FL (ref 80–100)
MONOCYTES # BLD AUTO: 0.76 X10*3/UL (ref 0.1–1)
MONOCYTES NFR BLD AUTO: 5.7 %
NEUTROPHILS # BLD AUTO: 11.15 X10*3/UL (ref 1.2–7.7)
NEUTROPHILS NFR BLD AUTO: 83.7 %
NRBC BLD-RTO: 0 /100 WBCS (ref 0–0)
PHOSPHATE SERPL-MCNC: 4.2 MG/DL (ref 2.5–4.9)
PLATELET # BLD AUTO: 349 X10*3/UL (ref 150–450)
POTASSIUM SERPL-SCNC: 4.9 MMOL/L (ref 3.5–5.3)
RBC # BLD AUTO: 3.61 X10*6/UL (ref 4.5–5.9)
SODIUM SERPL-SCNC: 138 MMOL/L (ref 136–145)
WBC # BLD AUTO: 13.3 X10*3/UL (ref 4.4–11.3)

## 2025-02-13 PROCEDURE — 80069 RENAL FUNCTION PANEL: CPT

## 2025-02-13 PROCEDURE — 2500000004 HC RX 250 GENERAL PHARMACY W/ HCPCS (ALT 636 FOR OP/ED): Mod: SE

## 2025-02-13 PROCEDURE — 97535 SELF CARE MNGMENT TRAINING: CPT | Mod: GO

## 2025-02-13 PROCEDURE — 99232 SBSQ HOSP IP/OBS MODERATE 35: CPT

## 2025-02-13 PROCEDURE — 82947 ASSAY GLUCOSE BLOOD QUANT: CPT

## 2025-02-13 PROCEDURE — 92526 ORAL FUNCTION THERAPY: CPT | Mod: GN | Performed by: SPEECH-LANGUAGE PATHOLOGIST

## 2025-02-13 PROCEDURE — 2500000002 HC RX 250 W HCPCS SELF ADMINISTERED DRUGS (ALT 637 FOR MEDICARE OP, ALT 636 FOR OP/ED): Mod: SE

## 2025-02-13 PROCEDURE — 36415 COLL VENOUS BLD VENIPUNCTURE: CPT

## 2025-02-13 PROCEDURE — 85025 COMPLETE CBC W/AUTO DIFF WBC: CPT

## 2025-02-13 PROCEDURE — 83735 ASSAY OF MAGNESIUM: CPT

## 2025-02-13 PROCEDURE — 2500000001 HC RX 250 WO HCPCS SELF ADMINISTERED DRUGS (ALT 637 FOR MEDICARE OP): Mod: SE

## 2025-02-13 RX ORDER — SULFAMETHOXAZOLE AND TRIMETHOPRIM 800; 160 MG/1; MG/1
1 TABLET ORAL
Start: 2025-02-14

## 2025-02-13 RX ORDER — PREDNISONE 20 MG/1
TABLET ORAL
Start: 2025-02-12 | End: 2026-04-23

## 2025-02-13 RX ADMIN — MYCOPHENOLATE MOFETIL 500 MG: 500 TABLET, FILM COATED ORAL at 09:20

## 2025-02-13 RX ADMIN — GABAPENTIN 300 MG: 300 CAPSULE ORAL at 06:20

## 2025-02-13 RX ADMIN — PREDNISOLONE ACETATE 1 DROP: 10 SUSPENSION/ DROPS OPHTHALMIC at 06:20

## 2025-02-13 RX ADMIN — ACETAMINOPHEN 975 MG: 325 TABLET ORAL at 06:20

## 2025-02-13 RX ADMIN — PREDNISOLONE ACETATE 1 DROP: 10 SUSPENSION/ DROPS OPHTHALMIC at 12:59

## 2025-02-13 RX ADMIN — INSULIN LISPRO 1 UNITS: 100 INJECTION, SOLUTION INTRAVENOUS; SUBCUTANEOUS at 12:02

## 2025-02-13 RX ADMIN — POLYETHYLENE GLYCOL 3350 17 G: 17 POWDER, FOR SOLUTION ORAL at 09:21

## 2025-02-13 RX ADMIN — BRIMONIDINE TARTRATE 1 DROP: 2 SOLUTION/ DROPS OPHTHALMIC at 06:20

## 2025-02-13 RX ADMIN — GABAPENTIN 300 MG: 300 CAPSULE ORAL at 13:00

## 2025-02-13 RX ADMIN — ENOXAPARIN SODIUM 40 MG: 100 INJECTION SUBCUTANEOUS at 09:20

## 2025-02-13 RX ADMIN — PREDNISONE 60 MG: 20 TABLET ORAL at 09:20

## 2025-02-13 RX ADMIN — PANTOPRAZOLE SODIUM 40 MG: 40 TABLET, DELAYED RELEASE ORAL at 09:21

## 2025-02-13 ASSESSMENT — COGNITIVE AND FUNCTIONAL STATUS - GENERAL
STANDING UP FROM CHAIR USING ARMS: A LOT
CLIMB 3 TO 5 STEPS WITH RAILING: TOTAL
DRESSING REGULAR LOWER BODY CLOTHING: A LOT
DAILY ACTIVITIY SCORE: 16
DRESSING REGULAR UPPER BODY CLOTHING: A LITTLE
EATING MEALS: A LITTLE
DAILY ACTIVITIY SCORE: 19
HELP NEEDED FOR BATHING: A LOT
TURNING FROM BACK TO SIDE WHILE IN FLAT BAD: A LITTLE
DRESSING REGULAR UPPER BODY CLOTHING: A LITTLE
PERSONAL GROOMING: A LITTLE
DRESSING REGULAR LOWER BODY CLOTHING: A LITTLE
HELP NEEDED FOR BATHING: A LITTLE
MOBILITY SCORE: 13
MOVING FROM LYING ON BACK TO SITTING ON SIDE OF FLAT BED WITH BEDRAILS: A LITTLE
WALKING IN HOSPITAL ROOM: A LOT
MOVING TO AND FROM BED TO CHAIR: A LOT
TOILETING: A LITTLE
TOILETING: A LITTLE
PERSONAL GROOMING: A LITTLE

## 2025-02-13 ASSESSMENT — PAIN SCALES - GENERAL
PAINLEVEL_OUTOF10: 0 - NO PAIN
PAINLEVEL_OUTOF10: 2

## 2025-02-13 ASSESSMENT — ACTIVITIES OF DAILY LIVING (ADL): HOME_MANAGEMENT_TIME_ENTRY: 15

## 2025-02-13 ASSESSMENT — PAIN - FUNCTIONAL ASSESSMENT
PAIN_FUNCTIONAL_ASSESSMENT: 0-10

## 2025-02-13 NOTE — PROGRESS NOTES
Juan Marsh is a 29 y.o. male on day 1 of admission presenting with Behcet's syndrome, neurologic type (Multi).    Subjective/Overnight Events:   Interval Events: Received cyclophosphamide 700mg IV one time dose. NAEON otherwise.    Doing well this morning. Endorsed that he tolerated the cyclophosphamide well.    ---------------------------------------------------    Objective:   24 Hour Vitals  Temp:  [36 °C (96.8 °F)-36.5 °C (97.7 °F)] 36.5 °C (97.7 °F)  Heart Rate:  [55-74] 74  Resp:  [16-20] 17  BP: (101-118)/(64-74) 101/64    Physical Exam   Gen: NAD  Pulm: No incr WOB, on RA    Neuro:  Alert. A&Ox3, follows commands. Noticeable dysarthria with improved aphasia, patient speaks more easily today. Questions and concerns are context appropriate.   EOMI, eyebrow raise/eye closure symmetric but L facial droop that that is mostly improved with activation (some residual FD)    Motor:   BUE EF, EE, HG 5/5. Left pronator drift.  BLE 5/5 HF    RUE dysmetria on FTN.    Sens: symmetric to light touch on both sides and face and body per patient    Results from last 72 hours   Lab Units 02/12/25  1138 02/11/25  0843   WBC AUTO x10*3/uL 11.7* 14.1*   NRBC AUTO /100 WBCs 0.0 0.0   RBC AUTO x10*6/uL 3.51* 3.67*   HEMOGLOBIN g/dL 11.0* 11.5*   HEMATOCRIT % 32.6* 34.1*   MCV fL 93 93   MCH pg 31.3 31.3   MCHC g/dL 33.7 33.7   RDW % 13.3 13.4   PLATELETS AUTO x10*3/uL 336 371   NEUTROS PCT AUTO % 88.2 93.3   IG PCT AUTO % 0.7 0.4   LYMPHS PCT AUTO % 6.7 5.3   MONOS PCT AUTO % 4.3 0.9   EOS PCT AUTO % 0.0 0.0   BASOS PCT AUTO % 0.1 0.1   NEUTROS ABS x10*3/uL 10.35* 13.19*   IG AUTO x10*3/uL 0.08 0.06   LYMPHS ABS AUTO x10*3/uL 0.78* 0.75*   MONOS ABS AUTO x10*3/uL 0.50 0.13   EOS ABS AUTO x10*3/uL 0.00 0.00   BASOS ABS AUTO x10*3/uL 0.01 0.01      Results from last 72 hours   Lab Units 02/13/25  0848 02/12/25  1138   GLUCOSE mg/dL 112* 123*   SODIUM mmol/L 138 140   POTASSIUM mmol/L 4.9 3.9   CHLORIDE mmol/L 105 104   CO2  mmol/L 24 27   ANION GAP mmol/L 14 13   BUN mg/dL 34* 34*   CREATININE mg/dL 0.99 0.89   EGFR mL/min/1.73m*2 >90 >90   CALCIUM mg/dL 9.7 9.9   PHOSPHORUS mg/dL 4.2 3.2   ALBUMIN g/dL 4.1 4.3   MAGNESIUM mg/dL 2.68* 2.61*      MRI Brain w and wo IV contrast 2/12/25  The current study is somewhat degraded by motion.      When compared with the prior study dated 02/07/2025, there has been  interval resolution of the abnormal enhancement noted within the  right basal ganglia/posterior limb of the right internal capsule and  right thalamus as well as residual but but significantly improved  enhancement within the right aspect of the midbrain and tyrone. No new  abnormal enhancement is identified on the current study.      There has been mild interval increased nonenhancing bright signal on  the FLAIR and T2 images noted within the right temporal lobe and  right cerebellar peduncle when compared with 12/07/2025. The  previously noted nonenhancing abnormal bright signal on the FLAIR and  T2 images within the right corona radiata, right basal ganglia,  posterior limb of the right internal capsule, right thalamus and  brainstem right greater than left is similar in appearance when  compared with 02/07/2025. There is similar associated mass effect  within the right basal ganglia, right thalamus, and right brainstem.  There is mild proximally 3 mm of bowing of the midline structures  from right to left at the level of the thalami.      The diffusion-weighted images demonstrate a new small 3-4 mm focus of  asymmetric increased diffusion signal corresponding diminished signal  on the ADC map overlying the right aspect of the midbrain as seen on  axial diffusion slice 64 of 76 corresponding to a focus of diminished  signal on the ADC map raising the possibility of a small focus of  acute to early subacute infarction.      There is similar nonenhancing abnormal bright signal on the FLAIR and  T2 images noted within the left corona  radiata, posterior limb of the  left internal capsule, and left thalamus when compared with  02/07/2025.      There is persistent abnormal increased signal on the FLAIR and T2  images infiltrating the optic apparatus including the optic chiasm  and optic tracts right greater than left.    NM PET CT Whole Body 2/10/25  No definite evidence of an active lymphomatous process.     CT Head wo IV contrast 2/8/25  1. No significant change in areas of geographic hypoattenuation  within bilateral cerebral hemispheres, right-greater-than-left as  described above. There is persistent mass effect with effacement of  the lateral ventricle and 0.3 cm leftward midline shift. Findings are  in keeping with patient's history of neuro Behcet's syndrome better  evaluated brain MRI.  2. No acute infarct or acute intracranial hemorrhage.    MRI brain w and wo IV contrast 2/7/25  1.  Slightly expansile FLAIR hyperintense partially enhancing process  extends from the tyrone and right middle cerebellar peduncle through  the midbrain, right cerebral peduncle, right thalamus and internal  capsule with some involvement of the right lentiform nucleus. There  is lesser signal abnormality involving the left cerebral peduncle,  thalamus and internal capsule. There is also some extension along the  right optic tract to the right side of the optic chiasm and possibly  minimally into the prechiasmatic segment of the right optic nerve.  The appearance is strongly suggestive of a flare-up of Behcet  syndrome. Other process such as glioma or other active primary  demyelinating disease process considered much less likely.    Scheduled medications  brimonidine, 1 drop, Right Eye, q12h  enoxaparin, 40 mg, subcutaneous, Daily  ergocalciferol, 1,250 mcg, oral, Every Sunday  gabapentin, 300 mg, oral, q8h YONNY  insulin lispro, 0-5 Units, subcutaneous, TID AC  mycophenolate, 500 mg, oral, BID  pantoprazole, 40 mg, oral, Daily  polyethylene glycol, 17 g, oral,  Daily  prednisoLONE acetate, 1 drop, Right Eye, 4x daily  predniSONE, 60 mg, oral, Daily      Continuous medications     PRN medications  PRN medications: acetaminophen, guaiFENesin, [Held by provider] ondansetron **OR** [Held by provider] ondansetron      Assessment/Plan   29RHM hx smoking, heavy EtOH use, systemic (pericarditis, arthralgias, skin rash, recurrent orogenital ulcers, uveitis, retinal vasculitis, but -ve HLAB51) and 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 Compliant w/MMF. CTH showed new R BG hypodensity with 3mm MLS. MRI w/FLAIR hyperintensity of R middle cerebellar peduncle through midbrain, R cerebral peduncle, thalamus, w/lesser signal along R optic tract to R optic chiasm, prechiasmatic seg of R optic nerve. Location too high risk for bx per NSGY.   Current c/f lymphoma vs neuro-Behcet flare. Will c/w steroids w/plan for ~6w taper ending w/maintenance pred 10 continued. PET scan at this time without evidence of lymphoma.    Update 2/13:  - s/p cyclophosphamide 700mg IV one time 2/12  - pending dispo  - continue with prednisone taper, PJP PPX, PPI  - Flow cytometry without monoclonal B cell population     #New R BG c/f Neuro-Behcet flare vs. lymphoma  :: MRI brain w wo: FLAIR hyperintensity of R middle cerebellar peduncle through midbrain, R cerebral peduncle, thalamus, internal capsule and some of lentiform nucleus; lesser signal in L cerebral peduncle, thalamus and internal capsule; extension along R optic tract to R optic chiasm and prechiasmatic segment of R optic nerve  :: CSF 2/8: ->0, WBC 86 -> 74 (N68%, L31%, M 1%), protein 57, glucose 58  Neg biofire  VZV not detected  1 OCB in CSF, elevated IgG index  Flow Cytometry without monoclonal B cell population  :: PET Scan 2/10 no  definitive evidence of active lymphomatous process  :: Per NSGY - current lesions too high risk to biopsy  - IVMP x 5 -> steroid taper decreasing by 10mg q2w and maintain on pred 10 afterwards  - Repeat Brain MRI w and wo IV contrast 2/12, reach out to Dr Ulloa after  - Bactrim, PPI, Vit D  - Continue on mycophenolate 500mg BID  - will order cyclophosphamide 700mg IV one time  - Continue home gabapentin 300mg TID  - PT/OT   [ ] Pending: CSF ENC, cytology    #Cough, secretions  #Leukocytosis, improving  ::ddx aspiration pneumonia vs steroid induced leukocytosis  ::HDS otherwise  ::CXR 2/12 without acute cardiopulmonary process  - completed MBSS -> regular diet with mildly thick liquids; follow up with SLP outpatient    F: PO PRN  E: replete PRN  N: regular diet with mildly thick liquids  Access: PIV  Ppx: Lovenox, SCDs    Code status: FULL  NOK: Nayla (mom) [phone]    Marsha Echeverria MD  Department of Neurology, PGY-2  [place] [address]

## 2025-02-13 NOTE — HOSPITAL COURSE
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 Patient is compliant with MMF. CTH showed a new right basal ganglia hypodensity with 3mm midline shift. MRI Brain showed FLAIR hyperintensity of the R middle cerebellar peduncle through the midbrain, thalamus, with lesser signal along the right optic tract to right optic chiasm. Concern for recurrent neuro-Behcets vs PCNSL. Location of lesion was too deep for NSGY to biopsy. Lumbar puncture was obtained with neutrophilic predominant pleocytosis. Flow cytometry was negative for a monoclonal B cell population. Whole body PET scan was performed and did not show evidence of a lymphomatosis process.    Patient was given a five day course of IVMP. After, repeat MRI Brain was obtained which did not show much improvement. There was a 3mm diffusion restriction in the right midbrain. Less concern for infarction and more concern for damage from inflammation. Patient was discussed with Dr Ulloa and decision was made to give a one time dose of Cyclophosphamide 700mg IV. Patient tolerated treatment well. Patient will be on a prolonged prednisone taper as described below along with PPI and PJP PPX. Patient improved clinically throughout admission and was discharged to acute rehab.    At this time, it is suspected that Neuro-Behcets is the more likely diagnosis. Although PCNSL is possible, patient's lesions are in an area that Behcets normally prefers (cascade sign on MRI). This is also the second time this type of lesion has been seen on  MRI. Patient also has systemic Behcet's.     Prednisone taper:  -Prednisone 60mg 2/12/25- 2/25/25  -Prednisone 50mg 2/26/25- 3/11/25  -Prednisone 40mg 3/12/25- 3/25/25  -Prednisone 30mg 3/26/25- 4/8/25  -Prednisone 20mg 4/9/25- 4/22/25  -Prednisone 10mg 4/23/25- indefinitely

## 2025-02-13 NOTE — PROGRESS NOTES
Inpatient Speech-Language Pathology Treatment     Patient Name: Juan Marsh  MRN: [id number]  Today's Date: 2/13/2025  Time Calculation  Start Time: 1025  Stop Time: 1040  Time Calculation (min): 15 min       Assessment/Plan:  #mild oropharyngeal dysphagia   -Ongoing assessment of safe PO trials and diet toleration following MBS on 02/12. Pt accepted and tolerated food and liquid challenges including mildly thick apple juice and solid textures (pieces of fruit) without overt s/sx of aspiration. Pt demonstrated great diet toleration without nutritional or respiratory compromise.   -SLP reviewed and demonstrated dysphagia therapy exercise of 2 second bolus hold. Pt completed three reps of 2 second bolus hold with thin liquids via straw with moderate verbal cues after review.   -SLP will continue to follow to assess diet toleration, motor speech production, and cognitive/linguistic function.       Recommendations:  Regular Solids & mildly/nectar thick liquids  Upright for all PO intake  Small bites/sips  Pills per pt preference  Complete oral care frequently throughout the day   Intermittent supervision with meals   SLP to continue to follow to ensure diet tolerance/determine readiness for repeat assessment as pt appropriate/schedule permits  If pt presents with any changes to mental, medical, or respiratory status, please make NPO and alert SLP        Plan:  Inpatient/Swing Bed or Outpatient: Inpatient  SLP TX Plan: Continue Plan of Care  SLP Plan: Skilled SLP  SLP Frequency: 2x per week  Duration: 2 weeks  SLP Discharge Recommendations: Continue skilled SLP services at the next level of care  SLP - OK to Discharge: Yes      Subjective   Pt received sitting in chair following session with OT. Pt was pleasant and cooperative throughout session. Pt eating meal tray upon arrival in the room. Mother present at bedside.    Patient Seen During This Visit: Yes  Arrival: Family/caregiver present (Mother present at  bedside)  Caregiver Feedback: Mother present, receptive and supportive of therapy  Prior to Session Communication: Bedside nurse      Objective   Pt independently fed self. Pt consumed and tolerated all consistency trials including sips of mildly thick apple juice via cup (4 oz) and solid textures (numerous pieces of fruit). Pt accepted sips of water via straw with 2 second bolus hold exercise but produced immediate cough following 1 of 3 trials.     Therapeutic Swallow:    Oral phase: Pt presented with left facial droop at rest but did not adversely affect oral stage of swallow. Noted efficient mastication and complete bolus extraction. No obvious anterior spillage or pocketing of food or liquid trials.    Pharyngeal Phase: Noted subjectively delayed swallow onset of all PO challenges. Noted immediate cough following one of three trials when utilizing dysphagia therapy exercise (bolus hold) with thin liquids via straw. However, on all other PO presentations, no overt indicators of aspiration were observed.          Encounter Problems       Encounter Problems (Active)       MOTOR SPEECH PRODUCTION       LTG - Patient will utilize compensatory intervention for intelligibility (Progressing)       Start:  02/10/25    Expected End:  02/24/25            STG - Patient will utilize compensatory speech strategies to increase intelligility to 90% in order to communicate daily wants/needs across all environments (Progressing)       Start:  02/10/25    Expected End:  02/24/25               Swallowing       LTG - Patient will tolerate the least restrictive diet consistency to allow for safe consumption of daily meals (Progressing)       Start:  02/11/25    Expected End:  02/25/25            SLP Goal 1 (Progressing)       Start:  02/11/25    Expected End:  02/25/25       Patient will tolerate therapeutic trials of recommended consistency without clinical signs and symptoms of aspiration on 100% of trials                  Inpatient:  Education Documentation  Modified Diet Training, taught by SAW Palmer-SLP at 2/13/2025 11:12 AM.  Learner: Family, Patient  Readiness: Acceptance  Method: Explanation  Response: Verbalizes Understanding    Education Comments  No comments found.

## 2025-02-13 NOTE — PROGRESS NOTES
Occupational Therapy    OT Treatment    Patient Name: Juan Marsh  MRN: [id number]  Department: [place] [address]  Room: [facility]  Today's Date: 2/13/2025  Time Calculation  Start Time: 0846  Stop Time: 0901  Time Calculation (min): 15 min        Assessment:  Prognosis: Good  Barriers to Discharge Home: Caregiver assistance, Physical needs  Caregiver Assistance: Caregiver assistance needed per identified barriers - however, level of patient's required assistance exceeds assistance available at home  Physical Needs: Stair navigation into home limited by function/safety, Ambulating household distances limited by function/safety, 24hr mobility assistance needed, 24hr ADL assistance needed, High falls risk due to function or environment  Evaluation/Treatment Tolerance: Patient tolerated treatment well  Medical Staff Made Aware: Yes  End of Session Communication: Bedside nurse  End of Session Patient Position: Up in chair, Alarm on  OT Assessment Results: Decreased ADL status, Decreased upper extremity strength, Decreased safe judgment during ADL, Decreased endurance, Decreased functional mobility, Decreased gross motor control, Decreased IADLs, Decreased fine motor control  Prognosis: Good  Barriers to Discharge: None  Evaluation/Treatment Tolerance: Patient tolerated treatment well  Medical Staff Made Aware: Yes  Strengths: Attitude of self  Barriers to Participation: Comorbidities  Plan:  Treatment Interventions: ADL retraining, Functional transfer training, UE strengthening/ROM, Endurance training, Patient/family training, Equipment evaluation/education, Neuromuscular reeducation, Compensatory technique education  OT Frequency: 3 times per week  OT Discharge Recommendations: High intensity level of continued care  Equipment Recommended upon Discharge:  (TBD)  OT Recommended Transfer Status: Assist of 1  OT - OK to Discharge: Yes  Treatment Interventions: ADL retraining, Functional transfer training, UE  strengthening/ROM, Endurance training, Patient/family training, Equipment evaluation/education, Neuromuscular reeducation, Compensatory technique education    Subjective   Previous Visit Info:  OT Last Visit  OT Received On: 02/13/25  General:  General  Reason for Referral: 30 yo male presenting with Behcet's syndrome, neurologic type.  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  Family/Caregiver Present: Yes  Caregiver Feedback: Mother present, receptive and supportive of therapy  Prior to Session Communication: Bedside nurse  Patient Position Received: Bed, 3 rail up, Alarm off, not on at start of session  Preferred Learning Style: visual, verbal  General Comment: Pt pleasant and agreeable to therapy  Precautions:  Medical Precautions: Fall precautions     Date/Time Vitals Session Patient Position Pulse Resp SpO2 BP MAP (mmHg)    02/13/25 0846 --  --  74  --  --  --  --                 Pain:  Pain Assessment  Pain Assessment: 0-10  0-10 (Numeric) Pain Score: 0 - No pain    Objective    Cognition:  Cognition  Overall Cognitive Status: Within Functional Limits  Arousal/Alertness: Appropriate responses to stimuli  Orientation Level: Oriented X4  Following Commands: Follows one step commands without difficulty  Safety Judgment: Decreased awareness of need for safety precautions  Problem Solving: Assistance required to identify errors made  Cognition Comments: Dyasthria still present, poorer oral motor control with increased secretions  Insight: Mild  Impulsive: Mildly    Activities of Daily Living:    Grooming  Grooming Level of Assistance: Contact guard  Grooming Where Assessed:  Standing sinkside  Grooming Comments: Facilitated standing oral care at sink with CGA x5 min, occasional minimal LOB with weightshifting requiring close assist for stability  LE Dressing  LE Dressing: Yes  Pants Level of Assistance: Moderate assistance  Sock Level of Assistance: Minimum assistance  LE Dressing Where Assessed: Edge of bed  LE Dressing Comments: Facilitated seated LB dressing task with verbal cueing for mechanics, instances with LOB without UE support while completing task    Bed Mobility/Transfers: Bed Mobility  Bed Mobility: Yes  Bed Mobility 1  Bed Mobility 1: Supine to sitting  Level of Assistance 1: Close supervision  Bed Mobility Comments 1: HOB elevated, cueing for safety as pt transitioned quickly    Transfers  Transfer: Yes  Transfer 1  Transfer From 1: Sit to  Transfer to 1: Sit, Stand  Technique 1: Sit to stand, Stand to sit  Transfer Device 1: Cane  Transfer Level of Assistance 1: Minimum assistance  Trials/Comments 1: Sit<>stands from EOB and chair, cueing for postiioning for controlled seated descent      Functional Mobility:  Functional Mobility  Functional Mobility Performed: Yes    Therapy/Activity: Therapeutic Activity  Therapeutic Activity Performed: Yes  Therapeutic Activity 1: Facilitated functional distance to/from restroom to complete ADL tasks, cueing for safety and safety as pt highly ataxic, requiring min A for stability    Outcome Measures:[facility] Daily Activity  Putting on and taking off regular lower body clothing: A lot  Bathing (including washing, rinsing, drying): A lot  Putting on and taking off regular upper body clothing: A little  Toileting, which includes using toilet, bedpan or urinal: A little  Taking care of personal grooming such as brushing teeth: A little  Eating Meals: A little  Daily Activity - Total Score: 16      Education Documentation  Body Mechanics, taught by Belia Martin OT at 2/13/2025 10:33 AM.  Learner: Patient  Readiness: Acceptance  Method:  Explanation, Demonstration  Response: Verbalizes Understanding, Demonstrated Understanding, Needs Reinforcement    ADL Training, taught by Belia Martin OT at 2/13/2025 10:33 AM.  Learner: Patient  Readiness: Acceptance  Method: Explanation, Demonstration  Response: Verbalizes Understanding, Demonstrated Understanding, Needs Reinforcement    EDUCATION:  Education  Individual(s) Educated: Patient, Parent  Education Provided: Diagnosis & Precautions, Fall precautons, Risk and benefits of OT discussed with patient or other, POC discussed and agreed upon  Patient Response to Education: Patient/Caregiver Verbalized Understanding of Information    Goals:  Encounter Problems       Encounter Problems (Active)       ADLs       Patient will perform UB and LB bathing  with stand by assist level of assistance and ae. (Progressing)       Start:  02/10/25    Expected End:  03/03/25            Patient with complete upper body dressing with independent level of assistance (Progressing)       Start:  02/10/25    Expected End:  03/03/25            Patient with complete lower body dressing with stand by assist level of assistance donning and doffing all LE clothes  with PRN adaptive equipment  (Progressing)       Start:  02/10/25    Expected End:  03/03/25            Patient will complete daily grooming tasks  with independent level of assistance  (Progressing)       Start:  02/10/25    Expected End:  03/03/25            Patient will complete toileting including hygiene clothing management/hygiene with stand by assist level of assistance and lrd. (Progressing)       Start:  02/10/25    Expected End:  03/03/25               COGNITION/SAFETY       Patient will score WFL on standardized cognitive assessment with min cues and within reasonable time frame (Progressing)       Start:  02/10/25    Expected End:  03/03/25               EXERCISE/STRENGTHENING       Patient with increase BUE to wfl strength. (Progressing)       Start:   02/10/25    Expected End:  03/03/25               MOBILITY       Patient will perform Functional mobility max Household distances/Community Distances with stand by assist level of assistance and least restrictive device in order to improve safety and functional mobility. (Progressing)       Start:  02/10/25    Expected End:  03/03/25               TRANSFERS       Patient will perform bed mobility independent level of assistance  (Progressing)       Start:  02/10/25    Expected End:  03/03/25            Patient will complete functional transferleast restrictive device with stand by assist level of assistance. (Progressing)       Start:  02/10/25    Expected End:  03/03/25

## 2025-02-13 NOTE — DISCHARGE SUMMARY
Discharge Diagnosis  Behcet's syndrome, neurologic type (Multi)    Issues Requiring Follow-Up  Dr Ulloa: follow up scheduled for 3/17/25.     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 Patient is compliant with MMF. CTH showed a new right basal ganglia hypodensity with 3mm midline shift. MRI Brain showed FLAIR hyperintensity of the R middle cerebellar peduncle through the midbrain, thalamus, with lesser signal along the right optic tract to right optic chiasm. Concern for recurrent neuro-Behcets vs PCNSL. Location of lesion was too deep for NSGY to biopsy. Lumbar puncture was obtained with neutrophilic predominant pleocytosis. Flow cytometry was negative for a monoclonal B cell population. Whole body PET scan was performed and did not show evidence of a lymphomatosis process.    Patient was given a five day course of IVMP. After, repeat MRI Brain was obtained which did not show much improvement. There was a 3mm diffusion restriction in the right midbrain. Less concern for infarction and more concern for damage from inflammation. Patient was discussed with Dr Ulloa and decision was made to give a one time dose of Cyclophosphamide 700mg IV. Patient tolerated treatment well. Patient will be on a prolonged prednisone taper  as described below along with PPI and PJP PPX. Patient improved clinically throughout admission and was discharged to acute rehab.    At this time, it is suspected that Neuro-Behcets is the more likely diagnosis. Although PCNSL is possible, patient's lesions are in an area that Behcets normally prefers (cascade sign on MRI). This is also the second time this type of lesion has been seen on MRI. Patient also has systemic Behcet's.     Prednisone taper:  -Prednisone 60mg 2/12/25- 2/25/25  -Prednisone 50mg 2/26/25- 3/11/25  -Prednisone 40mg 3/12/25- 3/25/25  -Prednisone 30mg 3/26/25- 4/8/25  -Prednisone 20mg 4/9/25- 4/22/25  -Prednisone 10mg 4/23/25- indefinitely     Pertinent Physical Exam At Time of Discharge  Physical Exam  Alert. A&Ox3, follows commands. Noticeable dysarthria with improved aphasia, patient speaks more easily today. Questions and concerns are context appropriate.   EOMI, eyebrow raise/eye closure symmetric but L facial droop that that is mostly improved with activation (some residual FD)     Motor:   BUE EF, EE, HG 5/5. Left pronator drift.  BLE 5/5 HF     Improving RUE dysmetria on FTN.     Sens: symmetric to light touch on both sides and face and body per patient    Home Medications     Medication List      START taking these medications     sulfamethoxazole-trimethoprim 800-160 mg tablet; Commonly known as:   Bactrim DS; Take 1 tablet by mouth once a day on Monday, Wednesday, and   Friday.; Start taking on: February 14, 2025     CHANGE how you take these medications     predniSONE 20 mg tablet; Commonly known as: Deltasone; Take 3 tablets   (60 mg) by mouth once daily for 14 days, THEN 2.5 tablets (50 mg) once   daily for 14 days, THEN 2 tablets (40 mg) once daily for 14 days, THEN 1.5   tablets (30 mg) once daily for 14 days, THEN 1 tablet (20 mg) once daily   for 14 days, THEN 0.5 tablets (10 mg) once daily.; Start taking on:   February 12, 2025; What changed: medication strength, See the new    instructions.     CONTINUE taking these medications     acetaminophen 325 mg tablet; Commonly known as: TylenoL; Take 2 tablets   (650 mg) by mouth every 6 hours if needed for mild pain (1 - 3) or fever   (temp greater than 38.0 C).   brimonidine 0.2 % ophthalmic solution; Commonly known as: AlphaGAN;   Administer 1 drop into the right eye every 12 hours.   cyclobenzaprine 10 mg tablet; Commonly known as: Flexeril; Take 1 tablet   (10 mg) by mouth 3 times a day as needed for muscle spasms.   diclofenac sodium 1 % gel; Commonly known as: Voltaren; Apply 4.5 inches   (4 g) topically 4 times a day.   ergocalciferol 1.25 MG (96760 UT) capsule; Commonly known as: Vitamin   D-2; Take 1 capsule (1,250 mcg) by mouth 1 (one) time per week.   gabapentin 300 mg capsule; Commonly known as: Neurontin; Take 1 capsule   (300 mg) by mouth every 8 hours.   mycophenolate 500 mg tablet; Commonly known as: Cellcept; Take 1 tablet   (500 mg) by mouth 2 times a day.   pantoprazole 40 mg EC tablet; Commonly known as: ProtoNix; Take 1 tablet   (40 mg) by mouth once daily.   prednisoLONE acetate 1 % ophthalmic suspension; Commonly known as:   Pred-Forte; Administer 1 drop into the right eye 4 times a day.       Outpatient Follow-Up  Future Appointments   Date Time Provider Department Center   3/17/2025  9:30 AM Juancarlos Ulloa MD [id number]       Marsha Echeverria MD

## 2025-02-13 NOTE — DISCHARGE INSTRUCTIONS
Jj Marsh! You were admitted to [facility] with concerns of worsening weakness. You were found to have a new lesion on the right side of your brain concerning for a recurrence of Neuro-Behcets vs primary central nervous system lymphoma (PCNSL). We performed a lumbar puncture. Flow cytometry did not show evidence of lymphoma. Your PET scan did not show evidence of lymphoma either. We consulted our Neurosurgery team to see if a biopsy to diagnosis the lesion was possible, but the lesion was too deep and not safe enough for biopsy. We started you on five days of IV steroids and performed another MRI of your brain after which unfortunately did not show much improvement. We reached out to Dr Ulloa who decided to give you a one time dose of 700mg IV cyclophosphamide. Now that you have completed treatment, you will be discharged to acute rehabilitation. You will work with speech therapy to improve your swallowing and physical/occupation therapy to improve your coordination and strength. You will also be on a prolonged steroid taper.    Best Wishes,  Your [facility] Neurology Team    Please Follow up with Dr Ulloa on 3/17/25.    Prednisone Taper:  -Prednisone 60mg 2/12/25- 2/25/25  -Prednisone 50mg 2/26/25- 3/11/25  -Prednisone 40mg 3/12/25- 3/25/25  -Prednisone 30mg 3/26/25- 4/8/25  -Prednisone 20mg 4/9/25- 4/22/25  -Prednisone 10mg 4/23/25- indefinitely     While on the steroid taper, it is imperative that you take Pantoprazole/Protonix everyday to prevent stomach ulcers and bleeds. It is also important to take Bactrim every Monday, Wednesday, and Friday to prevent pneumonias.     Please continue to take your Mycophenolate Mofetil (Cellcept) 500mg twice per day.    FOR ACUTE REHABILITATION:  - Patient discharged on regular diet with mildly thick liquids. Please have patient work with SLP, PT, and OT.

## 2025-02-13 NOTE — PROGRESS NOTES
Transitional Care Coordination Progress Note:  Per team, pt  medically ready for discharge.   [facility] can accept pt today.  Transport confirmed for 4pm, Neurology team notified.   Bedside nurse, and unit secretary notified, report #[phone] provided.  This nurse met with patient and his mother at the bedside and updated on discharge today.     Cherry Stapleton RN, BSN  Transitional Care Coordinator  Office: [phone]  Secure chat via Haiku

## 2025-02-14 ENCOUNTER — LAB REQUISITION (OUTPATIENT)
Dept: LAB | Facility: HOSPITAL | Age: 30
End: 2025-02-14
Payer: COMMERCIAL

## 2025-02-14 ENCOUNTER — APPOINTMENT (OUTPATIENT)
Dept: PHYSICAL THERAPY | Facility: CLINIC | Age: 30
End: 2025-02-14

## 2025-02-14 LAB
ALBUMIN SERPL BCP-MCNC: 3.8 G/DL (ref 3.4–5)
ALP SERPL-CCNC: 54 U/L (ref 33–120)
ALT SERPL W P-5'-P-CCNC: 8 U/L (ref 10–52)
ANION GAP SERPL CALC-SCNC: 11 MMOL/L (ref 10–20)
AST SERPL W P-5'-P-CCNC: 9 U/L (ref 9–39)
BILIRUB SERPL-MCNC: 0.2 MG/DL (ref 0–1.2)
BUN SERPL-MCNC: 35 MG/DL (ref 6–23)
CALCIUM SERPL-MCNC: 9.1 MG/DL (ref 8.6–10.3)
CHLORIDE SERPL-SCNC: 104 MMOL/L (ref 98–107)
CO2 SERPL-SCNC: 28 MMOL/L (ref 21–32)
CREAT SERPL-MCNC: 0.94 MG/DL (ref 0.5–1.3)
EGFRCR SERPLBLD CKD-EPI 2021: >90 ML/MIN/1.73M*2
ERYTHROCYTE [DISTWIDTH] IN BLOOD BY AUTOMATED COUNT: 13.5 % (ref 11.5–14.5)
GLUCOSE SERPL-MCNC: 83 MG/DL (ref 74–99)
HCT VFR BLD AUTO: 32.1 % (ref 41–52)
HGB BLD-MCNC: 10.6 G/DL (ref 13.5–17.5)
LABORATORY COMMENT REPORT: NORMAL
LABORATORY COMMENT REPORT: NORMAL
MCH RBC QN AUTO: 30.9 PG (ref 26–34)
MCHC RBC AUTO-ENTMCNC: 33 G/DL (ref 32–36)
MCV RBC AUTO: 94 FL (ref 80–100)
NRBC BLD-RTO: 0 /100 WBCS (ref 0–0)
PATH REPORT.FINAL DX SPEC: NORMAL
PATH REPORT.GROSS SPEC: NORMAL
PATH REPORT.RELEVANT HX SPEC: NORMAL
PATH REPORT.TOTAL CANCER: NORMAL
PLATELET # BLD AUTO: 338 X10*3/UL (ref 150–450)
POTASSIUM SERPL-SCNC: 4.1 MMOL/L (ref 3.5–5.3)
PROT SERPL-MCNC: 5.7 G/DL (ref 6.4–8.2)
RBC # BLD AUTO: 3.43 X10*6/UL (ref 4.5–5.9)
RESIDENT REVIEW: NORMAL
SODIUM SERPL-SCNC: 139 MMOL/L (ref 136–145)
WBC # BLD AUTO: 11.7 X10*3/UL (ref 4.4–11.3)

## 2025-02-14 PROCEDURE — 85027 COMPLETE CBC AUTOMATED: CPT

## 2025-02-14 PROCEDURE — 80053 COMPREHEN METABOLIC PANEL: CPT

## 2025-02-16 LAB
BACTERIA CSF CULT: NORMAL
GRAM STN SPEC: NORMAL
GRAM STN SPEC: NORMAL

## 2025-02-18 ENCOUNTER — APPOINTMENT (OUTPATIENT)
Dept: PHYSICAL THERAPY | Facility: CLINIC | Age: 30
End: 2025-02-18

## 2025-02-19 ENCOUNTER — HOME HEALTH ADMISSION (OUTPATIENT)
Dept: HOME HEALTH SERVICES | Facility: HOME HEALTH | Age: 30
End: 2025-02-19
Payer: COMMERCIAL

## 2025-02-19 ENCOUNTER — DOCUMENTATION (OUTPATIENT)
Dept: HOME HEALTH SERVICES | Facility: HOME HEALTH | Age: 30
End: 2025-02-19
Payer: COMMERCIAL

## 2025-02-19 NOTE — HH CARE COORDINATION
Home Care received a Referral for Nursing, Physical Therapy, Occupational Therapy, and Speech Language Pathology. We have processed the referral for a Start of Care on 24-48 HOURS POST DC .     If you have any questions or concerns, please feel free to contact us at [phone]. Follow the prompts, enter your five digit zip code, and you will be directed to your care team on C[facility] 1.

## 2025-02-20 PROCEDURE — RXMED WILLOW AMBULATORY MEDICATION CHARGE

## 2025-02-21 ENCOUNTER — LAB REQUISITION (OUTPATIENT)
Dept: LAB | Facility: HOSPITAL | Age: 30
End: 2025-02-21
Payer: COMMERCIAL

## 2025-02-21 ENCOUNTER — PHARMACY VISIT (OUTPATIENT)
Dept: PHARMACY | Facility: CLINIC | Age: 30
End: 2025-02-21
Payer: MEDICAID

## 2025-02-21 DIAGNOSIS — Z51.89 ENCOUNTER FOR OTHER SPECIFIED AFTERCARE: ICD-10-CM

## 2025-02-21 LAB
ANION GAP SERPL CALC-SCNC: 12 MMOL/L (ref 10–20)
BUN SERPL-MCNC: 26 MG/DL (ref 6–23)
CALCIUM SERPL-MCNC: 9.4 MG/DL (ref 8.6–10.3)
CHLORIDE SERPL-SCNC: 104 MMOL/L (ref 98–107)
CO2 SERPL-SCNC: 26 MMOL/L (ref 21–32)
CREAT SERPL-MCNC: 1.03 MG/DL (ref 0.5–1.3)
EGFRCR SERPLBLD CKD-EPI 2021: >90 ML/MIN/1.73M*2
ERYTHROCYTE [DISTWIDTH] IN BLOOD BY AUTOMATED COUNT: 14 % (ref 11.5–14.5)
GLUCOSE SERPL-MCNC: 71 MG/DL (ref 74–99)
HCT VFR BLD AUTO: 38.1 % (ref 41–52)
HGB BLD-MCNC: 12.4 G/DL (ref 13.5–17.5)
MCH RBC QN AUTO: 31.4 PG (ref 26–34)
MCHC RBC AUTO-ENTMCNC: 32.5 G/DL (ref 32–36)
MCV RBC AUTO: 97 FL (ref 80–100)
NRBC BLD-RTO: 0 /100 WBCS (ref 0–0)
PLATELET # BLD AUTO: 354 X10*3/UL (ref 150–450)
POTASSIUM SERPL-SCNC: 4.4 MMOL/L (ref 3.5–5.3)
RBC # BLD AUTO: 3.95 X10*6/UL (ref 4.5–5.9)
SODIUM SERPL-SCNC: 138 MMOL/L (ref 136–145)
WBC # BLD AUTO: 12.2 X10*3/UL (ref 4.4–11.3)

## 2025-02-21 PROCEDURE — 80048 BASIC METABOLIC PNL TOTAL CA: CPT

## 2025-02-21 PROCEDURE — 85027 COMPLETE CBC AUTOMATED: CPT

## 2025-02-23 ENCOUNTER — HOME CARE VISIT (OUTPATIENT)
Dept: HOME HEALTH SERVICES | Facility: HOME HEALTH | Age: 30
End: 2025-02-23

## 2025-02-24 LAB
AMPAR2 IGG CSF QL CBA IFA: NEGATIVE
AMPHIPHYSIN AB CSF QL IF: NEGATIVE
ANNOTATION COMMENT IMP: NORMAL
CASPR2 IGG CSF QL CBA IFA: NEGATIVE
CV2 AB CSF QL IF: NEGATIVE
DPPX IGG CSF QL CBA IFA: NEGATIVE
GABABR IGG CSF QL CBA IFA: NEGATIVE
GAD65 IGG+IGM CSF IA-SCNC: 0 NMOL/L
GFAP ALPHA IGG CSF QL IF: NEGATIVE
GLIAL NUC TYPE 1 AB CSF QL IF: NEGATIVE
HU1 AB CSF QL IF: NEGATIVE
HU2 AB CSF QL IF: NEGATIVE
HU3 AB CSF QL IF: NEGATIVE
IGLON5 IGG CSF QL CBA IFA: NEGATIVE
IMMUNOLOGIST REVIEW: NORMAL
LGI1 IGG CSF QL CBA IFA: NEGATIVE
MGLUR1 IGG CSF QL IF: NEGATIVE
NEUROCHONDRIN AB CSF QL IF: NEGATIVE
NIF IGG CSF QL IF: NEGATIVE
NMDAR1 IGG CSF QL CBA IFA: NEGATIVE
PCA-1 AB CSF QL IF: NEGATIVE
PCA-2 AB CSF QL IF: NEGATIVE
PCA-TR AB CSF QL IF: NEGATIVE
PDE10A AB IFA, CSF: NEGATIVE
SEPTIN-7 IGG CSF QL IF: NEGATIVE
TRIM46 AB IFA, CSF: NEGATIVE

## 2025-02-25 ENCOUNTER — APPOINTMENT (OUTPATIENT)
Dept: PHYSICAL THERAPY | Facility: CLINIC | Age: 30
End: 2025-02-25

## 2025-02-28 ENCOUNTER — HOME CARE VISIT (OUTPATIENT)
Dept: HOME HEALTH SERVICES | Facility: HOME HEALTH | Age: 30
End: 2025-02-28
Payer: COMMERCIAL

## 2025-02-28 VITALS
SYSTOLIC BLOOD PRESSURE: 122 MMHG | HEART RATE: 107 BPM | DIASTOLIC BLOOD PRESSURE: 73 MMHG | OXYGEN SATURATION: 96 % | TEMPERATURE: 98 F

## 2025-02-28 PROCEDURE — G0299 HHS/HOSPICE OF RN EA 15 MIN: HCPCS

## 2025-02-28 ASSESSMENT — ENCOUNTER SYMPTOMS
LAST BOWEL MOVEMENT: 67264
PERSON REPORTING PAIN: PATIENT
APPETITE LEVEL: GOOD
BLURRED VISION: 1
DENIES PAIN: 1

## 2025-02-28 ASSESSMENT — ACTIVITIES OF DAILY LIVING (ADL): ENTERING_EXITING_HOME: MODERATE ASSIST

## 2025-03-01 ASSESSMENT — ACTIVITIES OF DAILY LIVING (ADL): OASIS_M1830: 03

## 2025-03-03 ENCOUNTER — HOME CARE VISIT (OUTPATIENT)
Dept: HOME HEALTH SERVICES | Facility: HOME HEALTH | Age: 30
End: 2025-03-03
Payer: COMMERCIAL

## 2025-03-03 PROCEDURE — G0151 HHCP-SERV OF PT,EA 15 MIN: HCPCS | Mod: U2

## 2025-03-03 SDOH — HEALTH STABILITY: PHYSICAL HEALTH: EXERCISE TYPE: B LE

## 2025-03-03 SDOH — HEALTH STABILITY: PHYSICAL HEALTH: PHYSICAL EXERCISE: 10

## 2025-03-03 SDOH — HEALTH STABILITY: PHYSICAL HEALTH: EXERCISE ACTIVITIES SETS: 2

## 2025-03-03 SDOH — HEALTH STABILITY: PHYSICAL HEALTH: EXERCISE ACTIVITY: HIP 3WAY, HEEL RAISES , TKE, HS

## 2025-03-03 SDOH — HEALTH STABILITY: PHYSICAL HEALTH: PHYSICAL EXERCISE: SIT, STAND, SUPINE

## 2025-03-03 ASSESSMENT — ENCOUNTER SYMPTOMS
LOWEST PAIN SEVERITY IN PAST 24 HOURS: 2/10
PAIN LOCATION - EXACERBATING FACTORS: ROM, WB
PAIN LOCATION: BACK
PAIN LOCATION - PAIN DURATION: MIN
PERSON REPORTING PAIN: PATIENT
HIGHEST PAIN SEVERITY IN PAST 24 HOURS: 5/10
PAIN LOCATION - RELIEVING FACTORS: MEDS
PAIN LOCATION - PAIN FREQUENCY: INTERMITTENT
PAIN LOCATION - PAIN SEVERITY: 5/10
PAIN LOCATION - PAIN QUALITY: ACHE
PAIN: 1
PAIN SEVERITY GOAL: 0/10
MUSCLE WEAKNESS: 1

## 2025-03-03 ASSESSMENT — ACTIVITIES OF DAILY LIVING (ADL)
AMBULATION ASSISTANCE ON FLAT SURFACES: 1
CURRENT_FUNCTION: MINIMUM ASSIST
AMBULATION_DISTANCE/DURATION_TOLERATED: 80 FT
PHYSICAL TRANSFERS ASSESSED: 1

## 2025-03-04 ENCOUNTER — HOME CARE VISIT (OUTPATIENT)
Dept: HOME HEALTH SERVICES | Facility: HOME HEALTH | Age: 30
End: 2025-03-04
Payer: COMMERCIAL

## 2025-03-04 PROCEDURE — G0156 HHCP-SVS OF AIDE,EA 15 MIN: HCPCS

## 2025-03-05 ENCOUNTER — HOME CARE VISIT (OUTPATIENT)
Dept: HOME HEALTH SERVICES | Facility: HOME HEALTH | Age: 30
End: 2025-03-05

## 2025-03-05 ENCOUNTER — HOME CARE VISIT (OUTPATIENT)
Dept: HOME HEALTH SERVICES | Facility: HOME HEALTH | Age: 30
End: 2025-03-05
Payer: COMMERCIAL

## 2025-03-05 VITALS — RESPIRATION RATE: 14 BRPM

## 2025-03-05 PROCEDURE — G0153 HHCP-SVS OF S/L PATH,EA 15MN: HCPCS

## 2025-03-05 PROCEDURE — G0151 HHCP-SERV OF PT,EA 15 MIN: HCPCS

## 2025-03-05 SDOH — HEALTH STABILITY: PHYSICAL HEALTH: EXERCISE ACTIVITY: OPEN/ CLOSED CHAIN EX

## 2025-03-05 SDOH — HEALTH STABILITY: PHYSICAL HEALTH: PHYSICAL EXERCISE: SIT, STAND, SUPINE

## 2025-03-05 SDOH — HEALTH STABILITY: PHYSICAL HEALTH: PHYSICAL EXERCISE: 10

## 2025-03-05 SDOH — HEALTH STABILITY: PHYSICAL HEALTH: EXERCISE TYPE: B LE

## 2025-03-05 SDOH — HEALTH STABILITY: PHYSICAL HEALTH: EXERCISE ACTIVITIES SETS: 2

## 2025-03-05 ASSESSMENT — ENCOUNTER SYMPTOMS
PAIN: 1
HIGHEST PAIN SEVERITY IN PAST 24 HOURS: 4/10
PAIN LOCATION - EXACERBATING FACTORS: ROM
MUSCLE WEAKNESS: 1
DENIES PAIN: 1
SUBJECTIVE PAIN PROGRESSION: WAXING AND WANING
PAIN LOCATION - PAIN SEVERITY: 4/10
PERSON REPORTING PAIN: PATIENT
PAIN LOCATION - PAIN DURATION: MIN
ANGER WITHIN DEFINED LIMITS: 1
PAIN LOCATION: GENERALIZED
PAIN LOCATION - PAIN FREQUENCY: INTERMITTENT
PERSON REPORTING PAIN: PATIENT
AGGRESSION WITHIN DEFINED LIMITS: 1
PAIN LOCATION - PAIN QUALITY: ACHE
PAIN SEVERITY GOAL: 0/10
LOWEST PAIN SEVERITY IN PAST 24 HOURS: 2/10
PAIN LOCATION - RELIEVING FACTORS: MEDS

## 2025-03-05 ASSESSMENT — ACTIVITIES OF DAILY LIVING (ADL)
AMBULATION ASSISTANCE ON FLAT SURFACES: 1
AMBULATION_DISTANCE/DURATION_TOLERATED: 50 FT
CURRENT_FUNCTION: INDEPENDENT
PHYSICAL TRANSFERS ASSESSED: 1

## 2025-03-06 ENCOUNTER — HOME CARE VISIT (OUTPATIENT)
Dept: HOME HEALTH SERVICES | Facility: HOME HEALTH | Age: 30
End: 2025-03-06
Payer: COMMERCIAL

## 2025-03-06 PROCEDURE — G0152 HHCP-SERV OF OT,EA 15 MIN: HCPCS

## 2025-03-06 SDOH — HEALTH STABILITY: MENTAL HEALTH: SOCIAL ISOLATION: 1

## 2025-03-06 SDOH — HEALTH STABILITY: MENTAL HEALTH: STRESS FACTORS COMMENTS: ONGOING CARE NEEDS, LOSS OF PREVIOUYS JOB

## 2025-03-06 ASSESSMENT — ACTIVITIES OF DAILY LIVING (ADL)
DRESSING_REQUIRES_ASSISTANCE: 1
SHOPPING_REQUIRES_ASSISTANCE: 1
BATHING_REQUIRES_ASSISTANCE: 1
AMBULATION_REQUIRES_ASSISTANCE: 1
LAUNDRY_REQUIRES_ASSISTANCE: 1

## 2025-03-07 ENCOUNTER — HOME CARE VISIT (OUTPATIENT)
Dept: HOME HEALTH SERVICES | Facility: HOME HEALTH | Age: 30
End: 2025-03-07
Payer: COMMERCIAL

## 2025-03-07 VITALS
DIASTOLIC BLOOD PRESSURE: 74 MMHG | HEART RATE: 110 BPM | TEMPERATURE: 99 F | OXYGEN SATURATION: 95 % | SYSTOLIC BLOOD PRESSURE: 124 MMHG

## 2025-03-07 PROCEDURE — G0299 HHS/HOSPICE OF RN EA 15 MIN: HCPCS

## 2025-03-07 ASSESSMENT — ACTIVITIES OF DAILY LIVING (ADL)
TOILETING: 1
BATHING ASSESSED: 1
DRESSING_LB_CURRENT_FUNCTION: MODERATE ASSIST
TOILETING: CONTACT GUARD ASSIST
BATHING_CURRENT_FUNCTION: MINIMUM ASSIST
DRESSING_UB_CURRENT_FUNCTION: SUPERVISION

## 2025-03-07 ASSESSMENT — ENCOUNTER SYMPTOMS
APPETITE LEVEL: GOOD
PERSON REPORTING PAIN: PATIENT
LAST BOWEL MOVEMENT: 67271
DENIES PAIN: 1
DENIES PAIN: 1
PERSON REPORTING PAIN: PATIENT

## 2025-03-10 ENCOUNTER — TELEMEDICINE (OUTPATIENT)
Dept: PRIMARY CARE | Facility: HOSPITAL | Age: 30
End: 2025-03-10
Payer: COMMERCIAL

## 2025-03-10 DIAGNOSIS — M35.2 BEHCET'S SYNDROME, NEUROLOGIC TYPE (MULTI): Primary | ICD-10-CM

## 2025-03-10 PROCEDURE — 99213 OFFICE O/P EST LOW 20 MIN: CPT

## 2025-03-10 NOTE — PROGRESS NOTES
Chief complaint:  Virtual visit for DME F2F encounter      Juan Marsh is a 29 y.o. 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     Last seen in [facility] on 2/05/2025, for routine follow up.     Was admitted with neurology 2/07-2/13 for: generalized weakness and facial droop, suspected to have recurrent neuro-Behcets vs PCNS.  Per neurology discharge summary, the patient's imaging finding showed a lesion that was too deep for NSGY to biopsy, LP was significant for neutrophilic predominant for cytosis, no 73 was negative for monoclonal B cell population, however the PET CT scan performed did not show evidence of lymphomatous process.  The patient's diagnosis was thought to be more likely recurrent neuro- Behcets, was given one-time dose of cyclophosphamide 700 mg IV while inpatient, treated with a 5-day course of IV MP, discharged on a steroid taper course, with PPI and PJP prophylaxis to acute rehab.    This virtual visit was scheduled with the patient for request of DME, requesting shower chair, walker with 5 wheels.  It was noted by home health care that the patient has also been having fecal incontinence episodes.  Asked the patient about fecal incontinence episodes reported started 2 weeks ago, has 3 loose bowel movements a day, is incontinent episode usually in the morning he reports having the urge to defecate but unable to make it to the restroom.  He does report having an episode of constipation prior to his symptoms.  He is not taking any laxatives currently.  Denies having any melena, hematochezia, abdominal pain, nausea or vomiting. denies saddle anesthesia   On systemic review the patient also reports having (heavy breathing), past when his shortness of  breath started denies having any shortness of breath, denies having any URI symptoms, no sore throat, no runny nose, no fever, no cough, no chest pain.  The patient is unsure when he started to have the described heavy breathing, reports he feels it all the time, he is unable to describe it further more prompting mentions it is better on upright position, worse on laying down.    Within the limitation of this video call, the patient did not seem to be in distress, no dyspnea noted with a speech, was laying flat holding his phone of private during the video call.    asked the patient to come to the clinic tomorrow, for further evaluation and full neurological exam. the patient confirmed that he will be able In the clinic at 233/11/25, we reached out to his neurology attending Dr. Juancarlos Ulloa to inform him of the patients new concerns, and will update him after the evaluating the patient in the clinic tomorrow     - the patient will need a walker and shower chair due to his ongoing neurological deficits in the setting of his Neuro Behcet's disease. And Chronic Bilateral hip pain secondary to known AVN of the bilateral femoral heads.    Other co morbidities were not addressed this visit.     Medications:  Current Outpatient Medications   Medication Instructions    acetaminophen (TYLENOL) 650 mg, oral, Every 6 hours PRN    b complex vitamins capsule Take 1 capsule by mouth once daily    brimonidine (AlphaGAN) 0.2 % ophthalmic solution Instill 1 drop into right eye every 8 hours    cyclobenzaprine (FLEXERIL) 10 mg, oral, 3 times daily PRN    diclofenac sodium (Arthritis Pain, diclofenac,) 1 % gel Apply topically four times a day for 21 days    diclofenac sodium (VOLTAREN) 4 g, Topical, 4 times daily    ergocalciferol (Vitamin D-2) 1250 mcg (50,000 units) capsule Take 1 capsule by mouth once a week    ergocalciferol (VITAMIN D-2) 1,250 mcg, oral, Once Weekly    gabapentin (Neurontin) 300 mg capsule Take 1 capsule by  mouth three times a day    gabapentin (NEURONTIN) 300 mg, oral, Every 8 hours scheduled    mirtazapine (Remeron) 15 mg tablet Take 1 tablet by mouth once daily at bedtime. Take at bedtime while taking prednisone.    mycophenolate (Cellcept) 250 mg capsule Take 1 capsule by mouth twice daily    mycophenolate (CELLCEPT) 500 mg, oral, 2 times daily    omega-3 acid ethyl esters (Lovaza) 1 gram capsule Take 1 capsule by mouth once daily    pantoprazole (ProtoNix) 40 mg EC tablet Take 1 tablet by mouth once daily before breakfast    pantoprazole (PROTONIX) 40 mg, oral, Daily    prednisoLONE acetate (Pred-Forte) 1 % ophthalmic suspension 1 drop, Right Eye, 4 times daily    prednisoLONE acetate (Pred-Forte) 1 % ophthalmic suspension Instill 2 drops into right eye four times a day    predniSONE (Deltasone) 10 mg tablet Take by mouth according to taper - 6 tablets by mouth daily for 2 days then 5 tablets by mouth daily for 14 days then 4 tablets by mouth daily for 14 days then 3 tablet by mouth daily for 14 days then 2 tablet by mouth daily for 14 days then 1 tablet by mouth daily continously    predniSONE (Deltasone) 20 mg tablet Take 3 tablets (60 mg) by mouth once daily for 14 days, THEN 2.5 tablets (50 mg) once daily for 14 days, THEN 2 tablets (40 mg) once daily for 14 days, THEN 1.5 tablets (30 mg) once daily for 14 days, THEN 1 tablet (20 mg) once daily for 14 days, THEN 0.5 tablets (10 mg) once daily.    sulfamethoxazole-trimethoprim (Bactrim DS) 800-160 mg tablet 1 tablet, oral, Every Mon/Wed/Fri    sulfamethoxazole-trimethoprim (Bactrim DS) 800-160 mg tablet Take 1 tablet by mouth three times a week on monday, wednesday and friday       Allergies:  No Known Allergies    Past medical history:  Past Medical History:   Diagnosis Date    Lupus (systemic lupus erythematosus) (Multi)     Stroke (Multi)     Uveitis        Surgical history:  Past Surgical History:   Procedure Laterality Date    CT ANGIO NECK  9/2/2021    CT  NECK ANGIO W AND WO IV CONTRAST 9/2/2021 [facility]    CT HEAD ANGIO W AND WO IV CONTRAST  9/2/2021    CT HEAD ANGIO W AND WO IV CONTRAST 9/2/2021 [facility] CLINICAL LEGACY       Family history:  Family History   Problem Relation Name Age of Onset    Other (Diabetes mellitus) Other Multiple Family Members     Hypertension Other Multiple Family Members     Cancer Other Multiple Family Members        Social history:   reports that he has been smoking cigars. He has never used smokeless tobacco. He reports that he does not currently use alcohol. He reports current drug use. Drug: Marijuana.    Health maintenance:  Health Maintenance   Topic Date Due    Yearly Adult Physical  Never done    COVID-19 Vaccine (1) Never done    HPV Vaccines (3 - Risk male 3-dose series) 05/24/2012    Pneumococcal Vaccine: Pediatrics and At-Risk Adult Patients (1 of 2 - PCV) Never done    Zoster Vaccines (1 of 2) 06/22/2016    Influenza Vaccine (1) 09/01/2024    Lipid Panel  05/25/2026    DTaP/Tdap/Td Vaccines (9 - Td or Tdap) 10/02/2029    HIB Vaccines  Completed    Hepatitis B Vaccines  Completed    IPV Vaccines  Completed    Hepatitis A Vaccines  Completed    MMR Vaccines  Completed    Varicella Vaccines  Completed    HIV Screening  Completed    Hepatitis C Screening  Completed    Meningococcal Vaccine  Aged Out    Rotavirus Vaccines  Aged Out     Physical exam: unable to assess over video call     Labs:  Lab Results   Component Value Date    WBC 12.2 (H) 02/21/2025    HGB 12.4 (L) 02/21/2025    HCT 38.1 (L) 02/21/2025    MCV 97 02/21/2025     02/21/2025       Lab Results   Component Value Date    GLUCOSE 71 (L) 02/21/2025    CALCIUM 9.4 02/21/2025     02/21/2025    K 4.4 02/21/2025    CO2 26 02/21/2025     02/21/2025    BUN 26 (H) 02/21/2025    CREATININE 1.03 02/21/2025       Lab Results   Component Value Date    HGBA1C 5.4 05/25/2021        Lab Results   Component Value Date    CHOL 142 05/25/2021     Lab  "Results   Component Value Date    HDL 40.1 05/25/2021     No results found for: \"LDLCALC\"  Lab Results   Component Value Date    TRIG 85 05/25/2021     No components found for: \"CHOLHDL\"      Patient and plan discussed with attending physician   Trina Varner MD   "

## 2025-03-10 NOTE — PROGRESS NOTES
He reports he has been having accidents (fecal incontinence) for the past two weeks. He denies any numbness in the groin or new weakness. Accidents tend to occur in the morning.  Outside of the episodes of incontinence, he has been having three soft bowel movements a day; he denies any constipation or use of any laxatives. He was discharged from the hospital on a prednisone taper on 2/13 for a Neuro Behcet's exacerbation.  Differential for incontinence is worsening of Neuro-Behcet's vs constipation with overflow vs a GI infection. Constipation less likely due to regular bowel movements. Abdominal infection less likely due to absence of other symptoms. He will be seeing neuro in a week for follow up. I told him to take Imodium as needed in the meantime. He hoped to talk with Dr. Glez as well.

## 2025-03-11 ENCOUNTER — HOME CARE VISIT (OUTPATIENT)
Dept: HOME HEALTH SERVICES | Facility: HOME HEALTH | Age: 30
End: 2025-03-11
Payer: COMMERCIAL

## 2025-03-11 ENCOUNTER — OFFICE VISIT (OUTPATIENT)
Dept: PRIMARY CARE | Facility: HOSPITAL | Age: 30
End: 2025-03-11
Payer: COMMERCIAL

## 2025-03-11 VITALS
HEIGHT: 68 IN | HEART RATE: 95 BPM | OXYGEN SATURATION: 100 % | WEIGHT: 143 LBS | SYSTOLIC BLOOD PRESSURE: 113 MMHG | TEMPERATURE: 97.5 F | DIASTOLIC BLOOD PRESSURE: 75 MMHG | BODY MASS INDEX: 21.67 KG/M2

## 2025-03-11 VITALS — OXYGEN SATURATION: 99 % | RESPIRATION RATE: 16 BRPM | HEART RATE: 88 BPM

## 2025-03-11 DIAGNOSIS — M35.2 BEHCET'S SYNDROME, NEUROLOGIC TYPE (MULTI): Primary | ICD-10-CM

## 2025-03-11 DIAGNOSIS — R19.7 DIARRHEA, UNSPECIFIED TYPE: ICD-10-CM

## 2025-03-11 PROCEDURE — 3008F BODY MASS INDEX DOCD: CPT

## 2025-03-11 PROCEDURE — G0151 HHCP-SERV OF PT,EA 15 MIN: HCPCS | Mod: U2

## 2025-03-11 PROCEDURE — G0152 HHCP-SERV OF OT,EA 15 MIN: HCPCS

## 2025-03-11 PROCEDURE — 99213 OFFICE O/P EST LOW 20 MIN: CPT | Mod: GC,GE

## 2025-03-11 PROCEDURE — 99213 OFFICE O/P EST LOW 20 MIN: CPT

## 2025-03-11 PROCEDURE — G0156 HHCP-SVS OF AIDE,EA 15 MIN: HCPCS

## 2025-03-11 SDOH — HEALTH STABILITY: PHYSICAL HEALTH: EXERCISE ACTIVITY: OPEN/ CLOSED CHAIN EX

## 2025-03-11 SDOH — HEALTH STABILITY: PHYSICAL HEALTH: EXERCISE TYPE: B LE

## 2025-03-11 SDOH — HEALTH STABILITY: PHYSICAL HEALTH: PHYSICAL EXERCISE: SIT, STAND

## 2025-03-11 SDOH — HEALTH STABILITY: PHYSICAL HEALTH: EXERCISE ACTIVITIES SETS: 2

## 2025-03-11 SDOH — HEALTH STABILITY: PHYSICAL HEALTH: PHYSICAL EXERCISE: 10

## 2025-03-11 ASSESSMENT — ENCOUNTER SYMPTOMS
PAIN LOCATION: GENERALIZED
OCCASIONAL FEELINGS OF UNSTEADINESS: 0
PAIN LOCATION - RELIEVING FACTORS: REST
DEPRESSION: 0
PAIN LOCATION - PAIN SEVERITY: 4/10
PAIN SEVERITY GOAL: 0/10
PERSON REPORTING PAIN: PATIENT
PAIN LOCATION - PAIN DURATION: MIN
SUBJECTIVE PAIN PROGRESSION: WAXING AND WANING
PAIN: 1
LOSS OF SENSATION IN FEET: 0
HIGHEST PAIN SEVERITY IN PAST 24 HOURS: 4/10
LOWEST PAIN SEVERITY IN PAST 24 HOURS: 1/10
PAIN LOCATION - PAIN FREQUENCY: INTERMITTENT
MUSCLE WEAKNESS: 1
PAIN LOCATION - EXACERBATING FACTORS: MVT
PAIN LOCATION - PAIN QUALITY: ACHE

## 2025-03-11 ASSESSMENT — ACTIVITIES OF DAILY LIVING (ADL)
AMBULATION_DISTANCE/DURATION_TOLERATED: 100 FT
PHYSICAL TRANSFERS ASSESSED: 1
AMBULATION ASSISTANCE ON FLAT SURFACES: 1
CURRENT_FUNCTION: INDEPENDENT

## 2025-03-11 ASSESSMENT — PATIENT HEALTH QUESTIONNAIRE - PHQ9
2. FEELING DOWN, DEPRESSED OR HOPELESS: NOT AT ALL
SUM OF ALL RESPONSES TO PHQ9 QUESTIONS 1 AND 2: 0
1. LITTLE INTEREST OR PLEASURE IN DOING THINGS: NOT AT ALL

## 2025-03-11 ASSESSMENT — PAIN SCALES - GENERAL: PAINLEVEL_OUTOF10: 0-NO PAIN

## 2025-03-11 NOTE — PATIENT INSTRUCTIONS
As discussed today, our plan is:   - We will collect blood work and call you with the results.   -  Make sure to follow with neurology on 3/17  - Your DME papers are signed.     Labs - we collected labs today and will call you with any abnormal results. If you have any questions or concerns prior to us calling feel free to call our office to have your questions addressed.   Medication changes: Non  Consultations - you were referred to see the following specialists: - You should receive a call from central scheduling in the next few days if you do not receive a call within 3-5 business days please call [phone] to schedule your appointment.   4.   If you smoke or use other tobacco products, take steps to quit. Call [phone] for more information or to set up an appointment with  [facility]. The [place] Tobacco Quit Line is a free resource for people who don’t have insurance, receive Medicaid, pregnant women, or members of the [place] [facility]. Call 6-535-QUIT-NOW or 1-514.534.6839.    Please come back to see us in: 6 months   ------  If you have any problems or questions, please contact the clinic at [phone] to leave a message. Our fax number is [phone]. If your issue cannot wait until the next business day, please go to urgent care or the emergency department.     I also strongly urge all of my patients to register for [email or web address] by going to: [email or web address]  (The  staff can also send you a text/email link to register when you check out).    No shows: It is understandable if you are unable to make it to a visit, but please cancel your appointment instead of not showing up. This helps to give other patients access to primary care and keeps wait times low.        [facility]   [phone]

## 2025-03-11 NOTE — PROGRESS NOTES
Chief complaint:  Fecal incontinence     Background:  Juan Marsh is a 29 y.o. 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      Had a virtual visit with the patient yesterday for request of DME, requesting shower chair, walker with 5 wheels.  Yesterday went to bed the patient reported he has fecal incontinence episodes for the past 2 weeks, about 2-3 times a day, not associated with any saddle anesthesia or back pain, denied any worsening in his baseline neurological status, he also reported he felt that it is hard for him to take a breath, and episodes of breathing fast that resolve spontaneously .    She the patient was asked to come to the clinic for neurological assessment and physical exam.    Today he mentioned he has fecal incontinence episodes actually resolved a week ago, reports he had occasional  accidents about 2-3 times only.  And has been able to control his bowels for the past week, his last bowel movement was this morning, reported to be loose dark  He did report 1 episode of vomiting yesterday.  Denies having any melena, hematochezia, abdominal pain, nausea or vomiting. denies saddle anesthesia   denies having any shortness of breath, denies having any URI symptoms, no sore throat, no runny nose, no fever, no cough, no chest pain.  The patient is unsure when he started to have the described heavy breathing, reports he feels it all the time, he is unable to describe it further. more prompting mentions it is better on upright position, worse on laying down.      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  Per neurology discharge  summary, the patient's imaging finding showed a lesion that was too deep for NSGY to biopsy, LP was significant for neutrophilic predominant for cytosis, no 73 was negative for monoclonal B cell population, however the PET CT scan performed did not show evidence of lymphomatous process.  The patient's diagnosis was thought to be more likely recurrent neuro- Behcets, was given one-time dose of cyclophosphamide 700 mg IV while inpatient, treated with a 5-day course of IV MP, discharged on a steroid taper course, with PPI and PJP prophylaxis to acute rehab.          Medications:  Current Outpatient Medications   Medication Instructions    acetaminophen (TYLENOL) 650 mg, oral, Every 6 hours PRN    b complex vitamins capsule Take 1 capsule by mouth once daily    brimonidine (AlphaGAN) 0.2 % ophthalmic solution Instill 1 drop into right eye every 8 hours    cyclobenzaprine (FLEXERIL) 10 mg, oral, 3 times daily PRN    diclofenac sodium (Arthritis Pain, diclofenac,) 1 % gel Apply topically four times a day for 21 days    diclofenac sodium (VOLTAREN) 4 g, Topical, 4 times daily    ergocalciferol (Vitamin D-2) 1250 mcg (50,000 units) capsule Take 1 capsule by mouth once a week    ergocalciferol (VITAMIN D-2) 1,250 mcg, oral, Once Weekly    gabapentin (Neurontin) 300 mg capsule Take 1 capsule by mouth three times a day    gabapentin (NEURONTIN) 300 mg, oral, Every 8 hours scheduled    mirtazapine (Remeron) 15 mg tablet Take 1 tablet by mouth once daily at bedtime. Take at bedtime while taking prednisone.    mycophenolate (Cellcept) 250 mg capsule Take 1 capsule by mouth twice daily    mycophenolate (CELLCEPT) 500 mg, oral, 2 times daily    omega-3 acid ethyl esters (Lovaza) 1 gram capsule Take 1 capsule by mouth once daily    pantoprazole (ProtoNix) 40 mg EC tablet Take 1 tablet by mouth once daily before breakfast    pantoprazole (PROTONIX) 40 mg, oral, Daily    prednisoLONE acetate (Pred-Forte) 1 % ophthalmic suspension 1  drop, Right Eye, 4 times daily    prednisoLONE acetate (Pred-Forte) 1 % ophthalmic suspension Instill 2 drops into right eye four times a day    predniSONE (Deltasone) 10 mg tablet Take by mouth according to taper - 6 tablets by mouth daily for 2 days then 5 tablets by mouth daily for 14 days then 4 tablets by mouth daily for 14 days then 3 tablet by mouth daily for 14 days then 2 tablet by mouth daily for 14 days then 1 tablet by mouth daily continously    predniSONE (Deltasone) 20 mg tablet Take 3 tablets (60 mg) by mouth once daily for 14 days, THEN 2.5 tablets (50 mg) once daily for 14 days, THEN 2 tablets (40 mg) once daily for 14 days, THEN 1.5 tablets (30 mg) once daily for 14 days, THEN 1 tablet (20 mg) once daily for 14 days, THEN 0.5 tablets (10 mg) once daily.    sulfamethoxazole-trimethoprim (Bactrim DS) 800-160 mg tablet 1 tablet, oral, Every Mon/Wed/Fri    sulfamethoxazole-trimethoprim (Bactrim DS) 800-160 mg tablet Take 1 tablet by mouth three times a week on monday, wednesday and friday       Allergies:  No Known Allergies    Past medical history:  Past Medical History:   Diagnosis Date    Lupus (systemic lupus erythematosus) (Multi)     Stroke (Multi)     Uveitis        Surgical history:  Past Surgical History:   Procedure Laterality Date    CT ANGIO NECK  9/2/2021    CT NECK ANGIO W AND WO IV CONTRAST 9/2/2021 [facility]    CT HEAD ANGIO W AND WO IV CONTRAST  9/2/2021    CT HEAD ANGIO W AND WO IV CONTRAST 9/2/2021 [facility] CLINICAL LEGACY       Family history:  Family History   Problem Relation Name Age of Onset    Other (Diabetes mellitus) Other Multiple Family Members     Hypertension Other Multiple Family Members     Cancer Other Multiple Family Members        Social history:   reports that he has been smoking cigars. He has never used smokeless tobacco. He reports that he does not currently use alcohol. He reports current drug use. Drug: Marijuana.    Health maintenance:  Health  Maintenance   Topic Date Due    Yearly Adult Physical  Never done    COVID-19 Vaccine (1) Never done    HPV Vaccines (3 - Risk male 3-dose series) 05/24/2012    Pneumococcal Vaccine: Pediatrics and At-Risk Adult Patients (1 of 2 - PCV) Never done    Zoster Vaccines (1 of 2) 06/22/2016    Influenza Vaccine (1) 09/01/2024    Lipid Panel  05/25/2026    DTaP/Tdap/Td Vaccines (9 - Td or Tdap) 10/02/2029    HIB Vaccines  Completed    Hepatitis B Vaccines  Completed    IPV Vaccines  Completed    Hepatitis A Vaccines  Completed    MMR Vaccines  Completed    Varicella Vaccines  Completed    HIV Screening  Completed    Hepatitis C Screening  Completed    Meningococcal Vaccine  Aged Out    Rotavirus Vaccines  Aged Out     Physical exam:  General examinations: looks well , oriented to time person and place, not in distress. Using a cane to mobilize   Respiratory examinations: normal vesicular breathing equal air entry no wheezing or crackles. Normal resp effort. Walking pulse ox is within normal > 95%  Cardiovascular examinations: S1+S2+0 murmur no LL edema   Gastrointestinal examinations: soft and lax no tenderness or rigidity.   CNS examinations:  4/5 in the rt LE and he does not have sensory symptoms. Lt LE 5/5.  Rt UE: 4/5, lt UE 5/5.   No sensory deficits.  oes have right eyelid droop and decrease sensation on the right side of the face, otherwise CN were intact and visual field was intact as well.     Labs:  Lab Results   Component Value Date    WBC 12.2 (H) 02/21/2025    HGB 12.4 (L) 02/21/2025    HCT 38.1 (L) 02/21/2025    MCV 97 02/21/2025     02/21/2025       Lab Results   Component Value Date    GLUCOSE 71 (L) 02/21/2025    CALCIUM 9.4 02/21/2025     02/21/2025    K 4.4 02/21/2025    CO2 26 02/21/2025     02/21/2025    BUN 26 (H) 02/21/2025    CREATININE 1.03 02/21/2025       Lab Results   Component Value Date    HGBA1C 5.4 05/25/2021        Lab Results   Component Value Date    CHOL 142 05/25/2021  "    Lab Results   Component Value Date    HDL 40.1 05/25/2021     No results found for: \"LDLCALC\"  Lab Results   Component Value Date    TRIG 85 05/25/2021     No components found for: \"CHOLHDL\"    Imaging:  ECG 12 lead      Assessment and plan:  Juan Marsh is a 29 y.o. 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  Presenting due to concern for fecal incontinence episodes - resolved. [facility] the [facility] for Physical exam and assessment.   Te patients fecal incontinence resolved a week ago, it was not associated with any saddle anaesthesia or genital anaesthesia, no worsening focal neurological deficits. And no fever, or other infectious sx. In regard to is reported episodes of heavy/ fast breathing. Chest exam is clear, resp effort is normal, and walking pulse ox was performed with no desaturation noted.      Updates 3/11/2025:   - Ordered stool studies  - Ordered routine blood work.  - Continue steroid taper   - Follow up with Neurology on 3/17/2025  - the patient will need a walker and shower chair due to his ongoing neurological deficits in the setting of his Neuro Behcet's disease. And Chronic Bilateral hip pain secondary to known AVN of the bilateral femoral heads.    #Chronic Bilateral hip pain secondary to known AVN of the bilateral femoral heads.  - On Tylonl and Ms relaxant.   - Used to follow with Ortho for Intra articular injection.   - Following with Ortho  - He will require Walker and Commode as well.     #Neuro Behcet's   - Followed with Neurology and Rheumatology   - On MMF and steroid taper    #History of CVA   - On ASA 81     HEALTH MAINTENANCE   Antibody Testing   HIV: negative 5/2024  Syphilis: negative 5/2024   Hepatitis C: Negative 5/2024 "   Vaccines  Influenza: Declined   Lipid Screening ( ordered)  Vitamin D levels ( ordered)      Follow-up in 3 months    Patient and plan discussed with attending physician

## 2025-03-12 LAB
ALBUMIN SERPL-MCNC: 4.8 G/DL (ref 3.6–5.1)
ALBUMIN SERPL-MCNC: 4.8 G/DL (ref 3.6–5.1)
ALBUMIN/GLOB SERPL: 2.2 (CALC) (ref 1–2.5)
ALP SERPL-CCNC: 49 U/L (ref 36–130)
ALT SERPL-CCNC: 45 U/L (ref 9–46)
AST SERPL-CCNC: 22 U/L (ref 10–40)
BASOPHILS # BLD AUTO: 10 CELLS/UL (ref 0–200)
BASOPHILS NFR BLD AUTO: 0.1 %
BILIRUB DIRECT SERPL-MCNC: 0 MG/DL
BILIRUB INDIRECT SERPL-MCNC: 0.2 MG/DL (CALC) (ref 0.2–1.2)
BILIRUB SERPL-MCNC: 0.2 MG/DL (ref 0.2–1.2)
BUN SERPL-MCNC: 28 MG/DL (ref 7–25)
BUN/CREAT SERPL: 25 (CALC) (ref 6–22)
CALCIUM SERPL-MCNC: 10.1 MG/DL (ref 8.6–10.3)
CHLORIDE SERPL-SCNC: 103 MMOL/L (ref 98–110)
CO2 SERPL-SCNC: 20 MMOL/L (ref 20–32)
CREAT SERPL-MCNC: 1.14 MG/DL (ref 0.6–1.24)
EGFRCR SERPLBLD CKD-EPI 2021: 90 ML/MIN/1.73M2
EOSINOPHIL # BLD AUTO: 0 CELLS/UL (ref 15–500)
EOSINOPHIL NFR BLD AUTO: 0 %
ERYTHROCYTE [DISTWIDTH] IN BLOOD BY AUTOMATED COUNT: 15.4 % (ref 11–15)
GLOBULIN SER CALC-MCNC: 2.2 G/DL (CALC) (ref 1.9–3.7)
GLUCOSE SERPL-MCNC: 132 MG/DL (ref 65–99)
HCT VFR BLD AUTO: 39.8 % (ref 38.5–50)
HGB BLD-MCNC: 13.4 G/DL (ref 13.2–17.1)
LYMPHOCYTES # BLD AUTO: 703 CELLS/UL (ref 850–3900)
LYMPHOCYTES NFR BLD AUTO: 7.4 %
MAGNESIUM SERPL-MCNC: 2.4 MG/DL (ref 1.5–2.5)
MCH RBC QN AUTO: 32.1 PG (ref 27–33)
MCHC RBC AUTO-ENTMCNC: 33.7 G/DL (ref 32–36)
MCV RBC AUTO: 95.2 FL (ref 80–100)
MONOCYTES # BLD AUTO: 171 CELLS/UL (ref 200–950)
MONOCYTES NFR BLD AUTO: 1.8 %
NEUTROPHILS # BLD AUTO: 8617 CELLS/UL (ref 1500–7800)
NEUTROPHILS NFR BLD AUTO: 90.7 %
PHOSPHATE SERPL-MCNC: 4.1 MG/DL (ref 2.5–4.5)
PLATELET # BLD AUTO: 316 THOUSAND/UL (ref 140–400)
PMV BLD REES-ECKER: 9.9 FL (ref 7.5–12.5)
POTASSIUM SERPL-SCNC: 5 MMOL/L (ref 3.5–5.3)
PROT SERPL-MCNC: 7 G/DL (ref 6.1–8.1)
RBC # BLD AUTO: 4.18 MILLION/UL (ref 4.2–5.8)
SODIUM SERPL-SCNC: 137 MMOL/L (ref 135–146)
WBC # BLD AUTO: 9.5 THOUSAND/UL (ref 3.8–10.8)

## 2025-03-13 ENCOUNTER — HOME CARE VISIT (OUTPATIENT)
Dept: HOME HEALTH SERVICES | Facility: HOME HEALTH | Age: 30
End: 2025-03-13
Payer: COMMERCIAL

## 2025-03-13 VITALS — OXYGEN SATURATION: 96 % | HEART RATE: 126 BPM | RESPIRATION RATE: 16 BRPM

## 2025-03-13 PROCEDURE — G0151 HHCP-SERV OF PT,EA 15 MIN: HCPCS

## 2025-03-13 PROCEDURE — G0152 HHCP-SERV OF OT,EA 15 MIN: HCPCS

## 2025-03-13 SDOH — HEALTH STABILITY: PHYSICAL HEALTH: PHYSICAL EXERCISE: 15

## 2025-03-13 SDOH — HEALTH STABILITY: PHYSICAL HEALTH: EXERCISE ACTIVITY: OPEN/ CLOSED CHAIN EX

## 2025-03-13 SDOH — HEALTH STABILITY: PHYSICAL HEALTH: EXERCISE TYPE: B LE

## 2025-03-13 SDOH — HEALTH STABILITY: PHYSICAL HEALTH: EXERCISE ACTIVITIES SETS: 2

## 2025-03-13 SDOH — HEALTH STABILITY: PHYSICAL HEALTH: PHYSICAL EXERCISE: SIT, STAND, SUPINE

## 2025-03-13 ASSESSMENT — ENCOUNTER SYMPTOMS
PAIN LOCATION - PAIN DURATION: MIN
MUSCLE WEAKNESS: 1
HIGHEST PAIN SEVERITY IN PAST 24 HOURS: 5/10
LOWEST PAIN SEVERITY IN PAST 24 HOURS: 2/10
SUBJECTIVE PAIN PROGRESSION: WAXING AND WANING
PAIN: 1
PAIN LOCATION - RELIEVING FACTORS: MEDS
PAIN LOCATION - EXACERBATING FACTORS: ROM
PAIN LOCATION - PAIN FREQUENCY: INTERMITTENT
PAIN LOCATION - PAIN QUALITY: ACHE
PERSON REPORTING PAIN: PATIENT
PAIN LOCATION - PAIN SEVERITY: 5/10
PAIN LOCATION: RIGHT LEG
PAIN SEVERITY GOAL: 0/10

## 2025-03-13 ASSESSMENT — ACTIVITIES OF DAILY LIVING (ADL)
AMBULATION_DISTANCE/DURATION_TOLERATED: 150 FT
PHYSICAL TRANSFERS ASSESSED: 1
CURRENT_FUNCTION: INDEPENDENT
AMBULATION ASSISTANCE ON FLAT SURFACES: 1

## 2025-03-14 ENCOUNTER — HOME CARE VISIT (OUTPATIENT)
Dept: HOME HEALTH SERVICES | Facility: HOME HEALTH | Age: 30
End: 2025-03-14
Payer: COMMERCIAL

## 2025-03-14 PROCEDURE — G0153 HHCP-SVS OF S/L PATH,EA 15MN: HCPCS | Mod: U2

## 2025-03-14 RX ORDER — VITAMIN B COMPLEX
CAPSULE ORAL
Qty: 30 CAPSULE | Refills: 0 | OUTPATIENT
Start: 2025-03-14

## 2025-03-15 NOTE — PROGRESS NOTES
I reviewed the resident/fellow's documentation and saw and discussed the patient with the resident/fellow. I agree with the resident/fellow's medical decision making as documented in the note.    Juan Marsh is a 29 y.o. 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 He is here today as his nurse reported new onset fecal incontinence but patient reports this resolved last week.  He does feel a tightness in his chest occasionally with breathing but denies true SOB and exam is unremarkable even with pulse ox and walking in the hallway.  He has close followup with neurology next week which is good as his case is so challenging.  May need hip replacements for his AVN.  Obviously steroid injections no longer an option.  Difficult situation for a nice gentleman.        Noelle Krueger MD

## 2025-03-16 ASSESSMENT — ENCOUNTER SYMPTOMS
ANGER WITHIN DEFINED LIMITS: 1
AGGRESSION WITHIN DEFINED LIMITS: 1
SUBJECTIVE PAIN PROGRESSION: UNCHANGED
PERSON REPORTING PAIN: PATIENT
DENIES PAIN: 1
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
## 2025-03-17 ENCOUNTER — HOME CARE VISIT (OUTPATIENT)
Dept: HOME HEALTH SERVICES | Facility: HOME HEALTH | Age: 30
End: 2025-03-17
Payer: COMMERCIAL

## 2025-03-17 ENCOUNTER — OFFICE VISIT (OUTPATIENT)
Dept: NEUROLOGY | Facility: HOSPITAL | Age: 30
End: 2025-03-17
Payer: COMMERCIAL

## 2025-03-17 ENCOUNTER — APPOINTMENT (OUTPATIENT)
Dept: RHEUMATOLOGY | Facility: CLINIC | Age: 30
End: 2025-03-17
Payer: COMMERCIAL

## 2025-03-17 ENCOUNTER — PHARMACY VISIT (OUTPATIENT)
Dept: PHARMACY | Facility: CLINIC | Age: 30
End: 2025-03-17
Payer: MEDICAID

## 2025-03-17 VITALS
WEIGHT: 143 LBS | HEART RATE: 80 BPM | BODY MASS INDEX: 21.67 KG/M2 | TEMPERATURE: 97.1 F | DIASTOLIC BLOOD PRESSURE: 83 MMHG | RESPIRATION RATE: 18 BRPM | SYSTOLIC BLOOD PRESSURE: 128 MMHG | HEIGHT: 68 IN

## 2025-03-17 DIAGNOSIS — M35.2 BEHCET'S SYNDROME, NEUROLOGIC TYPE (MULTI): ICD-10-CM

## 2025-03-17 PROCEDURE — 99215 OFFICE O/P EST HI 40 MIN: CPT | Performed by: PSYCHIATRY & NEUROLOGY

## 2025-03-17 PROCEDURE — RXMED WILLOW AMBULATORY MEDICATION CHARGE

## 2025-03-17 PROCEDURE — G0156 HHCP-SVS OF AIDE,EA 15 MIN: HCPCS

## 2025-03-17 PROCEDURE — 3008F BODY MASS INDEX DOCD: CPT | Performed by: PSYCHIATRY & NEUROLOGY

## 2025-03-17 RX ORDER — MYCOPHENOLATE MOFETIL 500 MG/1
1000 TABLET ORAL 2 TIMES DAILY
Qty: 120 TABLET | Refills: 11 | Status: SHIPPED | OUTPATIENT
Start: 2025-03-17 | End: 2026-03-17

## 2025-03-17 ASSESSMENT — PAIN SCALES - GENERAL: PAINLEVEL_OUTOF10: 8
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
## 2025-03-17 NOTE — PATIENT INSTRUCTIONS
I'm glad you felt a little better after steroids and cyclophosphamide in your last admission.     Your latest brain Mri showed significant improvement in the active inflammation in the brain but the swelling was still there. I will repeat your brain MRI again in May to see if the swelling improves.     Please increase your mycophenolate to 1000 mg twice daily (two tablets twice daily).     Please continue the prednisone taper like you are doing until you are on 10 mg daily.     If you have any future recurrences we may need to try a medication called infliximab, which helps most patients but rarely can have a paradoxical effect increasing brain inflammation.     Please call Dr. Gilman ([phone]) and Dr. Leonard ([phone]) to scheduled appointments.     Follow up in May after MRI.     Thank you for visiting the clinic today    Juancarlos Ulloa MD

## 2025-03-19 ENCOUNTER — PHARMACY VISIT (OUTPATIENT)
Dept: PHARMACY | Facility: CLINIC | Age: 30
End: 2025-03-19
Payer: MEDICAID

## 2025-03-19 ENCOUNTER — HOME CARE VISIT (OUTPATIENT)
Dept: HOME HEALTH SERVICES | Facility: HOME HEALTH | Age: 30
End: 2025-03-19
Payer: COMMERCIAL

## 2025-03-19 PROCEDURE — G0153 HHCP-SVS OF S/L PATH,EA 15MN: HCPCS

## 2025-03-19 PROCEDURE — G0152 HHCP-SERV OF OT,EA 15 MIN: HCPCS

## 2025-03-19 ASSESSMENT — ENCOUNTER SYMPTOMS
DENIES PAIN: 1
PERSON REPORTING PAIN: PATIENT
SUBJECTIVE PAIN PROGRESSION: UNCHANGED

## 2025-03-20 ENCOUNTER — HOME CARE VISIT (OUTPATIENT)
Dept: HOME HEALTH SERVICES | Facility: HOME HEALTH | Age: 30
End: 2025-03-20
Payer: COMMERCIAL

## 2025-03-21 ENCOUNTER — HOME CARE VISIT (OUTPATIENT)
Dept: HOME HEALTH SERVICES | Facility: HOME HEALTH | Age: 30
End: 2025-03-21
Payer: COMMERCIAL

## 2025-03-21 VITALS
HEART RATE: 95 BPM | DIASTOLIC BLOOD PRESSURE: 71 MMHG | SYSTOLIC BLOOD PRESSURE: 94 MMHG | TEMPERATURE: 99 F | OXYGEN SATURATION: 98 %

## 2025-03-21 PROCEDURE — G0299 HHS/HOSPICE OF RN EA 15 MIN: HCPCS

## 2025-03-21 ASSESSMENT — ENCOUNTER SYMPTOMS
PERSON REPORTING PAIN: PATIENT
LAST BOWEL MOVEMENT: 67285
APPETITE LEVEL: GOOD
DENIES PAIN: 1

## 2025-03-24 ENCOUNTER — HOME CARE VISIT (OUTPATIENT)
Dept: HOME HEALTH SERVICES | Facility: HOME HEALTH | Age: 30
End: 2025-03-24
Payer: COMMERCIAL

## 2025-03-26 ENCOUNTER — HOME CARE VISIT (OUTPATIENT)
Dept: HOME HEALTH SERVICES | Facility: HOME HEALTH | Age: 30
End: 2025-03-26
Payer: COMMERCIAL

## 2025-03-26 PROCEDURE — G0153 HHCP-SVS OF S/L PATH,EA 15MN: HCPCS

## 2025-03-27 ENCOUNTER — OFFICE VISIT (OUTPATIENT)
Dept: ORTHOPEDIC SURGERY | Facility: HOSPITAL | Age: 30
End: 2025-03-27
Payer: COMMERCIAL

## 2025-03-27 ENCOUNTER — HOSPITAL ENCOUNTER (OUTPATIENT)
Dept: RADIOLOGY | Facility: EXTERNAL LOCATION | Age: 30
Discharge: HOME | End: 2025-03-27

## 2025-03-27 DIAGNOSIS — M87.052 AVASCULAR NECROSIS OF LEFT FEMORAL HEAD (MULTI): ICD-10-CM

## 2025-03-27 PROCEDURE — 2500000004 HC RX 250 GENERAL PHARMACY W/ HCPCS (ALT 636 FOR OP/ED): Performed by: FAMILY MEDICINE

## 2025-03-27 PROCEDURE — 99213 OFFICE O/P EST LOW 20 MIN: CPT | Mod: 25 | Performed by: FAMILY MEDICINE

## 2025-03-27 PROCEDURE — 99213 OFFICE O/P EST LOW 20 MIN: CPT | Performed by: FAMILY MEDICINE

## 2025-03-27 PROCEDURE — 20611 DRAIN/INJ JOINT/BURSA W/US: CPT | Mod: LT | Performed by: FAMILY MEDICINE

## 2025-03-27 RX ORDER — TRIAMCINOLONE ACETONIDE 40 MG/ML
80 INJECTION, SUSPENSION INTRA-ARTICULAR; INTRAMUSCULAR
Status: COMPLETED | OUTPATIENT
Start: 2025-03-27 | End: 2025-03-27

## 2025-03-27 RX ORDER — LIDOCAINE HYDROCHLORIDE 10 MG/ML
4 INJECTION, SOLUTION INFILTRATION; PERINEURAL
Status: COMPLETED | OUTPATIENT
Start: 2025-03-27 | End: 2025-03-27

## 2025-03-27 RX ADMIN — LIDOCAINE HYDROCHLORIDE 4 ML: 10 INJECTION, SOLUTION INFILTRATION; PERINEURAL at 14:14

## 2025-03-27 RX ADMIN — TRIAMCINOLONE ACETONIDE 80 MG: 400 INJECTION, SUSPENSION INTRA-ARTICULAR; INTRAMUSCULAR at 14:14
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
## 2025-03-28 RX ORDER — VITAMIN B COMPLEX
CAPSULE ORAL
Qty: 30 CAPSULE | Refills: 0 | OUTPATIENT
Start: 2025-03-28

## 2025-03-31 ASSESSMENT — ACTIVITIES OF DAILY LIVING (ADL)
HOME_HEALTH_OASIS: 01
AMBULATION ASSISTANCE: INDEPENDENT
OASIS_M1830: 02
AMBULATION ASSISTANCE: 1

## 2025-03-31 ASSESSMENT — ENCOUNTER SYMPTOMS
DENIES PAIN: 1
OCCASIONAL FEELINGS OF UNSTEADINESS: 1
LOSS OF SENSATION IN FEET: 0
PERSON REPORTING PAIN: PATIENT
ANGER WITHIN DEFINED LIMITS: 1
DEPRESSION: 0
AGGRESSION WITHIN DEFINED LIMITS: 1

## 2025-04-03 ENCOUNTER — OFFICE VISIT (OUTPATIENT)
Dept: PRIMARY CARE | Facility: HOSPITAL | Age: 30
End: 2025-04-03
Payer: COMMERCIAL

## 2025-04-03 VITALS
SYSTOLIC BLOOD PRESSURE: 130 MMHG | HEART RATE: 109 BPM | TEMPERATURE: 98.8 F | DIASTOLIC BLOOD PRESSURE: 83 MMHG | HEIGHT: 68 IN | OXYGEN SATURATION: 96 % | BODY MASS INDEX: 21.99 KG/M2 | WEIGHT: 145.1 LBS

## 2025-04-03 DIAGNOSIS — R00.2 PALPITATIONS: Primary | ICD-10-CM

## 2025-04-03 DIAGNOSIS — M87.052 AVASCULAR NECROSIS OF LEFT FEMORAL HEAD (MULTI): ICD-10-CM

## 2025-04-03 DIAGNOSIS — G89.29 CHRONIC RIGHT SHOULDER PAIN: ICD-10-CM

## 2025-04-03 DIAGNOSIS — E55.9 VITAMIN D DEFICIENCY: ICD-10-CM

## 2025-04-03 DIAGNOSIS — Z11.3 SCREEN FOR STD (SEXUALLY TRANSMITTED DISEASE): ICD-10-CM

## 2025-04-03 DIAGNOSIS — M87.051 AVASCULAR NECROSIS OF RIGHT FEMORAL HEAD (MULTI): ICD-10-CM

## 2025-04-03 DIAGNOSIS — M35.2: ICD-10-CM

## 2025-04-03 DIAGNOSIS — M35.2 BEHCET'S SYNDROME, NEUROLOGIC TYPE (MULTI): ICD-10-CM

## 2025-04-03 DIAGNOSIS — M25.511 CHRONIC RIGHT SHOULDER PAIN: ICD-10-CM

## 2025-04-03 PROCEDURE — 93005 ELECTROCARDIOGRAM TRACING: CPT

## 2025-04-03 PROCEDURE — 99214 OFFICE O/P EST MOD 30 MIN: CPT | Mod: GC,25

## 2025-04-03 RX ORDER — DICLOFENAC SODIUM 10 MG/G
GEL TOPICAL
Qty: 100 G | Refills: 0 | Status: SHIPPED | OUTPATIENT
Start: 2025-04-03

## 2025-04-03 RX ORDER — OMEGA-3-ACID ETHYL ESTERS 1 G/1
CAPSULE, LIQUID FILLED ORAL
Qty: 30 CAPSULE | Refills: 0 | Status: SHIPPED | OUTPATIENT
Start: 2025-04-03

## 2025-04-03 RX ORDER — CYCLOBENZAPRINE HCL 10 MG
10 TABLET ORAL 3 TIMES DAILY PRN
Qty: 20 TABLET | Refills: 0 | Status: SHIPPED | OUTPATIENT
Start: 2025-04-03

## 2025-04-03 RX ORDER — ACETAMINOPHEN 325 MG/1
650 TABLET ORAL EVERY 6 HOURS PRN
Qty: 20 TABLET | Refills: 0 | Status: SHIPPED | OUTPATIENT
Start: 2025-04-03

## 2025-04-03 RX ORDER — GABAPENTIN 300 MG/1
300 CAPSULE ORAL EVERY 8 HOURS SCHEDULED
Qty: 90 CAPSULE | Refills: 0 | Status: SHIPPED | OUTPATIENT
Start: 2025-04-03 | End: 2025-05-03

## 2025-04-03 RX ORDER — PANTOPRAZOLE SODIUM 40 MG/1
40 TABLET, DELAYED RELEASE ORAL DAILY
Qty: 30 TABLET | Refills: 0 | Status: SHIPPED | OUTPATIENT
Start: 2025-04-03 | End: 2025-05-03

## 2025-04-03 RX ORDER — VITAMIN B COMPLEX
CAPSULE ORAL
Qty: 30 CAPSULE | Refills: 0 | Status: SHIPPED | OUTPATIENT
Start: 2025-04-03

## 2025-04-03 RX ORDER — MYCOPHENOLATE MOFETIL 500 MG/1
1000 TABLET ORAL 2 TIMES DAILY
Qty: 120 TABLET | Refills: 11 | Status: SHIPPED | OUTPATIENT
Start: 2025-04-03 | End: 2026-04-03

## 2025-04-03 RX ORDER — MIRTAZAPINE 15 MG/1
TABLET, FILM COATED ORAL
Qty: 90 TABLET | Refills: 0 | Status: SHIPPED | OUTPATIENT
Start: 2025-04-03

## 2025-04-03 RX ORDER — BRIMONIDINE TARTRATE 2 MG/ML
SOLUTION/ DROPS OPHTHALMIC
Qty: 10 ML | Refills: 0 | Status: SHIPPED | OUTPATIENT
Start: 2025-04-03

## 2025-04-03 RX ORDER — PREDNISOLONE ACETATE 10 MG/ML
1 SUSPENSION/ DROPS OPHTHALMIC 4 TIMES DAILY
Qty: 15 ML | Refills: 1 | Status: SHIPPED | OUTPATIENT
Start: 2025-04-03

## 2025-04-03 RX ORDER — ERGOCALCIFEROL 1.25 MG/1
1 CAPSULE ORAL
Qty: 90 CAPSULE | Refills: 1 | Status: SHIPPED | OUTPATIENT
Start: 2025-04-06

## 2025-04-03 ASSESSMENT — ENCOUNTER SYMPTOMS
DEPRESSION: 0
OCCASIONAL FEELINGS OF UNSTEADINESS: 1
LOSS OF SENSATION IN FEET: 1

## 2025-04-03 ASSESSMENT — PATIENT HEALTH QUESTIONNAIRE - PHQ9
SUM OF ALL RESPONSES TO PHQ9 QUESTIONS 1 AND 2: 0
2. FEELING DOWN, DEPRESSED OR HOPELESS: NOT AT ALL
1. LITTLE INTEREST OR PLEASURE IN DOING THINGS: NOT AT ALL

## 2025-04-03 ASSESSMENT — PAIN SCALES - GENERAL: PAINLEVEL_OUTOF10: 0-NO PAIN

## 2025-04-03 NOTE — PROGRESS NOTES
Chief complaint:  FUV     Background:  Juan Marsh is a 29 y.o. 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 Presented for FUV.     Interval Hx since last seen in the clinic:   He required Admission for a flare of Neuro Behcet, In February 2025, where experienced increased weakness along with drooping on the left side of his face. An MRI of the brain revealed a new enhancing lesion located in the right thalamus and upper right midbrain, accompanied by a midline shift. He received treatment with intravenous methylprednisolone (IVMP) and a single dose of cyclophosphamide at 700 mg, which resulted in clinical improvement. A follow-up brain MRI indicated nearly complete resolution of the abnormal enhancement, although a persistent FLAIR lesion remained. Since his discharge, he has reported feeling better; however, he still faces challenges with walking (requiring a cane, complicated by avascular necrosis of the hip) and experiences slowed cognitive and verbal processing.       Subjective:   - He came in today, he report that he have been having unprotected sex and he request for STD, he report no concerns but he have some itchiness when he pees, no burning sensation and no genital ulcers.   - He mentioned that he have some palpitations with excretion only, No CP or SOB or other triggers. He have no family Hx of Sudden Cardiac death. He never developed any syncope.           Medications:  Current Outpatient Medications   Medication Instructions    acetaminophen (TYLENOL) 650 mg, oral, Every 6 hours PRN    brimonidine (AlphaGAN) 0.2 % ophthalmic solution 1 drop, Right Eye, Every 12 hours    cyclobenzaprine (FLEXERIL) 10 mg, oral, 3 times daily PRN    diclofenac sodium (VOLTAREN)  4 g, Topical, 4 times daily    ergocalciferol (VITAMIN D-2) 1,250 mcg, oral, Once Weekly    gabapentin (NEURONTIN) 300 mg, oral, Every 8 hours scheduled    pantoprazole (PROTONIX) 40 mg, oral, Daily    prednisoLONE acetate (Pred-Forte) 1 % ophthalmic suspension 1 drop, Right Eye, 4 times daily       Allergies:  No Known Allergies    Past medical history:  Past Medical History:   Diagnosis Date    Lupus (systemic lupus erythematosus) (Multi)     Stroke (Multi)     Uveitis        Surgical history:  Past Surgical History:   Procedure Laterality Date    CT ANGIO NECK  9/2/2021    CT NECK ANGIO W AND WO IV CONTRAST 9/2/2021 [facility]    CT HEAD ANGIO W AND WO IV CONTRAST  9/2/2021    CT HEAD ANGIO W AND WO IV CONTRAST 9/2/2021 [facility]       Family history:  Family History   Problem Relation Name Age of Onset    Other (Diabetes mellitus) Other Multiple Family Members     Hypertension Other Multiple Family Members     Cancer Other Multiple Family Members        Social history:   reports that he has been smoking cigars. He has never used smokeless tobacco. He reports that he does not currently use alcohol. He reports current drug use. Drug: Marijuana.    Health maintenance:  Health Maintenance   Topic Date Due    Yearly Adult Physical  Never done    HPV Vaccines (3 - Male 2-dose series) 05/16/2012    Pneumococcal Vaccine: Pediatrics and At-Risk Adult Patients (1 of 2 - PCV) Never done    Influenza Vaccine (1) 09/01/2024    COVID-19 Vaccine (1 - 2024-25 season) Never done    Lipid Panel  05/25/2026    DTaP/Tdap/Td Vaccines (9 - Td or Tdap) 10/02/2029    Zoster Vaccines (1 of 2) 06/22/2047    HIB Vaccines  Completed    Hepatitis B Vaccines  Completed    IPV Vaccines  Completed    Hepatitis A Vaccines  Completed    MMR Vaccines  Completed    Varicella Vaccines  Completed    HIV Screening  Completed    Hepatitis C Screening  Completed    Meningococcal Vaccine  Aged Out    Rotavirus Vaccines  Aged Out  "    Physical exam:  General examinations: Weak and lethargic , oriented to time person and place, not in distress. Using Crutches to mobilize   Respiratory examinations: normal vesicular breathing equal air entry no wheezing or crackles.   Cardiovascular examinations: S1+S2+0 murmur no LL edema   Gastrointestinal examinations: soft and lax no tenderness or rigidity.   CNS examinations: GCS 15/15 4/5 in the BLE and he does not have sensory symptoms. ULE 5/5 but he have shoulder pains when moving his upper extremities. CN were intact and visual field was intact as well    Labs:  Lab Results   Component Value Date    WBC 10.4 12/11/2024    HGB 10.5 (L) 12/11/2024    HCT 31.9 (L) 12/11/2024    MCV 94 12/11/2024     12/11/2024       Lab Results   Component Value Date    GLUCOSE 94 12/11/2024    CALCIUM 9.6 12/11/2024     12/11/2024    K 4.1 12/11/2024    CO2 26 12/11/2024     12/11/2024    BUN 27 (H) 12/11/2024    CREATININE 1.29 12/11/2024       Lab Results   Component Value Date    HGBA1C 5.4 05/25/2021        Lab Results   Component Value Date    CHOL 142 05/25/2021     Lab Results   Component Value Date    HDL 40.1 05/25/2021     No results found for: \"LDLCALC\"  Lab Results   Component Value Date    TRIG 85 05/25/2021     No components found for: \"CHOLHDL\"    Imaging:  ECG 12 lead      Assessment and plan:  Juan Marsh is a 29 y.o. male with pmh of Neuro Behcet on Steroids and MMF with AVN of the bilateral femoral heads and chronic lower limb weakness secondary to neuro behcet/chronic pain presenting to \[facility]\"" care.      Updates 4/3/2025:   - He is on the steroids taper and followed by Neurology, He requested refills which was done   - We did ECG in the clinic and it was unremarkable with NSR and No ST T changes with Normal NE and QRS.   - We sent for STD screening as per his request.           #Chronic Bilateral hip pain secondary to known AVN of the bilateral femoral heads.  - On " Tylonl and Ms relaxant.   - Following with Ortho for Intra articular injection.   - [facility] signed off and Recommended PT and Assisted devices.   - He have a walker and showed chair at home.         #Neuro Behcet's   - Followed with Neurology and Rheumatology   - On MMF 1000 BID and Pred 20 mg, Planned for lifetime Pred and MMF.   - Last flare was Feb 2025 where he had generalized weakness and left face droop and MRI new lesions, required IVMP and IV Cyclo which improved his symptoms and MRI images.   Plan:   - Followed up with Neurology.       #History of CVA   - On ASA 81       HEALTH MAINTENANCE   Antibody Testing   HIV: negative 5/2024  Syphilis: negative 5/2024   Hepatitis C: Negative 5/2024   Vaccines  Influenza: Declined   Lipid Screening ( ordered)  Vitamin D levels ( ordered)      Follow-up in 3 month.     Patient and plan discussed with attending physician   Joi Randle MD

## 2025-04-03 NOTE — PATIENT INSTRUCTIONS
As discussed today, our plan is:     Labs - we collected some labs and urine tests today and will call you with any abnormal results. If you have any questions or concerns prior to us calling feel free to call our office to have your questions addressed.   Medication changes: NA  3.   If you smoke or use other tobacco products, take steps to quit. Call [phone] for more information or to set up an appointment with  [facility]. The [place] Tobacco Quit Line is a free resource for people who don’t have insurance, receive Medicaid, pregnant women, or members of the [place] [facility]. Call 5-945-QUIT-NOW or 1-998.783.9514.    Please come back to see us in: 3 months    ------  If you have any problems or questions, please contact the clinic at [phone] to leave a message. Our fax number is [phone]. If your issue cannot wait until the next business day, please go to urgent care or the emergency department.     I also strongly urge all of my patients to register for [email or web address] by going to: [email or web address]  (The  staff can also send you a text/email link to register when you check out).    No shows: It is understandable if you are unable to make it to a visit, but please cancel your appointment instead of not showing up. This helps to give other patients access to primary care and keeps wait times low.        [facility]   [phone]

## 2025-04-04 ENCOUNTER — APPOINTMENT (OUTPATIENT)
Dept: RADIOLOGY | Facility: HOSPITAL | Age: 30
End: 2025-04-04
Payer: COMMERCIAL

## 2025-04-04 LAB
APPEARANCE UR: ABNORMAL
BACTERIA #/AREA URNS HPF: ABNORMAL /HPF
BACTERIA UR CULT: ABNORMAL
BILIRUB UR QL STRIP: NEGATIVE
C TRACH RRNA SPEC QL NAA+PROBE: NOT DETECTED
COLOR UR: YELLOW
GLUCOSE UR QL STRIP: NEGATIVE
HGB UR QL STRIP: NEGATIVE
HYALINE CASTS #/AREA URNS LPF: ABNORMAL /LPF
KETONES UR QL STRIP: ABNORMAL
LEUKOCYTE ESTERASE UR QL STRIP: NEGATIVE
N GONORRHOEA RRNA SPEC QL NAA+PROBE: NOT DETECTED
NITRITE UR QL STRIP: NEGATIVE
PH UR STRIP: 7 [PH] (ref 5–8)
PROT UR QL STRIP: ABNORMAL
QUEST GC CT AMPLIFIED (ALWAYS MESSAGE): NORMAL
RBC #/AREA URNS HPF: ABNORMAL /HPF
SERVICE CMNT-IMP: ABNORMAL
SP GR UR STRIP: 1.03 (ref 1–1.03)
SQUAMOUS #/AREA URNS HPF: ABNORMAL /HPF
T VAGINALIS RRNA SPEC QL NAA+PROBE: NOT DETECTED
WBC #/AREA URNS HPF: ABNORMAL /HPF

## 2025-04-05 NOTE — PROGRESS NOTES
I reviewed the resident/fellow's documentation and discussed the patient with the resident/fellow. I agree with the resident/fellow's medical decision making as documented in the note.   Follow up in [facility].    Narciso Acosta MD

## 2025-04-10 ENCOUNTER — HOSPITAL ENCOUNTER (OUTPATIENT)
Dept: RADIOLOGY | Facility: EXTERNAL LOCATION | Age: 30
Discharge: HOME | End: 2025-04-10

## 2025-04-10 ENCOUNTER — OFFICE VISIT (OUTPATIENT)
Dept: ORTHOPEDIC SURGERY | Facility: HOSPITAL | Age: 30
End: 2025-04-10
Payer: COMMERCIAL

## 2025-04-10 DIAGNOSIS — M87.051 AVASCULAR NECROSIS OF RIGHT FEMORAL HEAD (MULTI): ICD-10-CM

## 2025-04-10 PROCEDURE — 2500000004 HC RX 250 GENERAL PHARMACY W/ HCPCS (ALT 636 FOR OP/ED): Performed by: FAMILY MEDICINE

## 2025-04-10 PROCEDURE — 99213 OFFICE O/P EST LOW 20 MIN: CPT | Mod: 25 | Performed by: FAMILY MEDICINE

## 2025-04-10 PROCEDURE — 20611 DRAIN/INJ JOINT/BURSA W/US: CPT | Mod: RT | Performed by: FAMILY MEDICINE

## 2025-04-10 PROCEDURE — 99213 OFFICE O/P EST LOW 20 MIN: CPT | Performed by: FAMILY MEDICINE

## 2025-04-10 RX ORDER — TRIAMCINOLONE ACETONIDE 40 MG/ML
80 INJECTION, SUSPENSION INTRA-ARTICULAR; INTRAMUSCULAR
Status: COMPLETED | OUTPATIENT
Start: 2025-04-10 | End: 2025-04-10

## 2025-04-10 RX ORDER — LIDOCAINE HYDROCHLORIDE 10 MG/ML
4 INJECTION, SOLUTION INFILTRATION; PERINEURAL
Status: COMPLETED | OUTPATIENT
Start: 2025-04-10 | End: 2025-04-10

## 2025-04-10 RX ADMIN — TRIAMCINOLONE ACETONIDE 80 MG: 400 INJECTION, SUSPENSION INTRA-ARTICULAR; INTRAMUSCULAR at 15:30

## 2025-04-10 RX ADMIN — LIDOCAINE HYDROCHLORIDE 4 ML: 10 INJECTION, SOLUTION INFILTRATION; PERINEURAL at 15:30
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
## 2025-04-16 PROCEDURE — RXMED WILLOW AMBULATORY MEDICATION CHARGE

## 2025-04-18 ENCOUNTER — PHARMACY VISIT (OUTPATIENT)
Dept: PHARMACY | Facility: CLINIC | Age: 30
End: 2025-04-18
Payer: MEDICAID

## 2025-05-09 PROCEDURE — RXMED WILLOW AMBULATORY MEDICATION CHARGE

## 2025-05-12 ENCOUNTER — PHARMACY VISIT (OUTPATIENT)
Dept: PHARMACY | Facility: CLINIC | Age: 30
End: 2025-05-12
Payer: MEDICAID

## 2025-05-12 ENCOUNTER — APPOINTMENT (OUTPATIENT)
Dept: PRIMARY CARE | Facility: CLINIC | Age: 30
End: 2025-05-12
Payer: COMMERCIAL

## 2025-05-12 PROCEDURE — RXMED WILLOW AMBULATORY MEDICATION CHARGE

## 2025-05-12 RX ORDER — SULFAMETHOXAZOLE AND TRIMETHOPRIM 800; 160 MG/1; MG/1
TABLET ORAL
Qty: 39 TABLET | Refills: 0 | OUTPATIENT
Start: 2025-05-12

## 2025-05-13 ENCOUNTER — PHARMACY VISIT (OUTPATIENT)
Dept: PHARMACY | Facility: CLINIC | Age: 30
End: 2025-05-13
Payer: MEDICAID

## 2025-05-20 ENCOUNTER — APPOINTMENT (OUTPATIENT)
Dept: RADIOLOGY | Facility: CLINIC | Age: 30
End: 2025-05-20
Payer: COMMERCIAL

## 2025-05-20 DIAGNOSIS — M35.2 BEHCET'S SYNDROME, NEUROLOGIC TYPE (MULTI): ICD-10-CM

## 2025-05-20 PROCEDURE — 2550000001 HC RX 255 CONTRASTS: Performed by: PSYCHIATRY & NEUROLOGY

## 2025-05-20 PROCEDURE — A9575 INJ GADOTERATE MEGLUMI 0.1ML: HCPCS | Performed by: PSYCHIATRY & NEUROLOGY

## 2025-05-20 PROCEDURE — 70553 MRI BRAIN STEM W/O & W/DYE: CPT

## 2025-05-20 RX ORDER — GADOTERATE MEGLUMINE 376.9 MG/ML
12 INJECTION INTRAVENOUS
Status: COMPLETED | OUTPATIENT
Start: 2025-05-20 | End: 2025-05-20

## 2025-05-20 RX ADMIN — GADOTERATE MEGLUMINE 12 ML: 376.9 INJECTION INTRAVENOUS at 13:15

## 2025-06-02 ENCOUNTER — HOSPITAL ENCOUNTER (OUTPATIENT)
Dept: RADIOLOGY | Facility: CLINIC | Age: 30
Discharge: HOME | End: 2025-06-02
Payer: COMMERCIAL

## 2025-06-02 ENCOUNTER — OFFICE VISIT (OUTPATIENT)
Dept: ORTHOPEDIC SURGERY | Facility: CLINIC | Age: 30
End: 2025-06-02
Payer: COMMERCIAL

## 2025-06-02 DIAGNOSIS — M87.052 AVASCULAR NECROSIS OF LEFT FEMORAL HEAD (MULTI): ICD-10-CM

## 2025-06-02 DIAGNOSIS — M87.052 AVASCULAR NECROSIS OF FEMUR HEAD, LEFT (MULTI): ICD-10-CM

## 2025-06-02 DIAGNOSIS — M25.559 HIP PAIN, UNSPECIFIED LATERALITY: ICD-10-CM

## 2025-06-02 DIAGNOSIS — Z87.81 HISTORY OF FEMUR FRACTURE: ICD-10-CM

## 2025-06-02 DIAGNOSIS — S72.472A CLOSED TORUS FRACTURE OF DISTAL END OF LEFT FEMUR, INITIAL ENCOUNTER: ICD-10-CM

## 2025-06-02 DIAGNOSIS — M25.559 HIP PAIN, UNSPECIFIED LATERALITY: Primary | ICD-10-CM

## 2025-06-02 DIAGNOSIS — Z01.818 PRE-OPERATIVE CLEARANCE: ICD-10-CM

## 2025-06-02 PROCEDURE — 99214 OFFICE O/P EST MOD 30 MIN: CPT | Performed by: ORTHOPAEDIC SURGERY

## 2025-06-02 PROCEDURE — 73523 X-RAY EXAM HIPS BI 5/> VIEWS: CPT | Mod: BILATERAL PROCEDURE | Performed by: RADIOLOGY

## 2025-06-02 PROCEDURE — 73523 X-RAY EXAM HIPS BI 5/> VIEWS: CPT

## 2025-06-02 PROCEDURE — RXMED WILLOW AMBULATORY MEDICATION CHARGE

## 2025-06-02 NOTE — PROGRESS NOTES
Subjective    Patient ID: Juan Marsh is a 29 y.o. male.    Chief Complaint: Bilateral hip pain     Last Surgery: Left retrograde femoral nail 11/2/2022    HPI  Patient underwent retrograde nail fixation by me in 2022.  His fracture is healed.  He has no issue related to the fracture.  He is seeing me for bilateral hip pain. Patient underwent bilateral hip corticosteroid injection 1/14 and 3/27, but it did not help him this time . He continues to have pain. He also did PT with minimal improvements. He is currently taking prednisone for Lupus. HE smokes marijuana for pain and tobacco. Patient would like to discuss proceeding with hip replacement    Past hx:   I have seen him in the past for bilateral severe hip avascular necrosis with collapse.  Patient has  Behcet's disease and is on 5 mg of prednisone.  The last time I saw him in February of last year I referred him to receive intra-articular corticosteroid injection.  He reported that the injection gave him relief but the lasted for about a month.  Recently he had worsening pain in his hip.  Patient reported that he is having difficulty walking and has been using crutches.  He is out of work due to pain in the hip.      Objective   Ortho Exam  The patient is well-nourished and well-developed and in no acute distress. The patient displayed normal mood and affect. The patient's pupils were equal, round sclerae are white. Patient's respirations appear to be regular and unlabored.    RIGHT HIP:  No pain with rotational range of motion.  External rotation of  20 degrees, Internal rotation of  0 degrees  Flexion 20 degrees.       LEFT HIP:  Pain with rotational range of motion.  External rotation of  20 degrees, Internal rotation to neutral  Flexion 90 degrees.      Image Results:  I personally reviewed bilateral hip radiograph that reveals severe bilateral hip avascular necrosis with severe bilateralsubchondral collapse.  The left femur fracture is well-healed.   Intramedullary nail in place.      Assessment/Plan   Encounter Diagnoses:  Hip pain, unspecified laterality    Patient severe bilateral hip avascular necrosis with subchondral collapse and secondary osteoarthritis that is very symptomatic symptomatic. He is no longer having relief with the steroid injections and PT. He wishes to discuss total hip replacement. However he was counseled that he needs to be off drugs to be eligible for surgery. We discussed bilateral hip replacement surgery but proceeding with left first with concomitant removal of pre existing hardware    Patient has severe Left Femoral head Avascular Necrosis  that is functionally limiting for them. The patient is interested in proceeding with hip replacement surgery at this time.  I talked with the patient at length about risks, limitations, benefits and alternatives to total hip replacement today. Under my care or the care of previous providers, the patient has had a reasonable trial of nonoperative treatment for their hip problem including NSAIDS, tylenol  or other analgesics, activity modification and activity restriction and use of assistive devices.  These  previous treatments have not provided the patient with durable relief of their symptoms.The patient is not an appropriate candidate for physical therapy at this time. Despite the above, patient has had pain and symptomatic functional impairment that either interferes with their sleep or ADLs and quality of life. I reviewed concerns about implant wear, loosening, breakage, infection and infection prophylaxis, DVT, PE, death and other medical and anesthetic complications of surgery. We talked about the potential for persistent pain following surgery since there are many possible causes for hip and leg pain. The patient was advised that hip replacement will only relieve pain that is coming from the hip. We talked about leg length discrepancy, neurovascular problems and dislocation after  surgery. The patient understands that we may have to lengthen the leg slightly to provide for adequate stability of the hip. I reviewed dislocation precautions and activity restrictions in detail. We discussed advantages and disadvantages of cemented and cementless implant fixation. We discussed the concerns about intraoperative fracture, ingrowth failure, thigh pain and possible post-operative weight bearing restrictions following cementless hip replacement. We discussed advantages and disadvantages of different surgical approaches for a total hip replacement. The basic concepts of the joint replacement procedure has been reviewed with the patient and the patient has been provided the opportunity to see an actual implant either in the office or in our pre-op education class. We discussed the possible need for a homologous blood transfusion. We discussed the fact that many of our patients are able to go home as outpatient or 1 -2 days depending on their health, mobility, pre-op preparation, individual home situation, personal preference and insurance requirements. The patient should take our pre-operative teaching class at [facility]. All of the patient's questions were answered.      Patient has an IMN so we are going to plan to remove the retrograde nail to allow placement of a proximal hip prosthesis.     The patient will have surgery in 4/02/2025 and the patient will be same day discharge/overnight     The patient was given the Total Hip Information Folder at the end of the visit today.  The  patient was told that they should contact their primary care physician to discuss fitness for surgery.            Orders Placed This Encounter    XR hip right with pelvis when performed 2 or 3 views     No follow-ups on file.

## 2025-06-05 ENCOUNTER — PHARMACY VISIT (OUTPATIENT)
Dept: PHARMACY | Facility: CLINIC | Age: 30
End: 2025-06-05
Payer: MEDICAID

## 2025-06-11 ENCOUNTER — TELEPHONE (OUTPATIENT)
Dept: ORTHOPEDIC SURGERY | Facility: HOSPITAL | Age: 30
End: 2025-06-11
Payer: COMMERCIAL

## 2025-06-11 NOTE — TELEPHONE ENCOUNTER
Received correspondence from patient's insurance, [facility], in regards to partially approving the surgery. CPT 93657 (DA KEO) was approved however 15505 (Removal of hardware) was not approved.     Need to fax appeal to [employer] at [phone]    Angelita Chavez LPN

## 2025-06-11 NOTE — LETTER
2025     To:   Re: [employer] Appeals Dept  Appeal for CPT 19187 (Removal of Hardware/IMN Femur   Patient:   Date of Birth: Juan Marsh  1995   Member ID:   Authorization # [id number]   [id number]   Date of Surgery: 2025         To Whom It May Concern:     We are writing to appeal the denial of coverage on behalf of our patient, Juan Marsh : 1995, for a portion of the procedure scheduled to be performed on 2025. Specifically, we are requesting reconsideration of the denial for the removal of the femoral intramedullary nail that is currently in place in his left femur and is medically necessary and directly related to the approved total hip arthroplasty (KEO).    The procedure will be performed for the following diagnoses:    M87.052 - Osteonecrosis of hip, left femur    S72.402A - Unspecified fracture of lower end of left femur, initial encounter for closed fracture    Clinical Justification:  The patient had a previously placed intramedullary nail due to a left femur fracture he sustained. In the setting of progressive osteonecrosis that requires a total hip arthroplasty, the nail’s presence physically obstructs access to his left femoral canal. Its removal is medically necessary to allow proper canal preparation, prosthetic alignment, and secure fixation of the femoral stem.    This was not an elective or cosmetic removal, but a prerequisite for completing the medically necessary KEO. The nail removal and KEO will be performed during the same operative session to reduce surgical risk, anesthesia exposure, and recovery time.          Billing and Coding Clarification:  CPT code 96485 (Total hip arthroplasty) was submitted for the joint replacement.    CPT code [for nail removal - typically 25068 or 63636, depending on documentation] was submitted to report the hardware removal, a distinct and necessary component of the case.    These services are not  duplicative; the removal of the nail is required enable to perform the approved total hip replacement safely and to do it successfully. Denying this portion of the claim would be equivalent to approving a procedure but denying an essential surgical step required to complete it.    We request that you reconsider this denial and issue coverage for the intramedullary nail removal based on the clinical documentation and medical necessity. Included with this letter are the last office note, relevant imaging reports, and supporting medical records.    Please contact the office at [phone] should you need further information.      Thank you for your reconsideration.    Sincerely,        Chip Leonard MD   [facility]  Department of Orthopaedic Surgery  Divisions of Orthopaedic Trauma and   Adult Reconstruction  Phone: [phone]  Fax: [phone]    Attachments:  Last Office Note  Imaging Reports  Original Denial Letter

## 2025-06-16 ENCOUNTER — PRE-ADMISSION TESTING (OUTPATIENT)
Dept: PREADMISSION TESTING | Facility: HOSPITAL | Age: 30
End: 2025-06-16
Payer: COMMERCIAL

## 2025-06-16 VITALS
WEIGHT: 130.2 LBS | BODY MASS INDEX: 19.28 KG/M2 | SYSTOLIC BLOOD PRESSURE: 110 MMHG | TEMPERATURE: 97.3 F | OXYGEN SATURATION: 100 % | HEIGHT: 69 IN | DIASTOLIC BLOOD PRESSURE: 76 MMHG | HEART RATE: 94 BPM

## 2025-06-16 DIAGNOSIS — S72.472A CLOSED TORUS FRACTURE OF DISTAL END OF LEFT FEMUR, INITIAL ENCOUNTER: ICD-10-CM

## 2025-06-16 DIAGNOSIS — Z87.81 HISTORY OF FEMUR FRACTURE: ICD-10-CM

## 2025-06-16 DIAGNOSIS — Z01.811 PREOPERATIVE RESPIRATORY EXAMINATION: Primary | ICD-10-CM

## 2025-06-16 DIAGNOSIS — M87.051 AVASCULAR NECROSIS OF RIGHT FEMORAL HEAD (MULTI): ICD-10-CM

## 2025-06-16 DIAGNOSIS — M32.9 SYSTEMIC LUPUS ERYTHEMATOSUS, UNSPECIFIED SLE TYPE, UNSPECIFIED ORGAN INVOLVEMENT STATUS (MULTI): ICD-10-CM

## 2025-06-16 DIAGNOSIS — Z01.810 PREOPERATIVE CARDIOVASCULAR EXAMINATION: ICD-10-CM

## 2025-06-16 DIAGNOSIS — M35.2 BEHCET'S SYNDROME, NEUROLOGIC TYPE (MULTI): ICD-10-CM

## 2025-06-16 DIAGNOSIS — Z01.818 PRE-OPERATIVE CLEARANCE: ICD-10-CM

## 2025-06-16 DIAGNOSIS — Z01.818 PREOPERATIVE CLEARANCE: ICD-10-CM

## 2025-06-16 DIAGNOSIS — K29.20 ALCOHOLIC GASTRITIS, PRESENCE OF BLEEDING UNSPECIFIED, UNSPECIFIED CHRONICITY: ICD-10-CM

## 2025-06-16 DIAGNOSIS — M35.2: ICD-10-CM

## 2025-06-16 DIAGNOSIS — M87.052 AVASCULAR NECROSIS OF FEMUR HEAD, LEFT (MULTI): ICD-10-CM

## 2025-06-16 DIAGNOSIS — M87.052 AVASCULAR NECROSIS OF LEFT FEMORAL HEAD (MULTI): ICD-10-CM

## 2025-06-16 DIAGNOSIS — F10.10 ALCOHOL ABUSE: ICD-10-CM

## 2025-06-16 DIAGNOSIS — H44.111 PANUVEITIS OF RIGHT EYE: ICD-10-CM

## 2025-06-16 DIAGNOSIS — I31.9 MYOPERICARDITIS (HHS-HCC): ICD-10-CM

## 2025-06-16 PROCEDURE — 99245 OFF/OP CONSLTJ NEW/EST HI 55: CPT | Performed by: ANESTHESIOLOGY

## 2025-06-16 PROCEDURE — 80307 DRUG TEST PRSMV CHEM ANLYZR: CPT

## 2025-06-16 PROCEDURE — 87081 CULTURE SCREEN ONLY: CPT

## 2025-06-16 RX ORDER — CHLORHEXIDINE GLUCONATE ORAL RINSE 1.2 MG/ML
SOLUTION DENTAL
Qty: 473 ML | Refills: 0 | Status: SHIPPED | OUTPATIENT
Start: 2025-06-16

## 2025-06-16 RX ORDER — ANTISEPTIC SURGICAL SCRUB 0.04 MG/ML
SOLUTION TOPICAL
Qty: 473 ML | Refills: 0 | Status: SHIPPED | OUTPATIENT
Start: 2025-06-16

## 2025-06-16 ASSESSMENT — ENCOUNTER SYMPTOMS
ABDOMINAL DISTENTION: 0
SHORTNESS OF BREATH: 0
NUMBNESS: 1
FEVER: 0
CHILLS: 0
BRUISES/BLEEDS EASILY: 0
PALPITATIONS: 0
COUGH: 0
ARTHRALGIAS: 1
DYSPNEA WITH EXERTION: 0
DYSPNEA AT REST: 0

## 2025-06-16 ASSESSMENT — DUKE ACTIVITY SCORE INDEX (DASI)
CAN YOU DO LIGHT WORK AROUND THE HOUSE LIKE DUSTING OR WASHING DISHES: YES
CAN YOU TAKE CARE OF YOURSELF (EAT, DRESS, BATHE, OR USE TOILET): YES
DASI METS SCORE: 8.4
CAN YOU HAVE SEXUAL RELATIONS: YES
CAN YOU WALK A BLOCK OR TWO ON LEVEL GROUND: YES
TOTAL_SCORE: 45.7
CAN YOU CLIMB A FLIGHT OF STAIRS OR WALK UP A HILL: YES
CAN YOU PARTICIPATE IN MODERATE RECREATIONAL ACTIVITIES LIKE GOLF, BOWLING, DANCING, DOUBLES TENNIS OR THROWING A BASEBALL OR FOOTBALL: YES
CAN YOU WALK INDOORS, SUCH AS AROUND YOUR HOUSE: YES
CAN YOU DO YARD WORK LIKE RAKING LEAVES, WEEDING OR PUSHING A MOWER: NO
CAN YOU DO HEAVY WORK AROUND THE HOUSE LIKE SCRUBBING FLOORS OR LIFTING AND MOVING HEAVY FURNITURE: NO
CAN YOU DO MODERATE WORK AROUND THE HOUSE LIKE VACUUMING, SWEEPING FLOORS OR CARRYING GROCERIES: YES
CAN YOU RUN A SHORT DISTANCE: YES
CAN YOU PARTICIPATE IN STRENOUS SPORTS LIKE SWIMMING, SINGLES TENNIS, FOOTBALL, BASKETBALL, OR SKIING: YES

## 2025-06-16 ASSESSMENT — LIFESTYLE VARIABLES: SMOKING_STATUS: SMOKER

## 2025-06-16 ASSESSMENT — PAIN - FUNCTIONAL ASSESSMENT: PAIN_FUNCTIONAL_ASSESSMENT: 0-10

## 2025-06-16 ASSESSMENT — PAIN SCALES - GENERAL: PAINLEVEL_OUTOF10: 6

## 2025-06-16 NOTE — NURSING NOTE
Patient seen in PAT. Orders reviewed with PAT Provider.     Following labs obtained by documenting RN:   MRSA SWAB, GREEN TOP URINE    MULTIPLE ATTEMPTS MADE BY THIS RN, MA AND RESIDENT ANESTHESIOLOGIST FOR BLOOD DRAW.   RESIDENT  VOICED AGREEMENT WITH PATIENT THAT HE WILL HYDRATE AT  HOME WITH WATER AND ELECTROLYTE PRODUCTS AND RETURN TO [facility] FOR ADDITIONAL LABS.  PT VOICED UNDERSTANDING THAT HE IS TO RETURN TO [facility] BY FRIDAY, JUNE 20, 25.  THIS RN WILL FOLLOW  UP TO ENSURE COMPLETION.     Patient discharged with: Pre-procedure instructions/AVS.      Sofy Gifford BSN, RN  [facility]Ad[facility]   [facility]

## 2025-06-16 NOTE — CPM/PAT H&P
CPM/PAT Evaluation       Name: Juan Marsh (Juan Marsh)  /Age: 1995/29 y.o.     In-Person     HPI    29 y.o.  male  scheduled for removal of intramedullary nail femur with L direct anterior total hip arthroplasty on 25 with Dr. Leonard for  avascular necrosis of femur head and femur fracture.  PMHX includes hx of Behcet's dx, bilateral hip avascular necrosis s/p retrograde nail fixation, smoking, alcohol use.  Presents to [facility] today for perioperative risk stratification and optimization    Medical History[1]    Surgical History[2]    Patient Sexual activity questions deferred to the physician.    Family History[3]    Allergies[4]    Prior to Admission medications    Medication Sig Start Date End Date Taking? Authorizing Provider   acetaminophen (TylenoL) 325 mg tablet Take 2 tablets (650 mg) by mouth every 6 hours if needed for mild pain (1 - 3) or fever (temp greater than 38.0 C). 4/3/25   Joi Randle MD   b complex vitamins capsule Take 1 capsule by mouth once daily 4/3/25   Joi Randle MD   brimonidine (AlphaGAN) 0.2 % ophthalmic solution Instill 1 drop into right eye every 8 hours 4/3/25   Joi Randle MD   cyclobenzaprine (Flexeril) 10 mg tablet Take 1 tablet (10 mg) by mouth 3 times a day as needed for muscle spasms. 4/3/25   Joi Randle MD   diclofenac sodium (Arthritis Pain, diclofenac,) 1 % gel Apply topically four times a day for 21 days 4/3/25   Joi Randle MD   ergocalciferol (Vitamin D-2) 1250 mcg (50,000 units) capsule Take 1 capsule (1,250 mcg) by mouth 1 (one) time per week. 25   Joi Randle MD   gabapentin (Neurontin) 300 mg capsule Take 1 capsule (300 mg) by mouth every 8 hours. 4/3/25 5/3/25  Joi Randle MD   mirtazapine (Remeron) 15 mg tablet Take 1 tablet by mouth once daily at bedtime. Take at bedtime while taking prednisone. 4/3/25   Joi Randle MD    mycophenolate (Cellcept) 500 mg tablet Take 2 tablets (1,000 mg) by mouth 2 times a day. 4/3/25 4/3/26  Joi Randle MD   omega-3 acid ethyl esters (Lovaza) 1 gram capsule Take 1 capsule by mouth once daily 4/3/25   Joi Randle MD   pantoprazole (ProtoNix) 40 mg EC tablet Take 1 tablet (40 mg) by mouth once daily. 4/3/25 5/3/25  Joi Randle MD   prednisoLONE acetate (Pred-Forte) 1 % ophthalmic suspension Administer 1 drop into the right eye 4 times a day. 4/3/25   Joi Randle MD   predniSONE (Deltasone) 10 mg tablet Taper - 6 tablets by mouth daily for 2 days then 5 tablets daily for 14 days then 4 tablets daily for 14 days then 3 tablets daily for 14 days then 2 tablet daily for 14 days then 1 tablet once daily continously therafter 2/20/25      predniSONE (Deltasone) 20 mg tablet Take 3 tablets (60 mg) by mouth once daily for 14 days, THEN 2.5 tablets (50 mg) once daily for 14 days, THEN 2 tablets (40 mg) once daily for 14 days, THEN 1.5 tablets (30 mg) once daily for 14 days, THEN 1 tablet (20 mg) once daily for 14 days, THEN 0.5 tablets (10 mg) once daily. 2/12/25 4/23/26  Marsha Echeverria MD   sulfamethoxazole-trimethoprim (Bactrim DS) 800-160 mg tablet Take 1 tablet by mouth once a day on Monday, Wednesday, and Friday. 2/14/25   Marsha Echeverria MD   sulfamethoxazole-trimethoprim (Bactrim DS) 800-160 mg tablet Take 1 tablet by mouth three times a week on monday, wednesday and friday 2/20/25           PAT ROS:   Constitutional:    no fever   no chills  Neuro/Psych:    numbness  Eyes:   Ears:   Nose:   Mouth:   Throat:   Neck:   Cardio:    no chest pain   no palpitations   no peripheral edema   no dyspnea   no LOUIS  Respiratory:    no cough   no shortness of breath  Endocrine:   GI:    no abdominal distention  :   Musculoskeletal:    arthralgias  Hematologic:    does not bruise/bleed easily   no blood clots  Skin:      Physical Exam  Vitals reviewed.    Constitutional:       General: He is not in acute distress.     Appearance: Normal appearance.   Cardiovascular:      Rate and Rhythm: Normal rate and regular rhythm.      Heart sounds: No murmur heard.  Pulmonary:      Effort: Pulmonary effort is normal. No respiratory distress.      Breath sounds: Normal breath sounds.   Musculoskeletal:      Right lower leg: No edema.      Left lower leg: No edema.   Skin:     General: Skin is warm and dry.   Neurological:      Mental Status: He is alert.          PAT AIRWAY:   Airway:     Mallampati::  I    TM distance::  >3 FB    Neck ROM::  Full   No removable teeth per patient        Visit Vitals  Smoking Status Every Day       DASI Risk Score    No data to display       Caprini DVT Assessment      Flowsheet Row ED to Hosp-Admission (Discharged) from 2/7/2025 in [facility] with Michelle Sesay MD, Leo Tai MD, Myla Bowie MD and Aasef G Shaikh, MD PhD ED to Hosp-Admission (Discharged) from 12/8/2024 in [facility] with Radni Sanchez MD, Angel Luis Echavarria MD and Candido Coon,    DVT Score (IF A SCORE IS NOT CALCULATING, MUST SELECT A BMI TO COMPLETE) 2 filed at 02/07/2025 0353 8 filed at 12/08/2024 1925   Medical Factors Medical patient at bedrest filed at 02/07/2025 0353 Swollen legs filed at 12/08/2024 1925   Labs/Test Results -- Positive Lupus Anticoagulant, Other Thrombophilia filed at 12/08/2024 1925   BMI (BMI MUST BE CHOSEN) 30 or less filed at 02/07/2025 0353 30 or less filed at 12/08/2024 1925          Modified Frailty Index    No data to display       [id number] Stroke Risk Points         N/A 0 to 9 Points:      Last Change: N/A          The [id number] risk score (Lip JUAN, et al. 2009. © 2010 American College [employer]) quantifies the risk of stroke for a patient with atrial fibrillation. For patients without atrial fibrillation or under the age of 18 this score appears as  N/A. Higher score values generally indicate higher risk of stroke.        This score is not applicable to this patient. Components are not calculated.          Revised Cardiac Risk Index    No data to display       Apfel Simplified Score    No data to display       Risk Analysis Index Results This Encounter    No data found in the last 10 encounters.       Prodigy: High Risk  Total Score: 8              Prodigy Gender Score          ARISCAT Score for Postoperative Pulmonary Complications    No data to display       Chen Perioperative Risk for Myocardial Infarction or Cardiac Arrest (PRINCESS)    No data to display       Assessment and Plan     History of Present Illness     29 y.o.  male  scheduled for removal of intramedullary nail femur with L direct anterior total hip arthroplasty on 7/2/25 with Dr. Leonard for  avascular necrosis of femur head and femur fracture.  PMHX includes hx of Behcet's dx, bilateral hip avascular necrosis s/p retrograde nail fixation, smoking, alcohol use.  Presents to CPM today for perioperative risk stratification and optimization    Anesthesia/Airway   No anesthesia complications      General   Looks stated age, no acute distress    Neuro   #Behcet's disease with neurologic manifestation  - Dx in Aug 2021 - has been managed on MMF + prednisone  - Feb 2025 - ran out of prednisone and developed L facial droop and weakness with MRI brain showing new lesion in R thalamus and R midbrain. Tx with IV methylpred and cyclophosphamide x1. Repeat MRI brain showing near resolution   - Last seen Neuro on 3/17/25 - continuing Prednisone taper and low dose ASA for secondary stroke prevention. Patient said he ran out of Prednisone 2 days ago - will reach out to Neurology to make sure they are aware of this and can coordinate further care/treatment  - Currently, patient uses cane to ambulate. Still reports numbness on his face. R side weakness has improved since treatment      MRI Brain  (5/20/25):  IMPRESSION:  1. When compared to prior, there has been significant interval  decrease in the amount of FLAIR signal abnormality suggesting  resolving disease involving the right posterior limb internal  capsule, right lateral midbrain and extending into the cortical  spinal tract in the tyrone perhaps with component of wallerian  degeneration. Minimal residual enhancement along the lateral aspect  of the right midbrain could reflect active disease. FLAIR  hyperintensities involving the left corona radiata posterior limb of  the left internal capsule and left midbrain or subtly increasing.  Significant interval improvement in associated mass effect with  resolution of previously seen right to left midline shift. Please  correlate clinically and advise further follow-up examination if  clinically indicated.        Increased risk of delirium given:  decreased functional status, Patient instructed on and provided cognitive exercises  Patient is at increased risk for perioperative CVA secondary to  operative time > 2.5 hours      HEENT   No HEENT diagnosis or significant findings on chart review or clinical presentation and evaluation. No further preoperative testing/intervention indicated at this time.      Cardiovascular   #Hx of myopericarditis  - Hx of myopericarditis in May 2021 2/2 Behcet's dx - tx with NSAIDs  - Last follow up in Sept 2022 with Dr. Theo Mast - no further intervention  - Patient denies chest pain, shortness of breath, syncopal episodes, headaches, changes in vision    METS: 8.4  RCRI: 1 point, 6.0% risk for postoperative MACE   PRINCESS: 0.29% risk for 30-day postoperative MACE  EKG - 2/7/25 - NSR without significant ischemic changes  ECHO (9/4/21):  CONCLUSIONS:   1. The left ventricular systolic function is normal with a 60-65% estimated ejection fraction.   2. There is no evidence of left ventricular hypertrophy.    Pulmonary   #Smoking hx  - marijuana and tobacco use  - Discussed  smoking cessation importance with patient    No pulmonary diagnosis, however patient is at increased risk of perioperative complications secondary to  Tobacco abuse, duration of surgery > 2 hours  Stop Bang score is 1 placing patient at low risk for VALENTIN  ARISCAT: <26 points, 1.6% risk of in-hospital postoperative pulmonary complication  PRODIGY: Moderate risk for opioid-induced respiratory depression  Smoking cessation discussed with patient, patient also provided cessation education/hotline handout, Pumonary toilet education discussed, patient also provided deep breathing exercises and incentive spirometry educational handout      Renal   No renal diagnosis or significant findings on chart review, clinical presentation or evaluation. No further preoperative testing/intervention is indicated at this time.    Pt at Low risk for perioperative SATISH based on Dynamic Predictive Scoring Tool for Perioperative SATISH       Endocrine   No endocrine diagnosis or significant findings on chart review or clinical presentation and evaluation. No further testing or intervention is indicated at this time.      Hematologic   No hematologic diagnosis, however patient is at an increased risk for DVT  Caprini Score 4, patient at Low risk for perioperative DVT.  Patient provided VTE education/handout.      Gastrointestinal   No GI diagnosis or significant findings on chart review or clinical presentation and evaluation.   Eat-10 score 2  Apfel 1      Infectious Disease   No infectious diagnosis or significant findings on chart review or clinical presentation and evaluation.   Prescription provided for CHG body wash and dental rinse. CHG use instructions reviewed and provided to patient.  Staph screen collected      Musculoskeletal   #Chronic bilateral hip AVN   - Severe b/l hip avascular necrosis with subchondral collapse and osteoarthritis without relief from steroid injections and PT.  - Hx of retrograde nail fixation in 2022 in L  femur  - On Flexeril 10mg, Diclofenac topical cream, Gabapentin 300mg TID,   - Patient uses cane to ambulate    Plan:  - Giving good functional capacity and no further signs/symptoms, no cardiopulmonary testing needed prior to surgery  - Will reach out to Neuro to manage Neuro Behcet's plan and steroid plan  - Labs ordered today: orders placed by surgeon collected and CHG prescription given    Medication, NPO, and all other CPM instructions were reviewed with the patient.  All patient questions were addressed.    Patient seen and staffed with Dr. Narayan.    Donald Ragland, PGY-2             [1]   Past Medical History:  Diagnosis Date    Lupus (systemic lupus erythematosus) (Multi)     Stroke (Multi)     Uveitis    [2]   Past Surgical History:  Procedure Laterality Date    CT ANGIO NECK  9/2/2021    CT NECK ANGIO W AND WO IV CONTRAST 9/2/2021 [facility]    CT HEAD ANGIO W AND WO IV CONTRAST  9/2/2021    CT HEAD ANGIO W AND WO IV CONTRAST 9/2/2021 [facility] CLINICAL LEGAC[facility]   [3]   Family History  Problem Relation Name Age of Onset    Other (Diabetes mellitus) Other Multiple Family Members     Hypertension Other Multiple Family Members     Cancer Other Multiple Family Members    [4] No Known Allergies

## 2025-06-17 ENCOUNTER — ANESTHESIA EVENT (OUTPATIENT)
Dept: OPERATING ROOM | Facility: HOSPITAL | Age: 30
End: 2025-06-17
Payer: COMMERCIAL

## 2025-06-17 LAB — STAPHYLOCOCCUS SPEC CULT: NORMAL

## 2025-06-18 ENCOUNTER — TELEPHONE (OUTPATIENT)
Dept: NEUROLOGY | Facility: CLINIC | Age: 30
End: 2025-06-18
Payer: COMMERCIAL

## 2025-06-18 DIAGNOSIS — M35.2 BEHCET'S SYNDROME, NEUROLOGIC TYPE (MULTI): Primary | ICD-10-CM

## 2025-06-18 RX ORDER — PREDNISONE 10 MG/1
TABLET ORAL
Qty: 90 TABLET | Refills: 0 | Status: SHIPPED | OUTPATIENT
Start: 2025-06-18

## 2025-06-18 RX ORDER — SULFAMETHOXAZOLE AND TRIMETHOPRIM 800; 160 MG/1; MG/1
1 TABLET ORAL
Qty: 36 TABLET | Refills: 1 | Status: SHIPPED | OUTPATIENT
Start: 2025-06-18

## 2025-06-18 NOTE — TELEPHONE ENCOUNTER
I called Juan Marsh to clarify the My Chart message that was sent to Dr Joseph in error.  He needs a refill of   Prednisone 10mg 1 every day #90 with refills to [facility] on file.  Is he to continue with bactrim as well?

## 2025-06-20 ENCOUNTER — LAB (OUTPATIENT)
Dept: LAB | Facility: HOSPITAL | Age: 30
End: 2025-06-20
Payer: COMMERCIAL

## 2025-06-20 DIAGNOSIS — M87.052 AVASCULAR NECROSIS OF FEMUR HEAD, LEFT (MULTI): ICD-10-CM

## 2025-06-20 DIAGNOSIS — M35.2: ICD-10-CM

## 2025-06-20 DIAGNOSIS — Z01.818 PREOPERATIVE CLEARANCE: ICD-10-CM

## 2025-06-20 DIAGNOSIS — Z87.81 PERSONAL HISTORY OF (HEALED) TRAUMATIC FRACTURE: Primary | ICD-10-CM

## 2025-06-20 LAB
ABO GROUP (TYPE) IN BLOOD: NORMAL
ANTIBODY SCREEN: NORMAL
RH FACTOR (ANTIGEN D): NORMAL

## 2025-06-20 PROCEDURE — 36415 COLL VENOUS BLD VENIPUNCTURE: CPT

## 2025-06-20 PROCEDURE — 86850 RBC ANTIBODY SCREEN: CPT

## 2025-06-20 PROCEDURE — 86900 BLOOD TYPING SEROLOGIC ABO: CPT

## 2025-06-20 PROCEDURE — RXMED WILLOW AMBULATORY MEDICATION CHARGE

## 2025-06-20 PROCEDURE — 86901 BLOOD TYPING SEROLOGIC RH(D): CPT

## 2025-06-24 ENCOUNTER — PHARMACY VISIT (OUTPATIENT)
Dept: PHARMACY | Facility: CLINIC | Age: 30
End: 2025-06-24
Payer: COMMERCIAL

## 2025-06-24 ENCOUNTER — OFFICE VISIT (OUTPATIENT)
Dept: NEUROLOGY | Facility: HOSPITAL | Age: 30
End: 2025-06-24
Payer: COMMERCIAL

## 2025-06-24 VITALS
RESPIRATION RATE: 18 BRPM | HEART RATE: 83 BPM | BODY MASS INDEX: 19.4 KG/M2 | HEIGHT: 69 IN | DIASTOLIC BLOOD PRESSURE: 81 MMHG | WEIGHT: 131 LBS | TEMPERATURE: 97.1 F | SYSTOLIC BLOOD PRESSURE: 117 MMHG

## 2025-06-24 DIAGNOSIS — M35.2 BEHCET'S SYNDROME, NEUROLOGIC TYPE (MULTI): ICD-10-CM

## 2025-06-24 DIAGNOSIS — E55.9 VITAMIN D DEFICIENCY: ICD-10-CM

## 2025-06-24 PROCEDURE — 99215 OFFICE O/P EST HI 40 MIN: CPT | Performed by: PSYCHIATRY & NEUROLOGY

## 2025-06-24 PROCEDURE — RXMED WILLOW AMBULATORY MEDICATION CHARGE

## 2025-06-24 PROCEDURE — 3008F BODY MASS INDEX DOCD: CPT | Performed by: PSYCHIATRY & NEUROLOGY

## 2025-06-24 RX ORDER — PREDNISONE 10 MG/1
TABLET ORAL
Qty: 90 TABLET | Refills: 0 | Status: SHIPPED | OUTPATIENT
Start: 2025-06-24

## 2025-06-24 RX ORDER — MYCOPHENOLATE MOFETIL 500 MG/1
1000 TABLET ORAL 2 TIMES DAILY
Qty: 120 TABLET | Refills: 11 | Status: SHIPPED | OUTPATIENT
Start: 2025-06-24 | End: 2026-06-24

## 2025-06-24 RX ORDER — ERGOCALCIFEROL 1.25 MG/1
1.25 CAPSULE ORAL
Qty: 12 CAPSULE | Refills: 3 | Status: SHIPPED | OUTPATIENT
Start: 2025-06-24

## 2025-06-24 ASSESSMENT — PAIN SCALES - GENERAL: PAINLEVEL_OUTOF10: 0-NO PAIN

## 2025-06-24 NOTE — PATIENT INSTRUCTIONS
Your latest MRI showed improvement in the lesion on the right side of the brain and slight increase in the size of the lesion on the left. No active inflammation was present on the latest scan, which is great.     Please resume mycophenolate 1000 mg twice daily, prednisone 10 mg daily, and Vitamin D 56309 units once a week.     If you have any other attacks or worsening lesions in the future then we can consider a trial of infliximab also known as remicade.     Good luck with your hip surgery.     Repeat brain MRI in December then follow up again with me. Follow up sooner if you have any new symptoms or concerns.     Juancarlos Ulloa MD
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
## 2025-06-25 LAB
ALBUMIN SERPL-MCNC: 4.1 G/DL (ref 3.6–5.1)
ALP SERPL-CCNC: 66 U/L (ref 36–130)
ALT SERPL-CCNC: 35 U/L (ref 9–46)
ANION GAP SERPL CALCULATED.4IONS-SCNC: 12 MMOL/L (CALC) (ref 7–17)
AST SERPL-CCNC: 25 U/L (ref 10–40)
BASOPHILS # BLD AUTO: 34 CELLS/UL (ref 0–200)
BASOPHILS NFR BLD AUTO: 0.4 %
BILIRUB SERPL-MCNC: 0.3 MG/DL (ref 0.2–1.2)
BUN SERPL-MCNC: 21 MG/DL (ref 7–25)
CALCIUM SERPL-MCNC: 9.5 MG/DL (ref 8.6–10.3)
CHLORIDE SERPL-SCNC: 106 MMOL/L (ref 98–110)
CO2 SERPL-SCNC: 22 MMOL/L (ref 20–32)
CREAT SERPL-MCNC: 1.02 MG/DL (ref 0.6–1.24)
EGFRCR SERPLBLD CKD-EPI 2021: 103 ML/MIN/1.73M2
EOSINOPHIL # BLD AUTO: 51 CELLS/UL (ref 15–500)
EOSINOPHIL NFR BLD AUTO: 0.6 %
ERYTHROCYTE [DISTWIDTH] IN BLOOD BY AUTOMATED COUNT: 14 % (ref 11–15)
GLUCOSE SERPL-MCNC: 78 MG/DL (ref 65–99)
HCT VFR BLD AUTO: 34.4 % (ref 38.5–50)
HGB BLD-MCNC: 11 G/DL (ref 13.2–17.1)
LYMPHOCYTES # BLD AUTO: 2720 CELLS/UL (ref 850–3900)
LYMPHOCYTES NFR BLD AUTO: 32 %
MCH RBC QN AUTO: 31.5 PG (ref 27–33)
MCHC RBC AUTO-ENTMCNC: 32 G/DL (ref 32–36)
MCV RBC AUTO: 98.6 FL (ref 80–100)
MONOCYTES # BLD AUTO: 561 CELLS/UL (ref 200–950)
MONOCYTES NFR BLD AUTO: 6.6 %
NEUTROPHILS # BLD AUTO: 5134 CELLS/UL (ref 1500–7800)
NEUTROPHILS NFR BLD AUTO: 60.4 %
PLATELET # BLD AUTO: 413 THOUSAND/UL (ref 140–400)
PMV BLD REES-ECKER: 10.2 FL (ref 7.5–12.5)
POTASSIUM SERPL-SCNC: 4.3 MMOL/L (ref 3.5–5.3)
PROT SERPL-MCNC: 6.3 G/DL (ref 6.1–8.1)
RBC # BLD AUTO: 3.49 MILLION/UL (ref 4.2–5.8)
SODIUM SERPL-SCNC: 140 MMOL/L (ref 135–146)
WBC # BLD AUTO: 8.5 THOUSAND/UL (ref 3.8–10.8)

## 2025-06-29 PROBLEM — D84.821 IMMUNODEFICIENCY DUE TO DRUGS (CODE): Status: ACTIVE | Noted: 2025-06-29

## 2025-06-29 PROBLEM — F10.20 ALCOHOL DEPENDENCE, UNCOMPLICATED (MULTI): Status: ACTIVE | Noted: 2025-06-29

## 2025-06-29 PROBLEM — I69.351 HEMIPLEGIA AND HEMIPARESIS FOLLOWING CEREBRAL INFARCTION AFFECTING RIGHT DOMINANT SIDE (MULTI): Status: ACTIVE | Noted: 2025-06-29

## 2025-07-03 ENCOUNTER — APPOINTMENT (OUTPATIENT)
Dept: ORTHOPEDIC SURGERY | Facility: HOSPITAL | Age: 30
End: 2025-07-03
Payer: COMMERCIAL

## 2025-07-08 DIAGNOSIS — M35.2 BEHCET'S SYNDROME, NEUROLOGIC TYPE (MULTI): ICD-10-CM

## 2025-07-08 RX ORDER — PREDNISONE 10 MG/1
TABLET ORAL
Qty: 90 TABLET | Refills: 1 | Status: SHIPPED | OUTPATIENT
Start: 2025-07-08

## 2025-07-09 ENCOUNTER — APPOINTMENT (OUTPATIENT)
Dept: RADIOLOGY | Facility: HOSPITAL | Age: 30
End: 2025-07-09
Payer: COMMERCIAL

## 2025-07-09 ENCOUNTER — ANESTHESIA (OUTPATIENT)
Dept: OPERATING ROOM | Facility: HOSPITAL | Age: 30
End: 2025-07-09
Payer: COMMERCIAL

## 2025-07-09 ENCOUNTER — HOSPITAL ENCOUNTER (OUTPATIENT)
Facility: HOSPITAL | Age: 30
LOS: 1 days | Discharge: HOME HEALTH | End: 2025-07-10
Attending: ORTHOPAEDIC SURGERY | Admitting: ORTHOPAEDIC SURGERY
Payer: COMMERCIAL

## 2025-07-09 DIAGNOSIS — M35.2 BEHCET'S SYNDROME, NEUROLOGIC TYPE (MULTI): ICD-10-CM

## 2025-07-09 DIAGNOSIS — M35.2: ICD-10-CM

## 2025-07-09 DIAGNOSIS — I69.351 HEMIPLEGIA AND HEMIPARESIS FOLLOWING CEREBRAL INFARCTION AFFECTING RIGHT DOMINANT SIDE (MULTI): ICD-10-CM

## 2025-07-09 DIAGNOSIS — Z87.81 HISTORY OF FEMUR FRACTURE: ICD-10-CM

## 2025-07-09 DIAGNOSIS — M87.051 AVASCULAR NECROSIS OF RIGHT FEMORAL HEAD (MULTI): ICD-10-CM

## 2025-07-09 DIAGNOSIS — D84.821 IMMUNODEFICIENCY DUE TO DRUGS (CODE): ICD-10-CM

## 2025-07-09 DIAGNOSIS — H44.111 PANUVEITIS OF RIGHT EYE: ICD-10-CM

## 2025-07-09 DIAGNOSIS — M87.052 AVASCULAR NECROSIS OF LEFT FEMORAL HEAD (MULTI): ICD-10-CM

## 2025-07-09 DIAGNOSIS — Z96.642 S/P TOTAL LEFT HIP ARTHROPLASTY: Primary | ICD-10-CM

## 2025-07-09 DIAGNOSIS — M25.559 HIP PAIN, UNSPECIFIED LATERALITY: ICD-10-CM

## 2025-07-09 DIAGNOSIS — I31.9 MYOPERICARDITIS (HHS-HCC): ICD-10-CM

## 2025-07-09 DIAGNOSIS — F10.20 ALCOHOL DEPENDENCE, UNCOMPLICATED (MULTI): ICD-10-CM

## 2025-07-09 DIAGNOSIS — M87.052 AVASCULAR NECROSIS OF FEMUR HEAD, LEFT (MULTI): ICD-10-CM

## 2025-07-09 DIAGNOSIS — F10.10 ALCOHOL ABUSE: ICD-10-CM

## 2025-07-09 LAB
ABO GROUP (TYPE) IN BLOOD: NORMAL
ANTIBODY SCREEN: NORMAL
RH FACTOR (ANTIGEN D): NORMAL

## 2025-07-09 PROCEDURE — 3600000017 HC OR TIME - EACH INCREMENTAL 1 MINUTE - PROCEDURE LEVEL SIX: Performed by: ORTHOPAEDIC SURGERY

## 2025-07-09 PROCEDURE — 2780000003 HC OR 278 NO HCPCS: Performed by: ORTHOPAEDIC SURGERY

## 2025-07-09 PROCEDURE — A6213 FOAM DRG >16<=48 SQ IN W/BDR: HCPCS | Performed by: ORTHOPAEDIC SURGERY

## 2025-07-09 PROCEDURE — A27130 PR TOTAL HIP ARTHROPLASTY: Performed by: ANESTHESIOLOGY

## 2025-07-09 PROCEDURE — 2500000004 HC RX 250 GENERAL PHARMACY W/ HCPCS (ALT 636 FOR OP/ED): Mod: JZ,SE

## 2025-07-09 PROCEDURE — 2500000005 HC RX 250 GENERAL PHARMACY W/O HCPCS: Mod: SE | Performed by: ORTHOPAEDIC SURGERY

## 2025-07-09 PROCEDURE — 3600000018 HC OR TIME - INITIAL BASE CHARGE - PROCEDURE LEVEL SIX: Performed by: ORTHOPAEDIC SURGERY

## 2025-07-09 PROCEDURE — 3700000002 HC GENERAL ANESTHESIA TIME - EACH INCREMENTAL 1 MINUTE: Performed by: ORTHOPAEDIC SURGERY

## 2025-07-09 PROCEDURE — 27130 TOTAL HIP ARTHROPLASTY: CPT | Performed by: ORTHOPAEDIC SURGERY

## 2025-07-09 PROCEDURE — 2500000004 HC RX 250 GENERAL PHARMACY W/ HCPCS (ALT 636 FOR OP/ED): Mod: JZ,SE | Performed by: ORTHOPAEDIC SURGERY

## 2025-07-09 PROCEDURE — 2500000001 HC RX 250 WO HCPCS SELF ADMINISTERED DRUGS (ALT 637 FOR MEDICARE OP): Mod: SE

## 2025-07-09 PROCEDURE — 2500000005 HC RX 250 GENERAL PHARMACY W/O HCPCS: Mod: SE

## 2025-07-09 PROCEDURE — 97161 PT EVAL LOW COMPLEX 20 MIN: CPT | Mod: GP

## 2025-07-09 PROCEDURE — 2720000007 HC OR 272 NO HCPCS: Performed by: ORTHOPAEDIC SURGERY

## 2025-07-09 PROCEDURE — 7100000002 HC RECOVERY ROOM TIME - EACH INCREMENTAL 1 MINUTE: Performed by: ORTHOPAEDIC SURGERY

## 2025-07-09 PROCEDURE — 99140 ANES COMP EMERGENCY COND: CPT | Performed by: ANESTHESIOLOGY

## 2025-07-09 PROCEDURE — 2500000004 HC RX 250 GENERAL PHARMACY W/ HCPCS (ALT 636 FOR OP/ED): Mod: SE

## 2025-07-09 PROCEDURE — 7100000001 HC RECOVERY ROOM TIME - INITIAL BASE CHARGE: Performed by: ORTHOPAEDIC SURGERY

## 2025-07-09 PROCEDURE — 72170 X-RAY EXAM OF PELVIS: CPT

## 2025-07-09 PROCEDURE — C1713 ANCHOR/SCREW BN/BN,TIS/BN: HCPCS | Performed by: ORTHOPAEDIC SURGERY

## 2025-07-09 PROCEDURE — 72170 X-RAY EXAM OF PELVIS: CPT | Performed by: RADIOLOGY

## 2025-07-09 PROCEDURE — C1776 JOINT DEVICE (IMPLANTABLE): HCPCS | Performed by: ORTHOPAEDIC SURGERY

## 2025-07-09 PROCEDURE — 36415 COLL VENOUS BLD VENIPUNCTURE: CPT | Performed by: ANESTHESIOLOGY

## 2025-07-09 PROCEDURE — 64999 UNLISTED PX NERVOUS SYSTEM: CPT

## 2025-07-09 PROCEDURE — 86901 BLOOD TYPING SEROLOGIC RH(D): CPT | Performed by: ANESTHESIOLOGY

## 2025-07-09 PROCEDURE — P9045 ALBUMIN (HUMAN), 5%, 250 ML: HCPCS | Mod: JZ,SE

## 2025-07-09 PROCEDURE — A27130 PR TOTAL HIP ARTHROPLASTY: Performed by: NURSE ANESTHETIST, CERTIFIED REGISTERED

## 2025-07-09 PROCEDURE — 97116 GAIT TRAINING THERAPY: CPT | Mod: GP

## 2025-07-09 PROCEDURE — 3700000001 HC GENERAL ANESTHESIA TIME - INITIAL BASE CHARGE: Performed by: ORTHOPAEDIC SURGERY

## 2025-07-09 PROCEDURE — 1100000001 HC PRIVATE ROOM DAILY

## 2025-07-09 DEVICE — PINNACLE HIP SOLUTIONS ALTRX POLYETHYLENE ACETABULAR LINER NEUTRAL 36MM ID 54MM OD
Type: IMPLANTABLE DEVICE | Site: HIP | Status: FUNCTIONAL
Brand: PINNACLE ALTRX

## 2025-07-09 DEVICE — IMPLANTABLE DEVICE: Type: IMPLANTABLE DEVICE | Site: HIP | Status: FUNCTIONAL

## 2025-07-09 DEVICE — PINNACLE GRIPTION ACETABULAR SHELL SECTOR 54MM OD
Type: IMPLANTABLE DEVICE | Site: HIP | Status: FUNCTIONAL
Brand: PINNACLE GRIPTION

## 2025-07-09 DEVICE — ACTIS DUOFIX HIP PROSTHESIS (FEMORAL STEM 12/14 TAPER CEMENTLESS SIZE 7 STD COLLAR)  CE
Type: IMPLANTABLE DEVICE | Site: HIP | Status: FUNCTIONAL
Brand: ACTIS

## 2025-07-09 DEVICE — IMPLANTABLE DEVICE: Type: IMPLANTABLE DEVICE | Site: HIP | Status: NON-FUNCTIONAL

## 2025-07-09 DEVICE — PINNACLE CANCELLOUS BONE SCREW 6.5MM X 40MM
Type: IMPLANTABLE DEVICE | Site: HIP | Status: FUNCTIONAL
Brand: PINNACLE

## 2025-07-09 RX ORDER — PROPOFOL 10 MG/ML
INJECTION, EMULSION INTRAVENOUS AS NEEDED
Status: DISCONTINUED | OUTPATIENT
Start: 2025-07-09 | End: 2025-07-09

## 2025-07-09 RX ORDER — ONDANSETRON HYDROCHLORIDE 2 MG/ML
4 INJECTION, SOLUTION INTRAVENOUS EVERY 8 HOURS PRN
Status: DISCONTINUED | OUTPATIENT
Start: 2025-07-09 | End: 2025-07-09

## 2025-07-09 RX ORDER — OXYCODONE HYDROCHLORIDE 5 MG/1
2.5 TABLET ORAL EVERY 4 HOURS PRN
Refills: 0 | Status: DISCONTINUED | OUTPATIENT
Start: 2025-07-09 | End: 2025-07-10 | Stop reason: HOSPADM

## 2025-07-09 RX ORDER — FENTANYL CITRATE 50 UG/ML
INJECTION, SOLUTION INTRAMUSCULAR; INTRAVENOUS AS NEEDED
Status: DISCONTINUED | OUTPATIENT
Start: 2025-07-09 | End: 2025-07-09

## 2025-07-09 RX ORDER — HYDROMORPHONE HYDROCHLORIDE 1 MG/ML
INJECTION, SOLUTION INTRAMUSCULAR; INTRAVENOUS; SUBCUTANEOUS AS NEEDED
Status: DISCONTINUED | OUTPATIENT
Start: 2025-07-09 | End: 2025-07-09

## 2025-07-09 RX ORDER — VANCOMYCIN HYDROCHLORIDE 1 G/20ML
INJECTION, POWDER, LYOPHILIZED, FOR SOLUTION INTRAVENOUS AS NEEDED
Status: DISCONTINUED | OUTPATIENT
Start: 2025-07-09 | End: 2025-07-09 | Stop reason: HOSPADM

## 2025-07-09 RX ORDER — PHENYLEPHRINE 10 MG/250 ML(40 MCG/ML)IN 0.9 % SOD.CHLORIDE INTRAVENOUS
CONTINUOUS PRN
Status: DISCONTINUED | OUTPATIENT
Start: 2025-07-09 | End: 2025-07-09

## 2025-07-09 RX ORDER — OXYCODONE HYDROCHLORIDE 5 MG/1
10 TABLET ORAL EVERY 4 HOURS PRN
Refills: 0 | Status: DISCONTINUED | OUTPATIENT
Start: 2025-07-09 | End: 2025-07-10 | Stop reason: HOSPADM

## 2025-07-09 RX ORDER — CEFAZOLIN SODIUM 2 G/100ML
2 INJECTION, SOLUTION INTRAVENOUS EVERY 8 HOURS
Status: COMPLETED | OUTPATIENT
Start: 2025-07-09 | End: 2025-07-10

## 2025-07-09 RX ORDER — METOCLOPRAMIDE HYDROCHLORIDE 5 MG/ML
10 INJECTION INTRAMUSCULAR; INTRAVENOUS ONCE AS NEEDED
Status: DISCONTINUED | OUTPATIENT
Start: 2025-07-09 | End: 2025-07-09 | Stop reason: HOSPADM

## 2025-07-09 RX ORDER — INDOMETHACIN 25 MG/1
25 CAPSULE ORAL
Status: DISCONTINUED | OUTPATIENT
Start: 2025-07-10 | End: 2025-07-10 | Stop reason: HOSPADM

## 2025-07-09 RX ORDER — OXYCODONE HYDROCHLORIDE 5 MG/1
5 TABLET ORAL EVERY 6 HOURS PRN
Refills: 0 | Status: DISCONTINUED | OUTPATIENT
Start: 2025-07-09 | End: 2025-07-10 | Stop reason: HOSPADM

## 2025-07-09 RX ORDER — ACETAMINOPHEN 325 MG/1
650 TABLET ORAL EVERY 6 HOURS SCHEDULED
Status: DISCONTINUED | OUTPATIENT
Start: 2025-07-09 | End: 2025-07-10 | Stop reason: HOSPADM

## 2025-07-09 RX ORDER — ROCURONIUM BROMIDE 10 MG/ML
INJECTION, SOLUTION INTRAVENOUS AS NEEDED
Status: DISCONTINUED | OUTPATIENT
Start: 2025-07-09 | End: 2025-07-09

## 2025-07-09 RX ORDER — MEPERIDINE HYDROCHLORIDE 25 MG/ML
12.5 INJECTION INTRAMUSCULAR; INTRAVENOUS; SUBCUTANEOUS EVERY 10 MIN PRN
Status: DISCONTINUED | OUTPATIENT
Start: 2025-07-09 | End: 2025-07-09 | Stop reason: HOSPADM

## 2025-07-09 RX ORDER — ASPIRIN 81 MG/1
81 TABLET ORAL 2 TIMES DAILY
Status: DISCONTINUED | OUTPATIENT
Start: 2025-07-09 | End: 2025-07-10 | Stop reason: HOSPADM

## 2025-07-09 RX ORDER — LIDOCAINE HCL/PF 100 MG/5ML
SYRINGE (ML) INTRAVENOUS AS NEEDED
Status: DISCONTINUED | OUTPATIENT
Start: 2025-07-09 | End: 2025-07-09

## 2025-07-09 RX ORDER — ESMOLOL HYDROCHLORIDE 10 MG/ML
INJECTION INTRAVENOUS AS NEEDED
Status: DISCONTINUED | OUTPATIENT
Start: 2025-07-09 | End: 2025-07-09

## 2025-07-09 RX ORDER — ONDANSETRON 4 MG/1
4 TABLET, FILM COATED ORAL EVERY 8 HOURS PRN
Status: DISCONTINUED | OUTPATIENT
Start: 2025-07-09 | End: 2025-07-10 | Stop reason: HOSPADM

## 2025-07-09 RX ORDER — PANTOPRAZOLE SODIUM 40 MG/1
40 TABLET, DELAYED RELEASE ORAL
Status: DISCONTINUED | OUTPATIENT
Start: 2025-07-10 | End: 2025-07-10 | Stop reason: HOSPADM

## 2025-07-09 RX ORDER — CEFAZOLIN 1 G/1
INJECTION, POWDER, FOR SOLUTION INTRAVENOUS AS NEEDED
Status: DISCONTINUED | OUTPATIENT
Start: 2025-07-09 | End: 2025-07-09

## 2025-07-09 RX ORDER — POLYETHYLENE GLYCOL 3350 17 G/17G
17 POWDER, FOR SOLUTION ORAL DAILY
Status: DISCONTINUED | OUTPATIENT
Start: 2025-07-09 | End: 2025-07-10 | Stop reason: HOSPADM

## 2025-07-09 RX ORDER — HYDROMORPHONE HYDROCHLORIDE 0.2 MG/ML
0.2 INJECTION INTRAMUSCULAR; INTRAVENOUS; SUBCUTANEOUS EVERY 5 MIN PRN
Status: DISCONTINUED | OUTPATIENT
Start: 2025-07-09 | End: 2025-07-09 | Stop reason: HOSPADM

## 2025-07-09 RX ORDER — SODIUM CHLORIDE 0.9 G/100ML
INJECTION, SOLUTION IRRIGATION AS NEEDED
Status: DISCONTINUED | OUTPATIENT
Start: 2025-07-09 | End: 2025-07-09 | Stop reason: HOSPADM

## 2025-07-09 RX ORDER — CYCLOBENZAPRINE HCL 10 MG
10 TABLET ORAL 3 TIMES DAILY PRN
Status: DISCONTINUED | OUTPATIENT
Start: 2025-07-09 | End: 2025-07-10 | Stop reason: HOSPADM

## 2025-07-09 RX ORDER — ONDANSETRON HYDROCHLORIDE 2 MG/ML
INJECTION, SOLUTION INTRAVENOUS AS NEEDED
Status: DISCONTINUED | OUTPATIENT
Start: 2025-07-09 | End: 2025-07-09

## 2025-07-09 RX ORDER — SODIUM CHLORIDE, SODIUM LACTATE, POTASSIUM CHLORIDE, CALCIUM CHLORIDE 600; 310; 30; 20 MG/100ML; MG/100ML; MG/100ML; MG/100ML
INJECTION, SOLUTION INTRAVENOUS CONTINUOUS PRN
Status: DISCONTINUED | OUTPATIENT
Start: 2025-07-09 | End: 2025-07-09

## 2025-07-09 RX ORDER — TRANEXAMIC ACID 1 G/10ML
INJECTION, SOLUTION INTRAVENOUS AS NEEDED
Status: DISCONTINUED | OUTPATIENT
Start: 2025-07-09 | End: 2025-07-09

## 2025-07-09 RX ORDER — INDOMETHACIN 25 MG/1
25 CAPSULE ORAL
Status: DISCONTINUED | OUTPATIENT
Start: 2025-07-09 | End: 2025-07-09

## 2025-07-09 RX ORDER — ALBUMIN HUMAN 50 G/1000ML
SOLUTION INTRAVENOUS AS NEEDED
Status: DISCONTINUED | OUTPATIENT
Start: 2025-07-09 | End: 2025-07-09

## 2025-07-09 RX ORDER — NALOXONE HYDROCHLORIDE 0.4 MG/ML
0.2 INJECTION, SOLUTION INTRAMUSCULAR; INTRAVENOUS; SUBCUTANEOUS EVERY 5 MIN PRN
Status: DISCONTINUED | OUTPATIENT
Start: 2025-07-09 | End: 2025-07-10 | Stop reason: HOSPADM

## 2025-07-09 RX ORDER — ROPIVACAINE HCL/PF 100MG/20ML
SYRINGE (ML) INJECTION AS NEEDED
Status: DISCONTINUED | OUTPATIENT
Start: 2025-07-09 | End: 2025-07-09

## 2025-07-09 RX ORDER — MIDAZOLAM HYDROCHLORIDE 1 MG/ML
INJECTION INTRAMUSCULAR; INTRAVENOUS AS NEEDED
Status: DISCONTINUED | OUTPATIENT
Start: 2025-07-09 | End: 2025-07-09

## 2025-07-09 RX ORDER — PHENYLEPHRINE HCL IN 0.9% NACL 0.4MG/10ML
SYRINGE (ML) INTRAVENOUS AS NEEDED
Status: DISCONTINUED | OUTPATIENT
Start: 2025-07-09 | End: 2025-07-09

## 2025-07-09 RX ORDER — LIDOCAINE HYDROCHLORIDE 10 MG/ML
0.1 INJECTION, SOLUTION INFILTRATION; PERINEURAL ONCE
Status: CANCELLED | OUTPATIENT
Start: 2025-07-09 | End: 2025-07-09

## 2025-07-09 RX ADMIN — FENTANYL CITRATE 50 MCG: 50 INJECTION, SOLUTION INTRAMUSCULAR; INTRAVENOUS at 09:09

## 2025-07-09 RX ADMIN — Medication 120 MCG: at 08:51

## 2025-07-09 RX ADMIN — TRANEXAMIC ACID 1000 MG: 1 INJECTION, SOLUTION INTRAVENOUS at 08:42

## 2025-07-09 RX ADMIN — DEXAMETHASONE SODIUM PHOSPHATE 10 MG: 4 INJECTION INTRA-ARTICULAR; INTRALESIONAL; INTRAMUSCULAR; INTRAVENOUS; SOFT TISSUE at 09:01

## 2025-07-09 RX ADMIN — SODIUM CHLORIDE, POTASSIUM CHLORIDE, SODIUM LACTATE AND CALCIUM CHLORIDE: 600; 310; 30; 20 INJECTION, SOLUTION INTRAVENOUS at 11:06

## 2025-07-09 RX ADMIN — ROCURONIUM BROMIDE 20 MG: 10 INJECTION INTRAVENOUS at 09:30

## 2025-07-09 RX ADMIN — POVIDONE-IODINE 1 APPLICATION: 5 SOLUTION TOPICAL at 06:27

## 2025-07-09 RX ADMIN — ESMOLOL HYDROCHLORIDE 30 MG: 100 INJECTION, SOLUTION INTRAVENOUS at 09:17

## 2025-07-09 RX ADMIN — ASPIRIN 81 MG: 81 TABLET, COATED ORAL at 20:27

## 2025-07-09 RX ADMIN — ACETAMINOPHEN 650 MG: 325 TABLET ORAL at 13:38

## 2025-07-09 RX ADMIN — Medication 20 ML: at 07:09

## 2025-07-09 RX ADMIN — ONDANSETRON 4 MG: 2 INJECTION INTRAMUSCULAR; INTRAVENOUS at 10:56

## 2025-07-09 RX ADMIN — ASPIRIN 81 MG: 81 TABLET, COATED ORAL at 13:38

## 2025-07-09 RX ADMIN — Medication 80 MCG: at 10:50

## 2025-07-09 RX ADMIN — ACETAMINOPHEN 650 MG: 325 TABLET ORAL at 17:06

## 2025-07-09 RX ADMIN — PROPOFOL 150 MG: 10 INJECTION, EMULSION INTRAVENOUS at 08:39

## 2025-07-09 RX ADMIN — ROCURONIUM BROMIDE 10 MG: 10 INJECTION INTRAVENOUS at 10:36

## 2025-07-09 RX ADMIN — LIDOCAINE HYDROCHLORIDE 100 MG: 20 INJECTION INTRAVENOUS at 08:39

## 2025-07-09 RX ADMIN — ROCURONIUM BROMIDE 60 MG: 10 INJECTION INTRAVENOUS at 08:39

## 2025-07-09 RX ADMIN — HYDROMORPHONE HYDROCHLORIDE 0.2 MG: 1 INJECTION, SOLUTION INTRAMUSCULAR; INTRAVENOUS; SUBCUTANEOUS at 10:37

## 2025-07-09 RX ADMIN — OXYCODONE 5 MG: 5 TABLET ORAL at 20:27

## 2025-07-09 RX ADMIN — SODIUM CHLORIDE, POTASSIUM CHLORIDE, SODIUM LACTATE AND CALCIUM CHLORIDE: 600; 310; 30; 20 INJECTION, SOLUTION INTRAVENOUS at 08:34

## 2025-07-09 RX ADMIN — FENTANYL CITRATE 50 MCG: 50 INJECTION, SOLUTION INTRAMUSCULAR; INTRAVENOUS at 08:39

## 2025-07-09 RX ADMIN — MIDAZOLAM HYDROCHLORIDE 2 MG: 2 INJECTION, SOLUTION INTRAMUSCULAR; INTRAVENOUS at 07:04

## 2025-07-09 RX ADMIN — CEFAZOLIN 2 G: 330 INJECTION, POWDER, FOR SOLUTION INTRAMUSCULAR; INTRAVENOUS at 08:42

## 2025-07-09 RX ADMIN — OXYCODONE 10 MG: 5 TABLET ORAL at 13:39

## 2025-07-09 RX ADMIN — MIDAZOLAM HYDROCHLORIDE 2 MG: 2 INJECTION, SOLUTION INTRAMUSCULAR; INTRAVENOUS at 08:39

## 2025-07-09 RX ADMIN — Medication 10 MG: at 09:41

## 2025-07-09 RX ADMIN — SUGAMMADEX 200 MG: 100 INJECTION, SOLUTION INTRAVENOUS at 11:18

## 2025-07-09 RX ADMIN — Medication 50 MG: at 08:39

## 2025-07-09 RX ADMIN — Medication 160 MCG: at 08:44

## 2025-07-09 RX ADMIN — TRANEXAMIC ACID 1000 MG: 1 INJECTION, SOLUTION INTRAVENOUS at 10:56

## 2025-07-09 RX ADMIN — PHENYLEPHRINE-NACL IV SOLUTION 10 MG/250ML-0.9% 0.2 MCG/KG/MIN: 10-0.9/25 SOLUTION at 09:02

## 2025-07-09 RX ADMIN — Medication 10 MG: at 10:27

## 2025-07-09 RX ADMIN — HYDROMORPHONE HYDROCHLORIDE 0.5 MG: 1 INJECTION, SOLUTION INTRAMUSCULAR; INTRAVENOUS; SUBCUTANEOUS at 09:14

## 2025-07-09 RX ADMIN — ROCURONIUM BROMIDE 10 MG: 10 INJECTION INTRAVENOUS at 10:05

## 2025-07-09 RX ADMIN — INDOMETHACIN 25 MG: 25 CAPSULE ORAL at 17:06

## 2025-07-09 RX ADMIN — ALBUMIN HUMAN 250 ML: 0.05 INJECTION, SOLUTION INTRAVENOUS at 08:52

## 2025-07-09 RX ADMIN — HYDROMORPHONE HYDROCHLORIDE 0.5 MG: 1 INJECTION, SOLUTION INTRAMUSCULAR; INTRAVENOUS; SUBCUTANEOUS at 09:27

## 2025-07-09 RX ADMIN — CEFAZOLIN SODIUM 2 G: 2 INJECTION, SOLUTION INTRAVENOUS at 17:56

## 2025-07-09 SDOH — SOCIAL STABILITY: SOCIAL INSECURITY: WITHIN THE LAST YEAR, HAVE YOU BEEN AFRAID OF YOUR PARTNER OR EX-PARTNER?: NO

## 2025-07-09 SDOH — ECONOMIC STABILITY: FOOD INSECURITY: WITHIN THE PAST 12 MONTHS, YOU WORRIED THAT YOUR FOOD WOULD RUN OUT BEFORE YOU GOT THE MONEY TO BUY MORE.: NEVER TRUE

## 2025-07-09 SDOH — SOCIAL STABILITY: SOCIAL INSECURITY: ABUSE: ADULT

## 2025-07-09 SDOH — ECONOMIC STABILITY: HOUSING INSECURITY: AT ANY TIME IN THE PAST 12 MONTHS, WERE YOU HOMELESS OR LIVING IN A SHELTER (INCLUDING NOW)?: NO

## 2025-07-09 SDOH — SOCIAL STABILITY: SOCIAL INSECURITY: WITHIN THE LAST YEAR, HAVE YOU BEEN HUMILIATED OR EMOTIONALLY ABUSED IN OTHER WAYS BY YOUR PARTNER OR EX-PARTNER?: NO

## 2025-07-09 SDOH — ECONOMIC STABILITY: FOOD INSECURITY: HOW HARD IS IT FOR YOU TO PAY FOR THE VERY BASICS LIKE FOOD, HOUSING, MEDICAL CARE, AND HEATING?: NOT HARD AT ALL

## 2025-07-09 SDOH — SOCIAL STABILITY: SOCIAL INSECURITY: ARE YOU OR HAVE YOU BEEN THREATENED OR ABUSED PHYSICALLY, EMOTIONALLY, OR SEXUALLY BY ANYONE?: NO

## 2025-07-09 SDOH — ECONOMIC STABILITY: HOUSING INSECURITY: IN THE PAST 12 MONTHS, HOW MANY TIMES HAVE YOU MOVED WHERE YOU WERE LIVING?: 0

## 2025-07-09 SDOH — ECONOMIC STABILITY: HOUSING INSECURITY: IN THE LAST 12 MONTHS, WAS THERE A TIME WHEN YOU WERE NOT ABLE TO PAY THE MORTGAGE OR RENT ON TIME?: NO

## 2025-07-09 SDOH — SOCIAL STABILITY: SOCIAL INSECURITY: DOES ANYONE TRY TO KEEP YOU FROM HAVING/CONTACTING OTHER FRIENDS OR DOING THINGS OUTSIDE YOUR HOME?: NO

## 2025-07-09 SDOH — SOCIAL STABILITY: SOCIAL INSECURITY: WERE YOU ABLE TO COMPLETE ALL THE BEHAVIORAL HEALTH SCREENINGS?: YES

## 2025-07-09 SDOH — SOCIAL STABILITY: SOCIAL INSECURITY: ARE THERE ANY APPARENT SIGNS OF INJURIES/BEHAVIORS THAT COULD BE RELATED TO ABUSE/NEGLECT?: NO

## 2025-07-09 SDOH — SOCIAL STABILITY: SOCIAL INSECURITY: HAVE YOU HAD THOUGHTS OF HARMING ANYONE ELSE?: NO

## 2025-07-09 SDOH — ECONOMIC STABILITY: FOOD INSECURITY: WITHIN THE PAST 12 MONTHS, THE FOOD YOU BOUGHT JUST DIDN'T LAST AND YOU DIDN'T HAVE MONEY TO GET MORE.: NEVER TRUE

## 2025-07-09 SDOH — ECONOMIC STABILITY: INCOME INSECURITY: IN THE PAST 12 MONTHS HAS THE ELECTRIC, GAS, OIL, OR WATER COMPANY THREATENED TO SHUT OFF SERVICES IN YOUR HOME?: NO

## 2025-07-09 SDOH — ECONOMIC STABILITY: TRANSPORTATION INSECURITY: IN THE PAST 12 MONTHS, HAS LACK OF TRANSPORTATION KEPT YOU FROM MEDICAL APPOINTMENTS OR FROM GETTING MEDICATIONS?: NO

## 2025-07-09 SDOH — SOCIAL STABILITY: SOCIAL INSECURITY: HAS ANYONE EVER THREATENED TO HURT YOUR FAMILY OR YOUR PETS?: NO

## 2025-07-09 SDOH — SOCIAL STABILITY: SOCIAL INSECURITY: DO YOU FEEL UNSAFE GOING BACK TO THE PLACE WHERE YOU ARE LIVING?: NO

## 2025-07-09 SDOH — SOCIAL STABILITY: SOCIAL INSECURITY: DO YOU FEEL ANYONE HAS EXPLOITED OR TAKEN ADVANTAGE OF YOU FINANCIALLY OR OF YOUR PERSONAL PROPERTY?: NO

## 2025-07-09 SDOH — HEALTH STABILITY: MENTAL HEALTH: CURRENT SMOKER: 1

## 2025-07-09 ASSESSMENT — PAIN - FUNCTIONAL ASSESSMENT
PAIN_FUNCTIONAL_ASSESSMENT: 0-10
PAIN_FUNCTIONAL_ASSESSMENT: CPOT (CRITICAL CARE PAIN OBSERVATION TOOL)
PAIN_FUNCTIONAL_ASSESSMENT: 0-10
PAIN_FUNCTIONAL_ASSESSMENT: CPOT (CRITICAL CARE PAIN OBSERVATION TOOL)
PAIN_FUNCTIONAL_ASSESSMENT: CPOT (CRITICAL CARE PAIN OBSERVATION TOOL)
PAIN_FUNCTIONAL_ASSESSMENT: 0-10
PAIN_FUNCTIONAL_ASSESSMENT: CPOT (CRITICAL CARE PAIN OBSERVATION TOOL)
PAIN_FUNCTIONAL_ASSESSMENT: CPOT (CRITICAL CARE PAIN OBSERVATION TOOL)
PAIN_FUNCTIONAL_ASSESSMENT: 0-10
PAIN_FUNCTIONAL_ASSESSMENT: 0-10

## 2025-07-09 ASSESSMENT — COGNITIVE AND FUNCTIONAL STATUS - GENERAL
HELP NEEDED FOR BATHING: A LITTLE
CLIMB 3 TO 5 STEPS WITH RAILING: TOTAL
TURNING FROM BACK TO SIDE WHILE IN FLAT BAD: A LITTLE
MOBILITY SCORE: 18
PATIENT BASELINE BEDBOUND: NO
MOVING TO AND FROM BED TO CHAIR: A LITTLE
HELP NEEDED FOR BATHING: A LITTLE
WALKING IN HOSPITAL ROOM: A LOT
DAILY ACTIVITIY SCORE: 20
MOVING TO AND FROM BED TO CHAIR: A LITTLE
CLIMB 3 TO 5 STEPS WITH RAILING: A LOT
MOBILITY SCORE: 19
WALKING IN HOSPITAL ROOM: A LITTLE
DRESSING REGULAR UPPER BODY CLOTHING: A LITTLE
TOILETING: A LITTLE
MOVING TO AND FROM BED TO CHAIR: A LOT
MOBILITY SCORE: 13
DRESSING REGULAR UPPER BODY CLOTHING: A LITTLE
MOVING FROM LYING ON BACK TO SITTING ON SIDE OF FLAT BED WITH BEDRAILS: A LITTLE
DAILY ACTIVITIY SCORE: 20
CLIMB 3 TO 5 STEPS WITH RAILING: A LITTLE
TOILETING: A LITTLE
STANDING UP FROM CHAIR USING ARMS: A LITTLE
STANDING UP FROM CHAIR USING ARMS: A LITTLE
TURNING FROM BACK TO SIDE WHILE IN FLAT BAD: A LITTLE
DRESSING REGULAR LOWER BODY CLOTHING: A LITTLE
STANDING UP FROM CHAIR USING ARMS: A LOT
DRESSING REGULAR LOWER BODY CLOTHING: A LITTLE
TURNING FROM BACK TO SIDE WHILE IN FLAT BAD: A LITTLE
WALKING IN HOSPITAL ROOM: A LITTLE

## 2025-07-09 ASSESSMENT — LIFESTYLE VARIABLES
HOW OFTEN DO YOU HAVE A DRINK CONTAINING ALCOHOL: MONTHLY OR LESS
AUDIT-C TOTAL SCORE: 2
HOW OFTEN DO YOU HAVE 6 OR MORE DRINKS ON ONE OCCASION: LESS THAN MONTHLY
AUDIT-C TOTAL SCORE: 2
SKIP TO QUESTIONS 9-10: 0
HOW MANY STANDARD DRINKS CONTAINING ALCOHOL DO YOU HAVE ON A TYPICAL DAY: 1 OR 2

## 2025-07-09 ASSESSMENT — ACTIVITIES OF DAILY LIVING (ADL)
FEEDING YOURSELF: INDEPENDENT
JUDGMENT_ADEQUATE_SAFELY_COMPLETE_DAILY_ACTIVITIES: YES
LACK_OF_TRANSPORTATION: NO
GROOMING: INDEPENDENT
HEARING - LEFT EAR: FUNCTIONAL
DRESSING YOURSELF: INDEPENDENT
WALKS IN HOME: INDEPENDENT
HEARING - RIGHT EAR: FUNCTIONAL
PATIENT'S MEMORY ADEQUATE TO SAFELY COMPLETE DAILY ACTIVITIES?: YES
TOILETING: INDEPENDENT
ADEQUATE_TO_COMPLETE_ADL: YES
BATHING: INDEPENDENT
ADL_ASSISTANCE: INDEPENDENT
LACK_OF_TRANSPORTATION: NO

## 2025-07-09 ASSESSMENT — PATIENT HEALTH QUESTIONNAIRE - PHQ9
SUM OF ALL RESPONSES TO PHQ9 QUESTIONS 1 & 2: 0
1. LITTLE INTEREST OR PLEASURE IN DOING THINGS: NOT AT ALL
2. FEELING DOWN, DEPRESSED OR HOPELESS: NOT AT ALL

## 2025-07-09 ASSESSMENT — PAIN SCALES - GENERAL
PAINLEVEL_OUTOF10: 6
PAINLEVEL_OUTOF10: 0 - NO PAIN
PAINLEVEL_OUTOF10: 8
PAINLEVEL_OUTOF10: 8
PAINLEVEL_OUTOF10: 6

## 2025-07-09 ASSESSMENT — PAIN DESCRIPTION - DESCRIPTORS: DESCRIPTORS: ACHING

## 2025-07-09 NOTE — DISCHARGE INSTR - OTHER ORDERS
WOUND CARE  Patient may shower over dressing.  Do not let the water directly hit the dressing. Pat dry when finished.  After 7 days remove the dressing. There are    dissolvable sutures. Keep incision clean and dry after dressing removed. May cover with bandage as needed.  Do not apply creams or lotions.  No tub soaks.  If you experience continued drainage or bleeding, you may cover with abdominal pads (purchase at local drug store).  Knee replacements should wrap with an ace wrap.  Your surgical bandage will be removed by you or your home therapist 1 week after surgery.    PAIN, SWELLING, BRUISING & CLICKING  Pain and swelling are a natural part of your recovery which is considered normal fur up to a year after surgery.  Symptoms may be treated with movement, ice, compression stockings, elevating your leg, and by following the pain medication regimen as prescribed.  Bruising is normal for several weeks after surgery and will run down the leg over time.  You may ice areas that are tender to help with discomfort.  You are required to wear the provided compression stockings, every day, for 4 weeks following surgery.  Remove the stockings at night and place them back on in the morning.  Pain and swelling may temporarily increase with an increase in activity or exercise.  Use ice after activity.  Audible clicking with movement or exercises is considered normal following joint replacement.  You may also feel decreased sensation or numbness near the incision site.  These are usually normal and may or may not fade over time.     DENTAL PROCEDURES & CLEANINGS  You must wait a minimum of 3 months for elective dental appointments, including routine cleanings or dental work including bridges, crowns, extractions, etc..  For any dental visit - cleaning or dental procedures - patients must take an antibiotic 1 hour before the appointment.  Antibiotics are a lifelong need before dental appointments.  The antibiotic prescribed  will be based on each patient's allergies.    ICE & COLD THERAPY INSTRUCTIONS       To assist with pain control and post-op swelling, you should be using ice regularly throughout recovery, especially for the first 6 weeks, regardless of the cold therapy method you use.      Always make sure there is a layer of protection between the cold pad and your skin.    If you are using ICE PACKS or GEL PACKS, you will need to alternate 20 minutes on, 20 minutes off twice per hour.    If you are using an ICE MACHINE, please follow the provided ice machine instructions.  These devices differ from ice or ice packs whereas the mechanism circulates water through tubing and a pad to provide longer periods of cold therapy to the desired site.  You can use your cold devices around the clock for optimal comfort.  We recommend using cold therapy after working with therapy or completing exercises on your own.  There is no set schedule in which you must follow while using cold therapy.  Below are a few points to remember when using a cold therapy device:    You do not need to need to use the 20 on, 20 off method.  Detach the pad from the cooler and ambulate at least once every hour.  You can check your skin under the pad at this time.  You may wear the cold therapy device during periods of sleep including overnight.  If you wake up during the night, you can check the skin at this time.  You do not need to wake up specifically to perform skin checks.  Empty the cooler and pad when device is not in use.  Follow 's instructions for cleaning your cold therapy device.

## 2025-07-09 NOTE — NURSING NOTE
Met with Patient and Family at bedside- Patient is s/p Left Anterior Hip Replacement with  Dr. Chip Leonard.  Discussion with patient included education on the following topics: [facility] Education: Wound Care (Bandage Care & Removal, Personal Hygiene & Infection Prevention), Post-Op Activity (Home PT Regimen, Movement Precautions, Assistive Equipment & 1-2hr Movement), Post-Op Precautions (Falls, Blood Clots & Constipation), Cold-Therapy (Ice vs. Cold Therapy Machines) , Methods for Symptom Management (Pain, Nausea, Swelling & Constipation), Importance of Post-op Prescriptions, How to Obtain Medication Refills, When to Resume Driving & Who to Call, Use of Re.nooblet & Staff Contact Information, When to call 9-1-1, and When to call the Surgeon's Office.  Patient Did Not Complete class prior to surgery.  Patient is not able to verbalize understanding of class content/discussion.  Contact information was provided to patient for support and assistance during the post-operative period.

## 2025-07-09 NOTE — PROGRESS NOTES
"Orthopaedic Surgery Progress Note    Procedure: CHANA L IMN, L KEO  Date of Surgery: 7/9/25     Subjective: Evaluated on the floor a few hours in the postoperative period. Pain well controlled considering recent surgery. Denies chest pain, shortness of breath, or fevers. Doing well    Objective:  /70 (BP Location: Right arm, Patient Position: Lying)   Pulse 64   Temp 36.1 °C (97 °F) (Temporal)   Resp 13   Ht 1.753 m (5' 9\")   Wt 63.5 kg (140 lb)   SpO2 100%   BMI 20.67 kg/m²     Gen: arousable, NAD, appropriately conversational  Cardiac: RRR to peripheral palpation  Resp: nonlabored on RA  GI: soft, nondistended    MSK:  -Dressing C/D/I   Left lower extremity:  - tender over incisional site  - Compartments soft and compressible  - Fires TA/GS/EHL  - SILT in Jimenez/Sa/SP/DP/T distribution  - Palpable DP pulse     Assessment/Plan: Juan Marsh is a 30M who is  Now s/p CHANA (L IMN) and L KEO with Dr. Leonard.      Plan:  - Weight bearing: WBAT  - DVT ppx: SCDs, ASA 81mg BID    - Diet: Regular diet   - Pain: Tylenol, oxycodone 5/10  - Antibiotics: perioperative ancef 2g q8hr x3 doses  - Dressing:  Mepilex remove POD7 (7/14/25)   - FEN: HLIV with good PO intake  - Bowel Regimen: Colace, senna, dulcolax  - PT/OT    - Continue home medications  - No charles    Dispo: Pending PT/OT eval     Discussed with the attending surgeon, Dr. Leonard.     Keaton Connor MD, PGY-1  Orthopaedic Surgery  Available via Epic Chat       While admitted, this patient will be followed by the Ortho Trauma Team, available via Epic Chat weekdays 6a-6p. Please page [phone] on nights and weekends.    Ortho Trauma  First Call: Keaton Connor and Hayden Melendrez, PGY-1  Second Call: Jana Bain, PGY-2  Third Call: Narciso Gilliam, PGY-3      "

## 2025-07-09 NOTE — DISCHARGE INSTRUCTIONS
JOINT CARE TEAM  Please use the information below to contact your care team following surgery.  If you are leaving a message, please include your full name, date of birth and date of surgery so that we can correctly identify you.  Your call will be returned within 1-2 business days, please do not leave multiple messages regarding a single issue while you are awaiting a return call.     Who to call Contact Information Matters needing handled    Grace PANCHAL, RN   Ortho/Trauma Nurse Navigator    [phone]   Prescription Refills   Orders for dental antibiotics lifelong  Nursing, medical question related questions or concerns within 6 weeks of surgery   Orders for Outpatient Physical Therapy          [place]            [phone] [address]     Scheduling office Visits  Leave of Absence or other paperwork  Any concerns more than 6 weeks from surgery - an appointment will need to be made     MEDICATION REFILLS - ANSLEY Mcgowan, RN (MyChart or Main Office)    You will not receive a call indicating that your prescription has been filled.  Please contact your pharmacy with any questions.    Medication refills will be filled Monday-Friday 7am to 1pm ONLY. Please call the office or send a [employer] message for a refill request.  Any requests received outside of this timeframe will be handled on the next business day.  Messages should be left directly through the office or via my chart.  Please do not call multiple times or call other members of the care team for medication needs, this will cause the refill to take longer.    Per State and Institutional policy, pain medications can only be refilled every 7 days for up to six weeks following surgery.    DISCHARGE MEDICATIONS - Please reference the sample schedule on the reverse side for instructions on how to best schedule medications.  PAIN MEDICATION    ___X___ Tramadol / Oxycodone  Tramadol and Oxycodone have been prescribed for post-operative pain  control.    These medications will only be refilled ONCE every 7 days for a period of up to 6 weeks following surgery.  After 6 weeks, you will transition to acetaminophen and over -the- counter anti-inflammatories such as Ibuprofen, Advil or Aleve in conjunction with ICE/COLD THERAPY.   Side effects may be constipation and nausea, vomiting, sleepiness, dizziness, lightheadedness, headache, blurred vision, dry mouth sweating, itching (if you have itching, over-the -counter Benadryl can be used as needed).  You may NOT operate a motor vehicle while taking these medications or have been cleared by your care team.     ___ X___Acetaminophen (Tylenol)  Acetaminophen has been prescribed as an adjunct for pain control. Take two 500 mg tablets every 6 hours for 4 weeks. You will not receive a refill on this medication.  Do not exceed 4000mg of acetaminophen within a 24 hour period.  Side effects may include nausea, heartburn, drowsiness, and headache.    _______ Meloxicam (Mobic)-Meloxicam has been prescribed as an adjunct anti-inflammatory to assist in pain control.    Take one 15mg tablet once daily for 4 weeks.  You will not receive refills on this medication.   Side effects may include nausea.  May not be prescribed if you are on a more potent blood thinner than aspirin or have chronic kidney disease.    BLOOD THINNER    ___X ___ Blood Thinner  or Resume prescribed medication  A blood thinner has been prescribed to prevent blood clots in your leg or lungs. Take as prescribed on the bottle for 4 weeks. You will not receive a refill on this medication.    ANTI NAUSEA    ______Pantoprazole (Protonix)  Pantoprazole has been prescribed to help with nausea and protect your stomach while taking pain medication. Take one 40 mg tablet once daily for 4 weeks. You will not receive a refill on this medication.    ______ Zofran   Zofran has been prescribed to help with nausea and protect your stomach while taking pain medication.  Take one 4 mg tablet every eight hours as needed for nausea. Refills will be provided as necessary.    STOOL SOFTENERS    ___ X___Colace (Docusate Sodium) & Miralax (polyethylene glycol)  Take both medications to help with constipation while using the Oxycodone and Tramadol for pain control.  You will not receive a refill on this medication.    ______ Senna  Senna has been prescribed to help with constipation while on Oxycodone and Tramadol. Take one tab, once daily. Senna is a laxative used to treat constipation.  Side effects may include nausea, vomiting, persistent diarrhea, abdominal cramps, and discolored urine.      You will not receive refills on the following medications:  Acetaminophen (Tylenol)  Meloxicam  Miralax  Colace  Pantoprazole  Blood Thinner    Pain Medication Refills [phone] or MyChart- Monday through Friday 7am-1pm    Medication refills will be sent upon receipt of your request during the times listed above. Due to the high call volume, you will not receive a call confirming prescription refills; please do not call multiple times.  Prescription refills may take a few hours to process, you may follow up with your pharmacy for pickup availability.    SAMPLE              The times below are an example of how to organize medications to optimize pain control      Time 3:00 am 6:00 am 9:00 am 12:00 pm 3:00 pm 6:00 pm 9:00 pm 12:00 am   Medications Tramadol Tylenol  Oxycodone  Miralax   Blood Thinner  Colace  Pantoprazole  Tramadol  Meloxicam Tylenol  Oxycodone Tramadol Tylenol  Oxycodone  Miralax Blood Thinner  Colace  Tramadol   Tylenol  Oxycodone            You may begin to wean off the pain medication as your pain remains controlled with increased activity.  The schedules provided are meant to serve as an example.  You may wean off based on your pain control.  Please note that pain medications are not filled beyond 6 weeks after surgery.              The times below are an example of how to  WEAN OFF medications WHILE CONTINUING TO OPTIMIZE PAIN CONTROL.  Your actual medication schedule may vary based on your last dose taken.    Time 12:00am 4:00am 8:00am 12:00pm 4:00pm 8:00pm   Med Tramadol Oxycodone   Tramadol Oxycodone Tramadol Oxycodone     Time 12:00am 6:00am 12:00pm 6:00pm   Med Tramadol Oxycodone   Tramadol Oxycodone     Time 12:00am 8:00am 4:00pm   Med Tramadol Oxycodone   Tramadol     Time 12:00am 12:00pm   Med Tramadol Tramadol

## 2025-07-09 NOTE — ANESTHESIA POSTPROCEDURE EVALUATION
Patient: Juan Marsh    Procedure Summary       Date: 07/09/25 Room / Location: [facility] OR 08 / [facility] OR    Anesthesia Start: 0834 Anesthesia Stop: 1131    Procedures:       Removal L Femur IMN (T2 Alpha Gamma Nail) // L Direct Anterior KEO (Left: Hip)      ARTHROPLASTY, HIP, TOTAL, WITHOUT CEMENT (Left) Diagnosis:       Avascular necrosis of femur head, left (Multi)      History of femur fracture      (Avascular necrosis of femur head, left (Multi) [M87.052])      (History of femur fracture [Z87.81])    Surgeons: Chip Leonard MD Responsible Provider: Justice Arvizu MD    Anesthesia Type: general, regional ASA Status: 3 - Emergent            Anesthesia Type: general, regional    Vitals Value Taken Time   /73 07/09/25 12:31   Temp 36.4 °C (97.5 °F) 07/09/25 11:26   Pulse 61 07/09/25 12:37   Resp 15 07/09/25 12:37   SpO2 100 % 07/09/25 12:37   Vitals shown include unfiled device data.    Anesthesia Post Evaluation    Patient location during evaluation: PACU  Patient participation: complete - patient participated  Level of consciousness: awake and alert  Pain management: adequate  Airway patency: patent  Cardiovascular status: acceptable  Respiratory status: acceptable  Hydration status: acceptable  Postoperative Nausea and Vomiting: none        There were no known notable events for this encounter.

## 2025-07-09 NOTE — PROGRESS NOTES
Physical Therapy    Physical Therapy Evaluation & Treatment    Patient Name: Juan Marsh  MRN: [id number]  Department: [place] [address]  Room: [facility]  Today's Date: 7/9/2025   Time Calculation  Start Time: 1433  Stop Time: 1503  Time Calculation (min): 30 min    Assessment/Plan   PT Assessment  PT Assessment Results: Decreased strength, Impaired balance, Decreased endurance, Decreased mobility, Impaired judgement  Rehab Prognosis: Good  Barriers to Discharge Home: Caregiver assistance, Physical needs, Cognition needs  Caregiver Assistance: Caregiver assistance needed per identified barriers - however, level of patient's required assistance exceeds assistance available at home  Cognition Needs: 24hr supervision for safety awareness needed, Recollection or understanding of precautions/restrictions limited  Physical Needs: Ambulating household distances limited by function/safety, 24hr mobility assistance needed, High falls risk due to function or environment  Evaluation/Treatment Tolerance: Patient limited by fatigue  Medical Staff Made Aware: Yes  End of Session Communication: Bedside nurse  Assessment Comment: Patient is a 31yo M s/p Removal L Femur IMN (T2 Alpha Gamma Nail) // L Direct Anterior KEO (Left: Hip) ARTHROPLASTY, HIP, TOTAL, WITHOUT CEMENT (Left). Patient lives with mother in an apartment, uses a cane at baseline. patient family can provide 24/7 support however pt is below baseline and has 2 flights of steps to enter the apartment. pt required mod-max A for transfers and ambulation. dc rec high  End of Session Patient Position: Bed, 3 rail up, Alarm on   IP OR SWING BED PT PLAN  Inpatient or Swing Bed: Inpatient  PT Plan  Treatment/Interventions: Bed mobility, Gait training, Transfer training, Stair training, Balance training, Neuromuscular re-education, Strengthening, Endurance training, Therapeutic exercise, Range of motion, Therapeutic activity  PT Plan: Ongoing PT  PT Frequency: Daily  PT  Discharge Recommendations: High intensity level of continued care  PT Recommended Transfer Status: Assist x1 (would do better with FWW)  PT - OK to Discharge: Yes (indicates PT eval complete and dc rec determined)      Subjective     PT Visit Info:  PT Received On: 07/09/25  General Visit Information:  General  Reason for Referral: Removal L Femur IMN (T2 Alpha Gamma Nail) // L Direct Anterior KEO (Left: Hip)      ARTHROPLASTY, HIP, TOTAL, WITHOUT CEMENT (Left)  Past Medical History Relevant to Rehab: notable for heavy alcohol use, systemic (pericarditis, arthralgias, skin rash, recurrent orogenital ulcers, uveitis, retinal vasculitis, but negative HLAB51) and 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  Family/Caregiver Present: Yes  Caregiver Feedback: parents present, very supportive.  Prior to Session Communication: Bedside nurse  Patient Position Received: Bed, 3 rail up  General Comment: pt supine in bed, RN cleared. pt drowsy and required cueing. family encouraging.  Home Living:  Home Living  Type of Home: Apartment  Lives With:  (mother- who is an RN)  Home Adaptive Equipment: Cane, Walker rolling or standard, Crutches  Home Layout: One level  Home Access: Stairs to enter with rails (elevator broken)  Entrance Stairs-Rails:  (1 HR)  Entrance Stairs-Number of Steps: 2 flights,  Bathroom Shower/Tub: Tub/shower unit  Bathroom Equipment: Shower chair with back  Home Living Comments: reports he went to rehab after admission in february but was only there for 9 days and mother reports pt rushed his stay  Prior Level of Function:  Prior Function Per Pt/Caregiver Report  Level of [place]: Independent with homemaking with ambulation, Independent with ADLs and functional transfers  Receives Help From: Family  ADL Assistance: Independent (mother helps occasionally)  Homemaking  Assistance:  (family assists)  Ambulatory Assistance:  (cane)  Precautions:  Precautions  LE Weight Bearing Status: Weight Bearing as Tolerated  Medical Precautions: Fall precautions    Objective   Pain:  Pain Assessment  Pain Assessment: 0-10  0-10 (Numeric) Pain Score: 8  Pain Type: Acute pain, Surgical pain  Pain Location:  (LLE)  Pain Interventions: Repositioned (RN medicated)  Cognition:  Cognition  Overall Cognitive Status:  (drowsy, lethargic)  Orientation Level:  (cues required, AO x4)    General Assessments:     Activity Tolerance  Endurance: Tolerates 10 - 20 min exercise with multiple rests    Sensation  Light Touch:  (denies)    Functional Assessments:  Bed Mobility  Bed Mobility: Yes  Bed Mobility 1  Bed Mobility 1: Supine to sitting, Sitting to supine  Level of Assistance 1: Minimum assistance  Bed Mobility Comments 1: assist with LLE    Transfers  Transfer: Yes  Transfer 1  Transfer From 1: Sit to, Stand to  Transfer to 1: Stand, Sit  Technique 1: Sit to stand, Stand to sit  Transfer Level of Assistance 1: Minimum assistance  Trials/Comments 1: forward flexed    Ambulation/Gait Training  Ambulation/Gait Training Performed: Yes  Ambulation/Gait Training 1  Surface 1: Level tile  Quality of Gait 1: Inconsistent stride length, Decreased step length, Soft knee(s), Antalgic  Comments/Distance (ft) 1: mod A for lateral steps to the L, max A for 2 forward/backward steps.pt used HHA- refusing to trial FWW despite edu on pain management  Extremity/Trunk Assessments:  RLE   RLE : Exceptions to WFL  Strength RLE  RLE Overall Strength: Within Functional Limits - able to perform ADL tasks with strength, Deficits  LLE   LLE :  (not formally tested due to surgical limb)  Treatments:  Therapeutic Activity  Therapeutic Activity Performed: Yes  Therapeutic Activity 1: increased time to complete all mobility.  Therapeutic Activity 2: stood from EOB, lateral steps.  Therapeutic Activity 3: then forward/back  steps.  Outcome Measures:  [facility] Basic Mobility  Turning from your back to your side while in a flat bed without using bedrails: A little  Moving from lying on your back to sitting on the side of a flat bed without using bedrails: A little  Moving to and from bed to chair (including a wheelchair): A lot  Standing up from a chair using your arms (e.g. wheelchair or bedside chair): A lot  To walk in hospital room: A lot  Climbing 3-5 steps with railing: Total  Basic Mobility - Total Score: 13    Encounter Problems       Encounter Problems (Active)       Mobility       STG - Patient will ambulate > 75' with LRAD modif indep  (Progressing)       Start:  07/09/25    Expected End:  07/30/25               PT Transfers       STG - Transfer from bed to chair LRAD modif indep  (Progressing)       Start:  07/09/25    Expected End:  07/30/25            STG - Patient will perform bed mobility modif indep (Progressing)       Start:  07/09/25    Expected End:  07/30/25            STG - Patient will transfer sit to and from stand LRAD modif indep  (Progressing)       Start:  07/09/25    Expected End:  07/30/25                   Education Documentation  Precautions, taught by Emily Sr PT at 7/9/2025  3:26 PM.  Learner: Patient  Readiness: Acceptance  Method: Explanation  Response: Verbalizes Understanding  Comment: edu on role of PT, dc plan and precatuions    Mobility Training, taught by Emily Sr PT at 7/9/2025  3:26 PM.  Learner: Patient  Readiness: Acceptance  Method: Explanation  Response: Verbalizes Understanding  Comment: edu on role of PT, dc plan and precatuions    Education Comments  No comments found.

## 2025-07-09 NOTE — CARE PLAN
The patient's goals for the shift include      The clinical goals for the shift include pain management    Goal met.    Problem: Pain - Adult  Goal: Verbalizes/displays adequate comfort level or baseline comfort level  Outcome: Progressing     Problem: Safety - Adult  Goal: Free from fall injury  Outcome: Progressing     Problem: Discharge Planning  Goal: Discharge to home or other facility with appropriate resources  Outcome: Progressing     Problem: Chronic Conditions and Co-morbidities  Goal: Patient's chronic conditions and co-morbidity symptoms are monitored and maintained or improved  Outcome: Progressing     Problem: Nutrition  Goal: Nutrient intake appropriate for maintaining nutritional needs  Outcome: Progressing

## 2025-07-09 NOTE — CONSULTS
Juan Marsh is a 30 y.o. year old male patient who presents for L femur IMN removal with Dr. Leonard on 07/09. Acute Pain consulted for block for postoperative pain control.     Anticipated Postop Pain Issues -   Palliative: typically relieved with IV analgesics and regional local anesthetics  Provocative: typically with movement  Quality: typically burning and aching  Radiation: typically none  Severity: typically severe 8-10/10  Timing: typically constant    Medical History[1]     Surgical History[2]     Family History[3]     Social History     Socioeconomic History    Marital status: Single     Spouse name: Not on file    Number of children: Not on file    Years of education: Not on file    Highest education level: Not on file   Occupational History    Not on file   Tobacco Use    Smoking status: Every Day     Types: Cigars     Passive exposure: Past    Smokeless tobacco: Never   Vaping Use    Vaping status: Unknown   Substance and Sexual Activity    Alcohol use: Not Currently     Comment: previously 1 pint liquor daily. last drink in Nov 24    Drug use: Yes     Types: Marijuana     Comment: last used 7/7/25    Sexual activity: Defer   Other Topics Concern    Not on file   Social History Narrative    Not on file     Social Drivers of Health     Financial Resource Strain: Low Risk  (2/11/2025)    Overall Financial Resource Strain (CARDIA)     Difficulty of Paying Living Expenses: Not very hard   Recent Concern: Financial Resource Strain - Medium Risk (12/9/2024)    Overall Financial Resource Strain (CARDIA)     Difficulty of Paying Living Expenses: Somewhat hard   Food Insecurity: No Food Insecurity (12/8/2024)    Hunger Vital Sign     Worried About Running Out of Food in the Last Year: Never true     Ran Out of Food in the Last Year: Never true   Transportation Needs: No Transportation Needs (3/26/2025)    OASIS : Transportation     Lack of Transportation (Medical): No     Lack of Transportation  (Non-Medical): No     Patient Unable or Declines to Respond: No   Recent Concern: Transportation Needs - Unmet Transportation Needs (2/28/2025)    OASIS : Transportation     Lack of Transportation (Medical): Yes     Lack of Transportation (Non-Medical): No     Patient Unable or Declines to Respond: No   Physical Activity: Insufficiently Active (8/29/2024)    Exercise Vital Sign     Days of Exercise per Week: 3 days     Minutes of Exercise per Session: 40 min   Stress: Not on file   Social Connections: Feeling Socially Integrated (3/26/2025)    OASIS : Social Isolation     Frequency of experiencing loneliness or isolation: Never   Intimate Partner Violence: Not At Risk (12/8/2024)    Humiliation, Afraid, Rape, and Kick questionnaire     Fear of Current or Ex-Partner: No     Emotionally Abused: No     Physically Abused: No     Sexually Abused: No   Housing Stability: Low Risk  (2/11/2025)    Housing Stability Vital Sign     Unable to Pay for Housing in the Last Year: No     Number of Times Moved in the Last Year: 0     Homeless in the Last Year: No        RX Allergies[4]      Review of Systems  Gen: No fatigue, anorexia, insomnia, fever.   Eyes: No vision loss, double vision, drainage, eye pain.   ENT: No pharyngitis, dry mouth, no hearing changes or ear discharge  Cardiac: No chest pain, palpitations, syncope, near syncope.   Pulmonary: No shortness of breath, cough, hemoptysis.   Heme/lymph: No swollen glands, fever, bleeding.   GI: No abdominal pain, change in bowel habits, melena, hematemesis, hematochezia, nausea, vomiting, diarrhea.   : No discharge, dysuria, frequency, urgency, hematuria.  Endo: No polyuria or weight loss.   Musculoskeletal: Negative for any pain or loss of ROM/weakness  Skin: No rashes or lesions  Neuro: Normal speech, no numbness or weakness. No gait difficulties  Review of systems is otherwise negative unless stated above or in history of present illness.    Physical  Exam:  Constitutional:  no distress, alert and cooperative  Eyes: clear sclera  Head/Neck: No apparent injury, trachea midline  Respiratory/Thorax: Patent airways, thorax symmetric, breathing comfortably  Cardiovascular: no pitting edema  Gastrointestinal: Nondistended  Musculoskeletal: ROM intact  Extremities: no clubbing  Neurological: alert, roche x4  Psychological: Appropriate affect    No results found. However, due to the size of the patient record, not all encounters were searched. Please check Results Review for a complete set of results.     Juan Marsh is a 30 y.o. year old male patient who presents for L femur IMN removal with Dr. Leonard on 07/09. Acute Pain consulted for block for postoperative pain control.     Plan:    - L sided QL SS block performed preoperatively on 07/09  - Pain medications per primary team  - Will see on POD1 if inpatient    Acute Pain Team  pg [phone] ph [phone].        [1]   Past Medical History:  Diagnosis Date    Lupus (systemic lupus erythematosus) (Multi)     Stroke (Multi)     Uveitis    [2]   Past Surgical History:  Procedure Laterality Date    CT ANGIO NECK  9/2/2021    CT NECK ANGIO W AND WO IV CONTRAST 9/2/2021 [facility]ICAL [facility]GAC[facility]    CT HEAD ANGIO W AND WO IV CONTRAST  9/2/2021    CT HEAD ANGIO W AND WO IV CONTRAST 9/2/2021 [facility] CLINICAL LEGACY   [3]   Family History  Problem Relation Name Age of Onset    Other (Diabetes mellitus) Other Multiple Family Members     Hypertension Other Multiple Family Members     Cancer Other Multiple Family Members    [4] No Known Allergies

## 2025-07-09 NOTE — PROCEDURES
Peripheral Block    Patient location during procedure: pre-op  Medication administered at: 7/9/2025 7:04 AM  End time: 7/9/2025 7:10 AM  Reason for block: at surgeon's request, post-op pain management and acute pain service  Staffing  Performed: attending and resident   Authorized by: Jasson Barker MD    Performed by: Jasson Barker MD  Preanesthetic Checklist  Completed: patient identified, IV checked, site marked, risks and benefits discussed, surgical consent, monitors and equipment checked, pre-op evaluation and timeout performed   Timeout performed at: 7/9/2025 7:04 AM  Peripheral Block  Patient position: laying flat  Prep: ChloraPrep  Patient monitoring: heart rate, continuous pulse ox and cardiac monitor  Block type: QL  Laterality: left  Injection technique: single-shot  Guidance: ultrasound guided  Local infiltration: lidocaine  Infiltration strength: 1 %  Dose: 3 mL  Needle  Needle type: short-bevel   Needle gauge: 22 G  Needle length: 8 cm  Needle localization: ultrasound guidance     image stored in chart  Assessment  Injection assessment: negative aspiration for heme, no paresthesia on injection, incremental injection and local visualized surrounding nerve on ultrasound  Heart rate change: no  Slow fractionated injection: yes  Additional Notes  Quadratus lumborum block:    Prior to procedure: Following a focused history, procedure-related and patient-specific complications were discussed. Risks, benefits, and alternatives were explained. Informed, written consent was provided by the patient and/or surrogate decision maker for the block. Anticoagulation (if any) was held per SIDNEY guidelines. ASA monitors were applied. Patient was positioned, prepped with chlorhexidine, and draped with sterile towels.     Ultrasound guidance was used to identify the quadratus lumborum and surrounding structures. The needle was visualized throughout the procedure. Aspiration was negative. A total of 20 cc of 0.5%  ropivacaine, dexamethasone 4mg, and 1:200,000 epinephrine, was divided and injected unilaterally. Patient tolerated procedure well.     Timeout by Ochoa LERMA

## 2025-07-09 NOTE — HOSPITAL COURSE
30 y.o. year-old male who presented with AVN L hip. Patient is now s/p L KEO, L femur ROM on 7/9/25 with Dr. Chip Leonard . On the day of surgery, patient was identified in the pre-operative holding area and agreeable to proceed with surgery. Written consent was obtained.  Please see operative note for further details of this procedure. Patient received 24 hours of cosmo-operative antibiotics. Patient recovered in the PACU before transfer to a regular nursing floor. Patient was started on oxycodone and Tylenol for pain control. Physical therapy recommended continued recovery at rehab facility with continued physical therapy and wound care. On the day of discharge, patient was afebrile with stable vital signs. Patient was neurovascularly intact at time of discharge. Patient was discharged with prescription of Asprin 81mg BID for DVT prophylaxis for 4 weeks. Patient will follow-up with Dr. Chip Leonard  in 3 weeks for a postoperative visit.

## 2025-07-09 NOTE — ANESTHESIA PREPROCEDURE EVALUATION
Patient: Juan Marsh    Procedure Information       Anesthesia Start Date/Time: 25 0834    Procedures:       Removal L Femur IMN (T2 Alpha Gamma Nail) // L Direct Anterior KEO (Left: Hip)      ARTHROPLASTY, HIP, TOTAL, WITHOUT CEMENT (Left)    Location: [facility] OR 08 [facility] [place] [facility]    Surgeons: Chip Leonard MD            Relevant Problems   No relevant active problems       Clinical information reviewed:   Tobacco  Allergies  Meds   Med Hx  Surg Hx            NPO Detail:  NPO/Void Status  Carbohydrate Drink Given Prior to Surgery? : N  Date of Last Liquid: 25  Time of Last Liquid: 1700  Date of Last Solid: 25  Time of Last Solid: 1700  Last Intake Type: Clear fluids  Time of Last Void: 0500         Physical Exam    Airway  Mallampati: II  Mouth openin finger widths     Cardiovascular   Rhythm: regular  Rate: normal     Dental - normal exam     Pulmonary - normal exam   Abdominal            Anesthesia Plan    History of general anesthesia?: yes  History of complications of general anesthesia?: no    ASA 3 - emergent     general     The patient is a current smoker.    Anesthetic plan and risks discussed with patient.  Use of blood products discussed with patient who.

## 2025-07-09 NOTE — H&P
[facility] Department of Orthopaedic Surgery   Surgical History & Physical >30 Days    Reason for Surgery: AVN L hip  Planned Procedure: removal of IMN, left              Direct anterior total hip arthroplasty, left    History & Physical Reviewed:  Juan Marsh is a 30 y.o. male presenting with the above symptoms. This patient was evaluated as an outpatient, and a plan was made for operative management. Risks and benefits were discussed, and the patient and/or caregivers elected to proceed. The patient presents for the above listed procedure today.     Medical History[1]  Surgical History[2]  Social History     Tobacco Use    Smoking status: Every Day     Types: Cigars    Smokeless tobacco: Never   Substance Use Topics    Alcohol use: Not Currently     Comment: previously 1 pint liquor daily. last drink in Nov 24     Prior to Admission medications    Medication Sig Start Date End Date Taking? Authorizing Provider   acetaminophen (TylenoL) 325 mg tablet Take 2 tablets (650 mg) by mouth every 6 hours if needed for mild pain (1 - 3) or fever (temp greater than 38.0 C). 4/3/25   Joi Randle MD   b complex vitamins capsule Take 1 capsule by mouth once daily 4/3/25   Joi Randle MD   brimonidine (AlphaGAN) 0.2 % ophthalmic solution Instill 1 drop into right eye every 8 hours 4/3/25   Joi Randle MD   Betasept Surgical Scrub 4 % external liquid Use per CPM/PAT provided instructions 6/16/25   Donald Ragland MD   chlorhexidine (Peridex) 0.12 % solution Swish and spit 15ml night before and morning of surgery. Do not swallow 6/16/25   Donald Ragland MD   cyclobenzaprine (Flexeril) 10 mg tablet Take 1 tablet (10 mg) by mouth 3 times a day as needed for muscle spasms. 4/3/25   Joi Randle MD   diclofenac sodium (Arthritis Pain, diclofenac,) 1 % gel Apply topically four times a day for 21 days 4/3/25   Joi Randle MD   ergocalciferol (Vitamin D-2)  1250 mcg (50,000 units) capsule Take 1 capsule (1.25 mg) by mouth 1 (one) time per week. 6/24/25   Juancarlos Ulloa MD   gabapentin (Neurontin) 300 mg capsule Take 1 capsule (300 mg) by mouth every 8 hours. 4/3/25 6/16/25  Joi Randle MD   mirtazapine (Remeron) 15 mg tablet Take 1 tablet by mouth once daily at bedtime. Take at bedtime while taking prednisone. 4/3/25   Joi Randle MD   mycophenolate (Cellcept) 500 mg tablet Take 2 tablets (1,000 mg) by mouth 2 times a day. 6/24/25 6/24/26  Juancarlos Ulloa MD   omega-3 acid ethyl esters (Lovaza) 1 gram capsule Take 1 capsule by mouth once daily 4/3/25   Joi Randle MD   pantoprazole (ProtoNix) 40 mg EC tablet Take 1 tablet (40 mg) by mouth once daily. 4/3/25 6/16/25  Joi Randle MD   prednisoLONE acetate (Pred-Forte) 1 % ophthalmic suspension Administer 1 drop into the right eye 4 times a day. 4/3/25   Joi Randle MD   predniSONE (Deltasone) 10 mg tablet Take 1 tablet a day. 7/8/25   Juancarlos Ulloa MD   sulfamethoxazole-trimethoprim (Bactrim DS) 800-160 mg tablet Take 1 tablet by mouth three times a week on monday, wednesday and friday 2/20/25      sulfamethoxazole-trimethoprim (Bactrim DS) 800-160 mg tablet Take 1 tablet by mouth once a day on Monday, Wednesday, and Friday. 6/18/25   ELMIRA Harrison-CNP   predniSONE (Deltasone) 10 mg tablet Take 1 tablet a day. 6/24/25 7/8/25  Juancarlos Ulloa MD     RX Allergies[3]    Review of Systems:   Gen: Denies recent weight loss  Neuro: Denies recent confusion  Ophtho: Denies changes in vision  ENT: Denies changes in hearing  Endo: Denies weight loss/weight gain  CV: Denies chest pain  Resp: Denies shortness of breath  GI: Denies melena/hematochezia  : Denies painful urination  MSK: Per above HPI  Heme: No abnormal bleeding  Psych: Denies hallucinations    Physical Exam:  - Constitutional: No acute distress, cooperative  - Eyes: EOM grossly intact  -  Head/Neck: Trachea midline  - Respiratory/Thorax: Normal work of breathing  - Cardiovascular: palpable radial and DP pulses  - Gastrointestinal: Nondistended  - Psychological: Appropriate mood/behavior  - Skin: Warm and dry. Additional findings in musculoskeletal evaluation  - Musculoskeletal: Moves all extremities     ERAS patient?: No    COVID-19 Risk Consent:   Surgeon has reviewed the key risks related to apoorva COVID-19 and subsequent sequelae.   Keaton Connor MD, PGY-1  Orthopaedic Surgery  Available via Epic Chat                   [1]   Past Medical History:  Diagnosis Date    Lupus (systemic lupus erythematosus) (Multi)     Stroke (Multi)     Uveitis    [2]   Past Surgical History:  Procedure Laterality Date    CT ANGIO NECK  9/2/2021    CT NECK ANGIO W AND WO IV CONTRAST 9/2/2021 [facility] CL[facility]ICAL [facility]GAC[facility]    CT HEAD ANGIO W AND WO IV CONTRAST  9/2/2021    CT HEAD ANGIO W AND WO IV CONTRAST 9/2/2021 [facility] CLINICAL LEGACY   [3] No Known Allergies

## 2025-07-09 NOTE — ANESTHESIA PROCEDURE NOTES
Airway  Date/Time: 7/9/2025 8:41 AM  Reason: elective    Airway not difficult    Staffing  Performed: LINDA   Authorized by: Justice Arvizu MD    Performed by: Liliana Mejía  Patient location during procedure: OR    Patient Condition  Indications for airway management: anesthesia and airway protection  Patient position: sniffing  Sedation level: deep     Final Airway Details   Preoxygenated: yes  Final airway type: endotracheal airway  Successful airway: ETT  Cuffed: yes   Successful intubation technique: direct laryngoscopy  Adjuncts used in placement: intubating stylet  Endotracheal tube insertion site: oral  Blade: Rajiv  Blade size: #4  ETT size (mm): 7.5  Cormack-Lehane Classification: grade I - full view of glottis  Placement verified by: chest auscultation and capnometry   Measured from: teeth  ETT to teeth (cm): 23  Number of attempts at approach: 1

## 2025-07-10 ENCOUNTER — PHARMACY VISIT (OUTPATIENT)
Dept: PHARMACY | Facility: CLINIC | Age: 30
End: 2025-07-10
Payer: COMMERCIAL

## 2025-07-10 ENCOUNTER — DOCUMENTATION (OUTPATIENT)
Dept: HOME HEALTH SERVICES | Facility: HOME HEALTH | Age: 30
End: 2025-07-10
Payer: COMMERCIAL

## 2025-07-10 ENCOUNTER — HOME HEALTH ADMISSION (OUTPATIENT)
Dept: HOME HEALTH SERVICES | Facility: HOME HEALTH | Age: 30
End: 2025-07-10
Payer: COMMERCIAL

## 2025-07-10 VITALS
HEIGHT: 69 IN | HEART RATE: 82 BPM | WEIGHT: 140 LBS | RESPIRATION RATE: 17 BRPM | TEMPERATURE: 97.9 F | DIASTOLIC BLOOD PRESSURE: 69 MMHG | BODY MASS INDEX: 20.73 KG/M2 | SYSTOLIC BLOOD PRESSURE: 111 MMHG | OXYGEN SATURATION: 99 %

## 2025-07-10 LAB
ANION GAP SERPL CALC-SCNC: 13 MMOL/L (ref 10–20)
BUN SERPL-MCNC: 22 MG/DL (ref 6–23)
CALCIUM SERPL-MCNC: 8.8 MG/DL (ref 8.6–10.6)
CHLORIDE SERPL-SCNC: 102 MMOL/L (ref 98–107)
CO2 SERPL-SCNC: 24 MMOL/L (ref 21–32)
CREAT SERPL-MCNC: 0.77 MG/DL (ref 0.5–1.3)
EGFRCR SERPLBLD CKD-EPI 2021: >90 ML/MIN/1.73M*2
ERYTHROCYTE [DISTWIDTH] IN BLOOD BY AUTOMATED COUNT: 15.9 % (ref 11.5–14.5)
GLUCOSE SERPL-MCNC: 109 MG/DL (ref 74–99)
HCT VFR BLD AUTO: 29 % (ref 41–52)
HGB BLD-MCNC: 9.8 G/DL (ref 13.5–17.5)
MCH RBC QN AUTO: 31.5 PG (ref 26–34)
MCHC RBC AUTO-ENTMCNC: 33.8 G/DL (ref 32–36)
MCV RBC AUTO: 93 FL (ref 80–100)
NRBC BLD-RTO: 0 /100 WBCS (ref 0–0)
PLATELET # BLD AUTO: 216 X10*3/UL (ref 150–450)
POTASSIUM SERPL-SCNC: 3.9 MMOL/L (ref 3.5–5.3)
RBC # BLD AUTO: 3.11 X10*6/UL (ref 4.5–5.9)
SODIUM SERPL-SCNC: 135 MMOL/L (ref 136–145)
WBC # BLD AUTO: 16.1 X10*3/UL (ref 4.4–11.3)

## 2025-07-10 PROCEDURE — 99231 SBSQ HOSP IP/OBS SF/LOW 25: CPT

## 2025-07-10 PROCEDURE — 80048 BASIC METABOLIC PNL TOTAL CA: CPT

## 2025-07-10 PROCEDURE — 2500000001 HC RX 250 WO HCPCS SELF ADMINISTERED DRUGS (ALT 637 FOR MEDICARE OP): Mod: SE

## 2025-07-10 PROCEDURE — RXMED WILLOW AMBULATORY MEDICATION CHARGE

## 2025-07-10 PROCEDURE — 36415 COLL VENOUS BLD VENIPUNCTURE: CPT

## 2025-07-10 PROCEDURE — 97165 OT EVAL LOW COMPLEX 30 MIN: CPT | Mod: GO

## 2025-07-10 PROCEDURE — 7100000011 HC EXTENDED STAY RECOVERY HOURLY - NURSING UNIT

## 2025-07-10 PROCEDURE — 85027 COMPLETE CBC AUTOMATED: CPT

## 2025-07-10 PROCEDURE — 2500000004 HC RX 250 GENERAL PHARMACY W/ HCPCS (ALT 636 FOR OP/ED): Mod: SE

## 2025-07-10 PROCEDURE — 97116 GAIT TRAINING THERAPY: CPT | Mod: GP

## 2025-07-10 RX ORDER — PANTOPRAZOLE SODIUM 40 MG/1
40 TABLET, DELAYED RELEASE ORAL
Qty: 20 TABLET | Refills: 0 | Status: SHIPPED | OUTPATIENT
Start: 2025-07-11 | End: 2025-07-31

## 2025-07-10 RX ORDER — PNV NO.95/FERROUS FUM/FOLIC AC 28MG-0.8MG
1 TABLET ORAL 2 TIMES DAILY
Qty: 60 TABLET | Refills: 11 | Status: SHIPPED | OUTPATIENT
Start: 2025-07-10 | End: 2026-07-10

## 2025-07-10 RX ORDER — BUMETANIDE 0.25 MG/ML
0.5 INJECTION, SOLUTION INTRAMUSCULAR; INTRAVENOUS ONCE
Status: CANCELLED | OUTPATIENT
Start: 2025-07-10 | End: 2025-07-10

## 2025-07-10 RX ORDER — ONDANSETRON 4 MG/1
4 TABLET, FILM COATED ORAL EVERY 8 HOURS PRN
Qty: 20 TABLET | Refills: 0 | Status: SHIPPED | OUTPATIENT
Start: 2025-07-10

## 2025-07-10 RX ORDER — INDOMETHACIN 25 MG/1
25 CAPSULE ORAL
Qty: 90 CAPSULE | Refills: 0 | Status: SHIPPED | OUTPATIENT
Start: 2025-07-10 | End: 2025-08-09

## 2025-07-10 RX ORDER — PREDNISONE 10 MG/1
10 TABLET ORAL DAILY
Qty: 21 TABLET | Refills: 8 | Status: SHIPPED | OUTPATIENT
Start: 2025-07-10

## 2025-07-10 RX ORDER — FOLIC ACID 1 MG/1
TABLET ORAL DAILY
Status: CANCELLED | OUTPATIENT
Start: 2025-07-10

## 2025-07-10 RX ORDER — ACETAMINOPHEN 325 MG/1
650 TABLET ORAL EVERY 6 HOURS SCHEDULED
Qty: 240 TABLET | Refills: 0 | Status: SHIPPED | OUTPATIENT
Start: 2025-07-10

## 2025-07-10 RX ORDER — POLYETHYLENE GLYCOL 3350 17 G/17G
17 POWDER, FOR SOLUTION ORAL DAILY
Qty: 510 G | Refills: 0 | Status: SHIPPED | OUTPATIENT
Start: 2025-07-10

## 2025-07-10 RX ORDER — ASPIRIN 81 MG/1
81 TABLET ORAL 2 TIMES DAILY
Qty: 26 TABLET | Refills: 0 | Status: SHIPPED | OUTPATIENT
Start: 2025-07-10 | End: 2025-07-23

## 2025-07-10 RX ORDER — OXYCODONE HYDROCHLORIDE 5 MG/1
5 TABLET ORAL EVERY 6 HOURS PRN
Qty: 15 TABLET | Refills: 0 | Status: SHIPPED | OUTPATIENT
Start: 2025-07-10

## 2025-07-10 RX ORDER — PREDNISONE 20 MG/1
10 TABLET ORAL DAILY
Status: CANCELLED | OUTPATIENT
Start: 2025-07-10

## 2025-07-10 RX ADMIN — OXYCODONE 5 MG: 5 TABLET ORAL at 05:37

## 2025-07-10 RX ADMIN — ASPIRIN 81 MG: 81 TABLET, COATED ORAL at 08:38

## 2025-07-10 RX ADMIN — INDOMETHACIN 25 MG: 25 CAPSULE ORAL at 11:38

## 2025-07-10 RX ADMIN — ACETAMINOPHEN 650 MG: 325 TABLET ORAL at 05:37

## 2025-07-10 RX ADMIN — CEFAZOLIN SODIUM 2 G: 2 INJECTION, SOLUTION INTRAVENOUS at 08:39

## 2025-07-10 RX ADMIN — CYCLOBENZAPRINE 10 MG: 10 TABLET, FILM COATED ORAL at 00:38

## 2025-07-10 RX ADMIN — CEFAZOLIN SODIUM 2 G: 2 INJECTION, SOLUTION INTRAVENOUS at 00:18

## 2025-07-10 RX ADMIN — ACETAMINOPHEN 650 MG: 325 TABLET ORAL at 00:18

## 2025-07-10 RX ADMIN — PANTOPRAZOLE SODIUM 40 MG: 40 TABLET, DELAYED RELEASE ORAL at 05:37

## 2025-07-10 RX ADMIN — ACETAMINOPHEN 650 MG: 325 TABLET ORAL at 11:38

## 2025-07-10 ASSESSMENT — COGNITIVE AND FUNCTIONAL STATUS - GENERAL
HELP NEEDED FOR BATHING: A LITTLE
MOVING TO AND FROM BED TO CHAIR: A LITTLE
WALKING IN HOSPITAL ROOM: A LITTLE
TOILETING: A LITTLE
WALKING IN HOSPITAL ROOM: A LITTLE
DRESSING REGULAR LOWER BODY CLOTHING: A LITTLE
MOBILITY SCORE: 18
HELP NEEDED FOR BATHING: A LITTLE
HELP NEEDED FOR BATHING: A LITTLE
DRESSING REGULAR UPPER BODY CLOTHING: A LITTLE
DRESSING REGULAR UPPER BODY CLOTHING: A LITTLE
STANDING UP FROM CHAIR USING ARMS: A LITTLE
MOVING FROM LYING ON BACK TO SITTING ON SIDE OF FLAT BED WITH BEDRAILS: A LITTLE
CLIMB 3 TO 5 STEPS WITH RAILING: A LOT
TURNING FROM BACK TO SIDE WHILE IN FLAT BAD: A LITTLE
DAILY ACTIVITIY SCORE: 20
CLIMB 3 TO 5 STEPS WITH RAILING: A LITTLE
DAILY ACTIVITIY SCORE: 20
MOBILITY SCORE: 18
DAILY ACTIVITIY SCORE: 20
TOILETING: A LITTLE
DRESSING REGULAR LOWER BODY CLOTHING: A LITTLE
DRESSING REGULAR LOWER BODY CLOTHING: A LITTLE
TOILETING: A LITTLE
TURNING FROM BACK TO SIDE WHILE IN FLAT BAD: A LITTLE
TURNING FROM BACK TO SIDE WHILE IN FLAT BAD: A LITTLE
MOVING TO AND FROM BED TO CHAIR: A LITTLE
CLIMB 3 TO 5 STEPS WITH RAILING: A LOT
CLIMB 3 TO 5 STEPS WITH RAILING: A LOT
DRESSING REGULAR LOWER BODY CLOTHING: A LITTLE
TURNING FROM BACK TO SIDE WHILE IN FLAT BAD: A LITTLE
MOVING TO AND FROM BED TO CHAIR: A LITTLE
MOBILITY SCORE: 18
STANDING UP FROM CHAIR USING ARMS: A LITTLE
WALKING IN HOSPITAL ROOM: A LITTLE
TOILETING: A LITTLE
TURNING FROM BACK TO SIDE WHILE IN FLAT BAD: A LITTLE
TOILETING: A LITTLE
STANDING UP FROM CHAIR USING ARMS: A LITTLE
HELP NEEDED FOR BATHING: A LITTLE
MOVING TO AND FROM BED TO CHAIR: A LITTLE
DRESSING REGULAR UPPER BODY CLOTHING: A LITTLE
HELP NEEDED FOR BATHING: A LITTLE
DRESSING REGULAR LOWER BODY CLOTHING: A LITTLE
WALKING IN HOSPITAL ROOM: A LITTLE
MOBILITY SCORE: 18
CLIMB 3 TO 5 STEPS WITH RAILING: A LOT
WALKING IN HOSPITAL ROOM: A LITTLE
DRESSING REGULAR UPPER BODY CLOTHING: A LITTLE
MOVING TO AND FROM BED TO CHAIR: A LITTLE
DAILY ACTIVITIY SCORE: 21

## 2025-07-10 ASSESSMENT — PAIN DESCRIPTION - ORIENTATION: ORIENTATION: LEFT

## 2025-07-10 ASSESSMENT — PAIN SCALES - GENERAL
PAINLEVEL_OUTOF10: 0 - NO PAIN
PAINLEVEL_OUTOF10: 5 - MODERATE PAIN
PAINLEVEL_OUTOF10: 5 - MODERATE PAIN
PAINLEVEL_OUTOF10: 0 - NO PAIN
PAINLEVEL_OUTOF10: 5 - MODERATE PAIN

## 2025-07-10 ASSESSMENT — PAIN - FUNCTIONAL ASSESSMENT
PAIN_FUNCTIONAL_ASSESSMENT: 0-10

## 2025-07-10 ASSESSMENT — PAIN DESCRIPTION - DESCRIPTORS
DESCRIPTORS: ACHING
DESCRIPTORS: DULL;ACHING

## 2025-07-10 ASSESSMENT — PAIN DESCRIPTION - LOCATION: LOCATION: HIP

## 2025-07-10 ASSESSMENT — ACTIVITIES OF DAILY LIVING (ADL)
LACK_OF_TRANSPORTATION: NO
BATHING_ASSISTANCE: MINIMAL

## 2025-07-10 NOTE — HH CARE COORDINATION
Home Care received a Referral for Physical Therapy and Occupational Therapy. We have processed the referral for a Start of Care on 7/11.     If you have any questions or concerns, please feel free to contact us at [phone]. Follow the prompts, enter your five digit zip code, and you will be directed to your care team on C[facility] 1.

## 2025-07-10 NOTE — PROGRESS NOTES
Physical Therapy    Physical Therapy Treatment    Patient Name: Juan Marsh  MRN: [id number]  Department: [place] [address]  Room: 60Count includes the [facility]6023-A  Today's Date: 7/10/2025  Time Calculation  Start Time: 1039  Stop Time: 1059  Time Calculation (min): 20 min       Assessment/Plan   PT Assessment  PT Assessment Results: Decreased strength, Decreased range of motion, Decreased endurance, Impaired balance, Decreased mobility, Pain  Barriers to Discharge Home: Caregiver assistance, Physical needs  Caregiver Assistance: Caregiver assistance needed per identified barriers - however, level of patient's required assistance exceeds assistance available at home  Physical Needs: Intermittent mobility assistance needed  Evaluation/Treatment Tolerance: Patient tolerated treatment well  End of Session Communication: Bedside nurse  Assessment Comment: Pt tolerated session well this date. He was able to amb ~75-85' w/ FWW without taking standing/sitting rest break. Trialed steps this date as pt has 2 flights of stairs to enter home. He was able to negotiate 4 steps x2 reps (8 steps total). Trialed holding onto unilateral rail with one hand & holding one rail with two hands & ascending/descending sideways. Number of stairs performed limited as pt was attached to IV pole. Overall, pt demo's ability to negotiate steps & reports that he will have someone to assist him into house. Recommending LOW intensity level of PT following discharge from acute hospital.  End of Session Patient Position: Bed, 3 rail up, Alarm on     PT Plan  Treatment/Interventions: Bed mobility, Gait training, Transfer training, Stair training, Balance training, Neuromuscular re-education, Strengthening, Endurance training, Therapeutic exercise, Range of motion, Therapeutic activity  PT Plan: Ongoing PT  PT Frequency: Daily  PT Discharge Recommendations: Low intensity level of continued care  Equipment Recommended upon Discharge: Wheeled walker  PT Recommended Transfer  Status: Contact guard, Assistive device  PT - OK to Discharge: Yes (indicates PT eval complete and dc rec determined)    PT Visit Info:  PT Received On: 07/10/25     General Visit Information:   General  Reason for Referral: Removal L Femur IMN (T2 Alpha Gamma Nail) // L Direct Anterior KEO (Left: Hip)      ARTHROPLASTY, HIP, TOTAL, WITHOUT CEMENT (Left)  Past Medical History Relevant to Rehab: notable for heavy alcohol use, systemic (pericarditis, arthralgias, skin rash, recurrent orogenital ulcers, uveitis, retinal vasculitis, but negative HLAB51) and 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  Prior to Session Communication: Bedside nurse  Patient Position Received: Up in chair, Alarm on  General Comment: Pt sitting in bedside chair upon PT entry into room. Agreeable to therapy.    Subjective     Precautions:  Precautions  LE Weight Bearing Status: Weight Bearing as Tolerated (LLE)  Medical Precautions: Fall precautions    Objective     Pain:  Pain Assessment  0-10 (Numeric) Pain Score:  (Not formally rated)  Pain Type: Acute pain, Surgical pain  Pain Location: Leg  Pain Orientation: Left  Pain Interventions: Repositioned, Ambulation/increased activity, Cold pack, Elevated  Response to Interventions: Resting quietly    Cognition:  Cognition  Overall Cognitive Status: Within Functional Limits    Coordination:  Movements are Fluid and Coordinated: Yes    Postural Control:  Postural Control  Postural Control: Within Functional Limits  Static Sitting Balance  Static Sitting-Balance Support: Bilateral upper extremity supported, Feet supported  Static Sitting-Level of Assistance: Close supervision  Dynamic Sitting Balance  Dynamic Sitting-Balance Support: Bilateral upper extremity supported, Feet supported  Dynamic Sitting-Level of Assistance: Close supervision  Dynamic Sitting-Balance:  Forward lean  Dynamic Sitting-Comments: Scooting to EOB  Static Standing Balance  Static Standing-Balance Support: Bilateral upper extremity supported (FWW)  Static Standing-Level of Assistance: Close supervision, Contact guard  Dynamic Standing Balance  Dynamic Standing-Balance Support: Bilateral upper extremity supported (FWW)  Dynamic Standing-Level of Assistance: Close supervision, Contact guard  Dynamic Standing-Balance: Turning    Activity Tolerance:  Activity Tolerance  Endurance: Tolerates 10 - 20 min exercise with multiple rests  Early Mobility/Exercise Safety Screen: Proceed with mobilization - No exclusion criteria met    Treatments:  Therapeutic Activity  Therapeutic Activity Performed: Yes  Therapeutic Activity 1: Bed mobility, transfers, ambulation, stair negotiation- see flowsheet for details  Therapeutic Activity 2: Pt education provided regarding elevating LLE in bed & placement of ice to assist with edema & pain.  Therapeutic Activity 3: Pillows placed under LLE & foot of bed elevated to assist with edema. Ice packs placed on pt's L knee & L hip for management of pain.    Bed Mobility  Bed Mobility: Yes  Bed Mobility 1  Bed Mobility 1: Sitting to supine  Level of Assistance 1: Close supervision  Bed Mobility Comments 1: HOB flat, pt utilized RLE under ankle of LLE in order to guide LLE back into bed.    Ambulation/Gait Training  Ambulation/Gait Training Performed: Yes  Ambulation/Gait Training 1  Surface 1: Level tile  Device 1: Rolling walker  Assistance 1: Close supervision, Contact guard, Minimal verbal cues  Quality of Gait 1: Narrow base of support, Inconsistent stride length, Forward flexed posture, Antalgic, Soft knee(s)  Comments/Distance (ft) 1: ~75-85' w/ FWW - pt demos antalgic gait pattern & soft L knee. Heavy use of UE through walker.    Transfers  Transfer: Yes  Transfer 1  Transfer From 1: Chair with arms to  Transfer to 1: Stand  Transfer Device 1: Walker  Transfer Level of  "Assistance 1: Contact guard  Transfers 2  Transfer From 2: Stand to  Transfer to 2: Bed  Transfer Device 2: Walker  Transfer Level of Assistance 2: Contact guard, Minimal verbal cues    Stairs  Stairs: Yes  Stairs  Rails 1: Left  Curb Step 1: No  Device 1: Railing  Assistance 1: Contact guard, Moderate verbal cues  Comment/Number of Steps 1: Pt negotiated 4 standard steps with L rail in a non-reciprocal pattern. CGA for balance, cues for sequencing (\"up with strong LE, down with weaker LE\"). Pt ascended forward & descended backwards. Limited as pt was connected to IV pole. This PT noticed some RLE knee buckling, however, no overt losses of balance. Pt reporting that this is newer & \"it started this year.\"  Stairs 2  Rails 2: Left  Curb Step 2: No  Device 2: Railing  Assistance 2: Contact guard, Moderate verbal cues  Comment/Number of Steps 2: Pt negotiated 4 standard steps with BUE on L rail in a non-reciprocal pattern. Pt ascended/descended sideways, cues for sequencing.    Outcome Measures:  [facility] Basic Mobility  Turning from your back to your side while in a flat bed without using bedrails: A little  Moving from lying on your back to sitting on the side of a flat bed without using bedrails: A little  Moving to and from bed to chair (including a wheelchair): A little  Standing up from a chair using your arms (e.g. wheelchair or bedside chair): A little  To walk in hospital room: A little  Climbing 3-5 steps with railing: A little  Basic Mobility - Total Score: 18    Education Documentation  Precautions, taught by Jud Cortez PT at 7/10/2025  1:36 PM.  Learner: Patient  Readiness: Acceptance  Method: Explanation, Demonstration  Response: Verbalizes Understanding, Demonstrated Understanding  Comment: Mobility training    Mobility Training, taught by Jud Cortez PT at 7/10/2025  1:36 PM.  Learner: Patient  Readiness: Acceptance  Method: Explanation, Demonstration  Response: Verbalizes Understanding, Demonstrated " Understanding  Comment: Mobility training    Education Comments  No comments found.         Encounter Problems       Encounter Problems (Active)       Mobility       STG - Patient will ambulate > 75' with LRAD modif indep  (Progressing)       Start:  07/09/25    Expected End:  07/30/25               PT Transfers       STG - Transfer from bed to chair LRAD modif indep  (Progressing)       Start:  07/09/25    Expected End:  07/30/25            STG - Patient will perform bed mobility modif indep (Progressing)       Start:  07/09/25    Expected End:  07/30/25            STG - Patient will transfer sit to and from stand LRAD modif indep  (Progressing)       Start:  07/09/25    Expected End:  07/30/25                 Jud Cortez, PT, DPT

## 2025-07-10 NOTE — CARE PLAN
The patient's goals for the shift include    Problem: Pain - Adult  Goal: Verbalizes/displays adequate comfort level or baseline comfort level  Outcome: Progressing  Flowsheets (Taken 7/9/2025 2158)  Verbalizes/displays adequate comfort level or baseline comfort level:   Encourage patient to monitor pain and request assistance   Assess pain using appropriate pain scale     Problem: Safety - Adult  Goal: Free from fall injury  Outcome: Progressing  Flowsheets (Taken 7/9/2025 2158)  Free from fall injury: Instruct family/caregiver on patient safety     Problem: Chronic Conditions and Co-morbidities  Goal: Patient's chronic conditions and co-morbidity symptoms are monitored and maintained or improved  Outcome: Progressing  Flowsheets (Taken 7/9/2025 2158)  Care Plan - Patient's Chronic Conditions and Co-Morbidity Symptoms are Monitored and Maintained or Improved: Monitor and assess patient's chronic conditions and comorbid symptoms for stability, deterioration, or improvement       The clinical goals for the shift include patient will endorse pain <5/10 by end of shift.

## 2025-07-10 NOTE — PROGRESS NOTES
Juan Marsh is a 30 y.o. year old male patient who presents for L femur IMN removal with Dr. Leonard on 07/09. Acute Pain consulted for block for postoperative pain control.     Postop Pain HPI -   Palliative: relieved with IV analgesics and regional local anesthetics  Provocative: movement  Quality:  burning and aching  Radiation:  none  Severity:  5/10  Timing: constant    24-HOUR OPIOID CONSUMPTION:  Oxycodone 20 mg    Scheduled medications  Scheduled Medications[1]  Continuous medications  Continuous Medications[2]  PRN medications  PRN Medications[3]     Physical Exam:  Constitutional:  no distress, alert and cooperative  Eyes: clear sclera  Head/Neck: No apparent injury, trachea midline  Respiratory/Thorax: Patent airways, thorax symmetric, breathing comfortably  Cardiovascular: no pitting edema  Gastrointestinal: Nondistended  Musculoskeletal: ROM intact  Extremities: no clubbing  Neurological: alert, roche x4  Psychological: Appropriate affect    Results for orders placed or performed during the hospital encounter of 07/09/25 (from the past 24 hours)   Type And Screen   Result Value Ref Range    ABO TYPE O     Rh TYPE POS     ANTIBODY SCREEN NEG        Juan Marsh is a 30 y.o. year old male patient who presents for L femur IMN removal with Dr. Leonard on 07/09. Acute Pain consulted for block for postoperative pain control.     Plan:  - L sided QL SS block performed preoperatively on 07/09  - Pain medications per primary team  - Acute Pain will sign off     Acute Pain Team  pg [phone] ph [phone].        [1] acetaminophen, 650 mg, oral, q6h YONNY  aspirin, 81 mg, oral, BID  ceFAZolin, 2 g, intravenous, q8h  indomethacin, 25 mg, oral, TID  pantoprazole, 40 mg, oral, Daily before breakfast  polyethylene glycol, 17 g, oral, Daily  [2]    [3] PRN medications: cyclobenzaprine, naloxone, ondansetron **OR** [DISCONTINUED] ondansetron, oxyCODONE, oxyCODONE, oxyCODONE

## 2025-07-10 NOTE — CARE PLAN
The patient's goals for the shift include  safety and home with HH    The clinical goals for the shift include patient will endorse pain <5/10 by end of shift.    Problem: Discharge Planning  Goal: Discharge to home or other facility with appropriate resources  Outcome: Met     Problem: Chronic Conditions and Co-morbidities  Goal: Patient's chronic conditions and co-morbidity symptoms are monitored and maintained or improved  Outcome: Met     Problem: Nutrition  Goal: Nutrient intake appropriate for maintaining nutritional needs  Outcome: Met     Problem: Pain  Goal: Takes deep breaths with improved pain control throughout the shift  Outcome: Met  Goal: Turns in bed with improved pain control throughout the shift  Outcome: Met  Goal: Walks with improved pain control throughout the shift  Outcome: Met  Goal: Performs ADL's with improved pain control throughout shift  Outcome: Met  Goal: Participates in PT with improved pain control throughout the shift  Outcome: Met  Goal: Free from opioid side effects throughout the shift  Outcome: Met  Goal: Free from acute confusion related to pain meds throughout the shift  Outcome: Met

## 2025-07-10 NOTE — DISCHARGE SUMMARY
Orthopaedic Surgery Discharge Summary  Date of discharge: 7/10/25     Discharge Diagnosis  S/P total left hip arthroplasty    Issues Requiring Follow-Up  Routine Postoperative Followup    Test Results Pending At Discharge  Pending Labs       Order Current Status    Basic metabolic panel Collected (07/10/25 0627)    CBC Collected (07/10/25 0627)          Hospital Course  30 y.o. year-old male who presented with L hip AVN. Patient is now s/p L femur CHANA, L KEO on 7/10/25 with Dr. Chip Leonard . On the day of surgery, patient was identified in the pre-operative holding area and agreeable to proceed with surgery. Written consent was obtained.  Please see operative note for further details of this procedure. Patient received 24 hours of cosmo-operative antibiotics. Patient recovered in the PACU before transfer to a regular nursing floor. Patient was started on oxycodone and Tylenol for pain control. Physical therapy recommended continued recovery at home with continued physical therapy and wound care. On the day of discharge, patient was afebrile with stable vital signs. Patient was neurovascularly intact at time of discharge. Patient was discharged with prescription of Asprin 81mg BID for DVT prophylaxis for 4 weeks. Patient will follow-up with Dr. Chip Leonard  in 3 weeks for a postoperative visit.      Physical Exam:  Gen: arousable, NAD, appropriately conversational  Cardiac: RRR to peripheral palpation  Resp: nonlabored on RA  GI: soft, nondistended     MSK:  -Dressing C/D/I   Left lower extremity:  - tender over incisional site  - Compartments soft and compressible  - Fires TA/GS/EHL  - SILT in Jimenez/Sa/SP/DP/T distribution  - Palpable DP pulse      Home Medications     Medication List      ASK your doctor about these medications     acetaminophen 325 mg tablet; Commonly known as: TylenoL; Take 2 tablets   (650 mg) by mouth every 6 hours if needed for mild pain (1 - 3) or fever   (temp greater than 38.0 C).   b  complex vitamins capsule; Take 1 capsule by mouth once daily   * Betasept Surgical Scrub 4 % external liquid; Generic drug:   chlorhexidine; Use per CPM/PAT provided instructions   * chlorhexidine 0.12 % solution; Commonly known as: Peridex; Swish and   spit 15ml night before and morning of surgery. Do not swallow   brimonidine 0.2 % ophthalmic solution; Commonly known as: AlphaGAN;   Instill 1 drop into right eye every 8 hours   cyclobenzaprine 10 mg tablet; Commonly known as: Flexeril; Take 1 tablet   (10 mg) by mouth 3 times a day as needed for muscle spasms.   diclofenac sodium 1 % gel; Commonly known as: Arthritis Pain   (diclofenac); Apply topically four times a day for 21 days   ergocalciferol 1250 mcg (50,000 units) capsule; Commonly known as:   Vitamin D-2; Take 1 capsule (1.25 mg) by mouth 1 (one) time per week.   gabapentin 300 mg capsule; Commonly known as: Neurontin; Take 1 capsule   (300 mg) by mouth every 8 hours.   mirtazapine 15 mg tablet; Commonly known as: Remeron; Take 1 tablet by   mouth once daily at bedtime. Take at bedtime while taking prednisone.   mycophenolate 500 mg tablet; Commonly known as: Cellcept; Take 2 tablets   (1,000 mg) by mouth 2 times a day.   omega-3 acid ethyl esters 1 gram capsule; Commonly known as: Lovaza;   Take 1 capsule by mouth once daily   pantoprazole 40 mg EC tablet; Commonly known as: ProtoNix; Take 1 tablet   (40 mg) by mouth once daily.   prednisoLONE acetate 1 % ophthalmic suspension; Commonly known as:   Pred-Forte; Administer 1 drop into the right eye 4 times a day.   predniSONE 10 mg tablet; Commonly known as: Deltasone; Take 1 tablet a   day.   * sulfamethoxazole-trimethoprim 800-160 mg tablet; Commonly known as:   Bactrim DS; Take 1 tablet by mouth three times a week on monday, wednesday   and friday   * sulfamethoxazole-trimethoprim 800-160 mg tablet; Commonly known as:   Bactrim DS; Take 1 tablet by mouth once a day on Monday, Wednesday, and   Friday.  *  This list has 4 medication(s) that are the same as other medications   prescribed for you. Read the directions carefully, and ask your doctor or   other care provider to review them with you.     Outpatient Follow-Up  Future Appointments   Date Time Provider Department Center   7/29/2025  1:40 PM Karin Castillo PA-C [id number]   12/29/2025 11:00 AM Juancarlos Ulola MD [id number] [facility]       Keaton Connor MD, PGY-1  Orthopaedic Surgery  Available via Epic Chat       done

## 2025-07-10 NOTE — PROGRESS NOTES
Occupational Therapy    Evaluation    Patient Name: Juan Marsh  MRN: [id number]  Department: [place] [id number]  Room: [address] [place] [address]  Today's Date: 7/10/2025  Time Calculation  Start Time: 0815  Stop Time: 0828  Time Calculation (min): 13 min    Assessment  IP OT Assessment  OT Assessment: difficulty I/ADLs, safety, fxnl mob  Prognosis: Good  Barriers to Discharge Home: Physical needs  Physical Needs: Intermittent mobility assistance needed, Intermittent ADL assistance needed, Stair navigation into home limited by function/safety  Evaluation/Treatment Tolerance: Patient tolerated treatment well  Medical Staff Made Aware: Yes  End of Session Communication: Bedside nurse  End of Session Patient Position: Up in chair, Alarm on  Plan:  Treatment Interventions: ADL retraining, Functional transfer training, Endurance training, Patient/family training, Equipment evaluation/education, Compensatory technique education  OT Frequency: 2 times per week  OT Discharge Recommendations: Low intensity level of continued care  Equipment Recommended upon Discharge:  (hip kit)  OT Recommended Transfer Status: Assist of 1  OT - OK to Discharge: Yes    Subjective   Current Problem:  1. S/P total left hip arthroplasty  aspirin 81 mg EC tablet    indomethacin (Indocin) 25 mg capsule    ondansetron (Zofran) 4 mg tablet    oxyCODONE (Roxicodone) 5 mg immediate release tablet    polyethylene glycol (Glycolax, Miralax) 17 gram/dose powder    calcium carbonate-vitamin D3 (Oscal-500) 500 mg-10 mcg (400 unit) tablet    pantoprazole (ProtoNix) 40 mg EC tablet    acetaminophen (Tylenol) 325 mg tablet      2. Hip pain, unspecified laterality        3. Immunodeficiency due to drugs (CODE)        4. Alcohol dependence, uncomplicated (Multi)        5. Hemiplegia and hemiparesis following cerebral infarction affecting right dominant side (Multi)        6. History of femur fracture        7. Avascular necrosis of femur head, left (Multi)        8. Behcet's  disease with multisystem involvement (Multi)        9. Avascular necrosis of right femoral head (Multi)        10. Avascular necrosis of left femoral head (Multi)        11. Panuveitis of right eye        12. Myopericarditis (HHS-HCC)        13. Alcohol abuse        14. 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  Family/Caregiver Present: No  Prior to Session Communication: Bedside nurse  Patient Position Received: Bed, 3 rail up, Alarm on  Precautions:  LE Weight Bearing Status:  (LLE WBAT)  Medical Precautions: Fall precautions     Date/Time Vitals Session Patient Position Pulse Resp SpO2 BP MAP (mmHg)    07/10/25 0722 --  --  --  17  --  105/68  --                Pain:  Pain Assessment  Pain Assessment: 0-10  0-10 (Numeric) Pain Score: 5 - Moderate pain  Pain Location:  (LLE)    Objective   Cognition:  Overall Cognitive Status: Within Functional Limits  Orientation Level: Oriented X4  Insight: Mild           Home Living:  Type of Home: Apartment  Lives With:  (mother who is RN)  Home Adaptive Equipment: Cane, Walker rolling or standard, Crutches (BSC)  Home Layout: One level  Home Access:  (2 flights to enter with HR, elevator broken)  Bathroom Shower/Tub: Tub/shower unit  Bathroom Equipment: Shower chair with back   Prior Function:  Level of Bath: Independent with ADLs and functional transfers,  Independent with homemaking with ambulation  Ambulatory Assistance:  (cane)  Vocational: Unemployed  Hand Dominance: Right  IADL History:  IADL Comments: I I/ADLS, +drive, works various jobs  ADL:  Eating Assistance: Independent  Grooming Assistance: Independent (anticipated seated)  Bathing Assistance: Minimal (anticipated AE)  UE Dressing Assistance: Independent  LE Dressing Assistance:  (donned shorts SBA long sitting, min A kezia L sock, CGA over hips sit to stand)  Toileting Assistance with Device:  (min A Anticipated WHW)  Activity Tolerance:  Endurance:  (fair)  Bed Mobility/Transfers: Bed Mobility  Bed Mobility:  (sup to sit SBA)    Transfers  Transfer:  (sit/stand CGA WHW)      Functional Mobility:  Functional Mobility  Functional Mobility Performed:  (pt performed fxnl mob bed to chair CGA WHW)  Sitting Balance:  Dynamic Sitting Balance  Dynamic Sitting-Level of Assistance: Independent  Standing Balance:  Dynamic Standing Balance  Dynamic Standing-Level of Assistance: Contact guard    IADL's:   IADL Comments: I I/ADLS, +drive, works various jobs  Vision: Vision - Basic Assessment  Current Vision: Wears glasses all the time  Sensation:  Light Touch: No apparent deficits  Strength:  Strength Comments: BUE WFL       Coordination:  Movements are Fluid and Coordinated: Yes   Hand Function:  Hand Function  Gross Grasp: Functional  Extremities: RUE   RUE : Within Functional Limits and LUE   LUE: Within Functional Limits      Outcome Measures: [facility] Daily Activity  Putting on and taking off regular lower body clothing: A little  Bathing (including washing, rinsing, drying): A little  Putting on and taking off regular upper body clothing: None  Toileting, which includes using toilet, bedpan or urinal: A little  Taking care of personal grooming such as brushing teeth: None  Eating Meals: None  Daily Activity - Total Score: 21         and OT Adult Other Outcome Measures  4AT: -  Education Documentation  Body  Mechanics, taught by Latanya Ashley OT at 7/10/2025  9:01 AM.  Learner: Patient  Readiness: Acceptance  Method: Explanation, Demonstration  Response: Verbalizes Understanding, Needs Reinforcement  Comment: suzannaricardo garcia ADLs    Precautions, taught by Latanya Ashley OT at 7/10/2025  9:01 AM.  Learner: Patient  Readiness: Acceptance  Method: Explanation, Demonstration  Response: Verbalizes Understanding, Needs Reinforcement  Comment: jordan garcia ADLs    ADL Training, taught by Latanya Ashley OT at 7/10/2025  9:01 AM.  Learner: Patient  Readiness: Acceptance  Method: Explanation, Demonstration  Response: Verbalizes Understanding, Needs Reinforcement  Comment: suzannaricardo garcia ADLs    Education Comments  No comments found.      Goals:   Encounter Problems       Encounter Problems (Active)       ADLs       Patient will perform UB and LB bathing  with modified independent level of assistance and ae. (Progressing)       Start:  07/10/25    Expected End:  07/31/25            Patient with complete lower body dressing with modified independent level of assistance donning and doffing all LE clothes  with PRN adaptive equipment  (Progressing)       Start:  07/10/25    Expected End:  07/31/25            Patient will complete daily grooming tasks  with independent level of assistance  (Progressing)       Start:  07/10/25    Expected End:  07/31/25            Patient will complete toileting including hygiene clothing management/hygiene with modified independent level of assistance and lrd. (Progressing)       Start:  07/10/25    Expected End:  07/31/25               MOBILITY       Patient will perform Functional mobility max Household distances/Community Distances with modified independent level of assistance and least restrictive device in order to improve safety and functional mobility. (Progressing)       Start:  07/10/25    Expected End:  07/31/25               TRANSFERS       Patient will perform bed mobility independent level of  assistance  (Progressing)       Start:  07/10/25    Expected End:  07/31/25            Patient will complete functional transfer least restrictive device with modified independent level of assistance. (Progressing)       Start:  07/10/25    Expected End:  07/31/25

## 2025-07-10 NOTE — PROGRESS NOTES
"Orthopaedic Surgery Progress Note    Procedure: CHANA L IMN, L KEO  Date of Surgery: 7/9/25     Subjective: Evaluated on the floor POD1. Pain well controlled considering recent surgery. Denies chest pain, shortness of breath, or fevers. Doing well, wants to go home.    Objective:  /83   Pulse 79   Temp 36.8 °C (98.2 °F) (Temporal)   Resp 16   Ht 1.753 m (5' 9\")   Wt 63.5 kg (140 lb)   SpO2 98%   BMI 20.67 kg/m²     Gen: arousable, NAD, appropriately conversational  Cardiac: RRR to peripheral palpation  Resp: nonlabored on RA  GI: soft, nondistended    MSK:  -Dressing C/D/I   Left lower extremity:  - tender over incisional site  - Compartments soft and compressible  - Fires TA/GS/EHL  - SILT in Jimenez/Sa/SP/DP/T distribution  - Palpable DP pulse     Assessment/Plan: Juan Marsh is a 30M who is  Now s/p CHANA (L IMN) and L KEO with Dr. Leonard.      Plan:  - Weight bearing: WBAT  - DVT ppx: SCDs, ASA 81mg BID    - Diet: Regular diet   - Pain: Tylenol, oxycodone 5/10  - Antibiotics: perioperative ancef 2g q8hr x3 doses  -Indomethacin 25mg TID  - Dressing:  Mepilex remove POD7 (7/14/25)   - FEN: HLIV with good PO intake  - Bowel Regimen: Colace, senna, dulcolax  - PT/OT    - Continue home medications  - No charles    Dispo: Pending PT/OT eval     Discussed with the attending surgeon, Dr. Leonard.     Keaton Connor MD, PGY-1  Orthopaedic Surgery  Available via Epic Chat       While admitted, this patient will be followed by the Ortho Trauma Team, available via Epic Chat weekdays 6a-6p. Please page [phone] on nights and weekends.    Ortho Trauma  First Call: Keaton Connor and Hayden Melendrez, PGY-1  Second Call: Jana Bain, PGY-2  Third Call: Narciso Gilliam, PGY-3      "

## 2025-07-10 NOTE — DISCHARGE SUMMARY
Discharge Diagnosis  S/P total left hip arthroplasty    Issues Requiring Follow-Up  Routine postoperative appt    Test Results Pending At Discharge  Pending Labs       No current pending labs.            Hospital Course  30 y.o. year-old male who presented with AVN L hip. Patient is now s/p L KEO, L femur ROM on 7/9/25 with Dr. Chip Leonard . On the day of surgery, patient was identified in the pre-operative holding area and agreeable to proceed with surgery. Written consent was obtained.  Please see operative note for further details of this procedure. Patient received 24 hours of cosmo-operative antibiotics. Patient recovered in the PACU before transfer to a regular nursing floor. Patient was started on oxycodone and Tylenol for pain control. Physical therapy recommended continued recovery at rehab facility with continued physical therapy and wound care. On the day of discharge, patient was afebrile with stable vital signs. Patient was neurovascularly intact at time of discharge. Patient was discharged with prescription of Asprin 81mg BID for DVT prophylaxis for 4 weeks. Patient will follow-up with Dr. Chip Leonard  in 3 weeks for a postoperative visit.     Visit Vitals  /68 (BP Location: Right arm, Patient Position: Lying)   Pulse 68   Temp 36.6 °C (97.9 °F) (Temporal)   Resp 17     Vitals:    07/09/25 0619   Weight: 63.5 kg (140 lb)       Immunization History   Administered Date(s) Administered    DTaP, Unspecified 1997, 1997, 03/13/1998, 05/06/1999, 11/08/2001    Flu vaccine, trivalent, preservative free, age 6 months and greater (Fluarix/Fluzone/Flulaval) 01/16/2009    HPV, Quadrivalent 11/16/2011, 01/24/2012    Hepatitis A vaccine, pediatric/adolescent (HAVRIX, VAQTA) 09/30/2008, 11/03/2010    Hepatitis B vaccine, 19 yrs and under (RECOMBIVAX, ENGERIX) 1997, 05/19/1998, 08/20/1998    HiB, unspecified 1997, 1997, 03/13/1998, 05/06/1999    Influenza, seasonal,  injectable 01/15/2009, 11/03/2010, 11/16/2011    MMR vaccine, subcutaneous (MMR II) 10/20/1998, 11/08/2001    Meningococcal ACWY-D (Menactra) 4-valent conjugate vaccine 09/30/2008    PPD Test 01/09/2019    Polio, Unspecified 1997, 1997, 03/13/1998, 05/06/1999    Poliovirus vaccine, subcutaneous (IPOL) 01/24/2012    Tdap vaccine, age 7 year and older (BOOSTRIX, ADACEL) 09/30/2008, 08/02/2015, 10/02/2019    Varicella vaccine, subcutaneous (VARIVAX) 03/29/2000, 09/30/2008       Results        Pertinent Physical Exam At Time of Discharge  Physical Exam:  Gen: arousable, NAD, appropriately conversational  Cardiac: RRR to peripheral palpation  Resp: nonlabored on RA  GI: soft, nondistended     MSK:  -Dressing C/D/I   Left lower extremity:  - tender over incisional site  - Compartments soft and compressible  - Fires TA/GS/EHL  - SILT in Jimenez/Sa/SP/DP/T distribution  - Palpable DP pulse     Home Medications     Medication List      START taking these medications     aspirin 81 mg EC tablet; Take 1 tablet (81 mg) by mouth 2 times a day   for 26 doses.   indomethacin 25 mg capsule; Commonly known as: Indocin; Take 1 capsule   (25 mg) by mouth 3 times daily (morning, midday, late afternoon).   ondansetron 4 mg tablet; Commonly known as: Zofran; Take 1 tablet (4 mg)   by mouth every 8 hours if needed for nausea.   oxyCODONE 5 mg immediate release tablet; Commonly known as: Roxicodone;   Take 1 tablet (5 mg) by mouth every 6 hours if needed for moderate pain (4   - 6).   Oyster Shell Calcium-Vit D3 500 mg-10 mcg (400 unit) tablet; Generic   drug: calcium carbonate-vitamin D3; Take 1 tablet by mouth 2 times a day.   polyethylene glycol 17 gram/dose powder; Commonly known as: Glycolax,   Miralax; Mix 17 g of powder and drink once daily.     CHANGE how you take these medications     acetaminophen 325 mg tablet; Commonly known as: Tylenol; Take 2 tablets   (650 mg) by mouth every 6 hours.; What changed: when to take this,  reasons   to take this   pantoprazole 40 mg EC tablet; Commonly known as: ProtoNix; Take 1 tablet   (40 mg) by mouth once daily in the morning. Take before meals for 20   doses. Do not crush, chew, or split.; Start taking on: July 11, 2025; What   changed: when to take this, additional instructions   * predniSONE 10 mg tablet; Commonly known as: Deltasone; Take 1 tablet a   day.; What changed: Another medication with the same name was added. Make   sure you understand how and when to take each.   * predniSONE 10 mg tablet; Commonly known as: Deltasone; Take 1 tablet   (10 mg) by mouth once daily.; What changed: You were already taking a   medication with the same name, and this prescription was added. Make sure   you understand how and when to take each.   sulfamethoxazole-trimethoprim 800-160 mg tablet; Commonly known as:   Bactrim DS; Take 1 tablet by mouth three times a week on monday, wednesday   and friday; What changed: Another medication with the same name was   removed. Continue taking this medication, and follow the directions you   see here.  * This list has 2 medication(s) that are the same as other medications   prescribed for you. Read the directions carefully, and ask your doctor or   other care provider to review them with you.     CONTINUE taking these medications     b complex vitamins capsule; Take 1 capsule by mouth once daily   brimonidine 0.2 % ophthalmic solution; Commonly known as: AlphaGAN;   Instill 1 drop into right eye every 8 hours   cyclobenzaprine 10 mg tablet; Commonly known as: Flexeril; Take 1 tablet   (10 mg) by mouth 3 times a day as needed for muscle spasms.   gabapentin 300 mg capsule; Commonly known as: Neurontin; Take 1 capsule   (300 mg) by mouth every 8 hours.   mirtazapine 15 mg tablet; Commonly known as: Remeron; Take 1 tablet by   mouth once daily at bedtime. Take at bedtime while taking prednisone.   mycophenolate 500 mg tablet; Commonly known as: Cellcept; Take 2 tablets    (1,000 mg) by mouth 2 times a day.   omega-3 acid ethyl esters 1 gram capsule; Commonly known as: Lovaza;   Take 1 capsule by mouth once daily   prednisoLONE acetate 1 % ophthalmic suspension; Commonly known as:   Pred-Forte; Administer 1 drop into the right eye 4 times a day.     STOP taking these medications     Betasept Surgical Scrub 4 % external liquid; Generic drug: chlorhexidine   chlorhexidine 0.12 % solution; Commonly known as: Peridex   diclofenac sodium 1 % gel; Commonly known as: Arthritis Pain   (diclofenac)   ergocalciferol 1250 mcg (50,000 units) capsule; Commonly known as:   Vitamin D-2       Outpatient Follow-Up  Future Appointments   Date Time Provider Department Center   7/29/2025  1:40 PM Karin Castillo PA-C [id number] [facility]   12/29/2025 11:00 AM Juancarlos Ulloa MD [id number] [facility]       Keaton Connor MD, PGY-1  Orthopaedic Surgery  Available via Epic Chat

## 2025-07-10 NOTE — PROGRESS NOTES
07/10/25 1000   Discharge Planning   Living Arrangements Parent   Support Systems Parent   Assistance Needed Yes   Type of Residence Private residence   Number of Stairs to Enter Residence 3   Number of Stairs Within Residence 14   Do you have animals or pets at home? No   Who is requesting discharge planning? Provider   Home or Post Acute Services In home services   Type of Home Care Services Home PT   Expected Discharge Disposition Home H   Does the patient need discharge transport arranged? No   Financial Resource Strain   How hard is it for you to pay for the very basics like food, housing, medical care, and heating? Not hard   Housing Stability   In the last 12 months, was there a time when you were not able to pay the mortgage or rent on time? N   In the past 12 months, how many times have you moved where you were living? 0   At any time in the past 12 months, were you homeless or living in a shelter (including now)? N   Transportation Needs   In the past 12 months, has lack of transportation kept you from medical appointments or from getting medications? no   In the past 12 months, has lack of transportation kept you from meetings, work, or from getting things needed for daily living? No   Patient Choice   Provider Choice list and CMS website (https://medicare.gov/care-compare#search) for post-acute Quality and Resource Measure Data were provided and reviewed with: Patient   Patient / Family choosing to utilize agency / facility established prior to hospitalization Yes   Stroke Family Assessment   Stroke Family Assessment Needed No   Intensity of Service   Intensity of Service 0-30 min     I met with Juan at the bedside regarding discharge planning and home going needs. Patient states that he lives home with his mother [phone] where he is usually independent with ADL's he does have a walker, cane and crutches in the home. Patient will be discharged home with [facility] services. I will  continue to follow with a safe discharge plan.

## 2025-07-11 ENCOUNTER — HOME CARE VISIT (OUTPATIENT)
Dept: HOME HEALTH SERVICES | Facility: HOME HEALTH | Age: 30
End: 2025-07-11
Payer: COMMERCIAL

## 2025-07-11 ENCOUNTER — PATIENT OUTREACH (OUTPATIENT)
Dept: CARE COORDINATION | Facility: CLINIC | Age: 30
End: 2025-07-11
Payer: COMMERCIAL

## 2025-07-11 PROCEDURE — G0299 HHS/HOSPICE OF RN EA 15 MIN: HCPCS

## 2025-07-11 NOTE — PROGRESS NOTES
Patient had a normal length of stay for orthopedic care. [facility] services are in place   per the exemption list,No further outreach is indicated at this time. Will continue to follow through the transition period to ensure care needs are met.    Elisa Hensley RN, Care Manager  Population Health, [facility]  [phone]

## 2025-07-11 NOTE — PROGRESS NOTES
Patient is medically cleared for discharge home today via private transportation, he is recommended for low intensity therapy, processed for SOC with [facility] services. I did contact the patient to let him know and he did tell me that [facility] services had already contacted him. I will follow until discharged.(Late Entry)

## 2025-07-12 ENCOUNTER — HOME CARE VISIT (OUTPATIENT)
Dept: HOME HEALTH SERVICES | Facility: HOME HEALTH | Age: 30
End: 2025-07-12
Payer: COMMERCIAL

## 2025-07-12 VITALS
SYSTOLIC BLOOD PRESSURE: 112 MMHG | HEIGHT: 69 IN | OXYGEN SATURATION: 99 % | BODY MASS INDEX: 20.73 KG/M2 | WEIGHT: 140 LBS | TEMPERATURE: 98.4 F | HEART RATE: 99 BPM | DIASTOLIC BLOOD PRESSURE: 78 MMHG

## 2025-07-12 VITALS
HEART RATE: 102 BPM | SYSTOLIC BLOOD PRESSURE: 110 MMHG | DIASTOLIC BLOOD PRESSURE: 78 MMHG | RESPIRATION RATE: 16 BRPM | TEMPERATURE: 98.4 F

## 2025-07-12 PROCEDURE — G0151 HHCP-SERV OF PT,EA 15 MIN: HCPCS

## 2025-07-12 ASSESSMENT — ACTIVITIES OF DAILY LIVING (ADL)
AMBULATION ASSISTANCE: STAND BY ASSIST
TOILETING: STAND BY ASSIST
AMBULATION ASSISTANCE: 1
FEEDING: SUPERVISION
TOILETING: 1
ENTERING_EXITING_HOME: NEEDS ASSISTANCE
FEEDING ASSESSED: 1
OASIS_M1830: 02

## 2025-07-12 ASSESSMENT — ENCOUNTER SYMPTOMS
PERSON REPORTING PAIN: PATIENT
BOWEL PATTERN NORMAL: 1
STOOL FREQUENCY: DAILY
PAIN LOCATION: LEFT HIP
PAIN LOCATION - PAIN SEVERITY: 0/10
SUBJECTIVE PAIN PROGRESSION: WAXING AND WANING
LOWEST PAIN SEVERITY IN PAST 24 HOURS: 0/10
PERSON REPORTING PAIN: PATIENT
PAIN: 1
LIMITED RANGE OF MOTION: 1
PAIN SEVERITY GOAL: 0/10
PAIN LOCATION - PAIN SEVERITY: 0/10
CHANGE IN APPETITE: UNCHANGED
APPETITE LEVEL: GOOD
PAIN LOCATION: LEFT HIP
HIGHEST PAIN SEVERITY IN PAST 24 HOURS: 8/10
DENIES PAIN: 1
HIGHEST PAIN SEVERITY IN PAST 24 HOURS: 8/10
LAST BOWEL MOVEMENT: 67396

## 2025-07-12 ASSESSMENT — BALANCE ASSESSMENTS
NUDGED SCORE: 2
EYES CLOSED AT MAXIMUM POSITION NUDGED: 1 - STEADY
ARISES: 1 - ABLE, USES ARMS TO HELP
TURNING 360 DEGREES STEPS: 0 - DISCONTINUOUS STEPS
NUDGED: 2 - STEADY
BALANCE SCORE: 10
STANDING BALANCE: 1 - STEADY BUT WIDE STANCE AND USES CANE OR OTHER SUPPORT
ATTEMPTS TO ARISE: 2 - ABLE TO RISE, ONE ATTEMPT
IMMEDIATE STANDING BALANCE FIRST 5 SECONDS: 1 - STEADY BUT USES WALKER OR OTHER SUPPORT
ARISING SCORE: 1
SITTING BALANCE: 1 - STEADY, SAFE
SITTING DOWN: 1 - USES ARMS OR NOT SMOOTH MOTION

## 2025-07-12 ASSESSMENT — GAIT ASSESSMENTS
PATH: 1 - MILD/MODERATE DEVIATION OR USES WALKING AID
GAIT SCORE: 6
TRUNK: 0 - MARKED SWAY OR USES WALKING AID
INITIATION OF GAIT IMMEDIATELY AFTER GO: 0 - ANY HESITANCY OR MULTIPLE ATTEMPTS TO START
TRUNK SCORE: 0
STEP SYMMETRY: 1 - RIGHT AND LEFT STEP LENGTH APPEAR EQUAL
WALKING STANCE: 0 - HEELS APART
BALANCE AND GAIT SCORE: 16
STEP CONTINUITY: 0 - STOPPING OR DISCONTINUITY BETWEEN STEPS
PATH SCORE: 1

## 2025-07-15 NOTE — OP NOTE
Removal L Femur IMN (T2 Alpha Gamma Nail) // L Direct Anterior KEO (L), ARTHROPLASTY, HIP, TOTAL, WITHOUT CEMENT (L) Operative Note     Date: 2025  OR Location: [facility] OR    Name: Juan Marsh, : 1995, Age: 30 y.o., MRN: [id number], Sex: male    Diagnosis  Pre-op Diagnosis      * Avascular necrosis of femur head, left (Multi) [M87.052]     * History of femur fracture [Z87.81], retained femur nail Post-op Diagnosis     * Avascular necrosis of femur head, left (Multi) [M87.052]     * History of femur fracture [Z87.81], retained femur nail     Procedures  Removal L Femur IMN (T2 Alpha Gamma Nail) // L Direct Anterior KEO  71083 - [place] ARTHRP ACETBLR/PROX FEM PROSTC AGRFT/ALGRFT    ARTHROPLASTY, HIP, TOTAL, WITHOUT CEMENT  56406 - [place] ARTHRP ACETBLR/PROX FEM PROSTC AGRFT/ALGRFT    Left femoral nail removal    Surgeons      * Chip Leonard - Primary    Resident/Fellow/Other Assistant:  Surgeons and Role:     * Maximilian Desai MD - Resident - Assisting: He was my primary assistant for this procedure.  There no available residents to assist with for this procedure.  His assistance was needed and critical to the success of this procedure.  He assisted with surgical exposure, placement of retractors and implantation of implant/prosthesis and wound closure      Staff:   Circulator: Iftikhar  Circulator: Cyrus Palacios Person: Carole Lyons Circulator: Francisca  Relief Scrub: Lucius    Anesthesia Staff: Anesthesiologist: Justice Arvizu MD  CRNA: ELMIRA Zafar-CRNA  SRNA: Liliana Mejía  SIMEON: Arely Morris    Procedure Summary  Anesthesia: Regional, General  ASA: III  Estimated Blood Loss: 200 mL  Intra-op Medications:   Administrations occurring from 0755 to 1125 on 25:   Medication Name Total Dose   sodium chloride 0.9 % irrigation solution 4,000 mL   vancomycin (Vancocin) vial for injection 1 g   bupivacaine PF 0.25 % (Marcaine) 0.25 % (2.5 mg/mL) 30 mL, ketorolac (Toradol) 30 mg in  sodium chloride 0.9% 30 mL syringe 61 mL   albumin human 5 % 250 mL   ceFAZolin (Ancef) 1 gram/ 3 mL injection - reconstituted 330 mg/mL 2 g   dexAMETHasone (Decadron) 4 mg/mL IV Syringe 2 mL 10 mg   esmolol (Brevibloc) injection 30 mg   fentaNYL (Sublimaze) injection 50 mcg/mL 100 mcg   HYDROmorphone (Dilaudid) injection 1 mg/mL 1.2 mg   ketamine injection 50 mg/ 5 mL (10 mg/mL) 70 mg   lactated Ringer's infusion Cannot be calculated   lidocaine (cardiac) injection 2% prefilled syringe 100 mg   midazolam PF (Versed) injection 1 mg/mL 2 mg   ondansetron (Zofran) 2 mg/mL injection 4 mg   phenylephrine (David-Synephrine) 10 mg/250 mL NS (40 mcg/mL) infusion 1.42 mg   phenylephrine 40 mcg/mL syringe 10 mL 360 mcg   propofol (Diprivan) injection 10 mg/mL 150 mg   rocuronium (ZeMuron) 50 mg/5 mL injection 100 mg   sugammadex (Bridion) 200 mg/2 mL injection 200 mg   tranexamic acid (Cyklokapron) injection 2,000 mg              Anesthesia Record               Intraprocedure I/O Totals          Intake    Tranexamic Acid 20.00 mL    The total shown is the total volume documented since Anesthesia Start was filed.    Phenylephrine Drip 35.56 mL    The total shown is the total volume documented since Anesthesia Start was filed.    lactated Ringer's 1000.00 mL    albumin human 5 % 250.00 mL    Total Intake 1305.56 mL          Specimen: No specimens collected              Drains and/or Catheters: * None in log *    Tourniquet Times:         Implants:  Implants       Type Name Action Serial No.      Screw WIRE, EMILY 3 X 285 - SNA - [id number] Used, Not Implanted NA     Joint Hip ACETABULAR CUP, SECTOR, GRIPTON, SIZE 54MM - SNA - [id number] Implanted NA     Screw SCREW, PINNACLE CANCELL 6.5 X 40 - [id number] Implanted      Joint Hip LINER, ALTRX, NEURTAL, 36 X 54MM - [id number] Implanted      Joint Hip STEM, FEMORAL, ACTIS COLLAR, STD, SIZE 7 - [id number] Implanted       ARTICULE/ZE FEMORAL HEAD 12/14 TAPER CERAMIC Implanted                Findings: See below    Indications: Juan Marsh is an 30 y.o. male who is having surgery for Avascular necrosis of femur head, left (Multi) [M87.808]  History of femur fracture [Z87.81].  Patient has severe subchondral collapse and functional limiting pain.  He tried conservative treatment without improvement.  After discussion of risk-benefit alternative he elects to proceed with hip replacement surgery.  Ideally he would like to have both hip replaced however I told him the we will have to do this 1 at a time.  I also discussed his young age and the need for revision surgery in the future.  Of note patient have history of left femoral shaft fracture that was treated with retrograde nail fixation.  The nail extended beyond the subtrochanteric region and the nail needed to be removed in order to accommodate a femoral stem prosthesis.  I discussed the option of doing this in a staged fashion with nail removal and then total hip in the future versus a single-stage.  Strongly preferred a single-stage.  The patient was seen in the preoperative area. The risks, benefits, complications, treatment options, non-operative alternatives, expected recovery and outcomes were discussed with the patient. The possibilities of reaction to medication, pulmonary aspiration, injury to surrounding structures, bleeding, recurrent infection, the need for additional procedures, failure to diagnose a condition, and creating a complication requiring transfusion or operation were discussed with the patient. The patient concurred with the proposed plan, giving informed consent.  The site of surgery was properly noted/marked if necessary per policy. The patient has been actively warmed in preoperative area. Preoperative antibiotics have been ordered and given within 1 hours of incision. Venous thrombosis prophylaxis have been ordered including bilateral sequential compression devices and chemical prophylaxis    Procedure  Details:     We proceeded to the operating room.  Appropriate time-out was  performed.  Anesthesia was initiated by the Anesthesia team.  Patient was transferred to the flattop operating room table.  He was placed in the supine position.  All bony prominences adequately padded.  He received preoperative antibiotics.  The left lower extremity was prepped and draped in usual sterile fashion.  We marked out incision around the knee and also the distal interlock screws and proximal interlock screws.  An incision was made using a knife into the knee joint.  We performed a medial parapatellar approach.  We brought in fluoroscopy to localize all of the distal interlock screws and remove them.  The extraction jig was then threaded onto the end of the retrograde nail.  We then removed the proximal interlock screws.  We then back slapped the nail and remove the nail.  There was no purulence or concern for infection.  We copiously irrigated the knee joint.  The knee capsule and patella tendon was repaired.  All of the wound was closed in a layered fashion.  Patient was then transferred to the Slovan table for preparation for total hip replacement.        The Slovan table boot was  placed on bilateral feet.  We ensured that the boot was not too  tight.  Patient was transferred to the Slovan table, placed in a supine  position where all bony prominences were adequately padded. Patient  received preoperative antibiotics and tranexamic acid to minimize  risk of bleeding.  Patient was prepped and draped in the usual sterile  fashion.  We made a direct anterior approach to the hip, centered over the  tensor fascia cassy muscle belly.  Incision was made using the knife.   The fascia of the tensor muscle was incised.  We stayed within the  tensor compartment.  The lateral circumflex vessel was identified and  ligated.  We then elevated the rectus off the anterior hip capsule.   The anterior hip capsule was identified.  Inverted T-capsulotomy  was  performed.  We placed a retractor around the neck.  Using the  anterior tubercle of the intertrochanteric line as the landmark, we  made a femoral neck cut.  The head was removed.  The head was  severely deformed with flattening of the femoral head and subchondral collapse.  The articular cartilage was completely delaminated from the underlying subchondral bone.  The patient did  have degenerative labral tear.  The labrum was hypertrophic and it was  excised.  We then carefully placed retractors around the ring of the  acetabulum.  We began reaming.  We reamed starting with a 49 mm  reamer.  We then reamed up to 53 mm reamer.  We had good exposed  bleeding bone.  We used fluoroscopy to confirm appropriate reaming  and medialization.  We copiously irrigated and the 54 mm cup was then  placed.  I used the assistance of fluoroscopy and also the plane of the body to  appropriate cup positioning.  I was very satisfied  with the position of the cup.  The cup was impacted into place.   There was excellent stability of the cup.  In order to further  augment the stability, I chose to use one cancellous bone screw  through the hole in the cup.  We had excellent purchase with this  screw.  We then copiously irrigated.  The neutral liner was then  inserted and impacted into place with good fit.  Next, we turned our  attention to preparation of the femur for the stem.  We externally  rotated the femur.  We made a capsular release both medial and  laterally.  We extended the leg.  The femur grant within the wound.   We then used calcar orientation to  the version of the stem.  A  box osteotome was used to lateralize the entry point.  The canal  finder was inserted.  We began broaching.  We started with a starter  broach and broached up to size 7.  There was excellent fit with the  broach.  There was no further subsiding of the broach.  The broach  was rotational and was very stable.  A standard offset neck was  placed on  the trunnion after it was cleaned based on preoperative templating.  A trial of +1.5 head  was then placed.  The hip was reduced.  We took the hip through range  of motion.  The hip was very stable under fluoroscopy.  I was very  satisfied with the fit of the stem.  The leg length was equal. The hip was then dislocated.  The  broach was removed.  We then copiously irrigated and the actual stem  was impacted into place.  The collar sat about 1 mm shy of  resting on the calcar.  There was no further subsidence, and the  stent was rotational and stable.  At this point, we then cleaned the  trunnion and a +1.5, 36 mm head was then placed on the trunnion.  We  proceeded to reduce the hip, took it through extreme range of motion.  Again, the hip was very stable.  Radiographically, leg length was  equal.  We then copiously irrigated the wound bed.  We injected the  joint local solution to surrounding soft tissue.  The wound bed was dry with excellent hematosis.  Vancomycin powder was added to the wound.  We then repaired  the capsule and subsequently repaired the fascia of the tensor fascia  cassy.  We then closed the wound in a layered fashion using 2-0 Biosyn  subcuticular sutures and running 3-0 subcuticular strata fix sutures were used.   Silk dressing was then applied.  A  sterile dressing was applied.  The  patient was then awakened from sedation.  Prior to leaving the  operating room, I checked his clinical leg length and it was equal.   He was then transferred to PACU in stable condition without any  complication.  PLAN:    The patient will be evaluated by physical therapy and disposition planning.   Patient will be weightbearing as tolerated.  No hip precaution  required.  When patient is discharged, I will see patient in clinic in 2  weeks.  We will obtain x-ray of the AP pelvis, left AP radiograph,  and cross-table lateral.  I was present for this entire surgical  procedure.     Chip Leonard MD.         Evidence of Infection: No   Complications:  None; patient tolerated the procedure well.    Disposition: PACU - hemodynamically stable.  Condition: stable                 Additional Details:     Attending Attestation: I was present and scrubbed for the entire procedure.    Chip Leonard  Phone Number: [phone]

## 2025-07-16 ENCOUNTER — HOME CARE VISIT (OUTPATIENT)
Dept: HOME HEALTH SERVICES | Facility: HOME HEALTH | Age: 30
End: 2025-07-16
Payer: COMMERCIAL

## 2025-07-16 VITALS
DIASTOLIC BLOOD PRESSURE: 60 MMHG | SYSTOLIC BLOOD PRESSURE: 108 MMHG | HEART RATE: 102 BPM | RESPIRATION RATE: 16 BRPM | TEMPERATURE: 98.6 F

## 2025-07-16 PROCEDURE — G0151 HHCP-SERV OF PT,EA 15 MIN: HCPCS

## 2025-07-16 ASSESSMENT — ENCOUNTER SYMPTOMS
PAIN LOCATION: LEFT HIP
PERSON REPORTING PAIN: PATIENT
PAIN: 1
PAIN LOCATION - PAIN SEVERITY: 5/10

## 2025-07-17 ENCOUNTER — APPOINTMENT (OUTPATIENT)
Dept: ORTHOPEDIC SURGERY | Facility: HOSPITAL | Age: 30
End: 2025-07-17
Payer: COMMERCIAL

## 2025-07-18 ENCOUNTER — HOME CARE VISIT (OUTPATIENT)
Dept: HOME HEALTH SERVICES | Facility: HOME HEALTH | Age: 30
End: 2025-07-18
Payer: COMMERCIAL

## 2025-07-18 VITALS
SYSTOLIC BLOOD PRESSURE: 110 MMHG | DIASTOLIC BLOOD PRESSURE: 70 MMHG | HEART RATE: 96 BPM | TEMPERATURE: 97.8 F | RESPIRATION RATE: 16 BRPM

## 2025-07-18 PROCEDURE — G0151 HHCP-SERV OF PT,EA 15 MIN: HCPCS

## 2025-07-18 ASSESSMENT — ENCOUNTER SYMPTOMS
DENIES PAIN: 1
PERSON REPORTING PAIN: PATIENT

## 2025-07-19 ENCOUNTER — HOME CARE VISIT (OUTPATIENT)
Dept: HOME HEALTH SERVICES | Facility: HOME HEALTH | Age: 30
End: 2025-07-19
Payer: COMMERCIAL

## 2025-07-19 VITALS
TEMPERATURE: 97.9 F | OXYGEN SATURATION: 98 % | SYSTOLIC BLOOD PRESSURE: 111 MMHG | DIASTOLIC BLOOD PRESSURE: 73 MMHG | HEART RATE: 83 BPM

## 2025-07-19 PROCEDURE — G0299 HHS/HOSPICE OF RN EA 15 MIN: HCPCS

## 2025-07-19 SDOH — ECONOMIC STABILITY: FOOD INSECURITY: MEALS PER DAY: 3

## 2025-07-19 ASSESSMENT — ENCOUNTER SYMPTOMS
CHANGE IN APPETITE: UNCHANGED
DENIES PAIN: 1
LAST BOWEL MOVEMENT: 67403
APPETITE LEVEL: GOOD
PERSON REPORTING PAIN: PATIENT

## 2025-07-22 ENCOUNTER — APPOINTMENT (OUTPATIENT)
Dept: ORTHOPEDIC SURGERY | Facility: HOSPITAL | Age: 30
End: 2025-07-22
Payer: COMMERCIAL

## 2025-07-23 ENCOUNTER — HOME CARE VISIT (OUTPATIENT)
Dept: HOME HEALTH SERVICES | Facility: HOME HEALTH | Age: 30
End: 2025-07-23
Payer: COMMERCIAL

## 2025-07-23 ENCOUNTER — TELEPHONE (OUTPATIENT)
Dept: ORTHOPEDIC SURGERY | Facility: HOSPITAL | Age: 30
End: 2025-07-23
Payer: COMMERCIAL

## 2025-07-23 VITALS
HEART RATE: 100 BPM | SYSTOLIC BLOOD PRESSURE: 110 MMHG | DIASTOLIC BLOOD PRESSURE: 76 MMHG | RESPIRATION RATE: 14 BRPM | TEMPERATURE: 98 F

## 2025-07-23 DIAGNOSIS — M87.052 AVASCULAR NECROSIS OF FEMUR HEAD, LEFT (MULTI): ICD-10-CM

## 2025-07-23 DIAGNOSIS — Z96.642 STATUS POST TOTAL HIP REPLACEMENT, LEFT: ICD-10-CM

## 2025-07-23 PROCEDURE — G0152 HHCP-SERV OF OT,EA 15 MIN: HCPCS

## 2025-07-23 PROCEDURE — G0151 HHCP-SERV OF PT,EA 15 MIN: HCPCS

## 2025-07-23 ASSESSMENT — ENCOUNTER SYMPTOMS
PERSON REPORTING PAIN: PATIENT
LOWEST PAIN SEVERITY IN PAST 24 HOURS: 1/10
PAIN: 1
HIGHEST PAIN SEVERITY IN PAST 24 HOURS: 4/10
PAIN LOCATION: LEFT HIP

## 2025-07-23 ASSESSMENT — ACTIVITIES OF DAILY LIVING (ADL)
DRESSING_UB_CURRENT_FUNCTION: INDEPENDENT
BATHING_CURRENT_FUNCTION: INDEPENDENT
GROOMING_CURRENT_FUNCTION: INDEPENDENT
DRESSING_LB_CURRENT_FUNCTION: INDEPENDENT
BATHING ASSESSED: 1
TOILETING: 1
TOILETING: INDEPENDENT
GROOMING ASSESSED: 1

## 2025-07-23 NOTE — TELEPHONE ENCOUNTER
Copied from CRM #[id number]. Topic: Outbound call - Decline to Sched  >> Jul 22, 2025 10:31 AM Alexandre SPRING wrote:  Patient requested call transferred to department. Patient would like a call regarding removing the staples from his leg. Patient also would like a referral for physical therapy.

## 2025-07-23 NOTE — TELEPHONE ENCOUNTER
Called patient.     Would like outpatient PT order    Order placed.     Patient was unaware he didn't have staples, will discuss prineo at [facility]     Angelita Chavez LPN

## 2025-07-25 ENCOUNTER — HOME CARE VISIT (OUTPATIENT)
Dept: HOME HEALTH SERVICES | Facility: HOME HEALTH | Age: 30
End: 2025-07-25
Payer: COMMERCIAL

## 2025-07-29 ENCOUNTER — APPOINTMENT (OUTPATIENT)
Dept: RADIOLOGY | Facility: HOSPITAL | Age: 30
End: 2025-07-29
Payer: COMMERCIAL

## 2025-07-29 ENCOUNTER — HOSPITAL ENCOUNTER (INPATIENT)
Facility: HOSPITAL | Age: 30
End: 2025-07-29
Attending: STUDENT IN AN ORGANIZED HEALTH CARE EDUCATION/TRAINING PROGRAM | Admitting: STUDENT IN AN ORGANIZED HEALTH CARE EDUCATION/TRAINING PROGRAM
Payer: COMMERCIAL

## 2025-07-29 DIAGNOSIS — M35.2: ICD-10-CM

## 2025-07-29 DIAGNOSIS — R41.82 ALTERED MENTAL STATUS, UNSPECIFIED ALTERED MENTAL STATUS TYPE: Primary | ICD-10-CM

## 2025-07-29 DIAGNOSIS — R29.90 STROKE-LIKE SYMPTOMS: ICD-10-CM

## 2025-07-29 DIAGNOSIS — Z96.642 S/P TOTAL LEFT HIP ARTHROPLASTY: ICD-10-CM

## 2025-07-29 DIAGNOSIS — M35.2 BEHCET'S SYNDROME, NEUROLOGIC TYPE (MULTI): ICD-10-CM

## 2025-07-29 LAB
ALBUMIN SERPL BCP-MCNC: 4.5 G/DL (ref 3.4–5)
ALP SERPL-CCNC: 71 U/L (ref 33–120)
ALT SERPL W P-5'-P-CCNC: 14 U/L (ref 10–52)
ANION GAP SERPL CALC-SCNC: 15 MMOL/L (ref 10–20)
APTT PPP: 32 SECONDS (ref 26–36)
AST SERPL W P-5'-P-CCNC: 46 U/L (ref 9–39)
BASOPHILS # BLD AUTO: 0.02 X10*3/UL (ref 0–0.1)
BASOPHILS NFR BLD AUTO: 0.2 %
BILIRUB SERPL-MCNC: 0.5 MG/DL (ref 0–1.2)
BUN SERPL-MCNC: 20 MG/DL (ref 6–23)
CALCIUM SERPL-MCNC: 8.8 MG/DL (ref 8.6–10.6)
CARDIAC TROPONIN I PNL SERPL HS: 5 NG/L (ref 0–53)
CHLORIDE SERPL-SCNC: 107 MMOL/L (ref 98–107)
CO2 SERPL-SCNC: 25 MMOL/L (ref 21–32)
CREAT SERPL-MCNC: 0.71 MG/DL (ref 0.5–1.3)
EGFRCR SERPLBLD CKD-EPI 2021: >90 ML/MIN/1.73M*2
EOSINOPHIL # BLD AUTO: 0.02 X10*3/UL (ref 0–0.7)
EOSINOPHIL NFR BLD AUTO: 0.2 %
ERYTHROCYTE [DISTWIDTH] IN BLOOD BY AUTOMATED COUNT: 16.9 % (ref 11.5–14.5)
GLUCOSE BLD MANUAL STRIP-MCNC: 107 MG/DL (ref 74–99)
GLUCOSE SERPL-MCNC: 92 MG/DL (ref 74–99)
HCT VFR BLD AUTO: 34.9 % (ref 41–52)
HGB BLD-MCNC: 11.8 G/DL (ref 13.5–17.5)
IMM GRANULOCYTES # BLD AUTO: 0.07 X10*3/UL (ref 0–0.7)
IMM GRANULOCYTES NFR BLD AUTO: 0.5 % (ref 0–0.9)
INR PPP: 1.1 (ref 0.9–1.1)
LYMPHOCYTES # BLD AUTO: 2.01 X10*3/UL (ref 1.2–4.8)
LYMPHOCYTES NFR BLD AUTO: 15.6 %
MCH RBC QN AUTO: 32.1 PG (ref 26–34)
MCHC RBC AUTO-ENTMCNC: 33.8 G/DL (ref 32–36)
MCV RBC AUTO: 95 FL (ref 80–100)
MONOCYTES # BLD AUTO: 0.74 X10*3/UL (ref 0.1–1)
MONOCYTES NFR BLD AUTO: 5.8 %
NEUTROPHILS # BLD AUTO: 10 X10*3/UL (ref 1.2–7.7)
NEUTROPHILS NFR BLD AUTO: 77.7 %
NRBC BLD-RTO: 0 /100 WBCS (ref 0–0)
PLATELET # BLD AUTO: 390 X10*3/UL (ref 150–450)
POTASSIUM SERPL-SCNC: 3.5 MMOL/L (ref 3.5–5.3)
POTASSIUM SERPL-SCNC: 5.8 MMOL/L (ref 3.5–5.3)
PROT SERPL-MCNC: 7.2 G/DL (ref 6.4–8.2)
PROTHROMBIN TIME: 11.6 SECONDS (ref 9.8–12.4)
RBC # BLD AUTO: 3.68 X10*6/UL (ref 4.5–5.9)
SODIUM SERPL-SCNC: 141 MMOL/L (ref 136–145)
WBC # BLD AUTO: 12.9 X10*3/UL (ref 4.4–11.3)

## 2025-07-29 PROCEDURE — 71045 X-RAY EXAM CHEST 1 VIEW: CPT | Performed by: STUDENT IN AN ORGANIZED HEALTH CARE EDUCATION/TRAINING PROGRAM

## 2025-07-29 PROCEDURE — 70496 CT ANGIOGRAPHY HEAD: CPT | Performed by: RADIOLOGY

## 2025-07-29 PROCEDURE — 70450 CT HEAD/BRAIN W/O DYE: CPT

## 2025-07-29 PROCEDURE — 85610 PROTHROMBIN TIME: CPT | Performed by: EMERGENCY MEDICINE

## 2025-07-29 PROCEDURE — 36415 COLL VENOUS BLD VENIPUNCTURE: CPT

## 2025-07-29 PROCEDURE — 2500000004 HC RX 250 GENERAL PHARMACY W/ HCPCS (ALT 636 FOR OP/ED): Mod: JZ,SE | Performed by: STUDENT IN AN ORGANIZED HEALTH CARE EDUCATION/TRAINING PROGRAM

## 2025-07-29 PROCEDURE — 96374 THER/PROPH/DIAG INJ IV PUSH: CPT

## 2025-07-29 PROCEDURE — 2500000004 HC RX 250 GENERAL PHARMACY W/ HCPCS (ALT 636 FOR OP/ED): Mod: SE

## 2025-07-29 PROCEDURE — 82947 ASSAY GLUCOSE BLOOD QUANT: CPT

## 2025-07-29 PROCEDURE — 85025 COMPLETE CBC W/AUTO DIFF WBC: CPT | Performed by: EMERGENCY MEDICINE

## 2025-07-29 PROCEDURE — 99285 EMERGENCY DEPT VISIT HI MDM: CPT | Mod: 25 | Performed by: STUDENT IN AN ORGANIZED HEALTH CARE EDUCATION/TRAINING PROGRAM

## 2025-07-29 PROCEDURE — 71045 X-RAY EXAM CHEST 1 VIEW: CPT

## 2025-07-29 PROCEDURE — 99285 EMERGENCY DEPT VISIT HI MDM: CPT | Performed by: STUDENT IN AN ORGANIZED HEALTH CARE EDUCATION/TRAINING PROGRAM

## 2025-07-29 PROCEDURE — 74018 RADEX ABDOMEN 1 VIEW: CPT | Performed by: STUDENT IN AN ORGANIZED HEALTH CARE EDUCATION/TRAINING PROGRAM

## 2025-07-29 PROCEDURE — 84132 ASSAY OF SERUM POTASSIUM: CPT

## 2025-07-29 PROCEDURE — 70498 CT ANGIOGRAPHY NECK: CPT | Performed by: RADIOLOGY

## 2025-07-29 PROCEDURE — 70498 CT ANGIOGRAPHY NECK: CPT

## 2025-07-29 PROCEDURE — 73502 X-RAY EXAM HIP UNI 2-3 VIEWS: CPT | Mod: LT

## 2025-07-29 PROCEDURE — 80053 COMPREHEN METABOLIC PANEL: CPT | Performed by: EMERGENCY MEDICINE

## 2025-07-29 PROCEDURE — 73502 X-RAY EXAM HIP UNI 2-3 VIEWS: CPT | Mod: LEFT SIDE | Performed by: STUDENT IN AN ORGANIZED HEALTH CARE EDUCATION/TRAINING PROGRAM

## 2025-07-29 PROCEDURE — 2550000001 HC RX 255 CONTRASTS: Mod: SE | Performed by: EMERGENCY MEDICINE

## 2025-07-29 PROCEDURE — 70450 CT HEAD/BRAIN W/O DYE: CPT | Performed by: RADIOLOGY

## 2025-07-29 PROCEDURE — 87040 BLOOD CULTURE FOR BACTERIA: CPT

## 2025-07-29 PROCEDURE — 74018 RADEX ABDOMEN 1 VIEW: CPT

## 2025-07-29 PROCEDURE — 99223 1ST HOSP IP/OBS HIGH 75: CPT

## 2025-07-29 PROCEDURE — 36415 COLL VENOUS BLD VENIPUNCTURE: CPT | Performed by: EMERGENCY MEDICINE

## 2025-07-29 PROCEDURE — 84484 ASSAY OF TROPONIN QUANT: CPT | Performed by: EMERGENCY MEDICINE

## 2025-07-29 PROCEDURE — 1200000002 HC GENERAL ROOM WITH TELEMETRY DAILY

## 2025-07-29 RX ORDER — CYCLOBENZAPRINE HCL 10 MG
10 TABLET ORAL 3 TIMES DAILY PRN
Status: DISCONTINUED | OUTPATIENT
Start: 2025-07-29 | End: 2025-08-08 | Stop reason: HOSPADM

## 2025-07-29 RX ORDER — ONDANSETRON HYDROCHLORIDE 2 MG/ML
INJECTION, SOLUTION INTRAVENOUS
Status: COMPLETED
Start: 2025-07-29 | End: 2025-07-29

## 2025-07-29 RX ORDER — ONDANSETRON HYDROCHLORIDE 2 MG/ML
4 INJECTION, SOLUTION INTRAVENOUS ONCE
Status: COMPLETED | OUTPATIENT
Start: 2025-07-29 | End: 2025-07-29

## 2025-07-29 RX ORDER — PREDNISONE 10 MG/1
10 TABLET ORAL EVERY 24 HOURS
Status: DISCONTINUED | OUTPATIENT
Start: 2025-07-29 | End: 2025-08-07

## 2025-07-29 RX ORDER — ONDANSETRON HYDROCHLORIDE 2 MG/ML
4 INJECTION, SOLUTION INTRAVENOUS EVERY 8 HOURS PRN
Status: DISCONTINUED | OUTPATIENT
Start: 2025-07-29 | End: 2025-08-08 | Stop reason: HOSPADM

## 2025-07-29 RX ORDER — MYCOPHENOLATE MOFETIL 500 MG/1
1000 TABLET ORAL 2 TIMES DAILY
Status: DISCONTINUED | OUTPATIENT
Start: 2025-07-29 | End: 2025-07-31

## 2025-07-29 RX ORDER — INDOMETHACIN 25 MG/1
25 CAPSULE ORAL
Status: DISCONTINUED | OUTPATIENT
Start: 2025-07-30 | End: 2025-07-30

## 2025-07-29 RX ORDER — HEPARIN SODIUM 5000 [USP'U]/ML
5000 INJECTION, SOLUTION INTRAVENOUS; SUBCUTANEOUS EVERY 8 HOURS SCHEDULED
Status: DISCONTINUED | OUTPATIENT
Start: 2025-07-30 | End: 2025-08-04

## 2025-07-29 RX ORDER — POLYETHYLENE GLYCOL 3350 17 G/17G
17 POWDER, FOR SOLUTION ORAL DAILY
Status: DISCONTINUED | OUTPATIENT
Start: 2025-07-30 | End: 2025-07-30

## 2025-07-29 RX ORDER — THIAMINE HYDROCHLORIDE 100 MG/ML
500 INJECTION, SOLUTION INTRAMUSCULAR; INTRAVENOUS 3 TIMES DAILY
Status: COMPLETED | OUTPATIENT
Start: 2025-07-29 | End: 2025-08-03

## 2025-07-29 RX ORDER — PANTOPRAZOLE SODIUM 40 MG/10ML
40 INJECTION, POWDER, LYOPHILIZED, FOR SOLUTION INTRAVENOUS EVERY 24 HOURS
Status: DISCONTINUED | OUTPATIENT
Start: 2025-07-29 | End: 2025-07-31

## 2025-07-29 RX ADMIN — THIAMINE HYDROCHLORIDE 500 MG: 100 INJECTION, SOLUTION INTRAMUSCULAR; INTRAVENOUS at 23:40

## 2025-07-29 RX ADMIN — ONDANSETRON HYDROCHLORIDE 4 MG: 2 INJECTION, SOLUTION INTRAVENOUS at 20:40

## 2025-07-29 RX ADMIN — ONDANSETRON 4 MG: 2 INJECTION, SOLUTION INTRAMUSCULAR; INTRAVENOUS at 20:40

## 2025-07-29 RX ADMIN — IOHEXOL 90 ML: 350 INJECTION, SOLUTION INTRAVENOUS at 20:34

## 2025-07-29 ASSESSMENT — LIFESTYLE VARIABLES
HAVE YOU EVER FELT YOU SHOULD CUT DOWN ON YOUR DRINKING: NO
EVER HAD A DRINK FIRST THING IN THE MORNING TO STEADY YOUR NERVES TO GET RID OF A HANGOVER: NO
HAVE PEOPLE ANNOYED YOU BY CRITICIZING YOUR DRINKING: NO
EVER FELT BAD OR GUILTY ABOUT YOUR DRINKING: NO
TOTAL SCORE: 0

## 2025-07-30 ENCOUNTER — APPOINTMENT (OUTPATIENT)
Dept: RADIOLOGY | Facility: HOSPITAL | Age: 30
End: 2025-07-30
Payer: COMMERCIAL

## 2025-07-30 ENCOUNTER — HOME CARE VISIT (OUTPATIENT)
Dept: HOME HEALTH SERVICES | Facility: HOME HEALTH | Age: 30
End: 2025-07-30
Payer: COMMERCIAL

## 2025-07-30 LAB
ANION GAP SERPL CALC-SCNC: 17 MMOL/L (ref 10–20)
APTT PPP: 33 SECONDS (ref 26–36)
BASOPHILS # BLD AUTO: 0.04 X10*3/UL (ref 0–0.1)
BASOPHILS NFR BLD AUTO: 0.3 %
BUN SERPL-MCNC: 22 MG/DL (ref 6–23)
CALCIUM SERPL-MCNC: 9.6 MG/DL (ref 8.6–10.6)
CHLORIDE SERPL-SCNC: 103 MMOL/L (ref 98–107)
CO2 SERPL-SCNC: 23 MMOL/L (ref 21–32)
CREAT SERPL-MCNC: 0.71 MG/DL (ref 0.5–1.3)
EGFRCR SERPLBLD CKD-EPI 2021: >90 ML/MIN/1.73M*2
EOSINOPHIL # BLD AUTO: 0 X10*3/UL (ref 0–0.7)
EOSINOPHIL NFR BLD AUTO: 0 %
ERYTHROCYTE [DISTWIDTH] IN BLOOD BY AUTOMATED COUNT: 16.5 % (ref 11.5–14.5)
GLUCOSE SERPL-MCNC: 99 MG/DL (ref 74–99)
HCT VFR BLD AUTO: 33.5 % (ref 41–52)
HGB BLD-MCNC: 11.2 G/DL (ref 13.5–17.5)
IMM GRANULOCYTES # BLD AUTO: 0.04 X10*3/UL (ref 0–0.7)
IMM GRANULOCYTES NFR BLD AUTO: 0.3 % (ref 0–0.9)
INR PPP: 1.1 (ref 0.9–1.1)
LYMPHOCYTES # BLD AUTO: 0.79 X10*3/UL (ref 1.2–4.8)
LYMPHOCYTES NFR BLD AUTO: 6.2 %
MCH RBC QN AUTO: 31.5 PG (ref 26–34)
MCHC RBC AUTO-ENTMCNC: 33.4 G/DL (ref 32–36)
MCV RBC AUTO: 94 FL (ref 80–100)
MONOCYTES # BLD AUTO: 0.41 X10*3/UL (ref 0.1–1)
MONOCYTES NFR BLD AUTO: 3.2 %
NEUTROPHILS # BLD AUTO: 11.41 X10*3/UL (ref 1.2–7.7)
NEUTROPHILS NFR BLD AUTO: 90 %
NRBC BLD-RTO: 0 /100 WBCS (ref 0–0)
PLATELET # BLD AUTO: 403 X10*3/UL (ref 150–450)
POTASSIUM SERPL-SCNC: 4.4 MMOL/L (ref 3.5–5.3)
PROTHROMBIN TIME: 12.4 SECONDS (ref 9.8–12.4)
RBC # BLD AUTO: 3.55 X10*6/UL (ref 4.5–5.9)
SODIUM SERPL-SCNC: 139 MMOL/L (ref 136–145)
WBC # BLD AUTO: 12.7 X10*3/UL (ref 4.4–11.3)

## 2025-07-30 PROCEDURE — 82374 ASSAY BLOOD CARBON DIOXIDE: CPT

## 2025-07-30 PROCEDURE — 85610 PROTHROMBIN TIME: CPT

## 2025-07-30 PROCEDURE — 2500000004 HC RX 250 GENERAL PHARMACY W/ HCPCS (ALT 636 FOR OP/ED): Mod: SE

## 2025-07-30 PROCEDURE — 85025 COMPLETE CBC W/AUTO DIFF WBC: CPT

## 2025-07-30 PROCEDURE — 2060000001 HC INTERMEDIATE ICU ROOM DAILY

## 2025-07-30 PROCEDURE — 74018 RADEX ABDOMEN 1 VIEW: CPT

## 2025-07-30 PROCEDURE — 2550000001 HC RX 255 CONTRASTS: Mod: SE | Performed by: STUDENT IN AN ORGANIZED HEALTH CARE EDUCATION/TRAINING PROGRAM

## 2025-07-30 PROCEDURE — 97530 THERAPEUTIC ACTIVITIES: CPT | Mod: GP

## 2025-07-30 PROCEDURE — 70553 MRI BRAIN STEM W/O & W/DYE: CPT

## 2025-07-30 PROCEDURE — A9575 INJ GADOTERATE MEGLUMI 0.1ML: HCPCS | Mod: SE | Performed by: STUDENT IN AN ORGANIZED HEALTH CARE EDUCATION/TRAINING PROGRAM

## 2025-07-30 PROCEDURE — 92610 EVALUATE SWALLOWING FUNCTION: CPT | Mod: GN

## 2025-07-30 PROCEDURE — 36415 COLL VENOUS BLD VENIPUNCTURE: CPT

## 2025-07-30 PROCEDURE — 92526 ORAL FUNCTION THERAPY: CPT | Mod: GN

## 2025-07-30 PROCEDURE — 74177 CT ABD & PELVIS W/CONTRAST: CPT

## 2025-07-30 PROCEDURE — 74018 RADEX ABDOMEN 1 VIEW: CPT | Performed by: RADIOLOGY

## 2025-07-30 PROCEDURE — 97161 PT EVAL LOW COMPLEX 20 MIN: CPT | Mod: GP

## 2025-07-30 RX ORDER — ACETAMINOPHEN 160 MG/5ML
650 SOLUTION ORAL EVERY 4 HOURS PRN
Status: DISCONTINUED | OUTPATIENT
Start: 2025-07-30 | End: 2025-08-08 | Stop reason: HOSPADM

## 2025-07-30 RX ORDER — GADOTERATE MEGLUMINE 376.9 MG/ML
12 INJECTION INTRAVENOUS
Status: COMPLETED | OUTPATIENT
Start: 2025-07-30 | End: 2025-07-30

## 2025-07-30 RX ORDER — SODIUM CHLORIDE 9 MG/ML
75 INJECTION, SOLUTION INTRAVENOUS CONTINUOUS
Status: ACTIVE | OUTPATIENT
Start: 2025-07-30 | End: 2025-07-30

## 2025-07-30 RX ORDER — POLYETHYLENE GLYCOL 3350 17 G/17G
17 POWDER, FOR SOLUTION ORAL DAILY
Status: DISCONTINUED | OUTPATIENT
Start: 2025-07-31 | End: 2025-08-01

## 2025-07-30 RX ORDER — ACETAMINOPHEN 325 MG/1
650 TABLET ORAL EVERY 4 HOURS PRN
Status: DISCONTINUED | OUTPATIENT
Start: 2025-07-30 | End: 2025-08-08 | Stop reason: HOSPADM

## 2025-07-30 RX ORDER — ACETAMINOPHEN 650 MG/1
650 SUPPOSITORY RECTAL EVERY 4 HOURS PRN
Status: DISCONTINUED | OUTPATIENT
Start: 2025-07-30 | End: 2025-08-08 | Stop reason: HOSPADM

## 2025-07-30 RX ORDER — LIDOCAINE HYDROCHLORIDE 20 MG/ML
1 JELLY TOPICAL ONCE AS NEEDED
Status: DISCONTINUED | OUTPATIENT
Start: 2025-07-30 | End: 2025-08-08 | Stop reason: HOSPADM

## 2025-07-30 RX ORDER — MYCOPHENOLATE MOFETIL 200 MG/ML
1000 POWDER, FOR SUSPENSION ORAL 2 TIMES DAILY
Status: DISCONTINUED | OUTPATIENT
Start: 2025-07-30 | End: 2025-08-07

## 2025-07-30 RX ADMIN — THIAMINE HYDROCHLORIDE 500 MG: 100 INJECTION, SOLUTION INTRAMUSCULAR; INTRAVENOUS at 09:03

## 2025-07-30 RX ADMIN — SODIUM CHLORIDE 75 ML/HR: 9 INJECTION, SOLUTION INTRAVENOUS at 11:45

## 2025-07-30 RX ADMIN — GADOTERATE MEGLUMINE 12 ML: 376.9 INJECTION INTRAVENOUS at 00:48

## 2025-07-30 RX ADMIN — HEPARIN SODIUM 5000 UNITS: 5000 INJECTION, SOLUTION INTRAVENOUS; SUBCUTANEOUS at 15:23

## 2025-07-30 RX ADMIN — THIAMINE HYDROCHLORIDE 500 MG: 100 INJECTION, SOLUTION INTRAMUSCULAR; INTRAVENOUS at 15:57

## 2025-07-30 RX ADMIN — IOHEXOL 80 ML: 350 INJECTION, SOLUTION INTRAVENOUS at 15:13

## 2025-07-30 RX ADMIN — METHYLPREDNISOLONE SODIUM SUCCINATE 1000 MG: 1 INJECTION INTRAMUSCULAR; INTRAVENOUS at 03:49

## 2025-07-30 RX ADMIN — ONDANSETRON 4 MG: 2 INJECTION INTRAMUSCULAR; INTRAVENOUS at 15:23

## 2025-07-30 RX ADMIN — HEPARIN SODIUM 5000 UNITS: 5000 INJECTION, SOLUTION INTRAVENOUS; SUBCUTANEOUS at 09:04

## 2025-07-30 RX ADMIN — PANTOPRAZOLE SODIUM 40 MG: 40 INJECTION, POWDER, FOR SOLUTION INTRAVENOUS at 09:03

## 2025-07-30 SDOH — SOCIAL STABILITY: SOCIAL INSECURITY: HAS ANYONE EVER THREATENED TO HURT YOUR FAMILY OR YOUR PETS?: NO

## 2025-07-30 SDOH — SOCIAL STABILITY: SOCIAL INSECURITY: ARE YOU OR HAVE YOU BEEN THREATENED OR ABUSED PHYSICALLY, EMOTIONALLY, OR SEXUALLY BY ANYONE?: NO

## 2025-07-30 SDOH — SOCIAL STABILITY: SOCIAL INSECURITY: DO YOU FEEL UNSAFE GOING BACK TO THE PLACE WHERE YOU ARE LIVING?: NO

## 2025-07-30 SDOH — SOCIAL STABILITY: SOCIAL INSECURITY: DO YOU FEEL ANYONE HAS EXPLOITED OR TAKEN ADVANTAGE OF YOU FINANCIALLY OR OF YOUR PERSONAL PROPERTY?: NO

## 2025-07-30 SDOH — SOCIAL STABILITY: SOCIAL INSECURITY: HAVE YOU HAD ANY THOUGHTS OF HARMING ANYONE ELSE?: NO

## 2025-07-30 SDOH — SOCIAL STABILITY: SOCIAL INSECURITY: ARE THERE ANY APPARENT SIGNS OF INJURIES/BEHAVIORS THAT COULD BE RELATED TO ABUSE/NEGLECT?: NO

## 2025-07-30 SDOH — SOCIAL STABILITY: SOCIAL INSECURITY: HAVE YOU HAD THOUGHTS OF HARMING ANYONE ELSE?: NO

## 2025-07-30 SDOH — SOCIAL STABILITY: SOCIAL INSECURITY: DOES ANYONE TRY TO KEEP YOU FROM HAVING/CONTACTING OTHER FRIENDS OR DOING THINGS OUTSIDE YOUR HOME?: NO

## 2025-07-30 SDOH — SOCIAL STABILITY: SOCIAL INSECURITY: WERE YOU ABLE TO COMPLETE ALL THE BEHAVIORAL HEALTH SCREENINGS?: YES

## 2025-07-30 SDOH — SOCIAL STABILITY: SOCIAL INSECURITY: ABUSE: ADULT

## 2025-07-30 ASSESSMENT — PAIN SCALES - PAIN ASSESSMENT IN ADVANCED DEMENTIA (PAINAD)
FACIALEXPRESSION: SMILING OR INEXPRESSIVE
CONSOLABILITY: NO NEED TO CONSOLE
TOTALSCORE: 0
BODYLANGUAGE: RELAXED
BREATHING: NORMAL

## 2025-07-30 ASSESSMENT — PATIENT HEALTH QUESTIONNAIRE - PHQ9
1. LITTLE INTEREST OR PLEASURE IN DOING THINGS: NOT AT ALL
2. FEELING DOWN, DEPRESSED OR HOPELESS: NOT AT ALL
SUM OF ALL RESPONSES TO PHQ9 QUESTIONS 1 & 2: 0

## 2025-07-30 ASSESSMENT — LIFESTYLE VARIABLES
SUBSTANCE_ABUSE_PAST_12_MONTHS: NO
PRESCIPTION_ABUSE_PAST_12_MONTHS: NO

## 2025-07-30 ASSESSMENT — PAIN SCALES - GENERAL
PAINLEVEL_OUTOF10: 0 - NO PAIN

## 2025-07-30 ASSESSMENT — COGNITIVE AND FUNCTIONAL STATUS - GENERAL
STANDING UP FROM CHAIR USING ARMS: A LOT
PATIENT BASELINE BEDBOUND: NO
MOBILITY SCORE: 12
MOVING FROM LYING ON BACK TO SITTING ON SIDE OF FLAT BED WITH BEDRAILS: A LITTLE
WALKING IN HOSPITAL ROOM: TOTAL
TURNING FROM BACK TO SIDE WHILE IN FLAT BAD: A LITTLE
CLIMB 3 TO 5 STEPS WITH RAILING: TOTAL
MOVING TO AND FROM BED TO CHAIR: A LOT

## 2025-07-30 ASSESSMENT — PAIN - FUNCTIONAL ASSESSMENT
PAIN_FUNCTIONAL_ASSESSMENT: 0-10

## 2025-07-30 ASSESSMENT — ACTIVITIES OF DAILY LIVING (ADL): LACK_OF_TRANSPORTATION: NO

## 2025-07-30 NOTE — PROGRESS NOTES
Juan Marsh is a 30 y.o. male on day 1 of admission presenting with Behcet's syndrome, neurologic type (Multi).      Subjective   No acute overnight events, Patient seen today, family at bedside, he was lethargic and drowsy obeying commands, started on high IV steroids yesterday and IV fluids, pending swallow evaluation,         Objective     Last Recorded Vitals  Blood pressure 116/90, pulse 58, temperature 36.7 °C (98.1 °F), resp. rate 19, weight 63.5 kg (140 lb), SpO2 99%.    Physical Exam  Neurological Exam  MENTAL STATUS:  - Drowsy,  intermittently follows simple commands.      CRANIAL NERVES:  - CN II: R pupil 7mm, L pupil 3mm reactive to light b/l. Mild exotropia of R eye on midline gaze.  Visual fields intact bilaterally to visual threat    - CN III, IV, VI: R eyelid ptosis.also, inferior gaze at baseline, restricted EOM in both eyes in oculocephalic maneuver, has right eye adduction and left restricted in all directions   - CN VII:  slight right facial flattening     MOTOR: Able to lift all extremities of the bed   SENSATION: intact to painful stimuli      REFLEXES:        3+ reflexes throughout, symmetric UE and LE. Continuous clonus in R foot. 2 beats of clonus in L foot.  Juan Ramon positive left upper extremity, negative right upper extremity.  Toes downgoing bilaterally     COORDINATION: unable to assess   Gait: Deferred     Labs:  Last CBC:  Lab Results   Component Value Date    WBC 12.7 (H) 07/30/2025    HGB 11.2 (L) 07/30/2025    HCT 33.5 (L) 07/30/2025    MCV 94 07/30/2025     07/30/2025       Last RFP:  Lab Results   Component Value Date    GLUCOSE 99 07/30/2025    CALCIUM 9.6 07/30/2025     07/30/2025    K 4.4 07/30/2025    CO2 23 07/30/2025     07/30/2025    BUN 22 07/30/2025    CREATININE 0.71 07/30/2025       Imaging:  === 07/29/25 ===    MR BRAIN W AND WO CONTRAST    - Impression -  Compared to MRI brain 05/20/2025, there has been enlargement of the  infiltrative  lesion involving portions of the right cristobal tyrone, right  cerebral peduncle, right posterior limb internal capsule extending  into the medial right basal ganglia and corona radiata. The greatest  degrees of new structure involvement are seen within the right globus  pallidus, ventral lateral thalamus and more caudally in the tyrone.  This results in expansile edema of the aforementioned structures with  surrounding mass-effect to a significantly greater degree compared to  prior study. There is increased mass-effect on the hypothalamus and  right mammillary bodies. There is also increased mass effect on the  right lateral ventricle causing increased leftward bowing of the  septum pellucidum and some increased effacement of the 3rd ventricle.  There is also new patchy enhancement within this lesion, most  pronounced in the cerebral peduncle, right globus pallidus, ventral  medial thalamus, and posterior limb of the right internal capsule.  There is also increased edema within the right optic tract and right  aspect of the optic chiasm.    The other pre-existing lesion known to involve the left cerebral  peduncle and left internal capsule extending into the left corona  radiata is overall decreased in size and signal intensity on FLAIR  images compared to prior study; however, there is a tiny focus of new  enhancement within the left posterior limb internal capsule.    Assessment & Plan  Behcet's syndrome, neurologic type (Multi)    Juan Marsh is a 30 y.o. 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 who presented to  the ED progressive aphasia and vomiting. Initially came as bat with NIH of 7 Exam showed drowsiness, aphonia, aphasia with inability to repeat, name or follow two step commands. And right likely new right dilated pupil and ptosis. He also has right facial. CT scan showed some progression of right lesion with slight mass effect. Chest X--ray did not show infection and right hip x ray was stable post surgically with new changes. MRI showed worsening cascade on the right with mass effect and midbrain involvement.      # Concern for neuro Behcet's disease progression   Q2 neurochecks in MATEUS status   Continue IV solumedrol 1 G daily for 5 dose (started on 7/30)  Continue MMF 10 BID, hold oral prednisone   Follow Infection work up with blood and urine cultures  CT abdomen and pelvis to rule out intraabdominal infection   NPO pending evaluation, aspiration precautions  PT/OT/SLP     # Miscellaneous  Thiamine 500 mg 3 times daily for 3 days followed by 100 mL times daily  Zofran 4 mg every 8 hours as needed for nausea  MiraLAX 17 g daily  Protonix 40 mg IV daily  DVT prophylaxis: subQ heparin     Angelique White MD  PGY2- neurology resident

## 2025-07-30 NOTE — PROGRESS NOTES
Juan Marsh admitted to adult ED and IP due to stroke. ADOD 4-5 days       07/30/25 1757   Discharge Planning   Living Arrangements   (Lives with his mom)   Assistance Needed Pt does his laundry at his sister's home since the elevator broke in his apartment six months ago. Pt is able to dress himself. His mom does the cokking for him. Pt walks with a straight cane primarily and has a FWW.   Type of Residence Private residence   Number of Stairs to Enter Residence 1   Number of Stairs Within Residence 16   Who is requesting discharge planning? Provider   Expected Discharge Disposition Short Term A   Housing Stability   In the last 12 months, was there a time when you were not able to pay the mortgage or rent on time? N   In the past 12 months, how many times have you moved where you were living? 0   At any time in the past 12 months, were you homeless or living in a shelter (including now)? N   Transportation Needs   In the past 12 months, has lack of transportation kept you from medical appointments or from getting medications? no   In the past 12 months, has lack of transportation kept you from meetings, work, or from getting things needed for daily living? No   Intensity of Service   Intensity of Service >30 min     SW consulted by MD to follow up on discharge planning. Met with Pt's mom Nayla and dad Daryl. Pt taken to scan so parent provided collateral for this assessment.  Parent recalled Pt was previously at  Acute rehab after his first stroke. She explained she wanted to discuss with him if he wants to go to acute rehab. She recalled Pt didn't pass the swallow eval.   FARHAN printed choice list for both Pt and parent. FARHAN also provided Pt with a printed talking board to carrie to communicate with staff. Parent and Pt were sleeping when SW delivered choice list so sign out given as Pt went to IP room this evening.

## 2025-07-30 NOTE — PROGRESS NOTES
Speech-Language Pathology  Adult Inpatient Clinical Bedside Swallow Evaluation    Patient Name: Juan Marsh  MRN: [id number]  Today's Date: 7/30/2025   Start Time: 950  Stop Time: 1005  Time Calculation (min): 15    History of Present Illness:   Juan Marsh is a 30 y.o. 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      BAT was called at 8:11 pm, NIHSS was 7 (1 LOC q, 2 aphasia, 2 dysarthria, 1 right facial droop, and 1 left leg drift (limited by pain) ), /86 and glucose 107. CTH revealed hypodensity in same territory as previously demonstrated lesions with stable to improved compression of ventricle without any obvious dilatation of ventricles. CTA H/N negative for LVO. BAT was cancelled.      Patient had recent hip surgery two weeks ago, he was doing well, then today mother noticed that at 10 am in the morning he was vomiting and not able to speak his baseline is that he had some delay in answering questions and can use some signs but never like this. They tried giving him some fluids then  Father found him on the floor drowsy at 7 pm , no tongue bites, abnormal movements or urine incontinence ( family noted that he has been having occasional urine incontinence over the past dew weeks) He is complaint with all his medication but was not able to take the morning meds due to vomiting. They deny any recent illnesses or missed medications.     Assessment:   Clinical bedside swallow evaluation completed. Pt cleared for evaluation by RN. Intermittently awake but lethargic and upright in bed for  session w/ mother at bedside. Nonverbal and unable to answer orientation questions + inconsistently able to follow commands. Oral mechanism examination appears WNL. Mother reports pt w/ h/o dysphagia r/t baseline neurological disease though tolerated regular diet prior to admission. Per EMR, pt underwent MBSS as inpatient in Feb 2025 w/ recommendation of Regular Solids & Mildly (Nectar) Thick Liquids. Unable to produce volitional cough prior to administration of PO trials    Trials of ice chips, tsp sips water, and single sips water via straw were given/attempted. Pt unable to draw liquid through straw despite max cues/encouragement. Overt clinical s/s aspiration w/ tsp sips water characterized by immediate weak coughing. No further trials given 2/2 c/f aspiration.    Pt not appropriate for initiation of PO diet + instrumental swallow assessment contraindicated given severity of swallow function/mental status. Recommend NPO w/ frequent aggressive oral care. 2-3 ice chips per hr allowed for pleasure, safe swallow stimulation, and after oral care to prevent buildup of bacteria within the oropharynx. SLP will continue to follow w/ swallow re-assessment as pt appropriate/schedule permits. Question need for alternate means nutrition/hydration. RN/MD aware.      Extensive education/treatment provided to pt/family following completion of session re: results/recommendations and dysphagia POC. Pt/family verbalized understanding.      Recommendations:  NPO with frequent aggressive oral care.  2-3 ice chips/hr allowed for pleasure, safe swallow stimulation, and after oral care to prevent buildup of bacteria within the oropharynx  Consider alternate means nutrition/hydration   SLP will continue to follow w/ swallow re-assessment as pt appropriate/schedule permits    Goal:   Pt/caregiver/family will demonstrate adequate return of knowledge re: dysphagia POC/diet recommendations/safe swallowing guidelines w/ 100% acc to  effectively assist the patient in immediate safety with oral intake and swallowing.  Start Date: 7/30/2025  End Date: 8/30/2025  Status: Goal Initiated this date    Pt will tolerate least restrictive diet with no overt clinical s/s aspiration 100% of the time.   Start Date: 7/30/2025  End Date: 8/30/2025  Status: Goal Initiated this date       Plan:  SLP Services Indicated: Yes  Frequency: 2x per week  Discussed POC with patient and mother  SLP - OK to Discharge    Pain:   0-10  0 = No pain.     Inpatient Education:  Extensive education provided to patient and mother regarding current swallow function, recommendations/results, and POC.      Consultations/Referrals/Coordination of Services:   N/A

## 2025-07-30 NOTE — HOSPITAL COURSE
Juan Marsh is a 30 y.o. 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 Initially came as BAT with NIH of 7 Exam showed drowsiness, aphonia, aphasia. CT scan (7/29/2025) showed some progression of right lesion with slight mass effect. Chest X--ray (7/29/2025) did not show infection and right hip x ray (7/29/2025) was stable post surgically with new changes. MRI (7/29/2025) showed worsening cascade on the right with mass effect and midbrain involvement. Speech pathology evaluation (7/30/2025) shows failure of volitional cough and swallow function and NG tube was placed. CT abdomen (7/30/2025) was negative for intraabdominal infections but showed possible Right Lower Lobe atelectasis or early infection, he had secretions and cough and was started empirically on antibiotics, was discontinued 8/2/2025 since procalcitonin was negative and respiratory symptoms improved with pulmonary hygiene.    taper with decreasing 10 mg weekly until reaching 10 mg daily, with PJP and PPI prophylaxis, as well as vitamin D and calcium supplementation. He experienced significant dysphagia and required tube feeding; repeated evaluation with modified barium swallow cleared him for a minced diet.

## 2025-07-30 NOTE — PROGRESS NOTES
Pharmacy Medication History Review    Juan Marsh is a 30 y.o. male admitted for Behcet's syndrome, neurologic type (Multi). Pharmacy reviewed the patient's [id number] medications and allergies for accuracy.    Medications ADDED:  None  Medications CHANGED:  None  Medications REMOVED:   Oxycodone (therapy completed)     The list below reflects the updated PTA list.   Prior to Admission Medications   Prescriptions Last Dose Informant   acetaminophen (Tylenol) 325 mg tablet 7/29/2025 Other, Mother   Sig: Take 2 tablets (650 mg) by mouth every 6 hours.   Patient taking differently: Take 2 tablets (650 mg) by mouth every 6 hours if needed (pain).   aspirin 81 mg EC tablet 7/28/2025 Other, Mother   Sig: Take 1 tablet (81 mg) by mouth 2 times a day for 26 doses.   b complex vitamins capsule Not Taking Other, Mother   Sig: Take 1 capsule by mouth once daily   Patient not taking: Reported on 7/30/2025   brimonidine (AlphaGAN) 0.2 % ophthalmic solution 7/28/2025 Other, Mother, Self   Sig: Instill 1 drop into right eye every 8 hours   calcium carbonate-vitamin D3 (Oscal-500) 500 mg-10 mcg (400 unit) tablet 7/28/2025 Other, Mother   Sig: Take 1 tablet by mouth 2 times a day.   cyclobenzaprine (Flexeril) 10 mg tablet Not Taking Other, Mother, Self   Sig: Take 1 tablet (10 mg) by mouth 3 times a day as needed for muscle spasms.   Patient not taking: Reported on 7/30/2025   gabapentin (Neurontin) 300 mg capsule Not Taking Other, Mother   Sig: Take 1 capsule (300 mg) by mouth every 8 hours.   Patient not taking: Reported on 7/30/2025   indomethacin (Indocin) 25 mg capsule 7/28/2025 Other, Mother   Sig: Take 1 capsule (25 mg) by mouth 3 times daily (morning, midday, late afternoon).   mirtazapine (Remeron) 15 mg tablet Not Taking Other, Mother, Self   Sig: Take 1 tablet by mouth once daily at bedtime. Take at bedtime while taking prednisone.   Patient not taking: Reported on 7/30/2025   mycophenolate (Cellcept) 500  mg tablet 7/28/2025 Other, Mother   Sig: Take 2 tablets (1,000 mg) by mouth 2 times a day.   omega-3 acid ethyl esters (Lovaza) 1 gram capsule Not Taking Other, Mother   Sig: Take 1 capsule by mouth once daily   Patient not taking: Reported on 7/30/2025   ondansetron (Zofran) 4 mg tablet  Other, Mother   Sig: Take 1 tablet (4 mg) by mouth every 8 hours if needed for nausea.   pantoprazole (ProtoNix) 40 mg EC tablet 7/28/2025 Other, Mother   Sig: Take 1 tablet (40 mg) by mouth once daily in the morning. Take before meals for 20 doses. Do not crush, chew, or split.   polyethylene glycol (Glycolax, Miralax) 17 gram/dose powder Past Month Other, Mother   Sig: Mix 17 g of powder and drink once daily.   Patient taking differently: Mix 17 g of powder and drink once daily as needed for constipation.   predniSONE (Deltasone) 10 mg tablet 7/28/2025 Other, Mother   Sig: Take 1 tablet (10 mg) by mouth once daily.   prednisoLONE acetate (Pred-Forte) 1 % ophthalmic suspension 7/28/2025 Other, Mother, Self   Sig: Administer 1 drop into the right eye 4 times a day.   sulfamethoxazole-trimethoprim (Bactrim DS) 800-160 mg tablet 7/28/2025 Other, Mother, Self   Sig: Take 1 tablet by mouth three times a week on monday, wednesday and friday      Facility-Administered Medications: None        The list below reflects the updated allergy list. Please review each documented allergy for additional clarification and justification.  Allergies  Reviewed by Stephen Verduzco, PharmD on 7/30/2025   No Known Allergies         Patient accepts M2B at discharge.     Sources:   [facility]  Pharmacy dispense history  Patient Interview    Parent patient's mother, Ling Marsh, present at bedside Moderate historian  Chart Review  Care Everywhere  7/10/25  Orthopedics discharge summary    Additional Comments:  Patient was not able to verbally communicate during the [facility] interview. He was able to give the 'thumbs-up' gesture and nod to indicate  "whether he was taking a medication PTA. He was able to confirm some of the medications on the PTA med list. His mother was present and able to verify PTA med list with prompting and confirm last home dose of medications PTA      Stephen Verduzco PharmD  Transitions of Care Pharmacist  07/30/25     Secure Chat preferred   If no response call [phone] or Vocera \"Med Rec\"    "

## 2025-07-30 NOTE — PROGRESS NOTES
Physical Therapy    Physical Therapy Evaluation & Treatment    Patient Name: Juan Marsh  MRN: [id number]  Department: [facility] ED  Room: [place] [id number]  Today's Date: 7/30/2025   Time Calculation  Start Time: 1308  Stop Time: 1340  Time Calculation (min): 32 min    Assessment/Plan   PT Assessment  PT Assessment Results: Decreased strength, Decreased endurance, Impaired balance, Decreased mobility, Decreased coordination, Decreased cognition  Rehab Prognosis: Good  Barriers to Discharge Home: Physical needs, Caregiver assistance  Caregiver Assistance: Caregiver assistance needed per identified barriers - however, level of patient's required assistance exceeds assistance available at home  Physical Needs: Stair navigation into home limited by function/safety, Stair navigation to access bed limited by function/safety, Stair navigation to access bath limited by function/safety, In-home setup navigation limited by function/safety, Ambulating household distances limited by function/safety  Evaluation/Treatment Tolerance: Patient tolerated treatment well  Medical Staff Made Aware: Yes  Strengths: Attitude of self, Support of Caregivers  Barriers to Participation: Comorbidities  End of Session Communication: Bedside nurse  Assessment Comment: 30 y.o. male presenting to ED with Behcet's disease progression, previously ambulating with cane. Pt with excellent effort during session. Able to progress PT to include STS. Pt with residual R sided deficits in strength, however able to perform seated ther ex for BLEs with good endurance. Pt would benefit from high intensity PT during inpatient stay to address deficits and promote return to PLOF.  End of Session Patient Position: Bed, 2 rail up (caregiver present)   IP OR SWING BED PT PLAN  Inpatient or Swing Bed: Inpatient  PT Plan  Treatment/Interventions: Bed mobility, Transfer training, Gait training, Stair training, Balance training, Neuromuscular re-education,  Strengthening, Endurance training, Range of motion, Therapeutic exercise, Therapeutic activity, Home exercise program, Positioning, Postural re-education  PT Plan: Ongoing PT  PT Frequency: 5 times per week  PT Discharge Recommendations: High intensity level of continued care  PT Recommended Transfer Status: Assist x2  PT - OK to Discharge: Yes (meaning pt has been evaluated and discharge rec is in place)      Subjective     PT Visit Info:  PT Received On: 07/30/25  General Visit Information:  General  Reason for Referral: 30 y.o. male presenting to ED nwith stroke symptoms including  progressive aphasia and vomiting. Imaging shows progression of right brain lesion with slight mass effect c/f Behcet's disease progression  Past Medical History Relevant to Rehab: smoking, alcohol use disorder, systemic (pericarditis, arthralgias, skin rash, recurrent orogenital ulcers, uveitis, retinal vasculitis, -ve HLAB51) and neuro Behcet's disease with multiple recurrences, some residual right sided weakness, and recent left hip surgery for avascular necrosis  Family/Caregiver Present: Yes (mother at bedside, father present end of session)  Prior to Session Communication: Bedside nurse  Patient Position Received: Bed, 2 rail up, Alarm off, not on at start of session (ED bed)  General Comment: Pt received supine, moderately lethargic, and with expressive aphasia. Able to arouse and communicate using thumbs up/down to yes/no questions.  Home Living:  Home Living  Type of Home: Apartment  Lives With: Parent(s) (mother (who is an RN))  Home Adaptive Equipment: Walker rolling or standard, Cane, Crutches  Home Layout: One level  Home Access: Stairs to enter with rails (elevator broken)  Entrance Stairs-Rails:  (1 HR)  Entrance Stairs-Number of Steps: 2 flights  Prior Level of Function:  Prior Function Per Pt/Caregiver Report  Level of [place]: Independent with homemaking with ambulation, Independent with ADLs and functional  transfers  Receives Help From: Family  Ambulatory Assistance:  (Mod I with cane)  Precautions:  Precautions  LE Weight Bearing Status: Weight Bearing as Tolerated (LLE from recent surgery)  Medical Precautions: Fall precautions      Vital Signs Comment: During PT, /77, HR 93 bpm, SpO2 100% seated EOB. Post PT, /88,  bpm, SpO2 96% in supine position with HOB elevated.    Objective   Pain:  Pain Assessment  0-10 (Numeric) Pain Score: 0 - No pain  Response to Interventions: Absence of non-verbal indicators of pain  Unable to Self-Report Pain Reason:  (pt with expressive aphasia)  Cognition:  Cognition  Arousal/Alertness: Delayed responses to stimuli  Orientation Level: Oriented X4 (using yes/no questions, pt communicating with thumbs up vs down)  Following Commands: Follows one step commands with increased time    Activity Tolerance  Endurance: Tolerates 30 min exercise with multiple rests    Sensation  Light Touch: No apparent deficits       Coordination  Movements are Fluid and Coordinated: No  Upper Body Coordination: RUE impaired  Lower Body Coordination: RLE impaired  Finger to Nose: Bradykinesia  Heel to Shin: Impaired    Static Sitting Balance  Static Sitting-Balance Support: Bilateral upper extremity supported  Static Sitting-Level of Assistance: Contact guard  Static Sitting-Comment/Number of Minutes: EOB ~15 minutes  Dynamic Sitting Balance  Dynamic Sitting-Balance Support:  (varying unilateral UE support)  Dynamic Sitting-Level of Assistance: Contact guard  Dynamic Sitting-Balance: Reaching across midline, Forward lean  Dynamic Sitting-Comments: pt able to perform reaching tasks with increased time    Static Standing Balance  Static Standing-Balance Support:  (bilateral arm in arm)  Static Standing-Level of Assistance: Minimum assistance (x 2 for stability)  Static Standing-Comment/Number of Minutes: stood EOB ~20 seconds for linen change  Functional Assessments:  Bed Mobility  Bed  Mobility: Yes  Bed Mobility 1  Bed Mobility 1: Rolling left  Level of Assistance 1: Minimum assistance  Bed Mobility Comments 1: to reposition jerrica pads  Bed Mobility 2  Bed Mobility  2: Supine to sitting  Level of Assistance 2: Minimum assistance (trunk and BLEs)  Bed Mobility 3  Bed Mobility 3: Sitting to supine  Level of Assistance 3: Minimum assistance (trunk and BLEs)  Bed Mobility 4  Bed Mobility 4: Scooting  Level of Assistance 4: Dependent  Bed Mobility Comments 4: to EOB    Transfers  Transfer: Yes  Transfer 1  Transfer From 1: Sit to, Stand to  Transfer to 1: Stand, Sit  Technique 1: Sit to stand  Transfer Device 1:  (bilateral arm in arm)  Transfer Level of Assistance 1: Minimal verbal cues, Moderate assistance (for stability)  Trials/Comments 1: x 1    Ambulation/Gait Training  Ambulation/Gait Training Performed: No  Extremity/Trunk Assessments:  RUE   RUE : Exceptions to WFL (grossly 3/5)  LUE   LUE: Within Functional Limits  RLE   RLE : Exceptions to WFL (RLE grossly 3/5)  LLE   LLE : Within Functional Limits (LLE grossly 4-/5)  Treatments:       Bed Mobility  Bed Mobility: Yes  Bed Mobility 1  Bed Mobility 1: Rolling left  Level of Assistance 1: Minimum assistance  Bed Mobility Comments 1: to reposition jerrica pads  Bed Mobility 2  Bed Mobility  2: Supine to sitting  Level of Assistance 2: Minimum assistance (trunk and BLEs)  Bed Mobility 3  Bed Mobility 3: Sitting to supine  Level of Assistance 3: Minimum assistance (trunk and BLEs)  Bed Mobility 4  Bed Mobility 4: Scooting  Level of Assistance 4: Dependent  Bed Mobility Comments 4: to EOB    Ambulation/Gait Training  Ambulation/Gait Training Performed: No  Transfers  Transfer: Yes  Transfer 1  Transfer From 1: Sit to, Stand to  Transfer to 1: Stand, Sit  Technique 1: Sit to stand  Transfer Device 1:  (bilateral arm in arm)  Transfer Level of Assistance 1: Minimal verbal cues, Moderate assistance (for stability)  Trials/Comments 1: x 1  Outcome  Measures:  [facility] Basic Mobility  Turning from your back to your side while in a flat bed without using bedrails: A little  Moving from lying on your back to sitting on the side of a flat bed without using bedrails: A little  Moving to and from bed to chair (including a wheelchair): A lot  Standing up from a chair using your arms (e.g. wheelchair or bedside chair): A lot  To walk in hospital room: Total  Climbing 3-5 steps with railing: Total  Basic Mobility - Total Score: 12    Encounter Problems       Encounter Problems (Active)       Balance       Maintains static standing balance with upper extremity support with close supervision for completion of functional tasks.        Start:  07/30/25    Expected End:  08/08/25       INTERVENTIONS:  1. Practice standing with minimal support.  2. Educate patient about standing tolerance.  3. Educate patient about independence with gait, transfers, and ADL's.  4. Educate patient about use of assistive device.  5. Educate patient about self-directed care.         Maintains static and dynamic sitting balance with upper extremity support with distant supervision.        Start:  07/30/25    Expected End:  08/08/25       INTERVENTIONS:  1. Practice sitting on the edge of a bed/mat with minimal support.  2. Educate patient about maintining total hip precautions while maintaining balance.  3. Educate patient about pressure relief.  4. Educate patient about use of assistive device.            Mobility       Patient will ambulate household distance 50ft with CGA using LRD.        Start:  07/30/25    Expected End:  08/08/25               PT Transfers        Patient will perform functional transfers with CGA using LRD.        Start:  07/30/25    Expected End:  08/08/25                   Education Documentation  Handouts, taught by Sonya Hernandez PT at 7/30/2025  3:35 PM.  Learner: Family, Patient  Readiness: Acceptance  Method: Explanation, Demonstration  Response: Needs  Reinforcement  Comment: safety with functional mobility, HEP, joint positioning for skin integrity    Precautions, taught by Sonya Hernandez PT at 7/30/2025  3:35 PM.  Learner: Family, Patient  Readiness: Acceptance  Method: Explanation, Demonstration  Response: Needs Reinforcement  Comment: safety with functional mobility, HEP, joint positioning for skin integrity    Home Exercise Program, taught by Sonya Hernandez PT at 7/30/2025  3:35 PM.  Learner: Family, Patient  Readiness: Acceptance  Method: Explanation, Demonstration  Response: Needs Reinforcement  Comment: safety with functional mobility, HEP, joint positioning for skin integrity    Body Mechanics, taught by Sonya Hernandez PT at 7/30/2025  3:35 PM.  Learner: Family, Patient  Readiness: Acceptance  Method: Explanation, Demonstration  Response: Needs Reinforcement  Comment: safety with functional mobility, HEP, joint positioning for skin integrity    Mobility Training, taught by Sonya Hernandez PT at 7/30/2025  3:35 PM.  Learner: Family, Patient  Readiness: Acceptance  Method: Explanation, Demonstration  Response: Needs Reinforcement  Comment: safety with functional mobility, HEP, joint positioning for skin integrity    Education Comments  No comments found.

## 2025-07-30 NOTE — ED PROVIDER NOTES
Emergency Department Provider Note        History of Present Illness     History provided by: Family Member  Limitations to History: Altered Mental Status, Confusion, and Dysarthria  External Records Reviewed with Brief Summary: None    HPI:  Juan Marsh is a 30 y.o. 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 He was noted to have difficulty speaking approximately 1000 today. He does have residual right sided weakness from prior intracranial lesions.  He has had progressive aphasia and vomiting since this morning.    Last Known Well Time: 10:00am    Physical Exam   Triage vitals:  T 36.7 °C (98.1 °F)  HR 95  /86  RR 18  O2 99 % None (Room air)    General: Awake, alert, in no acute distress  Eyes: Gaze conjugate.  No scleral icterus or injection  HENT: Normo-cephalic, atraumatic. No stridor.  CV: Regular rate, regular rhythm. Radial pulses 2+ bilaterally  Resp: Breathing non-labored, speaking in full sentences.  Clear to auscultation bilaterally  GI: Soft, non-distended, non-tender. No rebound or guarding.  MSK/Extremities: No gross bony deformities. Moving all extremities  Skin: Warm. Appropriate color  Neuro: see below for NIHSS  Psych: Appropriate mood and affect    NIH Stroke Scale  Time: 2015    NIHSS  1A. Level of Consciousness: Alert, Keenly Responsive  1B. Ask Month and Age: 1 Question Right  1C. Blink Eyes & Squeeze Hands: Performs Both Tasks  2. Best Gaze: Normal  3. Visual: No Visual Loss  4. Facial Palsy: Minor Paralysis  5A. Motor - Left Arm: No Drift  5B. Motor - Right Arm: No Drift  6A. Motor - Left Leg: Drift  6B. Motor - Right Leg: No Drift  7. Limb Ataxia: Absent  8.  Sensory Loss: Normal  9. Best Language: Severe Aphasia  10. Dysarthria: Severe Dysarthria  11. Extinction and Inattention: No Abnormality  NIH Stroke Scale: 7     VAN: Positive    Medical Decision Making & ED Course   Medical Decision Makin y.o. male PMH smoking, alcohol use disorder, systemic (pericarditis, arthralgias, skin rash, recurrent orogenital ulcers, uveitis, retinal vasculitis, -ve HLAB51) and neuro Behcet's disease (steroid-repsonsive tumor-like L thalamic lesion 2021) with multiple recurrences (on prednisone 10mg and MMF 500mg BID prior to admission) who presents as a stroke alert with progressive aphasia and vomiting that began this morning.  On examination, he did have expressive aphasia and was unable to smile, but otherwise there was no new hemiparesis or pronator drift.  He was deemed VAN positive and CT head/CT angio head were obtained.  CTA did not reveal LVO, and the patient was outside of the TNK window.  Radiology resident did voice concern for increased edema surrounding the right thalamus, however formal read with comparison to prior imaging does not previous findings in this area, and this may represent Behcet's flare. As there was no further intervention indicated for the possibility of acute stroke, and he would require Neurology admission regardless of this determination, the decision was made to admit to the Neurology service for continued workup of this acute presentation today, with likely need for MRI.  ----    Differential diagnoses considered include but are not limited to: Ischemic stroke, hemorrhagic stroke, Behcet's flare, meningitis, encephalitis    Social Determinants of Health which Significantly Impact Care: None identified     EKG Independent Interpretation: EKG not obtained    Independent Result Review and Interpretation: Relevant laboratory and radiographic results were reviewed and independently interpreted by myself.  As necessary, they are commented on in the  ED Course.    Chronic conditions affecting the patient's care: As documented above in [facility]    The patient was discussed with the following consultants/services: Neurology regarding stroke like symptoms, neuro behcet's, admission    Care Considerations: As documented above in [facility]    IV Thrombolysis IV Thrombolysis Checklist        IV Thrombolysis Given: No; Thrombolysis contraindication reason: Working diagnosis is NOT a suspected ischemic stroke and Time from Last Known Well (or stroke onset) is >4.5 hours       ED Course:  ED Course as of 07/29/25 2233 Tue Jul 29, 2025 2036 Attending summary:  29 y/o M who is presenting from triage as a stroke alert. 1000 today mother noticed pt was having difficulty speaking. Has PMHx Bechet's c/b prior intracranial lesions and has residual R sided weakness. On arrival, pt has expressive aphasia and unable to smile, otherwise no new hemiparesis, pronator drift. VAN positive so CT/CTA ordered, CT with ?increased edema in area of R thalamus per radiology, CTA without LVO. Pt not in TNK window. Vitals unremarkable, afebrile. History is limited from pt but lower suspicion for recrudesence with abnormal CT head and these are new symptoms. Will likely require MRI brain and neuro admission.  [SS]   2230 CT brain attack angio head and neck W and WO IV contrast  CTA HEAD AND NECK:  1. No evidence for significant stenosis or large branch vessel  cutoffs of the intracranial vessels.  2. No evidence for significant stenosis of the cervical vessels.  3. Mild paraseptal emphysema in the bilateral lung apices.   [MG]   2231 CT brain attack head wo IV contrast  Hypodensity within the right thalamus, right cerebral peduncle and  posterior limb of the right internal capsule with slight mass effect  on the 3rd ventricle. The findings have decreased compared to prior  head CT 02/08/2025 however may be slightly increased compared to the  recent MRI, given the different techniques. Constellation of  findings  may reflect resolving disease, however, an acute Behcet flare or  worsening disease is difficult to exclude. Acute ischemia is  considered less likely. Consider follow-up with contrast-enhanced MRI  brain.   [MG]      ED Course User Index  [MG] Hiral Domínguez MD  [SS] Mouna Oglesby MD         Diagnoses as of 07/29/25 2233   Altered mental status, unspecified altered mental status type   Stroke-like symptoms       Disposition   As a result of their workup, the patient will require admission to the hospital.  The patient was informed of his diagnosis.  The patient was given the opportunity to ask questions and I answered them. The patient agreed to be admitted to the hospital.    Patient seen and discussed with ED attending physician.    Hiral Domínguez MD  Emergency Medicine      Hiral Domínguez MD  Resident  07/29/25 2233

## 2025-07-30 NOTE — ASSESSMENT & PLAN NOTE
Juan Marsh is a 30 y.o. Right-handed AA male smoker with PMHx notable for heavy alcohol use, systemic (pericarditis, arthralgias, skin rash, recurrent orogenital ulcers, uveitis, retinal vasculitis, but negative HLAB51) and neuro Behcet's diseasen prednisone 10mg daily and MMF 500mg BID  (steroid-responsive tumor-like left thalamic lesion in 8/2021) with multiple recurrences and some residual right sided weakness, and recent left hip surgery. who presented to the ED progressive aphasia and vomiting. Initially came as bat with NIH of 7 Exam showed drowsiness, aphonia, aphasia with inability to repeat, name or follow two step commands. And right likely new right dilated pupil and ptosis. He also has right facial. CT scan showed some progression of prior lesion    #Neuro Behcet's disease progression

## 2025-07-30 NOTE — H&P
History Of Present Illness  Juan Marsh is a 30 y.o. 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      BAT was called at 8:11 pm, NIHSS was 7 (1 LOC q, 2 aphasia, 2 dysarthria, 1 right facial droop, and 1 left leg drift (limited by pain) ), /86 and glucose 107. CTH revealed hypodensity in same territory as previously demonstrated lesions with stable to improved compression of ventricle without any obvious dilatation of ventricles. CTA H/N negative for LVO. BAT was cancelled.     Patient had recent hip surgery two weeks ago, he was doing well, then today mother noticed that at 10 am in the morning he was vomiting and not able to speak his baseline is that he had some delay in answering questions and can use some signs but never like this. They tried giving him some fluids then  Father found him on the floor drowsy at 7 pm , no tongue bites, abnormal movements or urine incontinence ( family noted that he has been having occasional urine incontinence over the past dew weeks) He is complaint with all his medication but was not able to take the morning meds due to vomiting. They deny any recent illnesses or missed medications.     Neuro - Immunology   Date of onset: 9/2021   Date of diagnosis: 8/2021   Last MRI brain: 5/2025   Last MRI cervical: 9/2021   Last OCT (Optical Coherence Tomography/Visual Evoked Potential): 9/2021    Possible NEUROBEHCET.      Disease Course at Onset: RR.   Disease Course Now: RR.   Current DMT (Disease Modifying Therapies): MMF + prednisone.   Previous DMT (Disease Modifying  Therapies): Humira + immuran    CSF (Cerebrospinal Fluid): done in September of 2021, bland with negative OCBs and IGG index. Repeated 5/2024: glucose 63, protein 48 WBC 12, negative OCBs and IGG studies, negative cytology and flow  JCV (Lawrence Cunningham Virus): not done.   VZV (Varicella Zoster Virus): negative 1/2022.   Hep Panel: negative hepatitis C.   NMO (Neuromyelitis Optica): negative 11/2021.   MOG (Myelin Oligodendrocyte Glycoprotein): negative 11/2021.      Per Outpatient Note:  11/18/2021: No new symptoms. still has some minor right leg weakness. repeat brain MRI showed significant improvement in the left ganglionic/thalamic lesion. AQP4 and MOG antibodies came back negative. he starting cutting down on smoking. He ran out of prednisone. will refill. He should overlap AZA and prednisone for at least six months.     05/02/2022:No new symptoms. due to progression of retinal vasculitis, rheumatology started him on humira in January of 2022. He complains of some numbness in the feet that started before starting humira. Humira can cause iatrogenic CNS inflammation and we have to watch him closely while on this medication.      7/9/2024: admitted to the hospital under my care in May 2024 due to right hemibody weakness and hyposthesia along with worsening headache and diplopia. He had stopped humira in 2022 and later stopped AZA and prednisone. Found to have recurrent enhancing lesion in the left thalamus extending to the left midbrain. CSF showed pleocytosis (12) with negative OCBs, IGG studies, cytology, and flow. HLAB51 was tested and came back negative. Suspicion of lymphoma arose given lesion recurrence in the same location. Ophthalmology also suspected sarcoidosis. CT C/A/P showed enlarging external iliac lymph nodes but deemed not amenable to biopsy by IR. He improved clinically with IVMP and repeat imaging showed resolution of the abnormal enhancement but persistent FLAIR changes. He was started on MMF  before discharge with good tolerability. His right hip pain is worsening requiring crutches. He is off prednisone now.      10/14/2024: Shortly after his visit with me in July, he started noticing worsening headaches, cold feeling over the left side of the head and face, and worsening right sided weakness. He presented to [facility] where brain MRI showed significant worsening of the left sided enhancing lesion in the thalamus, BG, and brainstem with new extension to the optic tract and the internal/external capsules. He was treated with IVMP with subsequent clinical improvement. It was unclear whether he has been taking his MMF or not and he still cannot confirm to me that he is taking it currently. I will prescribe MMF again and will increase his prednisone. Will repeat his brain MRI for lesion monitoring. Given several recurrences in the same spot, I will refer him to neuro-oncology to evaluate the possibility of CNS lymphoma.      3/17/2025: In February he ran out of prednisone and one week later developed increased weakness and left facial droop. MRI brain showed new enhancing lesion in the right thalamus and upper right midbrain with midline shift. He was treated with IVMP and one dose of cyclophosphamide 700 mg with clinical improvement. Repeat brain MRI showed near resolution of the abnormal enhancement but persistent FLAIR changes. Tumor board thought tumor/lymphoma is less likely. 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 No bulbar or sphincteric issues. Will increase his MMF dose to 1000 mg twice daily and maintain him on prednisone 10 mg for at least six months. If he he has any additional recurrences, we will consider adding rituximab or infliximab (after discussing the potential paradoxical effect of the latter). He has recent CBC diff and CMP that were unremarkable except for mild  lymphopenia (0.7).      6/24/2025: Again, he ran out of MMF and prednisone for insurance reasons according to him but luckily did not have any new neurological symptoms. He actually reports feeling very good. He stopped drinking. He is having his hip replacement surgery on 7/10 and his surgeons have cleared him to take prednisone prior and after the surgery. His repeat brain MRI IN may showed significant improvement in the right cascade lesion and slight increase in the left cascade lesion but no abnormal enhancement on my review. Will put him back on MMF and prednisone. Will repeat MRI again in December. Will consider infliximab in case of any clinical or radiological recurrence in the future.      Past Medical History  Medical History[1]  Surgical History  Surgical History[2]  Social History  Social History[3]  Allergies  Patient has no known allergies.  Prescriptions Prior to Admission[4]    Review of Systems  Neurological Exam  Physical Exam  Last Recorded Vitals  Blood pressure 115/78, pulse 78, temperature 36.7 °C (98.1 °F), resp. rate 16, SpO2 100%.    Relevant Results      NIH Stroke Scale  1A. Level of Consciousness: Alert, Keenly Responsive  1B. Ask Month and Age: 1 Question Right  1C. Blink Eyes & Squeeze Hands: Performs Both Tasks  2. Best Gaze: Normal  3. Visual: No Visual Loss  4. Facial Palsy: Minor Paralysis  5A. Motor - Left Arm: No Drift  5B. Motor - Right Arm: No Drift  6A. Motor - Left Leg: Drift  6B. Motor - Right Leg: No Drift  7. Limb Ataxia: Absent  8. Sensory Loss: Normal  9. Best Language: Severe Aphasia  10. Dysarthria: Severe Dysarthria  11. Extinction and Inattention: No Abnormality  NIH Stroke Scale: 7            NEUROLOGIC EXAM:    MENTAL STATUS:  - Drowsy, mouths his name and age, intermittently follows simple commands. Speech sounds aphonic with 1-2 words hypophonic words expressed.     CRANIAL NERVES:  - CN I: Not Assessed  - CN II: R pupil 7mm, L pupil 3mm reactive to light b/l.  Mild exotropia of R eye on midline gaze. R eyelid ptosis. Vsual fields intact bilaterally to visual threat    - CN III, IV, VI: Extraocular movements intact without nystagmus  - CN VII: Upper face symmetric, does not participate in smile  - CN VIII: Intact to conversation  - CN IX/X: unable to assess  - CN XI: symmetric strong shoulder shrug b/l  - CN XII: tongue midline (no biting noted)     MOTOR:  5/5 strength in b/l UE.   Antigravity in RLE, drift in LLE (limited by pain from recent procedure)     REFLEXES:        3+ reflexes throughout, symmetric UE and LE. Continuous clonus in R foot. 2 beats of clonus in L foot.  Juan Ramon positive left upper extremity, negative right upper extremity.  Toes downgoing bilaterally     COORDINATION: Finger to nose intact   SENSATION: Intact and symmetric to light touch in all extremities, no neglect   Gait: Deferred                 I have personally reviewed the following imaging results:  Imaging  CT brain attack angio head and neck W and WO IV contrast  Result Date: 7/29/2025  CT HEAD: 1. Hypodensity within the right thalamus, right cerebral peduncle and posterior limb of the right internal capsule with slight mass effect on the 3rd ventricle. The findings have decreased compared to prior head CT 02/08/2025 however may be slightly increased compared to the recent MRI, given the different techniques. Constellation of findings may reflect resolving disease, however, an acute Behcet flare or worsening disease is difficult to exclude. Acute ischemia is considered less likely. Consider follow-up with contrast-enhanced MRI brain.   CTA HEAD AND NECK: 1. No evidence for significant stenosis or large branch vessel cutoffs of the intracranial vessels. 2. No evidence for significant stenosis of the cervical vessels. 3. Mild paraseptal emphysema in the bilateral lung apices.     I personally reviewed the images/study and I agree with the findings as stated by Dr. Phillip Saldana. This study was  interpreted at [place], [place].   MACRO: Phillip Saldana discussed the significance and urgency of this critical finding in person by CT scanner with  Arnold Archuleta MD on 7/29/2025 at 8:36 pm.  (**-RCF-**) Findings:  See findings.       Signed by: Blanche Stone 7/29/2025 9:44 PM Dictation workstation:   [id number]    CT brain attack head wo IV contrast  Result Date: 7/29/2025  CT HEAD: 1. Hypodensity within the right thalamus, right cerebral peduncle and posterior limb of the right internal capsule with slight mass effect on the 3rd ventricle. The findings have decreased compared to prior head CT 02/08/2025 however may be slightly increased compared to the recent MRI, given the different techniques. Constellation of findings may reflect resolving disease, however, an acute Behcet flare or worsening disease is difficult to exclude. Acute ischemia is considered less likely. Consider follow-up with contrast-enhanced MRI brain.   CTA HEAD AND NECK: 1. No evidence for significant stenosis or large branch vessel cutoffs of the intracranial vessels. 2. No evidence for significant stenosis of the cervical vessels. 3. Mild paraseptal emphysema in the bilateral lung apices.     I personally reviewed the images/study and I agree with the findings as stated by Dr. Phillip Saldana. This study was interpreted at [facility], [place], [place].   MACRO: Phillip Saldana discussed the significance and urgency of this critical finding in person by CT scanner with  Arnold Archuleta MD on 7/29/2025 at 8:36 pm.  (**-RCF-**) Findings:  See findings.       Signed by: Blanche Stone 7/29/2025 9:44 PM Dictation workstation:   [id number]           Assessment/Plan   Assessment & Plan  Altered mental status, unspecified altered mental status type    Juan Marsh is a 30 y.o. Right-handed AA male smoker with PMHx notable for heavy alcohol use, systemic (pericarditis,  arthralgias, skin rash, recurrent orogenital ulcers, uveitis, retinal vasculitis, but negative HLAB51) and neuro Behcet's diseasen prednisone 10mg daily and MMF 500mg BID  (steroid-responsive tumor-like left thalamic lesion in 8/2021) with multiple recurrences and some residual right sided weakness, and recent left hip surgery. who presented to the ED progressive aphasia and vomiting. Initially came as bat with NIH of 7 Exam showed drowsiness, aphonia, aphasia with inability to repeat, name or follow two step commands. And right likely new right dilated pupil and ptosis. He also has right facial. CT scan showed some progression of right lesion with slight mass effect.      # Concern for neuro Behcet's disease progression   Admit to neurology  Q2 neurochecks and telemetry   STAT MRI with and without contrast   Will consider high dose IV steroids after MRI  Possible lumbar puncture after MRI  Continue MMF 10 BID   Continue prednisone 10 mg daily   Infection work up with blood and urine cultures  Chest x-ray, KUB, left hip x-ray given recent intervention  NPO pending evaluation  PT/OT/SLP    # Miscellaneous  Thiamine 500 mg 3 times daily for 3 days followed by 100 mL times daily  Zofran 4 mg every 8 hours as needed for nausea  MiraLAX 17 g daily  Protonix 40 mg IV daily      Angelique White MD  PGY-2 neurology resident       Senior Resident Attestation:  30-year-old right-handed male with history of neuro Behcet's disease on chronic prednisone and MMF, heavy alcohol use, recent left hip replacement presenting to ED with approximately 8-hour history of progressive aphasia/aphonia and inability to maintain oral intake 2/2 vomiting.  CTh/CTA head and neck negative for any acute stroke/LVO.  Stable compression of third ventricle compared to prior scans.  Exam is concerning for aphonia/hypophonia/aphasia, right eye ptosis, anisocoria reactive to light, and exotropia which per family are new findings.  In the context of prior  diagnosis of neuro Behcet's, these findings are concerning for progression/relapse of disease especially secondary to lesions in midbrain and tyrone.  There is also possibly underlying infectious process (GI illness vs less likely hardware infection in hip) manifesting as drowsiness, vomiting and leukocytosis.  Leukocytosis could be explained by chronic steroid use though there is neutrophil predominance.  Depending on MRI findings and infectious workup, he may need lumbar puncture, empiric antibiotic treatment, and/or high-dose steroids.  This patient requires inpatient stay for further workup of current condition.  Agree with plan stated above.    Arnold Archuleta MD  PGY3 Neurology    Patient to be discussed with attending physician in AM.       [1]   Past Medical History:  Diagnosis Date    Lupus (systemic lupus erythematosus) (Multi)     Stroke (Multi)     Uveitis    [2]   Past Surgical History:  Procedure Laterality Date    CT ANGIO NECK  9/2/2021    CT NECK ANGIO W AND WO IV CONTRAST 9/2/2021 [facility]    CT HEAD ANGIO W AND WO IV CONTRAST  9/2/2021    CT HEAD ANGIO W AND WO IV CONTRAST 9/2/2021 [facility] CLINICAL LEGAC[facility]   [3]   Social History  Tobacco Use    Smoking status: Every Day     Types: Cigars     Passive exposure: Past    Smokeless tobacco: Never   Vaping Use    Vaping status: Unknown   Substance Use Topics    Alcohol use: Not Currently     Comment: previously 1 pint liquor daily. last drink in Nov 24    Drug use: Yes     Types: Marijuana     Comment: last used 7/7/25   [4] (Not in a hospital admission)

## 2025-07-30 NOTE — PROCEDURES
CLINICAL NUTRITION PROCEDURE NOTE       Small Bore Feeding Tube Placement  Patient with dysphagia secondary to Behcet's syndrome, neurologic type (Multi), requiring small bore feeding tube placement for medication and nutrition administration.  All labs and images examined for appropriateness of tube placement. Procedure explained to the patient and/or family.    Feeding Tube Placement       Active Feeding Tube Placement    NG/OG/Feeding Tube Small bore feeding tube 12 Fr Left nostril     Properties       Placement date 07/30/25  Site Left nostril     Placement time 1220  Days less than 1    Placed by: Estela Piña RD and Debra Chang RD  Hand Hygiene Completed: Yes     Type of Tube: Feeding Tube  Tube Length (cm): 80 cm     Tube Type: Small bore feeding tube  Type of Small Bore Tube: Weighted feeding tube     NG/OG Tube Size: 12 Fr                 Assessments       Row Name 07/30/25 1237    Placement Verification X-ray     Distal Tube Measurement (cm) 80 cm     Tube Securement Nasal bridle                            Appropriate imaging orders have been placed to check tube placement.

## 2025-07-30 NOTE — ED TRIAGE NOTES
Pt presents to the ED for stroke symptoms. Per pt family LKW @1000 am. Pt has right sided weakness from a previous stroke. Pt family became concerned when patient was unable to speak and normally his speech was only delayed. Pt is not on bloodthinners but does have a hx of brain lesions.

## 2025-07-31 ENCOUNTER — HOME CARE VISIT (OUTPATIENT)
Dept: HOME HEALTH SERVICES | Facility: HOME HEALTH | Age: 30
End: 2025-07-31
Payer: COMMERCIAL

## 2025-07-31 ENCOUNTER — APPOINTMENT (OUTPATIENT)
Dept: RADIOLOGY | Facility: HOSPITAL | Age: 30
End: 2025-07-31
Payer: COMMERCIAL

## 2025-07-31 LAB
ALBUMIN SERPL BCP-MCNC: 4.7 G/DL (ref 3.4–5)
ANION GAP SERPL CALC-SCNC: 16 MMOL/L (ref 10–20)
APPEARANCE UR: ABNORMAL
BASOPHILS # BLD AUTO: 0.01 X10*3/UL (ref 0–0.1)
BASOPHILS NFR BLD AUTO: 0.1 %
BILIRUB UR STRIP.AUTO-MCNC: NEGATIVE MG/DL
BUN SERPL-MCNC: 33 MG/DL (ref 6–23)
CALCIUM SERPL-MCNC: 10.1 MG/DL (ref 8.6–10.6)
CHLORIDE SERPL-SCNC: 103 MMOL/L (ref 98–107)
CO2 SERPL-SCNC: 26 MMOL/L (ref 21–32)
COLOR UR: YELLOW
CREAT SERPL-MCNC: 1.03 MG/DL (ref 0.5–1.3)
EGFRCR SERPLBLD CKD-EPI 2021: >90 ML/MIN/1.73M*2
EOSINOPHIL # BLD AUTO: 0 X10*3/UL (ref 0–0.7)
EOSINOPHIL NFR BLD AUTO: 0 %
ERYTHROCYTE [DISTWIDTH] IN BLOOD BY AUTOMATED COUNT: 16.2 % (ref 11.5–14.5)
ERYTHROCYTE [DISTWIDTH] IN BLOOD BY AUTOMATED COUNT: 16.2 % (ref 11.5–14.5)
GLUCOSE BLD MANUAL STRIP-MCNC: 142 MG/DL (ref 74–99)
GLUCOSE BLD MANUAL STRIP-MCNC: 145 MG/DL (ref 74–99)
GLUCOSE SERPL-MCNC: 141 MG/DL (ref 74–99)
GLUCOSE UR STRIP.AUTO-MCNC: NORMAL MG/DL
HCT VFR BLD AUTO: 34 % (ref 41–52)
HCT VFR BLD AUTO: 34 % (ref 41–52)
HGB BLD-MCNC: 11.3 G/DL (ref 13.5–17.5)
HGB BLD-MCNC: 11.3 G/DL (ref 13.5–17.5)
HYALINE CASTS #/AREA URNS AUTO: ABNORMAL /LPF
IMM GRANULOCYTES # BLD AUTO: 0.06 X10*3/UL (ref 0–0.7)
IMM GRANULOCYTES NFR BLD AUTO: 0.5 % (ref 0–0.9)
KETONES UR STRIP.AUTO-MCNC: ABNORMAL MG/DL
LEUKOCYTE ESTERASE UR QL STRIP.AUTO: NEGATIVE
LYMPHOCYTES # BLD AUTO: 0.91 X10*3/UL (ref 1.2–4.8)
LYMPHOCYTES NFR BLD AUTO: 6.9 %
MAGNESIUM SERPL-MCNC: 2.44 MG/DL (ref 1.6–2.4)
MCH RBC QN AUTO: 31.5 PG (ref 26–34)
MCH RBC QN AUTO: 31.5 PG (ref 26–34)
MCHC RBC AUTO-ENTMCNC: 33.2 G/DL (ref 32–36)
MCHC RBC AUTO-ENTMCNC: 33.2 G/DL (ref 32–36)
MCV RBC AUTO: 95 FL (ref 80–100)
MCV RBC AUTO: 95 FL (ref 80–100)
MONOCYTES # BLD AUTO: 0.22 X10*3/UL (ref 0.1–1)
MONOCYTES NFR BLD AUTO: 1.7 %
MRSA DNA SPEC QL NAA+PROBE: NOT DETECTED
MUCOUS THREADS #/AREA URNS AUTO: ABNORMAL /LPF
NEUTROPHILS # BLD AUTO: 11.9 X10*3/UL (ref 1.2–7.7)
NEUTROPHILS NFR BLD AUTO: 90.8 %
NITRITE UR QL STRIP.AUTO: NEGATIVE
NRBC BLD-RTO: 0 /100 WBCS (ref 0–0)
NRBC BLD-RTO: 0 /100 WBCS (ref 0–0)
PH UR STRIP.AUTO: 6.5 [PH]
PHOSPHATE SERPL-MCNC: 3.9 MG/DL (ref 2.5–4.9)
PLATELET # BLD AUTO: 370 X10*3/UL (ref 150–450)
PLATELET # BLD AUTO: 370 X10*3/UL (ref 150–450)
POTASSIUM SERPL-SCNC: 4.5 MMOL/L (ref 3.5–5.3)
PROCALCITONIN SERPL-MCNC: 0.02 NG/ML
PROT UR STRIP.AUTO-MCNC: ABNORMAL MG/DL
RBC # BLD AUTO: 3.59 X10*6/UL (ref 4.5–5.9)
RBC # BLD AUTO: 3.59 X10*6/UL (ref 4.5–5.9)
RBC # UR STRIP.AUTO: NEGATIVE MG/DL
RBC #/AREA URNS AUTO: ABNORMAL /HPF
SODIUM SERPL-SCNC: 140 MMOL/L (ref 136–145)
SP GR UR STRIP.AUTO: 1.05
UROBILINOGEN UR STRIP.AUTO-MCNC: ABNORMAL MG/DL
WBC # BLD AUTO: 13.3 X10*3/UL (ref 4.4–11.3)
WBC # BLD AUTO: 13.3 X10*3/UL (ref 4.4–11.3)
WBC #/AREA URNS AUTO: ABNORMAL /HPF

## 2025-07-31 PROCEDURE — 97165 OT EVAL LOW COMPLEX 30 MIN: CPT | Mod: GO

## 2025-07-31 PROCEDURE — 2500000004 HC RX 250 GENERAL PHARMACY W/ HCPCS (ALT 636 FOR OP/ED): Mod: JZ

## 2025-07-31 PROCEDURE — 36415 COLL VENOUS BLD VENIPUNCTURE: CPT

## 2025-07-31 PROCEDURE — 81001 URINALYSIS AUTO W/SCOPE: CPT

## 2025-07-31 PROCEDURE — 3E0G76Z INTRODUCTION OF NUTRITIONAL SUBSTANCE INTO UPPER GI, VIA NATURAL OR ARTIFICIAL OPENING: ICD-10-PCS | Performed by: STUDENT IN AN ORGANIZED HEALTH CARE EDUCATION/TRAINING PROGRAM

## 2025-07-31 PROCEDURE — 74018 RADEX ABDOMEN 1 VIEW: CPT

## 2025-07-31 PROCEDURE — 74018 RADEX ABDOMEN 1 VIEW: CPT | Performed by: RADIOLOGY

## 2025-07-31 PROCEDURE — 87205 SMEAR GRAM STAIN: CPT

## 2025-07-31 PROCEDURE — 80069 RENAL FUNCTION PANEL: CPT

## 2025-07-31 PROCEDURE — 2500000004 HC RX 250 GENERAL PHARMACY W/ HCPCS (ALT 636 FOR OP/ED)

## 2025-07-31 PROCEDURE — 85025 COMPLETE CBC W/AUTO DIFF WBC: CPT

## 2025-07-31 PROCEDURE — 87641 MR-STAPH DNA AMP PROBE: CPT

## 2025-07-31 PROCEDURE — 2500000004 HC RX 250 GENERAL PHARMACY W/ HCPCS (ALT 636 FOR OP/ED): Mod: JZ | Performed by: PATHOLOGY

## 2025-07-31 PROCEDURE — 31720 CLEARANCE OF AIRWAYS: CPT

## 2025-07-31 PROCEDURE — 97116 GAIT TRAINING THERAPY: CPT | Mod: GP

## 2025-07-31 PROCEDURE — 2060000001 HC INTERMEDIATE ICU ROOM DAILY

## 2025-07-31 PROCEDURE — 99232 SBSQ HOSP IP/OBS MODERATE 35: CPT | Performed by: STUDENT IN AN ORGANIZED HEALTH CARE EDUCATION/TRAINING PROGRAM

## 2025-07-31 PROCEDURE — 84145 PROCALCITONIN (PCT): CPT

## 2025-07-31 PROCEDURE — 97530 THERAPEUTIC ACTIVITIES: CPT | Mod: GP

## 2025-07-31 PROCEDURE — 83735 ASSAY OF MAGNESIUM: CPT

## 2025-07-31 PROCEDURE — 2500000001 HC RX 250 WO HCPCS SELF ADMINISTERED DRUGS (ALT 637 FOR MEDICARE OP)

## 2025-07-31 PROCEDURE — 82947 ASSAY GLUCOSE BLOOD QUANT: CPT

## 2025-07-31 RX ORDER — VANCOMYCIN HYDROCHLORIDE 1 G/200ML
1000 INJECTION, SOLUTION INTRAVENOUS EVERY 12 HOURS
Status: DISCONTINUED | OUTPATIENT
Start: 2025-07-31 | End: 2025-08-01

## 2025-07-31 RX ORDER — PANTOPRAZOLE SODIUM 40 MG/10ML
40 INJECTION, POWDER, LYOPHILIZED, FOR SOLUTION INTRAVENOUS 2 TIMES DAILY
Status: DISCONTINUED | OUTPATIENT
Start: 2025-07-31 | End: 2025-08-02

## 2025-07-31 RX ORDER — SULFAMETHOXAZOLE AND TRIMETHOPRIM 800; 160 MG/1; MG/1
1 TABLET ORAL 3 TIMES WEEKLY
Status: DISCONTINUED | OUTPATIENT
Start: 2025-08-01 | End: 2025-08-07

## 2025-07-31 RX ORDER — VANCOMYCIN HYDROCHLORIDE 1 G/20ML
INJECTION, POWDER, LYOPHILIZED, FOR SOLUTION INTRAVENOUS DAILY PRN
Status: DISCONTINUED | OUTPATIENT
Start: 2025-07-31 | End: 2025-08-01

## 2025-07-31 RX ORDER — NAPROXEN SODIUM 220 MG/1
81 TABLET, FILM COATED ORAL DAILY
Status: DISCONTINUED | OUTPATIENT
Start: 2025-07-31 | End: 2025-08-03

## 2025-07-31 RX ADMIN — ASPIRIN 81 MG CHEWABLE TABLET 81 MG: 81 TABLET CHEWABLE at 09:17

## 2025-07-31 RX ADMIN — HEPARIN SODIUM 5000 UNITS: 5000 INJECTION, SOLUTION INTRAVENOUS; SUBCUTANEOUS at 00:14

## 2025-07-31 RX ADMIN — THIAMINE HYDROCHLORIDE 500 MG: 100 INJECTION, SOLUTION INTRAMUSCULAR; INTRAVENOUS at 15:43

## 2025-07-31 RX ADMIN — VANCOMYCIN HYDROCHLORIDE 1000 MG: 1 INJECTION, SOLUTION INTRAVENOUS at 16:45

## 2025-07-31 RX ADMIN — MYCOPHENOLATE MOFETIL 1000 MG: 200 POWDER, FOR SUSPENSION ORAL at 21:45

## 2025-07-31 RX ADMIN — THIAMINE HYDROCHLORIDE 500 MG: 100 INJECTION, SOLUTION INTRAMUSCULAR; INTRAVENOUS at 21:45

## 2025-07-31 RX ADMIN — HEPARIN SODIUM 5000 UNITS: 5000 INJECTION, SOLUTION INTRAVENOUS; SUBCUTANEOUS at 09:16

## 2025-07-31 RX ADMIN — THIAMINE HYDROCHLORIDE 500 MG: 100 INJECTION, SOLUTION INTRAMUSCULAR; INTRAVENOUS at 00:14

## 2025-07-31 RX ADMIN — PANTOPRAZOLE SODIUM 40 MG: 40 INJECTION, POWDER, FOR SOLUTION INTRAVENOUS at 00:13

## 2025-07-31 RX ADMIN — MYCOPHENOLATE MOFETIL 1000 MG: 200 POWDER, FOR SUSPENSION ORAL at 00:15

## 2025-07-31 RX ADMIN — METHYLPREDNISOLONE SODIUM SUCCINATE 1000 MG: 1 INJECTION INTRAMUSCULAR; INTRAVENOUS at 00:13

## 2025-07-31 RX ADMIN — PANTOPRAZOLE SODIUM 40 MG: 40 INJECTION, POWDER, FOR SOLUTION INTRAVENOUS at 21:45

## 2025-07-31 RX ADMIN — PIPERACILLIN SODIUM AND TAZOBACTAM SODIUM 4.5 G: 4; .5 INJECTION, SOLUTION INTRAVENOUS at 22:35

## 2025-07-31 RX ADMIN — ONDANSETRON 4 MG: 2 INJECTION INTRAMUSCULAR; INTRAVENOUS at 16:44

## 2025-07-31 RX ADMIN — HEPARIN SODIUM 5000 UNITS: 5000 INJECTION, SOLUTION INTRAVENOUS; SUBCUTANEOUS at 15:43

## 2025-07-31 RX ADMIN — HEPARIN SODIUM 5000 UNITS: 5000 INJECTION, SOLUTION INTRAVENOUS; SUBCUTANEOUS at 21:45

## 2025-07-31 RX ADMIN — PIPERACILLIN SODIUM AND TAZOBACTAM SODIUM 4.5 G: 4; .5 INJECTION, SOLUTION INTRAVENOUS at 15:43

## 2025-07-31 RX ADMIN — POLYETHYLENE GLYCOL 3350 17 G: 17 POWDER, FOR SOLUTION ORAL at 09:16

## 2025-07-31 RX ADMIN — THIAMINE HYDROCHLORIDE 500 MG: 100 INJECTION, SOLUTION INTRAMUSCULAR; INTRAVENOUS at 09:16

## 2025-07-31 RX ADMIN — MYCOPHENOLATE MOFETIL 1000 MG: 200 POWDER, FOR SUSPENSION ORAL at 09:16

## 2025-07-31 ASSESSMENT — COGNITIVE AND FUNCTIONAL STATUS - GENERAL
WALKING IN HOSPITAL ROOM: A LOT
DRESSING REGULAR UPPER BODY CLOTHING: A LOT
EATING MEALS: A LITTLE
MOVING TO AND FROM BED TO CHAIR: A LOT
EATING MEALS: TOTAL
WALKING IN HOSPITAL ROOM: A LOT
WALKING IN HOSPITAL ROOM: A LOT
HELP NEEDED FOR BATHING: A LOT
STANDING UP FROM CHAIR USING ARMS: A LOT
DRESSING REGULAR UPPER BODY CLOTHING: A LITTLE
EATING MEALS: TOTAL
CLIMB 3 TO 5 STEPS WITH RAILING: TOTAL
MOVING TO AND FROM BED TO CHAIR: A LOT
DAILY ACTIVITIY SCORE: 15
MOBILITY SCORE: 14
DRESSING REGULAR UPPER BODY CLOTHING: A LOT
DAILY ACTIVITIY SCORE: 11
TOILETING: A LOT
TOILETING: A LOT
PERSONAL GROOMING: A LOT
HELP NEEDED FOR BATHING: A LITTLE
CLIMB 3 TO 5 STEPS WITH RAILING: TOTAL
TOILETING: A LITTLE
MOBILITY SCORE: 16
DRESSING REGULAR LOWER BODY CLOTHING: A LOT
DRESSING REGULAR LOWER BODY CLOTHING: A LOT
TURNING FROM BACK TO SIDE WHILE IN FLAT BAD: A LITTLE
MOBILITY SCORE: 15
HELP NEEDED FOR BATHING: A LOT
MOVING TO AND FROM BED TO CHAIR: A LOT
CLIMB 3 TO 5 STEPS WITH RAILING: A LOT
DAILY ACTIVITIY SCORE: 14
STANDING UP FROM CHAIR USING ARMS: A LITTLE
STANDING UP FROM CHAIR USING ARMS: A LOT
DRESSING REGULAR LOWER BODY CLOTHING: A LITTLE
PERSONAL GROOMING: A LITTLE
PERSONAL GROOMING: A LOT
MOVING FROM LYING ON BACK TO SITTING ON SIDE OF FLAT BED WITH BEDRAILS: A LITTLE

## 2025-07-31 ASSESSMENT — PAIN - FUNCTIONAL ASSESSMENT
PAIN_FUNCTIONAL_ASSESSMENT: 0-10
PAIN_FUNCTIONAL_ASSESSMENT: 0-10

## 2025-07-31 ASSESSMENT — PAIN SCALES - GENERAL
PAINLEVEL_OUTOF10: 0 - NO PAIN
PAINLEVEL_OUTOF10: 0 - NO PAIN

## 2025-07-31 ASSESSMENT — ACTIVITIES OF DAILY LIVING (ADL): ADL_ASSISTANCE: INDEPENDENT

## 2025-07-31 NOTE — CARE PLAN
The patient's goals for the shift include      The clinical goals for the shift include pt will tolerate enteral trickle feedings by the end of the shift    Over the shift, the patient did not make progress toward the following goals. Barriers to progression include . Recommendations to address these barriers include .      Problem: Pain - Adult  Goal: Verbalizes/displays adequate comfort level or baseline comfort level  Outcome: Progressing     Problem: Safety - Adult  Goal: Free from fall injury  Outcome: Progressing     Problem: Discharge Planning  Goal: Discharge to home or other facility with appropriate resources  Outcome: Progressing     Problem: Chronic Conditions and Co-morbidities  Goal: Patient's chronic conditions and co-morbidity symptoms are monitored and maintained or improved  Outcome: Progressing     Problem: Nutrition  Goal: Nutrient intake appropriate for maintaining nutritional needs  Outcome: Progressing     Problem: Skin  Goal: Decreased wound size/increased tissue granulation at next dressing change  Outcome: Progressing  Goal: Participates in plan/prevention/treatment measures  Outcome: Progressing  Goal: Prevent/manage excess moisture  Outcome: Progressing  Goal: Prevent/minimize sheer/friction injuries  Outcome: Progressing  Goal: Promote/optimize nutrition  Outcome: Progressing  Goal: Promote skin healing  Outcome: Progressing

## 2025-07-31 NOTE — CONSULTS
Nutrition Initial Assessment:   Nutrition Assessment    Reason for Assessment: Enteral assessment/recommendation (TF)    Patient is a 30 y.o. 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    PMH: heavy alcohol use, systemic (pericarditis, arthralgias, skin rash, recurrent orogenital ulcers, uveitis, retinal vasculitis, but negative [id number]) and neuro Behcet's disease     Nutrition History:  Food and Nutrient History: Unable to meet with pt at this time. Limited history in EMR regarding pt's nutrition status. Pt seen by SLP and was recommended NPO. Now s/p small bore feeding tube placement. Pt was started on Isosource 1.5 @ 10ml/hr this morning per flowsheets.       Anthropometrics:    Height: 175.3cm  Weight: 63.5 kg (140 lb)    BMI: 20.67  IBW/kg (Dietitian Calculated): 72.7 kg  Percent of IBW: 87 %                      Weight History:   Wt Readings from Last 10 Encounters:   07/30/25 63.5 kg (140 lb)   07/11/25 63.5 kg (140 lb)   07/09/25 63.5 kg (140 lb)   06/24/25 59.4 kg (131 lb)   06/16/25 59.1 kg (130 lb 3.2 oz)   04/03/25 65.8 kg (145 lb 1.6 oz)   05/20/25 65 kg (143 lb 4.8 oz)   03/17/25 64.9 kg (143 lb)   03/11/25 64.9 kg (143 lb)   02/06/25 58.1 kg (128 lb)       Weight Change %:  Weight History / % Weight Change: Per chart review, weight tends up and down between 58-65kg. The last 3 documented weights have been 63.5kg exactly. Current weight documented at 63.5kg. No documented method on how weight was obtained.    Nutrition Focused Physical Exam Findings:    Subcutaneous Fat Loss:   Defer Subcutaneous Fat Loss Assessment: Defer all  Muscle Wasting:  Defer Muscle Wasting Assessment: Defer all  Edema:  Edema: none  Physical Findings:  Skin: Negative    Nutrition Significant Labs:  CBC Trend:   Results from last 7 days   Lab Units 07/31/25  0715 07/30/25  0529 07/29/25 2024   WBC AUTO x10*3/uL 13.3* 12.7* 12.9*   RBC AUTO x10*6/uL 3.59*  3.55* 3.68*   HEMOGLOBIN g/dL 11.3* 11.2* 11.8*   HEMATOCRIT % 34.0* 33.5* 34.9*   MCV fL 95 94 95   PLATELETS AUTO x10*3/uL 370 403 390    , BMP Trend:   Results from last 7 days   Lab Units 07/31/25  0715 07/30/25  0529 07/29/25  2108 07/29/25  2024   GLUCOSE mg/dL 141* 99  --  92   CALCIUM mg/dL 10.1 9.6  --  8.8   SODIUM mmol/L 140 139  --  141   POTASSIUM mmol/L 4.5 4.4   < > 5.8*   CO2 mmol/L 26 23  --  25   CHLORIDE mmol/L 103 103  --  107   BUN mg/dL 33* 22  --  20   CREATININE mg/dL 1.03 0.71  --  0.71    < > = values in this interval not displayed.    , BG POCT trend:   Results from last 7 days   Lab Units 07/31/25  1139 07/29/25 2011   POCT GLUCOSE mg/dL 145* 107*    , Renal Lab Trend:   Results from last 7 days   Lab Units 07/31/25  0715 07/30/25  0529 07/29/25  2108 07/29/25  2024   POTASSIUM mmol/L 4.5 4.4 3.5 5.8*   PHOSPHORUS mg/dL 3.9  --   --   --    SODIUM mmol/L 140 139  --  141   MAGNESIUM mg/dL 2.44*  --   --   --    EGFR mL/min/1.73m*2 >90 >90  --  >90   BUN mg/dL 33* 22  --  20   CREATININE mg/dL 1.03 0.71  --  0.71        Nutrition Specific Medications:  Scheduled medications  Scheduled Medications[1]  Continuous medications  Continuous Medications[2]  PRN medications  PRN Medications[3]      I/O:    ;  I/O last 3 completed shifts:  In: 868.8 (13.7 mL/kg) [I.V.:668.8 (10.5 mL/kg); IV Piggyback:200]  Out: - (0 mL/kg)   Weight: 63.5 kg   I/O this shift:  In: 700 [NG/GT:500; IV Piggyback:200]  Out: 300 [Urine:300]      Dietary Orders (From admission, onward)       Start     Ordered    07/31/25 1736  Enteral feeding with NPO Isosource 1.5; NG (nasogastric tube); 50; 300; Water; Every 6 hours  Diet effective now        Comments: Start @ 10ml/hr and increase by 10ml Q8H until goal of 50ml/hr is met.   Question Answer Comment   Tube feeding formula: Isosource 1.5    Feeding route: NG (nasogastric tube)    Tube feeding continuous rate (mL/hr): 50    Tube feeding flush (mL): 300    Flush type: Water     Flush frequency: Every 6 hours        07/31/25 1736    07/30/25 1818  May Not Participate in Room Service  ( ROOM SERVICE MAY NOT PARTICIPATE)  Once        Question:  .  Answer:  Yes    07/30/25 1817                     Estimated Needs:   Total Energy Estimated Needs in 24 hours (kCal):  (~1800)  Method for Estimating Needs: 25kcal/kg IBW  Total Protein Estimated Needs in 24 Hours (g):  (73-80)  Method for Estimating 24 Hour Protein Needs: 1.0-1.1g/kg IBW  Total Fluid Estimated Needs in 24 Hours (mL):  (per MD/team)           Nutrition Diagnosis   Malnutrition Diagnosis  Patient has Malnutrition Diagnosis:  (unable to determine at this time)    Nutrition Diagnosis  Patient has Nutrition Diagnosis: Yes  Diagnosis Status (1): New  Nutrition Diagnosis 1: Swallowing difficulty  Related to (1): AMS  As Evidenced by (1): SLP recommendation of NPO + small bore feeding tube placement       Nutrition Interventions/Recommendations   Nutrition prescription for enteral nutrition    Nutrition Recommendations:  Individualized Nutrition Prescription Provided for : Goal of Isosource 1.5 @ 50ml/hr. Start @ 10ml/hr and increase by 10ml Q8H until goal is met. FWF per MD/team.    Nutrition Interventions/Goals:   Enteral Intake: Management of delivery rate of enteral nutrition  Goal: TF @ 50 = 1800kcal, 82gm protein      Education Documentation  No documentation found.            Nutrition Monitoring and Evaluation   Enteral and Parenteral Nutrition Intake Determination: Enteral nutrition intake - Tolerate TF at goal rate    Body Weight: Body weight - Maintain stable weight    Electrolyte and Renal Panel: Electrolytes within normal limits  Glucose/Endocrine Profile: Glucose within normal limits ( mg/dL)         Goal Status: New goal(s) identified    Time Spent (min): 45 minutes            [1] aspirin, 81 mg, nasogastric tube, Daily  heparin (porcine), 5,000 Units, subcutaneous, q8h YONNY  methylPREDNISolone sodium succinate  (PF), 1,000 mg, intravenous, q18h  mycophenolate, 1,000 mg, nasogastric tube, BID  pantoprazole, 40 mg, intravenous, BID  piperacillin-tazobactam, 4.5 g, intravenous, q6h  polyethylene glycol, 17 g, nasogastric tube, Daily  [Held by provider] predniSONE, 10 mg, oral, q24h  [START ON 8/1/2025] sulfamethoxazole-trimethoprim, 1 tablet, nasogastric tube, Once per day on Monday Wednesday Friday  thiamine, 500 mg, intravenous, TID  vancomycin, 1,000 mg, intravenous, q12h     [2]    [3] PRN medications: acetaminophen **OR** acetaminophen **OR** acetaminophen, cyclobenzaprine, lidocaine, ondansetron, vancomycin

## 2025-07-31 NOTE — PROGRESS NOTES
Physical Therapy    Physical Therapy Treatment    Patient Name: Juan Marsh  MRN: [id number]  Department: [place] [id number]  Room: [address] [place], [place] [address]  Today's Date: 7/31/2025  Time Calculation  Start Time: 1449  Stop Time: 1519  Time Calculation (min): 30 min       Assessment/Plan   PT Assessment  Rehab Prognosis: Good  Barriers to Discharge Home: Physical needs, Caregiver assistance  Caregiver Assistance: Caregiver assistance needed per identified barriers - however, level of patient's required assistance exceeds assistance available at home  Physical Needs: Stair navigation into home limited by function/safety, Stair navigation to access bed limited by function/safety, Stair navigation to access bath limited by function/safety, In-home setup navigation limited by function/safety, Ambulating household distances limited by function/safety  Evaluation/Treatment Tolerance: Patient tolerated treatment well  Medical Staff Made Aware: Yes  Strengths: Attitude of self, Support of Caregivers, Premorbid level of function  Barriers to Participation: Comorbidities  End of Session Communication: Bedside nurse (OT present end of session)  Assessment Comment: Pt with excellent effort during session and motivated to participate in PT. Able to progress therapeutic activity to include multiple STS and ambulation. Pt with good BLE strength and endurance for functional mobility, however limited by balance and coordination deficits impairing overall gait stability. Continues to be appropriate for high intensity PT to faciliate return to PLOF.  End of Session Patient Position: Up in chair, Alarm on (mother and OT present)     PT Plan  Treatment/Interventions: Bed mobility, Transfer training, Gait training, Stair training, Balance training, Neuromuscular re-education, Strengthening, Endurance training, Range of motion, Therapeutic exercise, Therapeutic activity, Home exercise program, Positioning, Postural re-education  PT Plan: Ongoing PT  PT  Frequency: 5 times per week  PT Discharge Recommendations: High intensity level of continued care  PT Recommended Transfer Status: Assist x2  PT - OK to Discharge: Yes (meaning pt has been evaluated and discharge rec is in place)    PT Visit Info:  PT Received On: 07/31/25  Response to Previous Treatment: Patient with no complaints from previous session.     General Visit Information:   General  Family/Caregiver Present: Yes (mother present at bedside)  Prior to Session Communication: Bedside nurse  Patient Position Received: Bed, 3 rail up, Alarm on  Preferred Learning Style: verbal, visual  General Comment: Pt received supine, plesant, and agreeable to PT with a thumbs up    Subjective   Precautions:  Precautions  LE Weight Bearing Status: Weight Bearing as Tolerated  Medical Precautions: Fall precautions        Objective   Pain:  Pain Assessment  Pain Assessment: 0-10  0-10 (Numeric) Pain Score: 0 - No pain  Cognition:  Cognition  Arousal/Alertness: Delayed responses to stimuli  Orientation Level: Oriented X4 (pt only able to respond to simple yes/no questions with thumbs up vs down regarding person, place, time, situation)  Following Commands: Follows one step commands with increased time  Impulsive: Mildly (attemps to stand without device, educated on importance of safety with functional mobility)    Activity Tolerance:  Activity Tolerance  Endurance: Endurance does not limit participation in activity  Treatments:  Therapeutic Exercise  Therapeutic Exercise Performed: Yes (LAQ x 10 each)    Therapeutic Activity  Therapeutic Activity Performed: Yes    Balance/Neuromuscular Re-Education  Balance/Neuromuscular Re-Education Activity Performed: Yes  Balance/Neuromuscular Re-Education Activity 1: pt able to perform reaching tasks crossing midline with CGA, and intermittent min A at trunk, using varying unilateral UE support    Bed Mobility  Bed Mobility: Yes  Bed Mobility 1  Bed Mobility 1: Supine to sitting  Level  of Assistance 1: Minimum assistance (at trunk for stability)  Bed Mobility Comments 1: HOB elevated  Bed Mobility 2  Bed Mobility  2: Scooting  Level of Assistance 2: Minimum assistance (at trunk for stability)  Bed Mobility Comments 2: scoot to EOB    Ambulation/Gait Training  Ambulation/Gait Training Performed: Yes  Ambulation/Gait Training 1  Surface 1: Level tile  Device 1: Rolling walker  Assistance 1: Moderate assistance, Moderate verbal cues, Moderate tactile cues  Quality of Gait 1: Knee(s) buckle, Narrow base of support, Diminished heel strike, Decreased step length, Forward flexed posture, Ataxic (L knee instability, decreased foot clearance, pt with moderate L trunk lean during gait requiring min-mod A to achieve midline)  Comments/Distance (ft) 1: 1 x 20ft, 1 x 25ft with seated rest break between trials  Transfers  Transfer: Yes  Transfer 1  Transfer From 1: Sit to, Stand to  Transfer to 1: Sit, Stand  Technique 1: Sit to stand, Stand to sit  Transfer Device 1: Walker  Transfer Level of Assistance 1: Minimum assistance, Minimal verbal cues  Trials/Comments 1: x 3, cues and assist for walker placement, technique,and safety using device  Transfers 2  Transfer From 2: Stand to  Transfer to 2: Chair with arms  Technique 2: Stand to sit  Transfer Device 2: Walker  Transfer Level of Assistance 2: Minimum assistance, Minimal verbal cues  Trials/Comments 2: x 1, pt finished ambulation to bedside chair, increased cues with turns and for L foot placement    Outcome Measures:  [facility] Basic Mobility  Turning from your back to your side while in a flat bed without using bedrails: A little  Moving from lying on your back to sitting on the side of a flat bed without using bedrails: A little  Moving to and from bed to chair (including a wheelchair): A lot  Standing up from a chair using your arms (e.g. wheelchair or bedside chair): A little  To walk in hospital room: A lot  Climbing 3-5 steps with railing: Total  Basic  Mobility - Total Score: 14    Education Documentation  Handouts, taught by Sonya Hernandez PT at 7/31/2025  4:31 PM.  Learner: Family, Patient  Readiness: Acceptance  Method: Demonstration, Explanation  Response: Needs Reinforcement  Comment: safety with all functional mobility, HEP    Precautions, taught by Sonya Hernandez PT at 7/31/2025  4:31 PM.  Learner: Family, Patient  Readiness: Acceptance  Method: Demonstration, Explanation  Response: Needs Reinforcement  Comment: safety with all functional mobility, HEP    Home Exercise Program, taught by Sonya Hernandez PT at 7/31/2025  4:31 PM.  Learner: Family, Patient  Readiness: Acceptance  Method: Demonstration, Explanation  Response: Needs Reinforcement  Comment: safety with all functional mobility, HEP    Body Mechanics, taught by Sonya Hernandez PT at 7/31/2025  4:31 PM.  Learner: Family, Patient  Readiness: Acceptance  Method: Demonstration, Explanation  Response: Needs Reinforcement  Comment: safety with all functional mobility, HEP    Mobility Training, taught by Sonya Hernandez PT at 7/31/2025  4:31 PM.  Learner: Family, Patient  Readiness: Acceptance  Method: Demonstration, Explanation  Response: Needs Reinforcement  Comment: safety with all functional mobility, HEP    Handouts, taught by Sonya Hernandez PT at 7/31/2025  4:31 PM.  Learner: Family, Patient  Readiness: Acceptance  Method: Demonstration, Explanation  Response: Needs Reinforcement  Comment: safety with all functional mobility, HEP    Home Exercise Program, taught by Sonya Hernandez PT at 7/31/2025  4:31 PM.  Learner: Family, Patient  Readiness: Acceptance  Method: Demonstration, Explanation  Response: Needs Reinforcement  Comment: safety with all functional mobility, HEP    Body Mechanics, taught by Sonya Hernandez PT at 7/31/2025  4:31 PM.  Learner: Family, Patient  Readiness: Acceptance  Method: Demonstration,  Explanation  Response: Needs Reinforcement  Comment: safety with all functional mobility, HEP    Precautions, taught by Sonya Hernandez, PT at 7/31/2025  4:31 PM.  Learner: Family, Patient  Readiness: Acceptance  Method: Demonstration, Explanation  Response: Needs Reinforcement  Comment: safety with all functional mobility, HEP    ADL Training, taught by Sonya Hernandez, PT at 7/31/2025  4:31 PM.  Learner: Family, Patient  Readiness: Acceptance  Method: Demonstration, Explanation  Response: Needs Reinforcement  Comment: safety with all functional mobility, HEP    Education Comments  No comments found.        OP EDUCATION:       Encounter Problems       Encounter Problems (Active)       Balance       Maintains static standing balance with upper extremity support with close supervision for completion of functional tasks.  (Progressing)       Start:  07/30/25    Expected End:  08/08/25            Maintains static and dynamic sitting balance with upper extremity support with distant supervision.  (Progressing)       Start:  07/30/25    Expected End:  08/08/25               Mobility       Patient will ambulate household distance 50ft with CGA using LRD.  (Progressing)       Start:  07/30/25    Expected End:  08/08/25               PT Transfers        Patient will perform functional transfers with CGA using LRD.  (Progressing)       Start:  07/30/25    Expected End:  08/08/25       Tinetti>24 to reflect improvement in balance and decreased falls risk

## 2025-07-31 NOTE — PROGRESS NOTES
Occupational Therapy    Evaluation    Patient Name: Juan Marsh  MRN: [id number]  Today's Date: 7/31/2025  Time Calculation  Start Time: 1516  Stop Time: 1527  Time Calculation (min): 11 min    Assessment  IP OT Assessment  OT Assessment: Pt presents with impaired R UE strength, impaired static and dynamic standing balance. and impaired R UE coordiantion.  Prognosis: Good  Barriers to Discharge Home: Caregiver assistance, Cognition needs, Physical needs  Caregiver Assistance: Caregiver assistance needed per identified barriers - however, level of patient's required assistance exceeds assistance available at home  Cognition Needs: 24hr supervision for safety awareness needed  Physical Needs: 24hr mobility assistance needed, 24hr ADL assistance needed, High falls risk due to function or environment  Evaluation/Treatment Tolerance: Patient tolerated treatment well  Medical Staff Made Aware: Yes  End of Session Communication: Bedside nurse  End of Session Patient Position: Up in chair, Alarm on  Plan:  Treatment Interventions: ADL retraining, Functional transfer training, UE strengthening/ROM, Endurance training, Cognitive reorientation, Fine motor coordination activities  OT Frequency: 5 times per week  OT Discharge Recommendations: High intensity level of continued care  OT Recommended Transfer Status: Moderate assist, Assist of 1  OT - OK to Discharge: Yes    Subjective   Current Problem:  1. Altered mental status, unspecified altered mental status type        2. Stroke-like symptoms          General:  Reason for Referral: 30 y.o. male presenting to ED nwith stroke symptoms including  progressive aphasia and vomiting. Imaging shows progression of right brain lesion with slight mass effect c/f Behcet's disease progression  Past Medical History Relevant to Rehab: smoking, alcohol use disorder, systemic (pericarditis, arthralgias, skin rash, recurrent orogenital ulcers, uveitis, retinal vasculitis, -ve HLAB51) and  neuro Behcet's disease with multiple recurrences, some residual right sided weakness, and recent left hip surgery for avascular necrosis  Prior to Session Communication: Bedside nurse  Patient Position Received:  (sitting in a chair with PT present)  Family/Caregiver Present: Yes  Caregiver Feedback: pt's mother was present   Precautions:  LE Weight Bearing Status: Weight Bearing as Tolerated (LLE due to recent sx)  Medical Precautions: Fall precautions    Pain:  Pain Assessment  Pain Assessment: 0-10  0-10 (Numeric) Pain Score: 0 - No pain        Objective   Cognition:  Overall Cognitive Status: Impaired  Arousal/Alertness: Delayed responses to stimuli  Orientation Level:  (able to shake his head yes or no to some questions. follows simple verbal cues with 100% accuracy, however, unable to report his name, shaking his head yes when asked if it is difficult to answer using words.)  Safety/Judgement: Exceptions to WFL  Insight: Mild           Home Living:  Type of Home: Apartment  Lives With: Parent(s)  Home Adaptive Equipment: Walker rolling or standard, Cane, Crutches  Home Layout: One level  Home Access: Stairs to enter with rails   Prior Function:  Level of [place]: Independent with homemaking with ambulation, Independent with ADLs and functional transfers  Receives Help From: Family  ADL Assistance: Independent  Homemaking Assistance: Needs assistance  Ambulatory Assistance: Independent  Vocational: Unemployed     ADL:  Grooming Assistance: Stand by  Grooming Deficit: Setup (using a towel to wipe his mouth)  Toileting Assistance with Device: Moderate  Toileting Deficit:  (anticipate)  Activity Tolerance:  Endurance: Endurance does not limit participation in activity  Balance:  Dynamic Standing Balance  Dynamic Standing-Balance Support: Bilateral upper extremity supported  Dynamic Standing-Level of Assistance: Moderate assistance  Dynamic Standing-Balance:  (able to march in place using a wheeled walker with  extreme movement of R LE, questionable due to ataxia.)  Static Sitting Balance  Static Sitting-Balance Support: No upper extremity supported  Static Sitting-Level of Assistance: Distant supervision  Static Standing Balance  Static Standing-Balance Support: Bilateral upper extremity supported  Static Standing-Level of Assistance: Minimum assistance  Static Standing-Comment/Number of Minutes: using a wheeled walker. Pt started to have tremors mostly in R LE while standing.  Bed Mobility/Transfers: Bed Mobility  Bed Mobility: No   and Transfers  Transfer: Yes  Transfer 1  Transfer From 1: Chair with arms to  Transfer to 1: Stand  Technique 1: Sit to stand  Transfer Device 1: Walker  Transfer Level of Assistance 1: Minimum assistance, Minimal verbal cues  Trials/Comments 1: x 1 trial using a wheeled walker for support     Vision:     and Vision - Complex Assessment  Ocular Range of Motion: Within Functional Limits  Sensation:  Light Touch:  (Denied numbness or sensation deficits in R UE. Able to detect light touch.)  Proprioception:  (Able to detect the place of R UE with eyes closed.)    Coordination:  Movements are Fluid and Coordinated: No  Upper Body Coordination: Impaired R digits to thumb opposition, required increased time to complete this assessment.   Hand Function:  Hand Function  Gross Grasp: Impaired (R UE)  Coordination: Impaired (R UE)  Extremities: RUE   RUE :  (R shoulder AROM ~ 60 degrees flex/Abd. R elbow to digits AAROM grossly WFL. R digits to thumb opposition impaired coordination. R shoulder 3-/5, R elbow to  3/5), LUE   LUE:  (L shoulder AROM ~ 90 degrees flex, L elbow to digits AROM WFL.),  ,     Outcome Measures: [facility] Daily Activity  Putting on and taking off regular lower body clothing: A lot  Bathing (including washing, rinsing, drying): A lot  Putting on and taking off regular upper body clothing: A lot  Toileting, which includes using toilet, bedpan or urinal: A lot  Taking care of  personal grooming such as brushing teeth: A little  Eating Meals: A little  Daily Activity - Total Score: 14         ,     OT Adult Other Outcome Measures  4AT: unable to complete    Education Documentation  Home Exercise Program, taught by Hudson Gao OT at 7/31/2025  3:48 PM.  Learner: Family, Patient  Readiness: Acceptance  Method: Explanation  Response: Verbalizes Understanding    Body Mechanics, taught by Hudson Gao OT at 7/31/2025  3:48 PM.  Learner: Family, Patient  Readiness: Acceptance  Method: Explanation  Response: Verbalizes Understanding    ADL Training, taught by Hudson Gao OT at 7/31/2025  3:48 PM.  Learner: Family, Patient  Readiness: Acceptance  Method: Explanation  Response: Verbalizes Understanding    Education Comments  No comments found.        Goals:   Encounter Problems       Encounter Problems (Active)       ADLs       Patient will perform UB and LB bathing  with minimal assist  level of assistance and grab bars. (Progressing)       Start:  07/31/25    Expected End:  08/21/25            Patient with complete lower body dressing with minimal assist  level of assistance donning and doffing all LE clothes  with PRN adaptive equipment while supported sitting (Progressing)       Start:  07/31/25    Expected End:  08/22/25            Patient will complete toileting including hygiene clothing management/hygiene with minimal assist  level of assistance and grab bars. (Progressing)       Start:  07/31/25    Expected End:  08/21/25               COGNITION/SAFETY       Patient will score WFL on standardized cognitive assessment with min verbal cues and within reasonable time frame (Progressing)       Start:  07/31/25    Expected End:  08/21/25               EXERCISE/STRENGTHENING       Patient will complete BUE exercises for 15 reps in order to improve strength and activity for ADL performance.  (Progressing)       Start:  07/31/25    Expected End:  08/21/25               MOBILITY        Patient will perform Functional mobility min Household distances/Community Distances with minimal assist  level of assistance and least restrictive device in order to improve safety and functional mobility. (Progressing)       Start:  07/31/25    Expected End:  08/21/25               TRANSFERS       Patient will perform bed mobility minimal assist  level of assistance and bed rails in order to improve safety and independence with mobility (Progressing)       Start:  07/31/25    Expected End:  08/21/25            Patient will complete sit to stand transfer with minimal assist  level of assistance and least restrictive device in order to improve safety and prepare for out of bed mobility. (Progressing)       Start:  07/31/25    Expected End:  08/21/25 07/31/25 at 3:49 PM   Hudson Gao OTR/OTD  Rehab Office: [phone]

## 2025-07-31 NOTE — CONSULTS
Vancomycin Dosing by Pharmacy- INITIAL    Juan Marsh is a 30 y.o. year old male who Pharmacy has been consulted for vancomycin dosing for pneumonia. Based on the patient's indication and renal status this patient will be dosed based on a goal AUC of 400-600.     Renal function is currently est to be 95 mL/min.  Scr is showing a Slight bump 1.03 (0.71)  Monitor closely.     Visit Vitals  /74 (BP Location: Right arm, Patient Position: Lying)   Pulse 80   Temp 36.6 °C (97.9 °F) (Temporal)   Resp 15        Lab Results   Component Value Date    CREATININE 1.03 2025    CREATININE 0.71 2025    CREATININE 0.71 2025    CREATININE 0.77 07/10/2025    CREATININE 1.02 2025    CREATININE 1.14 2025        Patient weight is as follows:   Vitals:    25 0142   Weight: 63.5 kg (140 lb)       Cultures:  No results found for the encounter in last 14 days.        I/O last 3 completed shifts:  In: 868.8 (13.7 mL/kg) [I.V.:668.8 (10.5 mL/kg); IV Piggyback:200]  Out: - (0 mL/kg)   Weight: 63.5 kg   I/O during current shift:  I/O this shift:  In: 200 [NG/GT:200]  Out: 200 [Urine:200]    Temp (24hrs), Av.7 °C (98 °F), Min:36.6 °C (97.9 °F), Max:36.8 °C (98.2 °F)         Assessment/Plan     Patient will not be given a loading dose.  Will initiate vancomycin maintenance, 1000 mg every 12 hours.  pAUC = 428 / 518    This dosing regimen is predicted by [employer] to result in the following pharmacokinetic parameters:  <Loading dose: N/A  Regimen: 1000 mg IV every 12 hours.  Start time: 15:23 on 2025  Exposure target: AUC24 (range) 400-600 mg/L.hr   AUT05-40: 428 mg/L.hr  AUC24,ss: 518 mg/L.hr  Probability of AUC24 > 400: 77 %  Ctrough,ss: 15.6 mg/L  Probability of Ctrough,ss > 20: 30 %      Follow-up level will be ordered on  at am lab draw unless clinically indicated sooner.  Will continue to monitor renal function daily while on vancomycin and order serum creatinine at least every  48 hours if not already ordered.  Follow for continued vancomycin needs, clinical response, and signs/symptoms of toxicity.       Rickey Garcia, PharmD

## 2025-08-01 LAB
ALBUMIN SERPL BCP-MCNC: 4.5 G/DL (ref 3.4–5)
ANION GAP SERPL CALC-SCNC: 14 MMOL/L (ref 10–20)
BACTERIA SPEC RESP CULT: ABNORMAL
BASOPHILS # BLD AUTO: 0.02 X10*3/UL (ref 0–0.1)
BASOPHILS NFR BLD AUTO: 0.1 %
BUN SERPL-MCNC: 31 MG/DL (ref 6–23)
CALCIUM SERPL-MCNC: 9.8 MG/DL (ref 8.6–10.6)
CHLORIDE SERPL-SCNC: 103 MMOL/L (ref 98–107)
CO2 SERPL-SCNC: 25 MMOL/L (ref 21–32)
CREAT SERPL-MCNC: 0.99 MG/DL (ref 0.5–1.3)
EGFRCR SERPLBLD CKD-EPI 2021: >90 ML/MIN/1.73M*2
EOSINOPHIL # BLD AUTO: 0 X10*3/UL (ref 0–0.7)
EOSINOPHIL NFR BLD AUTO: 0 %
ERYTHROCYTE [DISTWIDTH] IN BLOOD BY AUTOMATED COUNT: 15.7 % (ref 11.5–14.5)
GLUCOSE BLD MANUAL STRIP-MCNC: 165 MG/DL (ref 74–99)
GLUCOSE SERPL-MCNC: 139 MG/DL (ref 74–99)
GRAM STN SPEC: ABNORMAL
HCT VFR BLD AUTO: 31.1 % (ref 41–52)
HGB BLD-MCNC: 10.4 G/DL (ref 13.5–17.5)
HOLD SPECIMEN: NORMAL
IMM GRANULOCYTES # BLD AUTO: 0.14 X10*3/UL (ref 0–0.7)
IMM GRANULOCYTES NFR BLD AUTO: 0.8 % (ref 0–0.9)
LYMPHOCYTES # BLD AUTO: 0.34 X10*3/UL (ref 1.2–4.8)
LYMPHOCYTES NFR BLD AUTO: 1.8 %
MAGNESIUM SERPL-MCNC: 2.75 MG/DL (ref 1.6–2.4)
MCH RBC QN AUTO: 31 PG (ref 26–34)
MCHC RBC AUTO-ENTMCNC: 33.4 G/DL (ref 32–36)
MCV RBC AUTO: 93 FL (ref 80–100)
MONOCYTES # BLD AUTO: 0.27 X10*3/UL (ref 0.1–1)
MONOCYTES NFR BLD AUTO: 1.5 %
NEUTROPHILS # BLD AUTO: 17.62 X10*3/UL (ref 1.2–7.7)
NEUTROPHILS NFR BLD AUTO: 95.8 %
NRBC BLD-RTO: 0 /100 WBCS (ref 0–0)
PHOSPHATE SERPL-MCNC: 3.3 MG/DL (ref 2.5–4.9)
PLATELET # BLD AUTO: 322 X10*3/UL (ref 150–450)
POTASSIUM SERPL-SCNC: 4 MMOL/L (ref 3.5–5.3)
RBC # BLD AUTO: 3.35 X10*6/UL (ref 4.5–5.9)
SODIUM SERPL-SCNC: 138 MMOL/L (ref 136–145)
VANCOMYCIN SERPL-MCNC: 24.4 UG/ML (ref 5–20)
WBC # BLD AUTO: 18.4 X10*3/UL (ref 4.4–11.3)

## 2025-08-01 PROCEDURE — 97530 THERAPEUTIC ACTIVITIES: CPT | Mod: GO

## 2025-08-01 PROCEDURE — 36415 COLL VENOUS BLD VENIPUNCTURE: CPT

## 2025-08-01 PROCEDURE — 2500000004 HC RX 250 GENERAL PHARMACY W/ HCPCS (ALT 636 FOR OP/ED)

## 2025-08-01 PROCEDURE — 97110 THERAPEUTIC EXERCISES: CPT | Mod: GP

## 2025-08-01 PROCEDURE — 2060000001 HC INTERMEDIATE ICU ROOM DAILY

## 2025-08-01 PROCEDURE — 2500000001 HC RX 250 WO HCPCS SELF ADMINISTERED DRUGS (ALT 637 FOR MEDICARE OP)

## 2025-08-01 PROCEDURE — 97530 THERAPEUTIC ACTIVITIES: CPT | Mod: GP

## 2025-08-01 PROCEDURE — 83735 ASSAY OF MAGNESIUM: CPT

## 2025-08-01 PROCEDURE — 80202 ASSAY OF VANCOMYCIN: CPT | Performed by: PATHOLOGY

## 2025-08-01 PROCEDURE — 82947 ASSAY GLUCOSE BLOOD QUANT: CPT

## 2025-08-01 PROCEDURE — 99232 SBSQ HOSP IP/OBS MODERATE 35: CPT | Performed by: STUDENT IN AN ORGANIZED HEALTH CARE EDUCATION/TRAINING PROGRAM

## 2025-08-01 PROCEDURE — 2500000004 HC RX 250 GENERAL PHARMACY W/ HCPCS (ALT 636 FOR OP/ED): Mod: JZ

## 2025-08-01 PROCEDURE — 97535 SELF CARE MNGMENT TRAINING: CPT | Mod: GO

## 2025-08-01 PROCEDURE — 97116 GAIT TRAINING THERAPY: CPT | Mod: GP

## 2025-08-01 PROCEDURE — 85025 COMPLETE CBC W/AUTO DIFF WBC: CPT

## 2025-08-01 PROCEDURE — 80069 RENAL FUNCTION PANEL: CPT

## 2025-08-01 PROCEDURE — 2500000004 HC RX 250 GENERAL PHARMACY W/ HCPCS (ALT 636 FOR OP/ED): Mod: JZ | Performed by: PATHOLOGY

## 2025-08-01 RX ORDER — POLYETHYLENE GLYCOL 3350 17 G/17G
17 POWDER, FOR SOLUTION ORAL 2 TIMES DAILY
Status: DISCONTINUED | OUTPATIENT
Start: 2025-08-01 | End: 2025-08-02

## 2025-08-01 RX ORDER — METOCLOPRAMIDE HYDROCHLORIDE 5 MG/ML
10 INJECTION INTRAMUSCULAR; INTRAVENOUS EVERY 8 HOURS SCHEDULED
Status: DISCONTINUED | OUTPATIENT
Start: 2025-08-01 | End: 2025-08-07

## 2025-08-01 RX ORDER — BISACODYL 5 MG
10 TABLET, DELAYED RELEASE (ENTERIC COATED) ORAL DAILY PRN
Status: DISCONTINUED | OUTPATIENT
Start: 2025-08-01 | End: 2025-08-08 | Stop reason: HOSPADM

## 2025-08-01 RX ADMIN — VANCOMYCIN HYDROCHLORIDE 1000 MG: 1 INJECTION, SOLUTION INTRAVENOUS at 02:59

## 2025-08-01 RX ADMIN — ACETAMINOPHEN 650 MG: 160 SOLUTION ORAL at 16:27

## 2025-08-01 RX ADMIN — ASPIRIN 81 MG CHEWABLE TABLET 81 MG: 81 TABLET CHEWABLE at 10:20

## 2025-08-01 RX ADMIN — THIAMINE HYDROCHLORIDE 500 MG: 100 INJECTION, SOLUTION INTRAMUSCULAR; INTRAVENOUS at 14:39

## 2025-08-01 RX ADMIN — HEPARIN SODIUM 5000 UNITS: 5000 INJECTION, SOLUTION INTRAVENOUS; SUBCUTANEOUS at 14:39

## 2025-08-01 RX ADMIN — PANTOPRAZOLE SODIUM 40 MG: 40 INJECTION, POWDER, FOR SOLUTION INTRAVENOUS at 10:21

## 2025-08-01 RX ADMIN — THIAMINE HYDROCHLORIDE 500 MG: 100 INJECTION, SOLUTION INTRAMUSCULAR; INTRAVENOUS at 20:32

## 2025-08-01 RX ADMIN — PIPERACILLIN SODIUM AND TAZOBACTAM SODIUM 4.5 G: 4; .5 INJECTION, SOLUTION INTRAVENOUS at 22:10

## 2025-08-01 RX ADMIN — MYCOPHENOLATE MOFETIL 1000 MG: 200 POWDER, FOR SUSPENSION ORAL at 20:32

## 2025-08-01 RX ADMIN — PIPERACILLIN SODIUM AND TAZOBACTAM SODIUM 4.5 G: 4; .5 INJECTION, SOLUTION INTRAVENOUS at 16:26

## 2025-08-01 RX ADMIN — METOCLOPRAMIDE HYDROCHLORIDE 10 MG: 5 INJECTION INTRAMUSCULAR; INTRAVENOUS at 22:10

## 2025-08-01 RX ADMIN — METHYLPREDNISOLONE SODIUM SUCCINATE 1000 MG: 1 INJECTION INTRAMUSCULAR; INTRAVENOUS at 00:28

## 2025-08-01 RX ADMIN — SODIUM CHLORIDE, SODIUM LACTATE, POTASSIUM CHLORIDE, AND CALCIUM CHLORIDE 1000 ML: .6; .31; .03; .02 INJECTION, SOLUTION INTRAVENOUS at 09:59

## 2025-08-01 RX ADMIN — METHYLPREDNISOLONE SODIUM SUCCINATE 1000 MG: 1 INJECTION INTRAMUSCULAR; INTRAVENOUS at 18:20

## 2025-08-01 RX ADMIN — POLYETHYLENE GLYCOL 3350 17 G: 17 POWDER, FOR SOLUTION ORAL at 20:32

## 2025-08-01 RX ADMIN — METOCLOPRAMIDE HYDROCHLORIDE 10 MG: 5 INJECTION INTRAMUSCULAR; INTRAVENOUS at 14:39

## 2025-08-01 RX ADMIN — HEPARIN SODIUM 5000 UNITS: 5000 INJECTION, SOLUTION INTRAVENOUS; SUBCUTANEOUS at 05:13

## 2025-08-01 RX ADMIN — THIAMINE HYDROCHLORIDE 500 MG: 100 INJECTION, SOLUTION INTRAMUSCULAR; INTRAVENOUS at 10:19

## 2025-08-01 RX ADMIN — METOCLOPRAMIDE HYDROCHLORIDE 10 MG: 5 INJECTION INTRAMUSCULAR; INTRAVENOUS at 10:19

## 2025-08-01 RX ADMIN — HEPARIN SODIUM 5000 UNITS: 5000 INJECTION, SOLUTION INTRAVENOUS; SUBCUTANEOUS at 22:10

## 2025-08-01 RX ADMIN — PIPERACILLIN SODIUM AND TAZOBACTAM SODIUM 4.5 G: 4; .5 INJECTION, SOLUTION INTRAVENOUS at 04:25

## 2025-08-01 RX ADMIN — PIPERACILLIN SODIUM AND TAZOBACTAM SODIUM 4.5 G: 4; .5 INJECTION, SOLUTION INTRAVENOUS at 12:14

## 2025-08-01 RX ADMIN — PANTOPRAZOLE SODIUM 40 MG: 40 INJECTION, POWDER, FOR SOLUTION INTRAVENOUS at 20:32

## 2025-08-01 RX ADMIN — MYCOPHENOLATE MOFETIL 1000 MG: 200 POWDER, FOR SUSPENSION ORAL at 10:20

## 2025-08-01 ASSESSMENT — COGNITIVE AND FUNCTIONAL STATUS - GENERAL
MOBILITY SCORE: 16
DRESSING REGULAR LOWER BODY CLOTHING: A LITTLE
CLIMB 3 TO 5 STEPS WITH RAILING: A LOT
STANDING UP FROM CHAIR USING ARMS: A LOT
DAILY ACTIVITIY SCORE: 15
MOVING TO AND FROM BED TO CHAIR: A LOT
TOILETING: A LITTLE
MOVING TO AND FROM BED TO CHAIR: A LITTLE
DAILY ACTIVITIY SCORE: 14
WALKING IN HOSPITAL ROOM: A LOT
MOVING FROM LYING ON BACK TO SITTING ON SIDE OF FLAT BED WITH BEDRAILS: A LITTLE
EATING MEALS: A LITTLE
CLIMB 3 TO 5 STEPS WITH RAILING: TOTAL
WALKING IN HOSPITAL ROOM: A LOT
HELP NEEDED FOR BATHING: A LOT
STANDING UP FROM CHAIR USING ARMS: A LITTLE
TOILETING: A LOT
DRESSING REGULAR UPPER BODY CLOTHING: A LOT
DRESSING REGULAR UPPER BODY CLOTHING: A LITTLE
TURNING FROM BACK TO SIDE WHILE IN FLAT BAD: A LITTLE
DRESSING REGULAR LOWER BODY CLOTHING: A LOT
PERSONAL GROOMING: A LOT
PERSONAL GROOMING: A LITTLE
HELP NEEDED FOR BATHING: A LITTLE
EATING MEALS: TOTAL
MOBILITY SCORE: 15

## 2025-08-01 ASSESSMENT — PAIN SCALES - PAIN ASSESSMENT IN ADVANCED DEMENTIA (PAINAD)
CONSOLABILITY: UNABLE TO CONSOLE, DISTRACT OR REASSURE
FACIALEXPRESSION: SAD, FRIGHTENED, FROWN
TOTALSCORE: 3
TOTALSCORE: MEDICATION (SEE MAR);REPOSITIONED
BODYLANGUAGE: RELAXED
BREATHING: NORMAL

## 2025-08-01 ASSESSMENT — PAIN SCALES - GENERAL
PAINLEVEL_OUTOF10: 0 - NO PAIN

## 2025-08-01 ASSESSMENT — ACTIVITIES OF DAILY LIVING (ADL): HOME_MANAGEMENT_TIME_ENTRY: 10

## 2025-08-01 ASSESSMENT — PAIN - FUNCTIONAL ASSESSMENT
PAIN_FUNCTIONAL_ASSESSMENT: 0-10
PAIN_FUNCTIONAL_ASSESSMENT: 0-10
PAIN_FUNCTIONAL_ASSESSMENT: PAINAD (PAIN ASSESSMENT IN ADVANCED DEMENTIA SCALE)
PAIN_FUNCTIONAL_ASSESSMENT: 0-10
PAIN_FUNCTIONAL_ASSESSMENT: 0-10

## 2025-08-01 NOTE — PROGRESS NOTES
TCC reached out to patient's mother (Nayla Marsh, [phone]) regarding discharge planning. Voicemail left with TCC contact information; patient currently not medical ready. TCC will continue to follow for discharge planning.    2835- TCC spoke with patient/family at bedside. Referral sent to [facility] per family request.      ANSLEY Wall, RN  [facility], [facility]  Transitional Care Coordinator, Mon-Fri  Cell: [phone], Office: [phone]  Email: [email or web address]

## 2025-08-01 NOTE — PROGRESS NOTES
Occupational Therapy    Occupational Therapy Treatment    Name: Juan Marsh  MRN: [id number]  Department: [facility] 2  Room: [address] [place] [address]  Date: 08/01/25  Start time: 11:32  Stop time: 11:56  Time Calculation (min): 24 min    Assessment:  OT Assessment: pt ambulated a short distance in room, completed UB bathing and UE exercises EOB and while seated in a chair. pt requires moderate assistance and verbal cues during ambulation.  Prognosis: Good  Barriers to Discharge Home: Caregiver assistance, Cognition needs, Physical needs  Caregiver Assistance: Caregiver assistance needed per identified barriers - however, level of patient's required assistance exceeds assistance available at home  Cognition Needs: 24hr supervision for safety awareness needed  Physical Needs: 24hr mobility assistance needed, 24hr ADL assistance needed, High falls risk due to function or environment  Evaluation/Treatment Tolerance: Patient tolerated treatment well  Medical Staff Made Aware: Yes  End of Session Communication: Bedside nurse  End of Session Patient Position: Up in chair, Alarm on (mother present at bedside)  Plan:  Treatment Interventions: ADL retraining, Functional transfer training, UE strengthening/ROM, Endurance training, Cognitive reorientation, Fine motor coordination activities  OT Frequency: 5 times per week  OT Discharge Recommendations: High intensity level of continued care  OT Recommended Transfer Status: Moderate assist, Assist of 1  OT - OK to Discharge: Yes    Subjective     OT Visit Info:  OT Received On: 08/01/25  General:  General  Reason for Referral: 30 y.o. male presenting to ED nwith stroke symptoms including  progressive aphasia and vomiting. Imaging shows progression of right brain lesion with slight mass effect c/f Behcet's disease progression  Past Medical History Relevant to Rehab: smoking, alcohol use disorder, systemic (pericarditis, arthralgias, skin rash, recurrent orogenital ulcers, uveitis, retinal  vasculitis, -ve HLAB51) and neuro Behcet's disease with multiple recurrences, some residual right sided weakness, and recent left hip surgery for avascular necrosis  Family/Caregiver Present: Yes (mother present towards end of session)  Prior to Session Communication: Bedside nurse  Patient Position Received: Bed, 3 rail up, Alarm on  Preferred Learning Style: verbal  General Comment: pt supine with HOB elevated, pleasant and agreeable to OT session with head nod yes.  Precautions:  Medical Precautions: Fall precautions  Precautions Comment: Aspiration precautions     Date/Time Vitals Session Patient Position Pulse Resp SpO2 BP MAP (mmHg)    08/01/25 1245 --  --  67  15  98 %  107/78  87     08/01/25 1300 --  --  --  --  99 %  --  --         Pain Assessment:  Pain Assessment  Pain Assessment: 0-10  0-10 (Numeric) Pain Score: 0 - No pain (shook head no)    Objective   Cognition:  Overall Cognitive Status: Impaired  Other (Comment): pt able to shake head yes/no to orientation questions with given choices. Oriented x3  Insight: Mild    Activities of Daily Living:   UE Bathing  UE Bathing Level of Assistance: Setup, Contact guard  UE Bathing Where Assessed: Other (Comment) (seated in chair)  UE Bathing Comments: pt was able to complete UB bathing of B UEs with setup, required assistance to bathe back.    LE Dressing  LE Dressing: Yes  Sock Level of Assistance: Maximum assistance  LE Dressing Where Assessed: Bed level  LE Dressing Comments: pt able to slighlt lift B LEs to kezia socks, requires maximam assist.     Bed Mobility/Transfers: Bed Mobility  Bed Mobility: Yes  Bed Mobility 1  Bed Mobility 1: Supine to sitting  Level of Assistance 1: Contact guard    Transfers  Transfer: Yes  Transfer 1  Transfer From 1: Sit to  Transfer to 1: Stand  Technique 1: Sit to stand  Transfer Device 1: Walker  Transfer Level of Assistance 1: Minimum assistance, Moderate verbal cues, +2  Transfers 2  Transfer From 2: Stand to  Transfer  to 2: Chair with arms  Technique 2: Stand to sit  Transfer Device 2: Walker  Transfer Level of Assistance 2: Minimum assistance, Moderate verbal cues, +2    Functional Mobility:  Functional Mobility  Functional Mobility Performed: Yes  Functional Mobility 1  Surface 1: Level tile  Device 1: Rolling walker  Assistance 1: Moderate assistance, Moderate verbal cues (x2)  Quality of Functional Mobility 1: Narrow base of support  Comments 1: pt ambulated a short distance in room with a wheeled walker and moderate assist x2. pt required moderate verbal cues for sequencing walker and base of support during ambulation. pt demonstrated fair dynamic standing balance with a narrow base of support.  Sitting Balance:  Static Sitting Balance  Static Sitting-Balance Support: Feet supported (on the floor)  Static Sitting-Level of Assistance: Close supervision  Standing Balance:  Static Standing Balance  Static Standing-Balance Support: Bilateral upper extremity supported  Static Standing-Level of Assistance: Minimum assistance    Therapy/Activity: Therapeutic Exercise  Therapeutic Exercise Performed: Yes  Therapeutic Exercise Activity 1: pt crossed midline to complete 10 high fives with B UE at outside of base of support while seated EOB.  Therapeutic Exercise Activity 2: pt completed 2 sets of 10 shoulder flexion exercises while seated in a chair.  Therapeutic Exercise Activity 3: pt was issued a sponge to increase  strength. pt was also issued theraband for shoulder exercises.    Extremities:  RUE   RUE : Exceptions to WFL and LUE   LUE: Exceptions to WFL    Outcome Measures:  [facility] Daily Activity  Putting on and taking off regular lower body clothing: A lot  Bathing (including washing, rinsing, drying): A lot  Putting on and taking off regular upper body clothing: A lot  Toileting, which includes using toilet, bedpan or urinal: A lot  Taking care of personal grooming such as brushing teeth: A little  Eating Meals: A  little  Daily Activity - Total Score: 14        Education Documentation  Home Exercise Program, taught by SHAHLA Gomes at 8/1/2025  2:29 PM.  Learner: Patient  Readiness: Acceptance  Method: Explanation  Response: Needs Reinforcement    Body Mechanics, taught by SHAHLA Gomes at 8/1/2025  2:29 PM.  Learner: Patient  Readiness: Acceptance  Method: Explanation  Response: Needs Reinforcement    ADL Training, taught by SHAHLA Gomes at 8/1/2025  2:29 PM.  Learner: Patient  Readiness: Acceptance  Method: Explanation  Response: Needs Reinforcement    Education Comments  No comments found.      Goals:  Encounter Problems       Encounter Problems (Active)       ADLs       Patient will perform UB and LB bathing  with minimal assist  level of assistance and grab bars. (Progressing)       Start:  07/31/25    Expected End:  08/21/25            Patient with complete lower body dressing with minimal assist  level of assistance donning and doffing all LE clothes  with PRN adaptive equipment while supported sitting (Progressing)       Start:  07/31/25    Expected End:  08/22/25            Patient will complete toileting including hygiene clothing management/hygiene with minimal assist  level of assistance and grab bars. (Progressing)       Start:  07/31/25    Expected End:  08/21/25               COGNITION/SAFETY       Patient will score WFL on standardized cognitive assessment with min verbal cues and within reasonable time frame (Progressing)       Start:  07/31/25    Expected End:  08/21/25               EXERCISE/STRENGTHENING       Patient will complete BUE exercises for 15 reps in order to improve strength and activity for ADL performance.  (Progressing)       Start:  07/31/25    Expected End:  08/21/25               MOBILITY       Patient will perform Functional mobility min Household distances/Community Distances with minimal assist  level of assistance and least restrictive device in order to improve safety  and functional mobility. (Progressing)       Start:  07/31/25    Expected End:  08/21/25               TRANSFERS       Patient will perform bed mobility minimal assist  level of assistance and bed rails in order to improve safety and independence with mobility (Progressing)       Start:  07/31/25    Expected End:  08/21/25            Patient will complete sit to stand transfer with minimal assist  level of assistance and least restrictive device in order to improve safety and prepare for out of bed mobility. (Progressing)       Start:  07/31/25    Expected End:  08/21/25                 Completion of this session, clinical decision making, and documentation performed under the supervision/direction of Hudson Gao OTR/NATHAN,OTRAISA.    Tegan Overton S/OT

## 2025-08-01 NOTE — PROGRESS NOTES
Vancomycin Dosing by Pharmacy- Cessation of Therapy    Consult to pharmacy for vancomycin dosing has been discontinued by the prescriber, pharmacy will sign off at this time.    Please call pharmacy if there are further questions or re-enter a consult if vancomycin is resumed.     Rajwinder Obrien, [facility]

## 2025-08-01 NOTE — NURSING NOTE
Patient ordered Vancomycin and Heparin. They are not compatible. This nurse was unable to place IV earlier. AM [facility] nurse will be informed to place additional line.

## 2025-08-01 NOTE — PROGRESS NOTES
Physical Therapy    Physical Therapy Treatment    Patient Name: Juan Marsh  MRN: [id number]  Department: [facility] [id number]  Room: [address] [place] [address]  Today's Date: 8/1/2025  Time Calculation  Start Time: 1450  Stop Time: 1529  Time Calculation (min): 39 min    Assessment/Plan   PT Assessment  Barriers to Discharge Home: Physical needs, Caregiver assistance  Caregiver Assistance: Caregiver assistance needed per identified barriers - however, level of patient's required assistance exceeds assistance available at home  Physical Needs: Stair navigation into home limited by function/safety, Stair navigation to access bed limited by function/safety, Stair navigation to access bath limited by function/safety, In-home setup navigation limited by function/safety, Ambulating household distances limited by function/safety  Evaluation/Treatment Tolerance: Patient tolerated treatment well  Medical Staff Made Aware: Yes  End of Session Communication: Bedside nurse  Assessment Comment: Pt motivated with excellent effort.  Able to ambulate multiple times today and increased distance.  Still high falls risk and appropriate for high intensity PT at time of d/c  End of Session Patient Position: Bed, 3 rail up, Alarm on     PT Plan  Treatment/Interventions: Bed mobility, Transfer training, Gait training, Stair training, Balance training, Neuromuscular re-education, Strengthening, Endurance training, Range of motion, Therapeutic exercise, Therapeutic activity, Home exercise program, Positioning, Postural re-education  PT Plan: Ongoing PT  PT Frequency: 5 times per week  PT Discharge Recommendations: High intensity level of continued care  PT Recommended Transfer Status: Assist x1, Assistive device (x1 to chair or BSC, x2 to bathroom)  PT - OK to Discharge: Yes (meaning pt has been evaluated and discharge rec is in place)    PT Visit Info:  PT Received On: 08/01/25  Response to Previous Treatment: Patient with no complaints from previous session.      General Visit Information:   General  Family/Caregiver Present: Yes  Caregiver Feedback: Mother and father present and supportive  Prior to Session Communication: Bedside nurse  Patient Position Received: Bed, 3 rail up, Alarm on  General Comment: Up in chair.  Pt pleasant, cooperative and agreeable to PT.  Eager to ambulate.  Able to ambulate further in pathak today.  Tolerated well.    Subjective   Precautions:  Precautions  Medical Precautions: Fall precautions    Objective   Pain:  Pain Assessment  Pain Assessment: 0-10  0-10 (Numeric) Pain Score: 0 - No pain  Cognition:  Cognition  Overall Cognitive Status: Within Functional Limits  Arousal/Alertness: Appropriate responses to stimuli  Orientation Level: Oriented X4  Following Commands: Follows one step commands with increased time  Impulsive: Mildly    Activity Tolerance:  Activity Tolerance  Endurance: Endurance does not limit participation in activity  Treatments:  Therapeutic Exercise  Therapeutic Exercise Activity 1: Seated in chair: heel raises, LAQ, hip flx 2x10    Bed Mobility 1  Bed Mobility 1: Sitting to supine  Level of Assistance 1: Minimum assistance, Minimal verbal cues, Minimal tactile cues    Ambulation/Gait Training 1  Surface 1: Level tile  Device 1: Rolling walker  Assistance 1: Moderate assistance, Moderate verbal cues, Moderate tactile cues  Quality of Gait 1: Knee(s) buckle, Narrow base of support, Diminished heel strike, Decreased step length, Forward flexed posture, Ataxic (Occasaional heavy lean to L or posterior, requiring Mod A to maintain upright.)  Comments/Distance (ft) 1: 2x 25 feet, 3 x 40 feet with seated and standing breaks  Transfer 1  Transfer From 1: Sit to, Stand to  Transfer to 1: Sit  Transfer Device 1: Walker  Transfer Level of Assistance 1: Minimum assistance, Moderate verbal cues, Moderate tactile cues  Transfers 2  Transfer From 2: Chair with arms to  Transfer to 2: Bed  Transfer Device 2: Walker  Transfer Level of  Assistance 2: Minimum assistance, Moderate verbal cues, Moderate tactile cues    Outcome Measures:    [facility] Basic Mobility  Turning from your back to your side while in a flat bed without using bedrails: A little  Moving from lying on your back to sitting on the side of a flat bed without using bedrails: A little  Moving to and from bed to chair (including a wheelchair): A little  Standing up from a chair using your arms (e.g. wheelchair or bedside chair): A little  To walk in hospital room: A lot  Climbing 3-5 steps with railing: Total  Basic Mobility - Total Score: 15    Education Documentation  Mobility Training, taught by Brenton Batista, PT at 8/1/2025  5:00 PM.  Learner: Patient  Readiness: Eager  Method: Explanation  Response: Verbalizes Understanding, Needs Reinforcement    Education Comments  No comments found.    OP EDUCATION:       Encounter Problems       Encounter Problems (Active)       Balance       Maintains static standing balance with upper extremity support with close supervision for completion of functional tasks.  (Progressing)       Start:  07/30/25    Expected End:  08/08/25            Maintains static and dynamic sitting balance with upper extremity support with distant supervision.  (Progressing)       Start:  07/30/25    Expected End:  08/08/25               Mobility       Patient will ambulate household distance 50ft with CGA using LRD.  (Progressing)       Start:  07/30/25    Expected End:  08/08/25               PT Transfers        Patient will perform functional transfers with CGA using LRD.  (Progressing)       Start:  07/30/25    Expected End:  08/08/25       Tinetti>24 to reflect improvement in balance and decreased falls risk

## 2025-08-01 NOTE — PROGRESS NOTES
Juan Marsh is a 30 y.o. male on day 3 of admission presenting with Behcet's syndrome, neurologic type (Multi).      Subjective   No acute overnight events, blood pressure on the soft side, given 1 L fluids bolus, he continued to have frequent hiccups that are bothering him, he is more alert today and communicating through the board and hand gestures.        Objective     Last Recorded Vitals  Blood pressure 107/78, pulse 67, temperature 36.5 °C (97.7 °F), temperature source Temporal, resp. rate 15, weight 63.5 kg (140 lb), SpO2 98%.    ROS:  Denies feeling nauseous.    Physical Exam  Neurological Exam  MENTAL STATUS:  - awake alert, following commands. Uses thumbs up/down and points to letters  on boards, oriented x3.      CRANIAL NERVES:  - CN II: R pupil 7 mm constricts to 5 mm with light sluggishly, L pupil 6 mm constricts to 4 mm with light briskly.  Visual fields intact bilaterally to visual threat.    - CN III, IV, VI: R eyelid ptosis. inferior gaze at baseline improved vertical and horizontal eye movements.   - CN V: equal facial sensation   - CN VIII: symmetric face at baseline, right facial flattening on smile.    - CN VIII: Intact to conversation and following commands.     MOTOR: Able to lift all extremities of the bed, right arm weakness with some some effort against gravity. 5/5 on the left in hand  and elbow flexion,  5/5 in both legs hip flexion and knee extension.   SENSATION: reported equal sensation      REFLEXES:        3+ reflexes throughout, symmetric UE and LE. Bilateral sustained clonus.  Bilateral Juan Ramon positive. Toes downgoing bilaterally     COORDINATION: intact finger to nose on the left   Gait: Deferred     Labs:  Last CBC:  Lab Results   Component Value Date    WBC 13.3 (H) 07/31/2025    WBC 13.3 (H) 07/31/2025    HGB 11.3 (L) 07/31/2025    HGB 11.3 (L) 07/31/2025    HCT 34.0 (L) 07/31/2025    HCT 34.0 (L) 07/31/2025    MCV 95 07/31/2025    MCV 95 07/31/2025      07/31/2025     07/31/2025       Last RFP:  Lab Results   Component Value Date    GLUCOSE 139 (H) 08/01/2025    CALCIUM 9.8 08/01/2025     08/01/2025    K 4.0 08/01/2025    CO2 25 08/01/2025     08/01/2025    BUN 31 (H) 08/01/2025    CREATININE 0.99 08/01/2025       Imaging:  === 07/30/25 ===  CT ABDOMEN PELVIS w IV CONTRAST    IMPRESSION:  1. Posterior right lower lobe patchy ground-glass opacities which may  represent atelectasis versus early infectious/inflammatory changes.  No evidence of infection within the abdomen/pelvis.  2. Evaluation for GI hemorrhage is limited secondary to single phase  of contrast. Hyperdense material within the ascending colon favored  to represent enteric contents as opposed to active hemorrhage.  Otherwise, no evidence of bleeding.  3. Other chronic/incidental findings as above.    === 07/30/25 ===  XR ABDOMEN 1 VIEW    IMPRESSION:  Enteric tube tip projects over the right upper quadrant.    === 07/29/25 ===    MR BRAIN W AND WO CONTRAST    - Impression -  Compared to MRI brain 05/20/2025, there has been enlargement of the  infiltrative lesion involving portions of the right cristobal tyrone, right  cerebral peduncle, right posterior limb internal capsule extending  into the medial right basal ganglia and corona radiata. The greatest  degrees of new structure involvement are seen within the right globus  pallidus, ventral lateral thalamus and more caudally in the tyrone.  This results in expansile edema of the aforementioned structures with  surrounding mass-effect to a significantly greater degree compared to  prior study. There is increased mass-effect on the hypothalamus and  right mammillary bodies. There is also increased mass effect on the  right lateral ventricle causing increased leftward bowing of the  septum pellucidum and some increased effacement of the 3rd ventricle.  There is also new patchy enhancement within this lesion, most  pronounced in the cerebral  peduncle, right globus pallidus, ventral  medial thalamus, and posterior limb of the right internal capsule.  There is also increased edema within the right optic tract and right  aspect of the optic chiasm.    The other pre-existing lesion known to involve the left cerebral  peduncle and left internal capsule extending into the left corona  radiata is overall decreased in size and signal intensity on FLAIR  images compared to prior study; however, there is a tiny focus of new  enhancement within the left posterior limb internal capsule.    Assessment & Plan  Behcet's syndrome, neurologic type (Multi)      Juan Marsh is a 30 y.o. 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 Initially came as BAT with NIH of 7 Exam showed drowsiness, aphonia, aphasia. CT scan (7/29/2025) showed some progression of right lesion with slight mass effect. Chest X--ray (7/29/2025) did not show infection and right hip x ray (7/29/2025) was stable post surgically with new changes. MRI (7/29/2025) showed worsening cascade on the right with mass effect and midbrain involvement. Speech pathology evaluation (7/30/2025) shows failure of volitional cough and swallow function and NG tube was placed. CT abdomen (7/30/2025) was negative for intraabdominal infections but showed possible Right Lower Lobe atelectasis or early infection, he had secretions and couch and was started empirically on antibiotics.     # Concern for neuro Behcet's disease progression with AMS and new  partial right third nerve palsy.  Q2 neurochecks in MATEUS status   Continue IV solumedrol 1 G daily for 5 dose (started on 7/30)  Continue MMF 1,000 BID, hold oral prednisone   Plan to discuss risks & benefits of starting Infliximab or cyclophosphamidewith pt and mom on Monday.     #Concern for pneumonia in right lower lobe   :: MRSA nares negative, s/p vancomycin   Continue zosyn (start date 7/31)   Respiratory culture, pro-calcitonin at 48 h for descalation.   Follow final blood culture   RT on boards, pulmonary hygiene measures     # Dysphagia   # Hiccups   Continue tube feeding   PPI 40 mg BID   Add metoclopramide 10 mg TID     #recent left hip surgery secondary to avascular necrosis   Discontinue indomethacin TID for pain.   Will reach out to ortho for removal of staples   Tylenol PRN    # Miscellaneous  Resume home aspirin ( prior stroke)  Thiamine 500 mg 3 times daily for 3 days followed by 100 mL times daily  Zofran 4 mg every 8 hours as needed for nausea  MiraLAX 17 g daily  Protonix 40 mg IV BID   DVT prophylaxis: subQ heparin   PT/OT: acute rehab     Angelique White MD  PGY-2 neurology resident

## 2025-08-02 LAB
ALBUMIN SERPL BCP-MCNC: 4 G/DL (ref 3.4–5)
ANION GAP SERPL CALC-SCNC: 13 MMOL/L (ref 10–20)
BASOPHILS # BLD AUTO: 0.02 X10*3/UL (ref 0–0.1)
BASOPHILS NFR BLD AUTO: 0.1 %
BUN SERPL-MCNC: 26 MG/DL (ref 6–23)
CALCIUM SERPL-MCNC: 9.3 MG/DL (ref 8.6–10.6)
CHLORIDE SERPL-SCNC: 105 MMOL/L (ref 98–107)
CO2 SERPL-SCNC: 27 MMOL/L (ref 21–32)
CREAT SERPL-MCNC: 0.86 MG/DL (ref 0.5–1.3)
EGFRCR SERPLBLD CKD-EPI 2021: >90 ML/MIN/1.73M*2
EOSINOPHIL # BLD AUTO: 0 X10*3/UL (ref 0–0.7)
EOSINOPHIL NFR BLD AUTO: 0 %
ERYTHROCYTE [DISTWIDTH] IN BLOOD BY AUTOMATED COUNT: 16 % (ref 11.5–14.5)
GLUCOSE SERPL-MCNC: 138 MG/DL (ref 74–99)
HBV SURFACE AB SER-ACNC: 58 MIU/ML
HBV SURFACE AG SERPL QL IA: NONREACTIVE
HCT VFR BLD AUTO: 32 % (ref 41–52)
HCV AB SER QL: NONREACTIVE
HGB BLD-MCNC: 10.5 G/DL (ref 13.5–17.5)
HIV 1+2 AB+HIV1 P24 AG SERPL QL IA: NONREACTIVE
HOLD SPECIMEN: NORMAL
IMM GRANULOCYTES # BLD AUTO: 0.11 X10*3/UL (ref 0–0.7)
IMM GRANULOCYTES NFR BLD AUTO: 0.6 % (ref 0–0.9)
LYMPHOCYTES # BLD AUTO: 0.51 X10*3/UL (ref 1.2–4.8)
LYMPHOCYTES NFR BLD AUTO: 3 %
MAGNESIUM SERPL-MCNC: 2.57 MG/DL (ref 1.6–2.4)
MCH RBC QN AUTO: 31.9 PG (ref 26–34)
MCHC RBC AUTO-ENTMCNC: 32.8 G/DL (ref 32–36)
MCV RBC AUTO: 97 FL (ref 80–100)
MONOCYTES # BLD AUTO: 0.56 X10*3/UL (ref 0.1–1)
MONOCYTES NFR BLD AUTO: 3.3 %
NEUTROPHILS # BLD AUTO: 15.84 X10*3/UL (ref 1.2–7.7)
NEUTROPHILS NFR BLD AUTO: 93 %
NRBC BLD-RTO: 0 /100 WBCS (ref 0–0)
PHOSPHATE SERPL-MCNC: 2.4 MG/DL (ref 2.5–4.9)
PLATELET # BLD AUTO: 305 X10*3/UL (ref 150–450)
POTASSIUM SERPL-SCNC: 3.7 MMOL/L (ref 3.5–5.3)
PROCALCITONIN SERPL-MCNC: 0.02 NG/ML
RBC # BLD AUTO: 3.29 X10*6/UL (ref 4.5–5.9)
SODIUM SERPL-SCNC: 141 MMOL/L (ref 136–145)
WBC # BLD AUTO: 17 X10*3/UL (ref 4.4–11.3)

## 2025-08-02 PROCEDURE — 2500000004 HC RX 250 GENERAL PHARMACY W/ HCPCS (ALT 636 FOR OP/ED)

## 2025-08-02 PROCEDURE — 36415 COLL VENOUS BLD VENIPUNCTURE: CPT

## 2025-08-02 PROCEDURE — 83735 ASSAY OF MAGNESIUM: CPT

## 2025-08-02 PROCEDURE — 80069 RENAL FUNCTION PANEL: CPT

## 2025-08-02 PROCEDURE — 86780 TREPONEMA PALLIDUM: CPT

## 2025-08-02 PROCEDURE — 87340 HEPATITIS B SURFACE AG IA: CPT

## 2025-08-02 PROCEDURE — 85025 COMPLETE CBC W/AUTO DIFF WBC: CPT

## 2025-08-02 PROCEDURE — 1100000001 HC PRIVATE ROOM DAILY

## 2025-08-02 PROCEDURE — 99232 SBSQ HOSP IP/OBS MODERATE 35: CPT | Performed by: STUDENT IN AN ORGANIZED HEALTH CARE EDUCATION/TRAINING PROGRAM

## 2025-08-02 PROCEDURE — 2500000001 HC RX 250 WO HCPCS SELF ADMINISTERED DRUGS (ALT 637 FOR MEDICARE OP)

## 2025-08-02 PROCEDURE — 97116 GAIT TRAINING THERAPY: CPT | Mod: GP

## 2025-08-02 PROCEDURE — 97110 THERAPEUTIC EXERCISES: CPT | Mod: GP

## 2025-08-02 PROCEDURE — 87389 HIV-1 AG W/HIV-1&-2 AB AG IA: CPT

## 2025-08-02 PROCEDURE — 84145 PROCALCITONIN (PCT): CPT

## 2025-08-02 PROCEDURE — 86803 HEPATITIS C AB TEST: CPT

## 2025-08-02 PROCEDURE — 2500000004 HC RX 250 GENERAL PHARMACY W/ HCPCS (ALT 636 FOR OP/ED): Mod: JZ

## 2025-08-02 PROCEDURE — 86706 HEP B SURFACE ANTIBODY: CPT

## 2025-08-02 RX ORDER — POLYETHYLENE GLYCOL 3350 17 G/17G
17 POWDER, FOR SOLUTION ORAL DAILY PRN
Status: DISCONTINUED | OUTPATIENT
Start: 2025-08-02 | End: 2025-08-07

## 2025-08-02 RX ORDER — PANTOPRAZOLE SODIUM 40 MG/10ML
40 INJECTION, POWDER, LYOPHILIZED, FOR SOLUTION INTRAVENOUS DAILY
Status: DISCONTINUED | OUTPATIENT
Start: 2025-08-03 | End: 2025-08-07

## 2025-08-02 RX ADMIN — PIPERACILLIN SODIUM AND TAZOBACTAM SODIUM 4.5 G: 4; .5 INJECTION, SOLUTION INTRAVENOUS at 09:31

## 2025-08-02 RX ADMIN — ASPIRIN 81 MG CHEWABLE TABLET 81 MG: 81 TABLET CHEWABLE at 09:13

## 2025-08-02 RX ADMIN — PANTOPRAZOLE SODIUM 40 MG: 40 INJECTION, POWDER, FOR SOLUTION INTRAVENOUS at 09:13

## 2025-08-02 RX ADMIN — MYCOPHENOLATE MOFETIL 1000 MG: 200 POWDER, FOR SUSPENSION ORAL at 09:14

## 2025-08-02 RX ADMIN — HEPARIN SODIUM 5000 UNITS: 5000 INJECTION, SOLUTION INTRAVENOUS; SUBCUTANEOUS at 22:55

## 2025-08-02 RX ADMIN — METOCLOPRAMIDE HYDROCHLORIDE 10 MG: 5 INJECTION INTRAMUSCULAR; INTRAVENOUS at 22:55

## 2025-08-02 RX ADMIN — METOCLOPRAMIDE HYDROCHLORIDE 10 MG: 5 INJECTION INTRAMUSCULAR; INTRAVENOUS at 05:24

## 2025-08-02 RX ADMIN — THIAMINE HYDROCHLORIDE 500 MG: 100 INJECTION, SOLUTION INTRAMUSCULAR; INTRAVENOUS at 20:41

## 2025-08-02 RX ADMIN — THIAMINE HYDROCHLORIDE 500 MG: 100 INJECTION, SOLUTION INTRAMUSCULAR; INTRAVENOUS at 14:15

## 2025-08-02 RX ADMIN — HEPARIN SODIUM 5000 UNITS: 5000 INJECTION, SOLUTION INTRAVENOUS; SUBCUTANEOUS at 05:25

## 2025-08-02 RX ADMIN — THIAMINE HYDROCHLORIDE 500 MG: 100 INJECTION, SOLUTION INTRAMUSCULAR; INTRAVENOUS at 09:13

## 2025-08-02 RX ADMIN — HEPARIN SODIUM 5000 UNITS: 5000 INJECTION, SOLUTION INTRAVENOUS; SUBCUTANEOUS at 14:15

## 2025-08-02 RX ADMIN — MYCOPHENOLATE MOFETIL 1000 MG: 200 POWDER, FOR SUSPENSION ORAL at 20:41

## 2025-08-02 RX ADMIN — PIPERACILLIN SODIUM AND TAZOBACTAM SODIUM 4.5 G: 4; .5 INJECTION, SOLUTION INTRAVENOUS at 05:24

## 2025-08-02 RX ADMIN — METOCLOPRAMIDE HYDROCHLORIDE 10 MG: 5 INJECTION INTRAMUSCULAR; INTRAVENOUS at 14:15

## 2025-08-02 RX ADMIN — METHYLPREDNISOLONE SODIUM SUCCINATE 1000 MG: 1 INJECTION INTRAMUSCULAR; INTRAVENOUS at 12:49

## 2025-08-02 ASSESSMENT — COGNITIVE AND FUNCTIONAL STATUS - GENERAL
MOBILITY SCORE: 14
MOVING FROM LYING ON BACK TO SITTING ON SIDE OF FLAT BED WITH BEDRAILS: A LITTLE
MOBILITY SCORE: 15
MOVING TO AND FROM BED TO CHAIR: A LOT
STANDING UP FROM CHAIR USING ARMS: A LITTLE
CLIMB 3 TO 5 STEPS WITH RAILING: A LOT
TURNING FROM BACK TO SIDE WHILE IN FLAT BAD: A LITTLE
CLIMB 3 TO 5 STEPS WITH RAILING: TOTAL
MOVING FROM LYING ON BACK TO SITTING ON SIDE OF FLAT BED WITH BEDRAILS: A LITTLE
WALKING IN HOSPITAL ROOM: A LOT
TURNING FROM BACK TO SIDE WHILE IN FLAT BAD: A LITTLE
WALKING IN HOSPITAL ROOM: A LOT
STANDING UP FROM CHAIR USING ARMS: A LOT
MOVING TO AND FROM BED TO CHAIR: A LITTLE

## 2025-08-02 ASSESSMENT — PAIN - FUNCTIONAL ASSESSMENT
PAIN_FUNCTIONAL_ASSESSMENT: 0-10

## 2025-08-02 ASSESSMENT — PAIN SCALES - GENERAL
PAINLEVEL_OUTOF10: 0 - NO PAIN

## 2025-08-02 NOTE — PROGRESS NOTES
Juan Marsh is a 30 y.o. male on day 4 of admission presenting with Behcet's syndrome, neurologic type (Multi).      Subjective   No acute overnight events. He is alert and communicating through the board and hand gestures. Hiccups have improved. Denied any new complaints. Cough and secretions have improved. The staples are not removed yet will reach out to ortho again. He had 3 bowel movements will hold scheduled regimen for now       Objective     Last Recorded Vitals  Blood pressure 127/77, pulse 90, temperature 36.4 °C (97.5 °F), temperature source Temporal, resp. rate 17, weight 63.5 kg (140 lb), SpO2 100%.      Physical Exam  Neurological Exam  MENTAL STATUS:  - awake alert, following commands. Non- verbal, Uses thumbs up/down and points to letters  on boards, oriented x3.      CRANIAL NERVES:  - CN II: R pupil 7 mm constricts to 5 mm with light sluggishly, L pupil 6 mm constricts to 4 mm with light briskly.  Visual fields intact bilaterally to visual threat.    - CN III, IV, VI: R eyelid ptosis. inferior gaze at baseline, more pronounced at the right, vertical and horizontal eye movements improved since admission.   - CN V: equal facial sensation   - CN VIII: symmetric face at baseline, right facial flattening on smile.    - CN VIII: Intact to conversation and following commands.     MOTOR: Able to lift all extremities of the bed, with noted right arm weakness with some some effort against gravity.   SENSATION: reported equal sensation      REFLEXES:        3+ reflexes throughout, symmetric UE and LE. Right sustained clonus, and two beats on the left.  Bilateral Juan Ramon positive. Toes downgoing bilaterally     COORDINATION: intact finger to nose on the left   Gait: Deferred     Labs:  Last CBC:  Lab Results   Component Value Date    WBC 17.0 (H) 08/02/2025    HGB 10.5 (L) 08/02/2025    HCT 32.0 (L) 08/02/2025    MCV 97 08/02/2025     08/02/2025       Last RFP:  Lab Results   Component Value  Date    GLUCOSE 138 (H) 08/02/2025    CALCIUM 9.3 08/02/2025     08/02/2025    K 3.7 08/02/2025    CO2 27 08/02/2025     08/02/2025    BUN 26 (H) 08/02/2025    CREATININE 0.86 08/02/2025       Imaging:  === 07/30/25 ===  CT ABDOMEN PELVIS w IV CONTRAST    IMPRESSION:  1. Posterior right lower lobe patchy ground-glass opacities which may  represent atelectasis versus early infectious/inflammatory changes.  No evidence of infection within the abdomen/pelvis.  2. Evaluation for GI hemorrhage is limited secondary to single phase  of contrast. Hyperdense material within the ascending colon favored  to represent enteric contents as opposed to active hemorrhage.  Otherwise, no evidence of bleeding.  3. Other chronic/incidental findings as above.    === 07/30/25 ===  XR ABDOMEN 1 VIEW    IMPRESSION:  Enteric tube tip projects over the right upper quadrant.    === 07/29/25 ===    MR BRAIN W AND WO CONTRAST    - Impression -  Compared to MRI brain 05/20/2025, there has been enlargement of the  infiltrative lesion involving portions of the right cristobal tyrone, right  cerebral peduncle, right posterior limb internal capsule extending  into the medial right basal ganglia and corona radiata. The greatest  degrees of new structure involvement are seen within the right globus  pallidus, ventral lateral thalamus and more caudally in the tyrone.  This results in expansile edema of the aforementioned structures with  surrounding mass-effect to a significantly greater degree compared to  prior study. There is increased mass-effect on the hypothalamus and  right mammillary bodies. There is also increased mass effect on the  right lateral ventricle causing increased leftward bowing of the  septum pellucidum and some increased effacement of the 3rd ventricle.  There is also new patchy enhancement within this lesion, most  pronounced in the cerebral peduncle, right globus pallidus, ventral  medial thalamus, and posterior limb of the  right internal capsule.  There is also increased edema within the right optic tract and right  aspect of the optic chiasm.    The other pre-existing lesion known to involve the left cerebral  peduncle and left internal capsule extending into the left corona  radiata is overall decreased in size and signal intensity on FLAIR  images compared to prior study; however, there is a tiny focus of new  enhancement within the left posterior limb internal capsule.    Assessment & Plan  Behcet's syndrome, neurologic type (Multi)      Juan Marsh is a 30 y.o. 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 Initially came as BAT with NIH of 7 Exam showed drowsiness, aphonia, aphasia. CT scan (7/29/2025) showed some progression of right lesion with slight mass effect. Chest X--ray (7/29/2025) did not show infection and right hip x ray (7/29/2025) was stable post surgically with new changes. MRI (7/29/2025) showed worsening cascade on the right with mass effect and midbrain involvement. Speech pathology evaluation (7/30/2025) shows failure of volitional cough and swallow function and NG tube was placed. CT abdomen (7/30/2025) was negative for intraabdominal infections but showed possible Right Lower Lobe atelectasis or early infection, he had secretions and cough and was started empirically on antibiotics, was be discontinued 8/2/2025 since procalcitonin was negative and respiratory symptoms improved with pulmonary hygiene.     # Concern for neuro Behcet's  disease progression with AMS and new partial right third nerve palsy.  Continue IV solumedrol 1 G daily for 5 dose (started on 7/30), Last (5th) dose to be given today (8/2/2025)  Continue MMF 1,000 BID, hold oral prednisone   Plan to discuss risks & benefits of starting Infliximab or cyclophosphamidewith pt and mom on Monday.   Will confirm pre-treatment labs are present.  Hepatitis B and C, and HIV were last done in 2021, while syphilis and TP spot were done 5/2024, will repeat labs.     #Concern for pneumonia in right lower lobe, resolved   # leukocytosis, likely reactive from steroids   :: MRSA nares negative, s/p vancomycin   Discontinue zosyn 8/2/2025 since procalcitonin was negative and respiratory symptoms improved with pulmonary hygiene.   Follow final blood culture   Continue RT on boards, pulmonary hygiene measures     # Dysphagia   # Hiccups, improving   Continue tube feeding  Speech therapy follow up   PPI 40 mg once daily   Continue  metoclopramide 10 mg TID     #recent left hip surgery secondary to avascular necrosis   Will reach out to ortho again for removal of staples   Tylenol PRN    # Miscellaneous  Resume home aspirin ( prior stroke)  Thiamine 500 mg 3 times daily for 3 days followed by 100 mL times daily  Zofran 4 mg every 8 hours as needed for nausea  MiraLAX 17 g daily, (on hold for frequent bowel movements)   Protonix 40 mg IV OD   DVT prophylaxis: subQ heparin   PT/OT: acute rehab     SHARYN HOLT  MS3    I personally saw, examined, and discussed the patient above. I have reviewed and agree with the medical student note above. All edits or corrections have been made directly into the note, including the physical exam and assessment/plan. Patient was seen and discussed with the attending, BEHZAD Almanza , who agrees with the management plan.

## 2025-08-02 NOTE — CARE PLAN
Transitional Care Coordinator Note: Patient discussed with medical team, per medical team patient is not medically ready.   Discharge dispo: [facility], [facility] accepted Patient Pending Precert.  ADOD Next week.  Jorge France RN  Transitional Care Coordinator      DSC Team Submitted Precert.   Precert Pending number [id number]

## 2025-08-02 NOTE — PROGRESS NOTES
Physical Therapy    Physical Therapy Treatment    Patient Name: Juan Marsh  MRN: [id number]  Department: [place] [address]  Room: [address] [place] [address]  Today's Date: 8/2/2025  Time Calculation  Start Time: 1359  Stop Time: 1425  Time Calculation (min): 26 min    Assessment/Plan   PT Assessment  Barriers to Discharge Home: Physical needs, Caregiver assistance  Caregiver Assistance: Caregiver assistance needed per identified barriers - however, level of patient's required assistance exceeds assistance available at home  Physical Needs: Stair navigation into home limited by function/safety, Stair navigation to access bed limited by function/safety, Stair navigation to access bath limited by function/safety, In-home setup navigation limited by function/safety, Ambulating household distances limited by function/safety  Evaluation/Treatment Tolerance: Patient tolerated treatment well  Medical Staff Made Aware: Yes  End of Session Communication: Bedside nurse  Assessment Comment: Pt motivated with excellent effort.  Able to ambulate in pathak again today.  Still high falls risk and appropriate for high intensity PT at time of d/c  End of Session Patient Position: Up in chair, Alarm off, not on at start of session, Alarm off, caregiver present     PT Plan  Treatment/Interventions: Bed mobility, Transfer training, Gait training, Stair training, Balance training, Neuromuscular re-education, Strengthening, Endurance training, Range of motion, Therapeutic exercise, Therapeutic activity, Home exercise program, Positioning, Postural re-education  PT Plan: Ongoing PT  PT Frequency: 5 times per week  PT Discharge Recommendations: High intensity level of continued care  PT Recommended Transfer Status: Assist x1, Assistive device (x1 to chair or BSC, x2 to bathroom)  PT - OK to Discharge: Yes (meaning pt has been evaluated and discharge rec is in place)    PT Visit Info:  PT Received On: 08/02/25  Response to Previous Treatment: Patient with no  complaints from previous session.     General Visit Information:   General  Family/Caregiver Present: Yes  Caregiver Feedback: Multiple family members present and supportive  Prior to Session Communication: Bedside nurse  Patient Position Received: Bed, 3 rail up, Alarm off, not on at start of session, Alarm off, caregiver present  General Comment: Supine.  Pt pleasant, cooperative and agreeable to PT.  Able to ambulate into pathak today.  Pt with several coughing fits adding extra instability this session.    Subjective   Precautions:  Precautions  Medical Precautions: Fall precautions    Objective   Pain:  Pain Assessment  Pain Assessment: 0-10  0-10 (Numeric) Pain Score: 0 - No pain  Cognition:  Cognition  Overall Cognitive Status: Within Functional Limits  Arousal/Alertness: Appropriate responses to stimuli  Orientation Level: Oriented X4  Following Commands: Follows one step commands with increased time    Activity Tolerance:  Activity Tolerance  Endurance: Endurance does not limit participation in activity  Treatments:  Therapeutic Exercise  Therapeutic Exercise Activity 1: Seated in chair: heel raises, LAQ, hip flx 2x10    Bed Mobility 1  Bed Mobility 1: Supine to sitting  Level of Assistance 1: Minimum assistance, Minimal verbal cues, Minimal tactile cues    Ambulation/Gait Training 1  Surface 1: Level tile, Carpet  Device 1: Rolling walker  Assistance 1: Moderate assistance, Moderate verbal cues, Moderate tactile cues  Quality of Gait 1: Knee(s) buckle, Narrow base of support, Diminished heel strike, Decreased step length, Forward flexed posture, Ataxic (Pt with less heavy leans today, but did have 2 bouts of coughing.  Primarily Min A but Mod A for balance during turns and during coughing bouts (pt flexing forward and losing balance))  Comments/Distance (ft) 1: 2x 30 feet  Transfer 1  Transfer From 1: Sit to, Stand to  Transfer to 1: Sit  Transfer Device 1: Walker  Transfer Level of Assistance 1: Minimum  assistance, Moderate verbal cues, Moderate tactile cues  Transfers 2  Transfer From 2: Bed to  Transfer to 2: Chair with arms  Transfer Device 2: Walker  Transfer Level of Assistance 2: Minimum assistance, Moderate verbal cues, Moderate tactile cues    Outcome Measures:    [facility] Basic Mobility  Turning from your back to your side while in a flat bed without using bedrails: A little  Moving from lying on your back to sitting on the side of a flat bed without using bedrails: A little  Moving to and from bed to chair (including a wheelchair): A little  Standing up from a chair using your arms (e.g. wheelchair or bedside chair): A little  To walk in hospital room: A lot  Climbing 3-5 steps with railing: Total  Basic Mobility - Total Score: 15    Education Documentation  Mobility Training, taught by Brenton Batista, PT at 8/2/2025  4:09 PM.  Learner: Patient  Readiness: Acceptance  Method: Explanation  Response: Verbalizes Understanding    Education Comments  No comments found.    OP EDUCATION:       Encounter Problems       Encounter Problems (Active)       Balance       Maintains static standing balance with upper extremity support with close supervision for completion of functional tasks.  (Progressing)       Start:  07/30/25    Expected End:  08/08/25            Maintains static and dynamic sitting balance with upper extremity support with distant supervision.  (Progressing)       Start:  07/30/25    Expected End:  08/08/25               Mobility       Patient will ambulate household distance 50ft with CGA using LRD.  (Progressing)       Start:  07/30/25    Expected End:  08/08/25               PT Transfers        Patient will perform functional transfers with CGA using LRD.  (Progressing)       Start:  07/30/25    Expected End:  08/08/25       Tinetti>24 to reflect improvement in balance and decreased falls risk

## 2025-08-02 NOTE — CONSULTS
ORTHOPAEDIC SURGERY CONSULT NOTE     HPI:   30 year old male (Behcet's disease, lupus) presents after missed follow-up on 7/29 s/p L KEO with nail removal with Dr. Leonard. Currently admitted for lupus flare. Per family and pt, he was otherwise recovering well postop without concerns. WBAT until his lupus flare. Denies pain, numbness, tingling, or weakness.    PMH:   Medical History[1]  PSH: Relevant surgical history as per HPI  SocHx: Denies alcohol, tobacco, or illicit drug use   Ambulatory Status: Community ambulator without assistive devices   FamHx:  Non-contributory to this patient's acute orthopaedic problem other than as mentioned in HPI  Allergies:   Allergies  Reviewed by Ana Saini RN on 7/30/2025   No Known Allergies       Medications: Denies home anticoagulation use   Current Outpatient Medications   Medication Instructions    acetaminophen (TYLENOL) 650 mg, oral, Every 6 hours scheduled    b complex vitamins capsule Take 1 capsule by mouth once daily    brimonidine (AlphaGAN) 0.2 % ophthalmic solution Instill 1 drop into right eye every 8 hours    calcium carbonate-vitamin D3 (Oscal-500) 500 mg-10 mcg (400 unit) tablet 1 tablet, oral, 2 times daily    cyclobenzaprine (FLEXERIL) 10 mg, oral, 3 times daily PRN    gabapentin (NEURONTIN) 300 mg, oral, Every 8 hours scheduled    indomethacin (INDOCIN) 25 mg, oral, 3 times daily (morning, midday, late afternoon)    mirtazapine (Remeron) 15 mg tablet Take 1 tablet by mouth once daily at bedtime. Take at bedtime while taking prednisone.    mycophenolate (CELLCEPT) 1,000 mg, oral, 2 times daily    omega-3 acid ethyl esters (Lovaza) 1 gram capsule Take 1 capsule by mouth once daily    ondansetron (ZOFRAN) 4 mg, oral, Every 8 hours PRN    pantoprazole (PROTONIX) 40 mg, oral, Daily before breakfast, Do not crush, chew, or split.    polyethylene glycol (GLYCOLAX, MIRALAX) 17 g, oral, Daily    prednisoLONE acetate (Pred-Forte) 1 % ophthalmic  suspension 1 drop, Right Eye, 4 times daily    predniSONE (DELTASONE) 10 mg, oral, Daily    sulfamethoxazole-trimethoprim (Bactrim DS) 800-160 mg tablet Take 1 tablet by mouth three times a week on monday, wednesday and friday     ROS: 14 point ROS negative except as above    OBJECTIVE:  /83   Pulse 50   Temp 36.4 °C (97.5 °F) (Temporal)   Resp 18   Wt 63.5 kg (140 lb)   SpO2 100%   BMI 20.67 kg/m²     Physical Exam:  - Constitutional: No acute distress, cooperative  - Eyes: EOM grossly intact  - Head/Neck: Trachea midline  - Respiratory/Thorax: Normal work of breathing  - Cardiovascular: RRR on peripheral palpation  - Gastrointestinal: Nondistended  - Psychological: Appropriate mood/behavior  - Skin: Warm and dry. Additional findings in musculoskeletal evaluation  - Musculoskeletal:  LLE  - Incisions well-healed  - Fires DF/PF/EHL/FHL  - SILT in saph/sural/SPN/DPN distributions  - Foot warm, well perfused  - DP/PT pulse, brisk cap refill  - Compartments soft and compressible    Last Recorded Vitals  Blood pressure 111/83, pulse 50, temperature 36.4 °C (97.5 °F), temperature source Temporal, resp. rate 18, weight 63.5 kg (140 lb), SpO2 100%.  Intake/Output last 3 Shifts:  I/O last 3 completed shifts:  In: 3650 (57.5 mL/kg) [NG/GT:2450; IV Piggyback:1200]  Out: 350 (5.5 mL/kg) [Urine:350 (0.2 mL/kg/hr)]  Weight: 63.5 kg     Imaging:  AP and lateral radiographs of the left hip/pelvis display no hardware complications.    Assessment:   Injury: Postop check    30M (Behcet syndrome, lupus) p/a L KEO w [facility] on 7/9/25 w Dr. Leonard. Admitted 7/29 for lupus flare. WBAT limited by lupus. Incisions c/d/i, NVI. XR wo HW complications. Staples removed. Overall recovering well postop without concerns.    Plan:  - No indication for acute ortho operative intervention  - Weightbearing: continue WBAT LLE   - Pain meds per primary team/ED  - Antibiotics: None from orthopaedic standpoint   - Diet: Okay for diet from  orthopaedic standpoint  - Please continue DVT ppx while inpt until 4 weeks postop. Recommend ASA enteric coated 81 mg BID  - Ortho Trauma to sign off    Patient should follow-up with Dr. Leonard 3 weeks after discharge. Appointments can be made by calling [phone].     Dispo per primary/ED    This patient was seen and evaluated within 30 minutes of consultation. Plan not finalized until signed by attending.    Geena Contreras MD  Orthopaedic Surgery  PGY-3  Resident On Call  Pager: [phone] (EpicChat preferred)         [1]   Past Medical History:  Diagnosis Date    Lupus (systemic lupus erythematosus) (Multi)     Stroke (Multi)     Uveitis

## 2025-08-02 NOTE — CARE PLAN
The clinical goals for the shift include patient will tolerate enteral TF this shift without complications, remain safe      Problem: Safety - Adult  Goal: Free from fall injury  Outcome: Progressing     Problem: Nutrition  Goal: Nutrient intake appropriate for maintaining nutritional needs  Outcome: Progressing

## 2025-08-03 VITALS
BODY MASS INDEX: 20.67 KG/M2 | TEMPERATURE: 98.1 F | DIASTOLIC BLOOD PRESSURE: 77 MMHG | HEART RATE: 75 BPM | SYSTOLIC BLOOD PRESSURE: 116 MMHG | WEIGHT: 140 LBS | RESPIRATION RATE: 18 BRPM | OXYGEN SATURATION: 97 %

## 2025-08-03 LAB
ALBUMIN SERPL BCP-MCNC: 3.8 G/DL (ref 3.4–5)
ANION GAP SERPL CALC-SCNC: 15 MMOL/L (ref 10–20)
BACTERIA BLD CULT: NORMAL
BACTERIA BLD CULT: NORMAL
BASOPHILS # BLD AUTO: 0.01 X10*3/UL (ref 0–0.1)
BASOPHILS NFR BLD AUTO: 0.1 %
BUN SERPL-MCNC: 26 MG/DL (ref 6–23)
CALCIUM SERPL-MCNC: 9.1 MG/DL (ref 8.6–10.6)
CHLORIDE SERPL-SCNC: 104 MMOL/L (ref 98–107)
CO2 SERPL-SCNC: 25 MMOL/L (ref 21–32)
CREAT SERPL-MCNC: 0.71 MG/DL (ref 0.5–1.3)
EGFRCR SERPLBLD CKD-EPI 2021: >90 ML/MIN/1.73M*2
EOSINOPHIL # BLD AUTO: 0 X10*3/UL (ref 0–0.7)
EOSINOPHIL NFR BLD AUTO: 0 %
ERYTHROCYTE [DISTWIDTH] IN BLOOD BY AUTOMATED COUNT: 16.1 % (ref 11.5–14.5)
GLUCOSE SERPL-MCNC: 139 MG/DL (ref 74–99)
HCT VFR BLD AUTO: 31.8 % (ref 41–52)
HGB BLD-MCNC: 10.1 G/DL (ref 13.5–17.5)
IMM GRANULOCYTES # BLD AUTO: 0.13 X10*3/UL (ref 0–0.7)
IMM GRANULOCYTES NFR BLD AUTO: 0.8 % (ref 0–0.9)
LYMPHOCYTES # BLD AUTO: 0.96 X10*3/UL (ref 1.2–4.8)
LYMPHOCYTES NFR BLD AUTO: 5.7 %
MAGNESIUM SERPL-MCNC: 2.73 MG/DL (ref 1.6–2.4)
MCH RBC QN AUTO: 31.4 PG (ref 26–34)
MCHC RBC AUTO-ENTMCNC: 31.8 G/DL (ref 32–36)
MCV RBC AUTO: 99 FL (ref 80–100)
MONOCYTES # BLD AUTO: 0.88 X10*3/UL (ref 0.1–1)
MONOCYTES NFR BLD AUTO: 5.2 %
NEUTROPHILS # BLD AUTO: 14.89 X10*3/UL (ref 1.2–7.7)
NEUTROPHILS NFR BLD AUTO: 88.2 %
NRBC BLD-RTO: 0 /100 WBCS (ref 0–0)
PHOSPHATE SERPL-MCNC: 2.5 MG/DL (ref 2.5–4.9)
PLATELET # BLD AUTO: 300 X10*3/UL (ref 150–450)
POTASSIUM SERPL-SCNC: 3.7 MMOL/L (ref 3.5–5.3)
RBC # BLD AUTO: 3.22 X10*6/UL (ref 4.5–5.9)
SODIUM SERPL-SCNC: 140 MMOL/L (ref 136–145)
TREPONEMA PALLIDUM IGG+IGM AB [PRESENCE] IN SERUM OR PLASMA BY IMMUNOASSAY: NONREACTIVE
WBC # BLD AUTO: 16.9 X10*3/UL (ref 4.4–11.3)

## 2025-08-03 PROCEDURE — 2500000004 HC RX 250 GENERAL PHARMACY W/ HCPCS (ALT 636 FOR OP/ED)

## 2025-08-03 PROCEDURE — 83735 ASSAY OF MAGNESIUM: CPT

## 2025-08-03 PROCEDURE — 86481 TB AG RESPONSE T-CELL SUSP: CPT

## 2025-08-03 PROCEDURE — 85025 COMPLETE CBC W/AUTO DIFF WBC: CPT

## 2025-08-03 PROCEDURE — 99232 SBSQ HOSP IP/OBS MODERATE 35: CPT

## 2025-08-03 PROCEDURE — 2500000001 HC RX 250 WO HCPCS SELF ADMINISTERED DRUGS (ALT 637 FOR MEDICARE OP)

## 2025-08-03 PROCEDURE — 36415 COLL VENOUS BLD VENIPUNCTURE: CPT

## 2025-08-03 PROCEDURE — 2500000004 HC RX 250 GENERAL PHARMACY W/ HCPCS (ALT 636 FOR OP/ED): Mod: JZ

## 2025-08-03 PROCEDURE — 1100000001 HC PRIVATE ROOM DAILY

## 2025-08-03 PROCEDURE — 80069 RENAL FUNCTION PANEL: CPT

## 2025-08-03 RX ORDER — NAPROXEN SODIUM 220 MG/1
81 TABLET, FILM COATED ORAL 2 TIMES DAILY
Status: DISPENSED | OUTPATIENT
Start: 2025-08-03 | End: 2025-08-06

## 2025-08-03 RX ORDER — PREDNISONE 20 MG/1
60 TABLET ORAL DAILY
Status: DISCONTINUED | OUTPATIENT
Start: 2025-08-03 | End: 2025-08-07

## 2025-08-03 RX ADMIN — ACETAMINOPHEN 650 MG: 160 SOLUTION ORAL at 09:32

## 2025-08-03 RX ADMIN — THIAMINE HYDROCHLORIDE 500 MG: 100 INJECTION, SOLUTION INTRAMUSCULAR; INTRAVENOUS at 14:49

## 2025-08-03 RX ADMIN — THIAMINE HYDROCHLORIDE 500 MG: 100 INJECTION, SOLUTION INTRAMUSCULAR; INTRAVENOUS at 09:15

## 2025-08-03 RX ADMIN — METOCLOPRAMIDE HYDROCHLORIDE 10 MG: 5 INJECTION INTRAMUSCULAR; INTRAVENOUS at 05:55

## 2025-08-03 RX ADMIN — MYCOPHENOLATE MOFETIL 1000 MG: 200 POWDER, FOR SUSPENSION ORAL at 09:16

## 2025-08-03 RX ADMIN — HEPARIN SODIUM 5000 UNITS: 5000 INJECTION, SOLUTION INTRAVENOUS; SUBCUTANEOUS at 05:55

## 2025-08-03 RX ADMIN — ASPIRIN 81 MG CHEWABLE TABLET 81 MG: 81 TABLET CHEWABLE at 20:27

## 2025-08-03 RX ADMIN — ASPIRIN 81 MG CHEWABLE TABLET 81 MG: 81 TABLET CHEWABLE at 09:15

## 2025-08-03 RX ADMIN — ACETAMINOPHEN 650 MG: 160 SOLUTION ORAL at 20:30

## 2025-08-03 RX ADMIN — PREDNISONE 60 MG: 20 TABLET ORAL at 09:40

## 2025-08-03 RX ADMIN — PANTOPRAZOLE SODIUM 40 MG: 40 INJECTION, POWDER, FOR SOLUTION INTRAVENOUS at 09:14

## 2025-08-03 RX ADMIN — MYCOPHENOLATE MOFETIL 1000 MG: 200 POWDER, FOR SUSPENSION ORAL at 20:27

## 2025-08-03 ASSESSMENT — PAIN SCALES - GENERAL
PAINLEVEL_OUTOF10: 8
PAINLEVEL_OUTOF10: 3

## 2025-08-03 ASSESSMENT — PAIN - FUNCTIONAL ASSESSMENT: PAIN_FUNCTIONAL_ASSESSMENT: 0-10

## 2025-08-03 NOTE — CARE PLAN
The patient's goals for the shift include      The clinical goals for the shift include Pt will remain safe and free from falls/injury overnight      Problem: Pain - Adult  Goal: Verbalizes/displays adequate comfort level or baseline comfort level  Outcome: Progressing     Problem: Safety - Adult  Goal: Free from fall injury  Outcome: Progressing     Problem: Discharge Planning  Goal: Discharge to home or other facility with appropriate resources  Outcome: Progressing     Problem: Chronic Conditions and Co-morbidities  Goal: Patient's chronic conditions and co-morbidity symptoms are monitored and maintained or improved  Outcome: Progressing     Problem: Nutrition  Goal: Nutrient intake appropriate for maintaining nutritional needs  Outcome: Progressing     Problem: Skin  Goal: Promote skin healing  Outcome: Progressing     Problem: Skin  Goal: Promote/optimize nutrition  Outcome: Progressing     Problem: Skin  Goal: Prevent/minimize sheer/friction injuries  Outcome: Progressing     Problem: Skin  Goal: Prevent/manage excess moisture  Outcome: Progressing

## 2025-08-03 NOTE — PROGRESS NOTES
Juan Marsh is a 30 y.o. male on day 5 of admission presenting with Behcet's syndrome, neurologic type (Multi).      Subjective   No acute overnight events. He is alert and communicating through the board and hand gestures. Denied any new complaints. Continued mild complaints of nausea and cough. Hiccups have improved. Respiratory secretions have improved. Ortho has removed staples and pt denies any L hip pain.      Objective     Last Recorded Vitals  Blood pressure 124/79, pulse 73, temperature 36.4 °C (97.5 °F), resp. rate 18, weight 63.5 kg (140 lb), SpO2 98%.      Physical Exam  Neurological Exam  MENTAL STATUS:  - awake alert, following commands. Able to follow 2 complex commands. Non- verbal. Uses thumbs up/down and points to letters on boards, oriented to self, situation, and year.      CRANIAL NERVES:  - CN II: R pupil 7 mm constricts to 5 mm with light sluggishly, L pupil 6 mm constricts to 4 mm with light briskly.  Visual fields intact bilaterally to visual threat.    - CN III, IV, VI: Vertical / horizontal eye movements, inferior gaze at baseline, and R eyelid ptosis have all improved since admission.   - CN V: equal facial sensation   - CN VII: symmetric face at baseline and symmetric at full smile. Previous right facial droop has improved. Mild Left facial droop at partial smile. Symmetric eyebrow elevation.  - CN VIII: Intact to conversation and following commands.   - CN XI: Slight weakness with R shoulder shrug.    MOTOR: Able to lift all extremities of the bed, with noted right arm weakness with some some effort against gravity. Increased RUE tone.  SENSATION: reported equal sensation      REFLEXES:        - Brisk 2+ biceps and brachioradialis reflexes bilaterally. Pectoral reflex present bilaterally.  - 3+ suprapatellar and patellar reflexes bilaterally, spreading cross-adductor. Bilateral sustained clonus.      COORDINATION: intact finger to nose on the left   Gait: Deferred      Labs:  Last CBC:  Lab Results   Component Value Date    WBC 16.9 (H) 08/03/2025    HGB 10.1 (L) 08/03/2025    HCT 31.8 (L) 08/03/2025    MCV 99 08/03/2025     08/03/2025       Last RFP:  Lab Results   Component Value Date    GLUCOSE 139 (H) 08/03/2025    CALCIUM 9.1 08/03/2025     08/03/2025    K 3.7 08/03/2025    CO2 25 08/03/2025     08/03/2025    BUN 26 (H) 08/03/2025    CREATININE 0.71 08/03/2025       Imaging:  === 07/30/25 ===  CT ABDOMEN PELVIS w IV CONTRAST    IMPRESSION:  1. Posterior right lower lobe patchy ground-glass opacities which may  represent atelectasis versus early infectious/inflammatory changes.  No evidence of infection within the abdomen/pelvis.  2. Evaluation for GI hemorrhage is limited secondary to single phase  of contrast. Hyperdense material within the ascending colon favored  to represent enteric contents as opposed to active hemorrhage.  Otherwise, no evidence of bleeding.  3. Other chronic/incidental findings as above.    === 07/30/25 ===  XR ABDOMEN 1 VIEW    IMPRESSION:  Enteric tube tip projects over the right upper quadrant.    === 07/29/25 ===    MR BRAIN W AND WO CONTRAST    - Impression -  Compared to MRI brain 05/20/2025, there has been enlargement of the  infiltrative lesion involving portions of the right cristobal tyrone, right  cerebral peduncle, right posterior limb internal capsule extending  into the medial right basal ganglia and corona radiata. The greatest  degrees of new structure involvement are seen within the right globus  pallidus, ventral lateral thalamus and more caudally in the tyrone.  This results in expansile edema of the aforementioned structures with  surrounding mass-effect to a significantly greater degree compared to  prior study. There is increased mass-effect on the hypothalamus and  right mammillary bodies. There is also increased mass effect on the  right lateral ventricle causing increased leftward bowing of the  septum pellucidum  and some increased effacement of the 3rd ventricle.  There is also new patchy enhancement within this lesion, most  pronounced in the cerebral peduncle, right globus pallidus, ventral  medial thalamus, and posterior limb of the right internal capsule.  There is also increased edema within the right optic tract and right  aspect of the optic chiasm.    The other pre-existing lesion known to involve the left cerebral  peduncle and left internal capsule extending into the left corona  radiata is overall decreased in size and signal intensity on FLAIR  images compared to prior study; however, there is a tiny focus of new  enhancement within the left posterior limb internal capsule.    Assessment & Plan  Behcet's syndrome, neurologic type (Multi)      Juan Marsh is a 30 y.o. 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 Found to have Neuro-Bechet relapse, s/p 5 days IV methypred, now on prednisone taper. Will decide on Infliximab vs cyclophosphamide for chronic immunosuppression with Dr. Ulloa on Monday. Clinically improving.    # Concern for neuro Behcet's disease progression with AMS and new partial right third nerve palsy.  Completed IV solumedrol 1 G daily for 5 dose (7/30 to 8/2/2025).  Restart Prednisone (60mg oral). Consider steroid taper.  Continue MMF 1,000 BID.   Plan to discuss risks & benefits of starting Infliximab or cyclophosphamidewith pt and mom on Monday.   Awaiting completion of pre-treatment labs. Hepatitis B and C, and HIV labs  were negative (8/2/2025). Pending syphilis and TB spot (ordered 8/2/2025).     #Concern for pneumonia in right lower lobe, resolved   # leukocytosis, likely reactive from steroids   :: MRSA nares negative, s/p vancomycin   Discontinued zosyn (8/2/2025)   Follow final blood culture   Continue RT on boards, pulmonary hygiene measures   Will try to reach out to speech for repeat swallow evaluation.    # Dysphagia   # Hiccups, improving   Continue tube feeding  Speech therapy follow up   PPI 40 mg once daily   Continue  metoclopramide 10 mg TID     #recent left hip surgery secondary to avascular necrosis   Ortho removed staples (8/2/2025). Weight-bearing as tolerated)   Continue aspirin (81mg BID) as DVT prophylaxis until (8/6/2025).  Tylenol PRN    # Miscellaneous  Thiamine 500 mg 3 times daily for 3 days followed by 100 mL times daily  Zofran 4 mg every 8 hours as needed for nausea  MiraLAX 17 g daily, (on hold for frequent bowel movements)     Gi PPX: Protonix 40 mg IV OD   DVT ppx: aspirin 81mg BID, till 8/6  Diet: NPO - SLP NG feeds  Dispo: PT/OT: acute rehab   Code status: Full    SHARYN HOLT  MS3    I participated and contributed to the above medical student note including the HPI, physical exam, assessment, and plan. I agree with the above information.   Zoey Dietrich MD  Neurology PGY-4

## 2025-08-03 NOTE — CARE PLAN
The patient's goals for the shift include  getting OOB.    The clinical goals for the shift include Patient will remain neurologically stable throughout shift.      Problem: Pain - Adult  Goal: Verbalizes/displays adequate comfort level or baseline comfort level  Outcome: Progressing     Problem: Safety - Adult  Goal: Free from fall injury  Outcome: Progressing     Problem: Discharge Planning  Goal: Discharge to home or other facility with appropriate resources  Outcome: Progressing     Problem: Chronic Conditions and Co-morbidities  Goal: Patient's chronic conditions and co-morbidity symptoms are monitored and maintained or improved  Outcome: Progressing     Problem: Nutrition  Goal: Nutrient intake appropriate for maintaining nutritional needs  Outcome: Progressing     Problem: Skin  Goal: Participates in plan/prevention/treatment measures  Outcome: Progressing  Goal: Prevent/manage excess moisture  Outcome: Progressing  Goal: Prevent/minimize sheer/friction injuries  Outcome: Progressing  Goal: Promote/optimize nutrition  Outcome: Progressing  Goal: Promote skin healing  Outcome: Progressing

## 2025-08-04 LAB
ALBUMIN SERPL BCP-MCNC: 3.9 G/DL (ref 3.4–5)
ANION GAP SERPL CALC-SCNC: 11 MMOL/L (ref 10–20)
BASOPHILS # BLD AUTO: 0.02 X10*3/UL (ref 0–0.1)
BASOPHILS NFR BLD AUTO: 0.2 %
BUN SERPL-MCNC: 29 MG/DL (ref 6–23)
CALCIUM SERPL-MCNC: 9.1 MG/DL (ref 8.6–10.6)
CHLORIDE SERPL-SCNC: 106 MMOL/L (ref 98–107)
CO2 SERPL-SCNC: 29 MMOL/L (ref 21–32)
CREAT SERPL-MCNC: 0.8 MG/DL (ref 0.5–1.3)
EGFRCR SERPLBLD CKD-EPI 2021: >90 ML/MIN/1.73M*2
EOSINOPHIL # BLD AUTO: 0.01 X10*3/UL (ref 0–0.7)
EOSINOPHIL NFR BLD AUTO: 0.1 %
ERYTHROCYTE [DISTWIDTH] IN BLOOD BY AUTOMATED COUNT: 16.1 % (ref 11.5–14.5)
GLUCOSE SERPL-MCNC: 97 MG/DL (ref 74–99)
HCT VFR BLD AUTO: 32 % (ref 41–52)
HGB BLD-MCNC: 10.4 G/DL (ref 13.5–17.5)
IMM GRANULOCYTES # BLD AUTO: 0.06 X10*3/UL (ref 0–0.7)
IMM GRANULOCYTES NFR BLD AUTO: 0.5 % (ref 0–0.9)
LYMPHOCYTES # BLD AUTO: 3.24 X10*3/UL (ref 1.2–4.8)
LYMPHOCYTES NFR BLD AUTO: 27 %
MAGNESIUM SERPL-MCNC: 2.44 MG/DL (ref 1.6–2.4)
MCH RBC QN AUTO: 31.5 PG (ref 26–34)
MCHC RBC AUTO-ENTMCNC: 32.5 G/DL (ref 32–36)
MCV RBC AUTO: 97 FL (ref 80–100)
MONOCYTES # BLD AUTO: 0.76 X10*3/UL (ref 0.1–1)
MONOCYTES NFR BLD AUTO: 6.3 %
NEUTROPHILS # BLD AUTO: 7.9 X10*3/UL (ref 1.2–7.7)
NEUTROPHILS NFR BLD AUTO: 65.9 %
NRBC BLD-RTO: 0 /100 WBCS (ref 0–0)
PHOSPHATE SERPL-MCNC: 2.9 MG/DL (ref 2.5–4.9)
PLATELET # BLD AUTO: 280 X10*3/UL (ref 150–450)
POTASSIUM SERPL-SCNC: 3.2 MMOL/L (ref 3.5–5.3)
RBC # BLD AUTO: 3.3 X10*6/UL (ref 4.5–5.9)
SODIUM SERPL-SCNC: 143 MMOL/L (ref 136–145)
WBC # BLD AUTO: 12 X10*3/UL (ref 4.4–11.3)

## 2025-08-04 PROCEDURE — 36415 COLL VENOUS BLD VENIPUNCTURE: CPT

## 2025-08-04 PROCEDURE — 99233 SBSQ HOSP IP/OBS HIGH 50: CPT | Performed by: PSYCHIATRY & NEUROLOGY

## 2025-08-04 PROCEDURE — 2500000002 HC RX 250 W HCPCS SELF ADMINISTERED DRUGS (ALT 637 FOR MEDICARE OP, ALT 636 FOR OP/ED)

## 2025-08-04 PROCEDURE — 2500000001 HC RX 250 WO HCPCS SELF ADMINISTERED DRUGS (ALT 637 FOR MEDICARE OP)

## 2025-08-04 PROCEDURE — 85025 COMPLETE CBC W/AUTO DIFF WBC: CPT

## 2025-08-04 PROCEDURE — 2500000004 HC RX 250 GENERAL PHARMACY W/ HCPCS (ALT 636 FOR OP/ED)

## 2025-08-04 PROCEDURE — 80069 RENAL FUNCTION PANEL: CPT

## 2025-08-04 PROCEDURE — 83735 ASSAY OF MAGNESIUM: CPT

## 2025-08-04 PROCEDURE — 2500000004 HC RX 250 GENERAL PHARMACY W/ HCPCS (ALT 636 FOR OP/ED): Mod: JZ

## 2025-08-04 PROCEDURE — 92526 ORAL FUNCTION THERAPY: CPT | Mod: GN

## 2025-08-04 PROCEDURE — 86481 TB AG RESPONSE T-CELL SUSP: CPT

## 2025-08-04 PROCEDURE — 2060000001 HC INTERMEDIATE ICU ROOM DAILY

## 2025-08-04 RX ORDER — DIPHENHYDRAMINE HCL 25 MG
25 CAPSULE ORAL EVERY 6 HOURS PRN
Status: DISCONTINUED | OUTPATIENT
Start: 2025-08-04 | End: 2025-08-08 | Stop reason: HOSPADM

## 2025-08-04 RX ORDER — DIPHENHYDRAMINE HYDROCHLORIDE 50 MG/ML
25 INJECTION, SOLUTION INTRAMUSCULAR; INTRAVENOUS DAILY PRN
Status: DISCONTINUED | OUTPATIENT
Start: 2025-08-04 | End: 2025-08-08 | Stop reason: HOSPADM

## 2025-08-04 RX ORDER — POTASSIUM CHLORIDE 1.5 G/1.58G
40 POWDER, FOR SOLUTION ORAL ONCE
Status: COMPLETED | OUTPATIENT
Start: 2025-08-04 | End: 2025-08-04

## 2025-08-04 RX ADMIN — METOCLOPRAMIDE HYDROCHLORIDE 10 MG: 5 INJECTION INTRAMUSCULAR; INTRAVENOUS at 14:06

## 2025-08-04 RX ADMIN — METOCLOPRAMIDE HYDROCHLORIDE 10 MG: 5 INJECTION INTRAMUSCULAR; INTRAVENOUS at 21:34

## 2025-08-04 RX ADMIN — SULFAMETHOXAZOLE AND TRIMETHOPRIM 1 TABLET: 800; 160 TABLET ORAL at 09:30

## 2025-08-04 RX ADMIN — PREDNISONE 60 MG: 20 TABLET ORAL at 09:31

## 2025-08-04 RX ADMIN — POTASSIUM CHLORIDE 40 MEQ: 1.5 POWDER, FOR SOLUTION ORAL at 11:16

## 2025-08-04 RX ADMIN — ASPIRIN 81 MG CHEWABLE TABLET 81 MG: 81 TABLET CHEWABLE at 09:31

## 2025-08-04 RX ADMIN — ASPIRIN 81 MG CHEWABLE TABLET 81 MG: 81 TABLET CHEWABLE at 21:34

## 2025-08-04 RX ADMIN — SODIUM CHLORIDE 300 MG: 0.9 INJECTION, SOLUTION INTRAVENOUS at 16:20

## 2025-08-04 RX ADMIN — MYCOPHENOLATE MOFETIL 1000 MG: 200 POWDER, FOR SUSPENSION ORAL at 21:35

## 2025-08-04 RX ADMIN — DIPHENHYDRAMINE HYDROCHLORIDE 25 MG: 50 INJECTION INTRAMUSCULAR; INTRAVENOUS at 15:16

## 2025-08-04 RX ADMIN — PANTOPRAZOLE SODIUM 40 MG: 40 INJECTION, POWDER, FOR SOLUTION INTRAVENOUS at 09:29

## 2025-08-04 RX ADMIN — ACETAMINOPHEN 650 MG: 160 SOLUTION ORAL at 15:15

## 2025-08-04 RX ADMIN — MYCOPHENOLATE MOFETIL 1000 MG: 200 POWDER, FOR SUSPENSION ORAL at 09:31

## 2025-08-04 ASSESSMENT — PAIN SCALES - GENERAL
PAINLEVEL_OUTOF10: 0 - NO PAIN

## 2025-08-04 ASSESSMENT — PAIN - FUNCTIONAL ASSESSMENT
PAIN_FUNCTIONAL_ASSESSMENT: 0-10

## 2025-08-04 NOTE — PROGRESS NOTES
Speech-Language Pathology  Adult Inpatient Swallow Treatment    Patient Name: Juan Marsh  MRN: [id number]  Today's Date: 8/4/2025   Start Time: 940  Stop Time: 955  Time Calculation (min): 15    Impression:   Swallow re-assessment completed. Pt received awake/alert and upright in bed for session w/ family members x2 at the bedside; dobhoff now in place. Significantly improved wakefulness/alertness compared to initial eval on 7/30. Largely nonverbal but consistently able to follow commands + utilized nonverbal means of communication in response to questions by SLP appropriately; i.e., thumbs up. Weak volitional cough prior to administration of PO trials.     Trials of ice chips, tsp sips water, and single/consecutive sips water via straw were given. Swallow initiated w/ mouth agape. No cough noted though pt w/ wet upper airway sounds following consecutive sips water via straw.     Unable to determine swallowing safety/efficiency at the bedside. Given pt w/ h/o dysphagia/modified diet, recommend MBSS to instrumentally evaluate swallow function/determine least restrictive diet; will complete as orders placed/radiology schedule permits. Recommend NPO w/ frequent aggressive oral care until MBSS can be completed. 10 ice chips per hr allowed for pleasure, safe swallow stimulation, and after oral care to prevent buildup of bacteria within the oropharynx. RN/MD aware.       Recommendations:   NPO with frequent aggressive oral care.  10 ice chips/hr allowed for pleasure, safe swallow stimulation, and after oral care to prevent buildup of bacteria within the oropharynx  MBSS to instrumentally evaluate swallow function/determine least restrictive diet; will complete as orders placed/radiology schedule permits    Goal:   Pt/caregiver/family will demonstrate adequate return of knowledge re: dysphagia POC/diet recommendations/safe swallowing guidelines w/ 100% acc to effectively assist the patient in immediate safety with  oral intake and swallowing.  Start Date: 7/30/2025  End Date: 8/30/2025  Status: Progressing     Pt will tolerate least restrictive diet with no overt clinical s/s aspiration 100% of the time.   Start Date: 7/30/2025  End Date: 8/30/2025  Status: Progressing       Plan:  SLP Services Indicated: Yes  Frequency: 2x per week  Discussed POC with patient and family x2  SLP - OK to Discharge    Pain:   0-10  0 = No pain.     Inpatient Education:  Extensive education provided to patient and family x2 regarding current swallow function, recommendations/results, and POC.      Consultations/Referrals/Coordination of Services:   N/A

## 2025-08-04 NOTE — PROGRESS NOTES
TCC aware that patient is not medically cleared for discharge. Precert is pending for [facility]. Patient to have a repeat swallow evaluation. TCC will continue to follow for discharge planning.      ANSLEY Wall, RN  [facility]&9  Transitional Care Coordinator, Mon-Fri  Cell: [phone], Office: [phone]  Email: [email or web address]    Pt is a 54 yo M with a hx of alcohol use, HTN, uncontrolled DM, HLD presenting with lightheadedness, visual disturbances and nausea. Pt has hx of hospitalization for alcohol withdrawal c/b seizure in August. Last drink 10/4 7pm. In ED, had tachycardia to 120, HTN to 190/100. Found to be in mild DKA with blood glucose 453, beta hydroxybutyrate 1.4, ketones in urine due to recent missed insulin dose. HbA1c 10.7.

## 2025-08-04 NOTE — PROGRESS NOTES
Juan Marsh is a 30 y.o. male on day 6 of admission presenting with Behcet's syndrome, neurologic type (Multi).      Subjective   No acute overnight events, he is awake and alert, he still communicating through gestures but was able to verbalize one sentence that he is doing well. Mother and father at bedside. We discussed with them during the round infliximab risks vs benefits and decision was made to go ahead with infliximab infusion.      Objective     Last Recorded Vitals  Blood pressure 114/71, pulse 75, temperature 36.8 °C (98.2 °F), temperature source Temporal, resp. rate 18, weight 63.5 kg (140 lb), SpO2 99%.      Physical Exam  Neurological Exam  MENTAL STATUS:  - awake alert, following commands. Able to follow 2 complex commands. Non- verbal. ( Verbalized one phrase today) Uses thumbs up/down and points to letters on boards, oriented to self, situation, and year.      CRANIAL NERVES:  - CN II: R pupil 6 mm constricts to 5 mm with light sluggishly, L pupil 6 mm constricts to 4 mm with light briskly.  Visual fields intact bilaterally to visual threat.    - CN III, IV, VI: Vertical / horizontal eye movements, inferior gaze at baseline, and R eyelid ptosis have all improved since admission.   - CN V: equal facial sensation   - CN VII: symmetric face at baseline and symmetric at full smile. Previous right facial droop has improved. Left facial droop at smile. Symmetric eyebrow elevation.  - CN VIII: Intact to conversation and following commands.   - CN XI: Slight weakness with R shoulder shrug.    MOTOR: Able to lift all extremities of the bed, with noted right arm weakness with some some effort against gravity. Increased RUE tone.  SENSATION: reported equal sensation      REFLEXES:        - Brisk 2+ biceps and brachioradialis reflexes bilaterally. Pectoral reflex present bilaterally.  - 3+ suprapatellar and patellar reflexes bilaterally, spreading cross-adductor. Bilateral sustained clonus.       COORDINATION: intact finger to nose on the left   Gait: Deferred     Labs:  Last CBC:  Lab Results   Component Value Date    WBC 16.9 (H) 08/03/2025    HGB 10.1 (L) 08/03/2025    HCT 31.8 (L) 08/03/2025    MCV 99 08/03/2025     08/03/2025       Last RFP:  Lab Results   Component Value Date    GLUCOSE 139 (H) 08/03/2025    CALCIUM 9.1 08/03/2025     08/03/2025    K 3.7 08/03/2025    CO2 25 08/03/2025     08/03/2025    BUN 26 (H) 08/03/2025    CREATININE 0.71 08/03/2025       Imaging:  === 07/30/25 ===  CT ABDOMEN PELVIS w IV CONTRAST    IMPRESSION:  1. Posterior right lower lobe patchy ground-glass opacities which may  represent atelectasis versus early infectious/inflammatory changes.  No evidence of infection within the abdomen/pelvis.  2. Evaluation for GI hemorrhage is limited secondary to single phase  of contrast. Hyperdense material within the ascending colon favored  to represent enteric contents as opposed to active hemorrhage.  Otherwise, no evidence of bleeding.  3. Other chronic/incidental findings as above.    === 07/30/25 ===  XR ABDOMEN 1 VIEW    IMPRESSION:  Enteric tube tip projects over the right upper quadrant.    === 07/29/25 ===    MR BRAIN W AND WO CONTRAST    - Impression -  Compared to MRI brain 05/20/2025, there has been enlargement of the  infiltrative lesion involving portions of the right cristobal tyrone, right  cerebral peduncle, right posterior limb internal capsule extending  into the medial right basal ganglia and corona radiata. The greatest  degrees of new structure involvement are seen within the right globus  pallidus, ventral lateral thalamus and more caudally in the tyrone.  This results in expansile edema of the aforementioned structures with  surrounding mass-effect to a significantly greater degree compared to  prior study. There is increased mass-effect on the hypothalamus and  right mammillary bodies. There is also increased mass effect on the  right lateral  ventricle causing increased leftward bowing of the  septum pellucidum and some increased effacement of the 3rd ventricle.  There is also new patchy enhancement within this lesion, most  pronounced in the cerebral peduncle, right globus pallidus, ventral  medial thalamus, and posterior limb of the right internal capsule.  There is also increased edema within the right optic tract and right  aspect of the optic chiasm.    The other pre-existing lesion known to involve the left cerebral  peduncle and left internal capsule extending into the left corona  radiata is overall decreased in size and signal intensity on FLAIR  images compared to prior study; however, there is a tiny focus of new  enhancement within the left posterior limb internal capsule.    Assessment & Plan  Behcet's syndrome, neurologic type (Multi)      Juan Marsh is a 30 y.o. 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 Found to have Neuro-Bechet relapse, s/p 5 days IV methypred, now on prednisone taper. Discussed with patient and family next step of treatment and will proceed with infliximab.     # Concern for neuro Behcet's disease progression with AMS and new partial right third nerve palsy.  Completed IV solumedrol 1 G daily for 5 dose (7/30 to 8/2/2025).  Continue Prednisone (60mg oral). Consider steroid taper.  Continue MMF 1,000 BID.   Hepatitis B and C, HIV and syphilis labs are repeated and negative (8/2/2025). Pending and TB spot (ordered 8/2/2025), but he had negative  one on 5/2024   Start Infliximab first dose infusion 5 mg /kg and treat prior with Tylenol and diphenhydramine.   Shift to MATEUS status for close monitoring post infusion and monitor for infusion reactions.       # Dysphagia   # Hiccups, improving   Continue tube feeding  Speech therapy follow up, MBBS ordered   PPI 40 mg once daily   Continue  metoclopramide 10 mg TID     #recent left hip surgery secondary to avascular necrosis   Ortho removed staples (8/2/2025). Weight-bearing as tolerated)   Continue aspirin (81mg BID) as DVT prophylaxis until (8/6/2025).  Tylenol PRN    #Concern for pneumonia in right lower lobe, resolved   # leukocytosis, likely reactive from steroids   :: MRSA nares negative, s/p vancomycin   :: blood culture is negative   :: zosyn discontinued (8/2/2025)   Continue RT on boards, pulmonary hygiene measures     # Miscellaneous  Thiamine 500 mg 3 times daily for 3 days followed by 100 mL times daily  Zofran 4 mg every 8 hours as needed for nausea  MiraLAX 17 g daily, (on hold for frequent bowel movements)     Gi PPX: Protonix 40 mg IV OD   DVT ppx: aspirin 81mg BID, till 8/6  Diet: NPO - SLP NG feeds  Dispo: PT/OT: acute rehab   Code status: Full    Angelique White MD  PGY-2 neurology resident

## 2025-08-05 ENCOUNTER — SPECIALTY PHARMACY (OUTPATIENT)
Dept: NEUROLOGY | Facility: HOSPITAL | Age: 30
End: 2025-08-05
Payer: COMMERCIAL

## 2025-08-05 ENCOUNTER — APPOINTMENT (OUTPATIENT)
Dept: RADIOLOGY | Facility: HOSPITAL | Age: 30
End: 2025-08-05
Payer: COMMERCIAL

## 2025-08-05 DIAGNOSIS — M35.2 BEHCET'S SYNDROME, NEUROLOGIC TYPE (MULTI): Primary | ICD-10-CM

## 2025-08-05 LAB
ALBUMIN SERPL BCP-MCNC: 3.8 G/DL (ref 3.4–5)
ANION GAP SERPL CALC-SCNC: 10 MMOL/L (ref 10–20)
BASOPHILS # BLD AUTO: 0.01 X10*3/UL (ref 0–0.1)
BASOPHILS NFR BLD AUTO: 0.1 %
BUN SERPL-MCNC: 27 MG/DL (ref 6–23)
CALCIUM SERPL-MCNC: 9.2 MG/DL (ref 8.6–10.6)
CHLORIDE SERPL-SCNC: 105 MMOL/L (ref 98–107)
CO2 SERPL-SCNC: 27 MMOL/L (ref 21–32)
CREAT SERPL-MCNC: 0.75 MG/DL (ref 0.5–1.3)
EGFRCR SERPLBLD CKD-EPI 2021: >90 ML/MIN/1.73M*2
EOSINOPHIL # BLD AUTO: 0.06 X10*3/UL (ref 0–0.7)
EOSINOPHIL NFR BLD AUTO: 0.5 %
ERYTHROCYTE [DISTWIDTH] IN BLOOD BY AUTOMATED COUNT: 15.9 % (ref 11.5–14.5)
GLUCOSE SERPL-MCNC: 111 MG/DL (ref 74–99)
HCT VFR BLD AUTO: 33.7 % (ref 41–52)
HGB BLD-MCNC: 10.9 G/DL (ref 13.5–17.5)
IMM GRANULOCYTES # BLD AUTO: 0.06 X10*3/UL (ref 0–0.7)
IMM GRANULOCYTES NFR BLD AUTO: 0.5 % (ref 0–0.9)
LYMPHOCYTES # BLD AUTO: 3.51 X10*3/UL (ref 1.2–4.8)
LYMPHOCYTES NFR BLD AUTO: 28.2 %
MAGNESIUM SERPL-MCNC: 2.31 MG/DL (ref 1.6–2.4)
MCH RBC QN AUTO: 31.5 PG (ref 26–34)
MCHC RBC AUTO-ENTMCNC: 32.3 G/DL (ref 32–36)
MCV RBC AUTO: 97 FL (ref 80–100)
MONOCYTES # BLD AUTO: 0.66 X10*3/UL (ref 0.1–1)
MONOCYTES NFR BLD AUTO: 5.3 %
NEUTROPHILS # BLD AUTO: 8.16 X10*3/UL (ref 1.2–7.7)
NEUTROPHILS NFR BLD AUTO: 65.4 %
NRBC BLD-RTO: 0 /100 WBCS (ref 0–0)
PHOSPHATE SERPL-MCNC: 3.2 MG/DL (ref 2.5–4.9)
PLATELET # BLD AUTO: 278 X10*3/UL (ref 150–450)
POTASSIUM SERPL-SCNC: 3.3 MMOL/L (ref 3.5–5.3)
RBC # BLD AUTO: 3.46 X10*6/UL (ref 4.5–5.9)
SODIUM SERPL-SCNC: 139 MMOL/L (ref 136–145)
WBC # BLD AUTO: 12.5 X10*3/UL (ref 4.4–11.3)

## 2025-08-05 PROCEDURE — 99233 SBSQ HOSP IP/OBS HIGH 50: CPT | Performed by: PSYCHIATRY & NEUROLOGY

## 2025-08-05 PROCEDURE — 1100000001 HC PRIVATE ROOM DAILY

## 2025-08-05 PROCEDURE — 84100 ASSAY OF PHOSPHORUS: CPT

## 2025-08-05 PROCEDURE — 2500000004 HC RX 250 GENERAL PHARMACY W/ HCPCS (ALT 636 FOR OP/ED)

## 2025-08-05 PROCEDURE — 2500000001 HC RX 250 WO HCPCS SELF ADMINISTERED DRUGS (ALT 637 FOR MEDICARE OP)

## 2025-08-05 PROCEDURE — 97530 THERAPEUTIC ACTIVITIES: CPT | Mod: GP | Performed by: PHYSICAL THERAPIST

## 2025-08-05 PROCEDURE — 85025 COMPLETE CBC W/AUTO DIFF WBC: CPT

## 2025-08-05 PROCEDURE — 2500000004 HC RX 250 GENERAL PHARMACY W/ HCPCS (ALT 636 FOR OP/ED): Mod: JZ

## 2025-08-05 PROCEDURE — 36415 COLL VENOUS BLD VENIPUNCTURE: CPT

## 2025-08-05 PROCEDURE — 83735 ASSAY OF MAGNESIUM: CPT

## 2025-08-05 RX ORDER — ACETAMINOPHEN 325 MG/1
650 TABLET ORAL ONCE
OUTPATIENT
Start: 2025-08-18 | End: 2025-08-18

## 2025-08-05 RX ORDER — EPINEPHRINE 0.3 MG/.3ML
0.3 INJECTION SUBCUTANEOUS EVERY 5 MIN PRN
OUTPATIENT
Start: 2025-08-18

## 2025-08-05 RX ORDER — DIPHENHYDRAMINE HCL 25 MG
25 TABLET ORAL ONCE
OUTPATIENT
Start: 2025-08-18 | End: 2025-08-18

## 2025-08-05 RX ORDER — POTASSIUM CHLORIDE 1.5 G/1.58G
40 POWDER, FOR SOLUTION ORAL ONCE
Status: COMPLETED | OUTPATIENT
Start: 2025-08-05 | End: 2025-08-05

## 2025-08-05 RX ORDER — FAMOTIDINE 10 MG/ML
20 INJECTION, SOLUTION INTRAVENOUS ONCE AS NEEDED
OUTPATIENT
Start: 2025-08-18

## 2025-08-05 RX ORDER — DIPHENHYDRAMINE HYDROCHLORIDE 50 MG/ML
50 INJECTION, SOLUTION INTRAMUSCULAR; INTRAVENOUS AS NEEDED
OUTPATIENT
Start: 2025-08-18

## 2025-08-05 RX ORDER — ACETAMINOPHEN 500 MG
5 TABLET ORAL DAILY
Status: DISCONTINUED | OUTPATIENT
Start: 2025-08-05 | End: 2025-08-08 | Stop reason: HOSPADM

## 2025-08-05 RX ORDER — ALBUTEROL SULFATE 0.83 MG/ML
3 SOLUTION RESPIRATORY (INHALATION) AS NEEDED
OUTPATIENT
Start: 2025-08-18

## 2025-08-05 RX ADMIN — METOCLOPRAMIDE HYDROCHLORIDE 10 MG: 5 INJECTION INTRAMUSCULAR; INTRAVENOUS at 14:28

## 2025-08-05 RX ADMIN — POTASSIUM CHLORIDE 40 MEQ: 1.5 POWDER, FOR SOLUTION ORAL at 09:43

## 2025-08-05 RX ADMIN — METOCLOPRAMIDE HYDROCHLORIDE 10 MG: 5 INJECTION INTRAMUSCULAR; INTRAVENOUS at 05:15

## 2025-08-05 RX ADMIN — ASPIRIN 81 MG CHEWABLE TABLET 81 MG: 81 TABLET CHEWABLE at 20:25

## 2025-08-05 RX ADMIN — MYCOPHENOLATE MOFETIL 1000 MG: 200 POWDER, FOR SUSPENSION ORAL at 20:25

## 2025-08-05 RX ADMIN — SODIUM CHLORIDE 500 ML: 0.9 INJECTION, SOLUTION INTRAVENOUS at 08:35

## 2025-08-05 RX ADMIN — Medication 5 MG: at 20:47

## 2025-08-05 RX ADMIN — PREDNISONE 60 MG: 20 TABLET ORAL at 08:32

## 2025-08-05 RX ADMIN — ASPIRIN 81 MG CHEWABLE TABLET 81 MG: 81 TABLET CHEWABLE at 08:32

## 2025-08-05 RX ADMIN — PANTOPRAZOLE SODIUM 40 MG: 40 INJECTION, POWDER, FOR SOLUTION INTRAVENOUS at 08:32

## 2025-08-05 RX ADMIN — MYCOPHENOLATE MOFETIL 1000 MG: 200 POWDER, FOR SUSPENSION ORAL at 08:32

## 2025-08-05 RX ADMIN — POTASSIUM CHLORIDE 40 MEQ: 1.5 POWDER, FOR SOLUTION ORAL at 17:30

## 2025-08-05 RX ADMIN — METOCLOPRAMIDE HYDROCHLORIDE 10 MG: 5 INJECTION INTRAMUSCULAR; INTRAVENOUS at 22:14

## 2025-08-05 SDOH — HEALTH STABILITY: MENTAL HEALTH: HOW OFTEN DO YOU HAVE A DRINK CONTAINING ALCOHOL?: MONTHLY OR LESS

## 2025-08-05 SDOH — HEALTH STABILITY: MENTAL HEALTH: HOW OFTEN DO YOU HAVE SIX OR MORE DRINKS ON ONE OCCASION?: LESS THAN MONTHLY

## 2025-08-05 SDOH — ECONOMIC STABILITY: INCOME INSECURITY: IN THE PAST 12 MONTHS HAS THE ELECTRIC, GAS, OIL, OR WATER COMPANY THREATENED TO SHUT OFF SERVICES IN YOUR HOME?: NO

## 2025-08-05 SDOH — SOCIAL STABILITY: SOCIAL INSECURITY: WITHIN THE LAST YEAR, HAVE YOU BEEN HUMILIATED OR EMOTIONALLY ABUSED IN OTHER WAYS BY YOUR PARTNER OR EX-PARTNER?: NO

## 2025-08-05 SDOH — ECONOMIC STABILITY: FOOD INSECURITY: HOW HARD IS IT FOR YOU TO PAY FOR THE VERY BASICS LIKE FOOD, HOUSING, MEDICAL CARE, AND HEATING?: NOT HARD AT ALL

## 2025-08-05 SDOH — ECONOMIC STABILITY: HOUSING INSECURITY: AT ANY TIME IN THE PAST 12 MONTHS, WERE YOU HOMELESS OR LIVING IN A SHELTER (INCLUDING NOW)?: NO

## 2025-08-05 SDOH — ECONOMIC STABILITY: FOOD INSECURITY: WITHIN THE PAST 12 MONTHS, THE FOOD YOU BOUGHT JUST DIDN'T LAST AND YOU DIDN'T HAVE MONEY TO GET MORE.: NEVER TRUE

## 2025-08-05 SDOH — SOCIAL STABILITY: SOCIAL INSECURITY: WITHIN THE LAST YEAR, HAVE YOU BEEN AFRAID OF YOUR PARTNER OR EX-PARTNER?: NO

## 2025-08-05 SDOH — HEALTH STABILITY: PHYSICAL HEALTH: ON AVERAGE, HOW MANY DAYS PER WEEK DO YOU ENGAGE IN MODERATE TO STRENUOUS EXERCISE (LIKE A BRISK WALK)?: 3 DAYS

## 2025-08-05 SDOH — HEALTH STABILITY: MENTAL HEALTH: HOW MANY DRINKS CONTAINING ALCOHOL DO YOU HAVE ON A TYPICAL DAY WHEN YOU ARE DRINKING?: 1 OR 2

## 2025-08-05 SDOH — ECONOMIC STABILITY: FOOD INSECURITY: WITHIN THE PAST 12 MONTHS, YOU WORRIED THAT YOUR FOOD WOULD RUN OUT BEFORE YOU GOT THE MONEY TO BUY MORE.: NEVER TRUE

## 2025-08-05 SDOH — HEALTH STABILITY: PHYSICAL HEALTH: ON AVERAGE, HOW MANY MINUTES DO YOU ENGAGE IN EXERCISE AT THIS LEVEL?: 40 MIN

## 2025-08-05 SDOH — ECONOMIC STABILITY: HOUSING INSECURITY: IN THE PAST 12 MONTHS, HOW MANY TIMES HAVE YOU MOVED WHERE YOU WERE LIVING?: 0

## 2025-08-05 SDOH — ECONOMIC STABILITY: TRANSPORTATION INSECURITY: IN THE PAST 12 MONTHS, HAS LACK OF TRANSPORTATION KEPT YOU FROM MEDICAL APPOINTMENTS OR FROM GETTING MEDICATIONS?: NO

## 2025-08-05 ASSESSMENT — COGNITIVE AND FUNCTIONAL STATUS - GENERAL
TURNING FROM BACK TO SIDE WHILE IN FLAT BAD: A LITTLE
MOBILITY SCORE: 15
STANDING UP FROM CHAIR USING ARMS: A LOT
CLIMB 3 TO 5 STEPS WITH RAILING: TOTAL
TURNING FROM BACK TO SIDE WHILE IN FLAT BAD: A LITTLE
MOVING TO AND FROM BED TO CHAIR: A LOT
MOVING TO AND FROM BED TO CHAIR: A LITTLE
MOVING FROM LYING ON BACK TO SITTING ON SIDE OF FLAT BED WITH BEDRAILS: A LITTLE
WALKING IN HOSPITAL ROOM: A LOT
WALKING IN HOSPITAL ROOM: A LOT
MOBILITY SCORE: 15
CLIMB 3 TO 5 STEPS WITH RAILING: A LOT
STANDING UP FROM CHAIR USING ARMS: A LITTLE

## 2025-08-05 ASSESSMENT — PAIN SCALES - GENERAL
PAINLEVEL_OUTOF10: 0 - NO PAIN

## 2025-08-05 ASSESSMENT — ACTIVITIES OF DAILY LIVING (ADL)
TOILETING: INDEPENDENT
GROOMING: INDEPENDENT
HEARING - LEFT EAR: FUNCTIONAL
ADEQUATE_TO_COMPLETE_ADL: YES
FEEDING YOURSELF: INDEPENDENT
DRESSING YOURSELF: INDEPENDENT
JUDGMENT_ADEQUATE_SAFELY_COMPLETE_DAILY_ACTIVITIES: YES
LACK_OF_TRANSPORTATION: NO
WALKS IN HOME: INDEPENDENT
PATIENT'S MEMORY ADEQUATE TO SAFELY COMPLETE DAILY ACTIVITIES?: YES
HEARING - RIGHT EAR: FUNCTIONAL
BATHING: INDEPENDENT

## 2025-08-05 ASSESSMENT — PAIN - FUNCTIONAL ASSESSMENT
PAIN_FUNCTIONAL_ASSESSMENT: 0-10

## 2025-08-05 ASSESSMENT — PAIN SCALES - PAIN ASSESSMENT IN ADVANCED DEMENTIA (PAINAD)
TOTALSCORE: 2
BODYLANGUAGE: RELAXED
FACIALEXPRESSION: SAD, FRIGHTENED, FROWN
BREATHING: NORMAL
CONSOLABILITY: DISTRACTED OR REASSURED BY VOICE/TOUCH

## 2025-08-05 ASSESSMENT — LIFESTYLE VARIABLES
SKIP TO QUESTIONS 9-10: 0
AUDIT-C TOTAL SCORE: 2

## 2025-08-05 NOTE — CARE PLAN
The patient's goals for the shift include  getting adequate rest to promote healing.     The clinical goals for the shift include pt will remain safe and free from injury throughout the shift

## 2025-08-05 NOTE — PROGRESS NOTES
"Juan Marsh is a 30 y.o. male on day 7 of admission presenting with Behcet's syndrome, neurologic type (Multi).      Subjective   No acute overnight events, he tolerated infliximab well. He had soft pressure while sleeping and few reading of bradycardia 48-49, giving one bolus of fluids, this morning he has setting in chair, verbalized some words with notable dysarthria, he was able to communicate using using the board and denied any new complaints.     Objective     Last Recorded Vitals  Blood pressure 120/80, pulse 63, temperature 37.1 °C (98.8 °F), resp. rate 17, height 1.778 m (5' 10\"), weight 63.8 kg (140 lb 10.5 oz), SpO2 100%.      Physical Exam  Neurological Exam  MENTAL STATUS:  - awake alert, following commands. Able to follow 2 complex commands. Non- verbal. ( Verbalized a phrase with dysarthria) Uses thumbs up/down and points to letters on boards, oriented to self, situation, and year.      CRANIAL NERVES:  - CN II: R pupil 7 mm constricts to 5 mm with light sluggishly, L pupil 5 mm constricts to 3 mm with light briskly.  Visual fields intact bilaterally to visual threat.    - CN III, IV, VI: Vertical / horizontal eye movements, inferior gaze at baseline, and R eyelid ptosis have all improved since admission.   - CN V: equal facial sensation   - CN VII: symmetric face at baseline and symmetric at full smile. Previous right facial droop has improved. Left facial droop at smile and drooling noted, Symmetric eyebrow elevation.  - CN VIII: Intact to conversation and following commands.   - CN XI: Slight weakness with R shoulder shrug.    MOTOR: Able to lift all extremities of the bed, with noted right arm weakness with some some effort against gravity. Increased RUE tone.  SENSATION: reported equal sensation      REFLEXES:        - Brisk 2+ biceps and brachioradialis reflexes bilaterally. Pectoral reflex present bilaterally.  - 3+ suprapatellar and patellar reflexes bilaterally, spreading " cross-adductor. Bilateral sustained clonus.      COORDINATION: intact finger to nose on the left   Gait: Deferred     Labs:  Last CBC:  Lab Results   Component Value Date    WBC 12.5 (H) 08/05/2025    HGB 10.9 (L) 08/05/2025    HCT 33.7 (L) 08/05/2025    MCV 97 08/05/2025     08/05/2025       Last RFP:  Lab Results   Component Value Date    GLUCOSE 111 (H) 08/05/2025    CALCIUM 9.2 08/05/2025     08/05/2025    K 3.3 (L) 08/05/2025    CO2 27 08/05/2025     08/05/2025    BUN 27 (H) 08/05/2025    CREATININE 0.75 08/05/2025       Imaging:  === 07/30/25 ===  CT ABDOMEN PELVIS w IV CONTRAST    IMPRESSION:  1. Posterior right lower lobe patchy ground-glass opacities which may  represent atelectasis versus early infectious/inflammatory changes.  No evidence of infection within the abdomen/pelvis.  2. Evaluation for GI hemorrhage is limited secondary to single phase  of contrast. Hyperdense material within the ascending colon favored  to represent enteric contents as opposed to active hemorrhage.  Otherwise, no evidence of bleeding.  3. Other chronic/incidental findings as above.    === 07/30/25 ===  XR ABDOMEN 1 VIEW    IMPRESSION:  Enteric tube tip projects over the right upper quadrant.    === 07/29/25 ===    MR BRAIN W AND WO CONTRAST    - Impression -  Compared to MRI brain 05/20/2025, there has been enlargement of the  infiltrative lesion involving portions of the right cristobal tyrone, right  cerebral peduncle, right posterior limb internal capsule extending  into the medial right basal ganglia and corona radiata. The greatest  degrees of new structure involvement are seen within the right globus  pallidus, ventral lateral thalamus and more caudally in the tyrone.  This results in expansile edema of the aforementioned structures with  surrounding mass-effect to a significantly greater degree compared to  prior study. There is increased mass-effect on the hypothalamus and  right mammillary bodies. There is  also increased mass effect on the  right lateral ventricle causing increased leftward bowing of the  septum pellucidum and some increased effacement of the 3rd ventricle.  There is also new patchy enhancement within this lesion, most  pronounced in the cerebral peduncle, right globus pallidus, ventral  medial thalamus, and posterior limb of the right internal capsule.  There is also increased edema within the right optic tract and right  aspect of the optic chiasm.    The other pre-existing lesion known to involve the left cerebral  peduncle and left internal capsule extending into the left corona  radiata is overall decreased in size and signal intensity on FLAIR  images compared to prior study; however, there is a tiny focus of new  enhancement within the left posterior limb internal capsule.    Assessment & Plan  Behcet's syndrome, neurologic type (Multi)      Juan Marsh is a 30 y.o. Right-handed AA male smoker with PMHx notable for heavy alcohol use, systemic (pericarditis, arthralgias, skin rash, recurrent orogenital ulcers, uveitis, retinal vasculitis, but negative HLAB51) and neuro Behcet's disease on prednisone 10mg daily and MMF 500mg BID recurrent neurological episodes sine 2021 with recurrent bilateral cascade signs in the setting of poor compliance with immunomodulation (previously treated with AZA, humira, and most recently MMF). His acute attacks have previously responded well to IVMP and a single dose of cyclophosphamide recent left hip surgery for avascular necrosis who presented to the ED (7/28/2025) with AMS and being no-verbal and vomiting. MRI showed large T2 hyperintense lesion in the right lentiform nucleus, internal capsule, thalamus, and upper midbrain with mass effect and a small similar lesion on the left s/p 5 days IV methypred, and is on prednisone taper and s/p first dose infusion of infliximab  5 mg /kg on 8/4/2025 which was tolerated well.    # Concern for neuro Behcet's  disease progression with AMS and new partial right third nerve palsy.  :: S/p IV solumedrol 1 G daily for 5 dose (7/30 to 8/2/2025).  :: S/p  Infliximab first dose infusion 5 mg /kg (on 8/4/2025)  Continue Prednisone (60mg oral) will plan for long taper.   Continue MMF 1,000 BID.   Repeat MRI head with and without contrast on Friday (8/8/2025)  Plan for next doses of inflimximab (5 mg / kg per dose) at weeks 2 and 6 then every eight weeks thereafter if the response is satisfactory.       # Dysphagia   # Hiccups, improving   # Hypokalemia.   Continue tube feeding through [facility].   Speech therapy follow up, MBBS ordered   PPI 40 mg once daily   Continue metoclopramide 10 mg TID   Potasium replacement 40 meq x2 doses.     #recent left hip surgery 7/10/2025 secondary to avascular necrosis   Ortho removed staples (8/2/2025). Weight-bearing as tolerated)   Continue aspirin (81mg BID) as DVT prophylaxis until (8/6/2025) then switch back to heparin and daily aspirin 81 mg.   Tylenol PRN    #Concern for pneumonia in right lower lobe, resolved   # leukocytosis, likely reactive from steroids   :: MRSA nares negative, s/p vancomycin   :: blood culture is negative   :: zosyn discontinued (8/2/2025)   Continue pulmonary hygiene measures     # Miscellaneous  Thiamine 500 mg 3 times daily for 3 days followed by 100 mL times daily  Zofran 4 mg every 8 hours as needed for nausea  MiraLAX 17 g daily, (on hold for frequent bowel movements)     Gi PPX: Protonix 40 mg IV OD   DVT ppx: aspirin 81mg BID, till 8/6  Diet: NPO - SLP NG feeds  Dispo: PT/OT: acute rehab   Code status: Full    Angelique White MD  PGY-2 neurology resident

## 2025-08-05 NOTE — CARE PLAN
The patient's goals for the shift include      The clinical goals for the shift include Pt will have stable vital signs throughout the shift.    Over the shift, the patient remained stable throughout the shift. Pt up with assistance to chair. Awaiting MBSS.

## 2025-08-05 NOTE — PROGRESS NOTES
Physical Therapy    Physical Therapy Treatment    Patient Name: Juan Marsh  MRN: [id number]  Today's Date: 8/5/2025  Time Calculation  Start Time: 1432  Stop Time: 1455  Time Calculation (min): 23 min       Assessment/Plan   PT Assessment  PT Assessment Results: Decreased strength, Impaired balance, Decreased mobility  Barriers to Discharge Home: Physical needs, Caregiver assistance  Caregiver Assistance: Caregiver assistance needed per identified barriers - however, level of patient's required assistance exceeds assistance available at home  Physical Needs: Stair navigation into home limited by function/safety, Stair navigation to access bed limited by function/safety, Stair navigation to access bath limited by function/safety, In-home setup navigation limited by function/safety, Ambulating household distances limited by function/safety  Evaluation/Treatment Tolerance: Patient tolerated treatment well  Medical Staff Made Aware: Yes  Strengths: Premorbid level of function  Barriers to Participation: Comorbidities  End of Session Communication: Bedside nurse  Assessment Comment: patient was min A for mobility. balance and functional independence remain primary impairments. pt very motivated to work with PT. pt remains appropraite for continued skilled PT while inpatient for work on balance, strength and funcitonal indepenence.  End of Session Patient Position: Up in chair, Alarm off, not on at start of session  PT Plan  Inpatient/Swing Bed or Outpatient: Inpatient  PT Plan  Treatment/Interventions: Bed mobility, Transfer training, Gait training, Stair training, Balance training, Neuromuscular re-education, Strengthening, Endurance training, Range of motion, Therapeutic exercise, Therapeutic activity, Home exercise program, Positioning, Postural re-education  PT Plan: Ongoing PT  PT Frequency: 5 times per week  PT Discharge Recommendations: High intensity level of continued care  PT Recommended Transfer Status:  Assist x1, Assistive device (x1 to chair or BSC, x2 to bathroom)  PT - OK to Discharge: Yes      General Visit Information:   PT  Visit  PT Received On: 08/05/25  Response to Previous Treatment: Patient with no complaints from previous session.  Reason for Referral: 30 y.o. male presenting to ED nwith stroke symptoms including  progressive aphasia and vomiting. Imaging shows progression of right brain lesion with slight mass effect c/f Behcet's disease progression  Past Medical History Relevant to Rehab: smoking, alcohol use disorder, systemic (pericarditis, arthralgias, skin rash, recurrent orogenital ulcers, uveitis, retinal vasculitis, -ve HLAB51) and neuro Behcet's disease with multiple recurrences, some residual right sided weakness, and recent left hip surgery for avascular necrosis  Prior to Session Communication: Bedside nurse  Patient Position Received: Up in chair, Alarm off, not on at start of session  Family/Caregiver Present: No  General Comment: pt pleasant and agreeable to PT     Subjective   Precautions:  Precautions  Medical Precautions: Fall precautions  Precautions Comment: Aspiration precautions  Vital Signs:  Vital Signs  Vitals Session: Post PT  SpO2: 100 %  BP: 125/77  Patient Position: Sitting    Objective   Pain:  Pain Assessment  Pain Assessment: 0-10  0-10 (Numeric) Pain Score: 0 - No pain  Cognition:  Cognition  Orientation Level: Oriented X4  Following Commands: Follows all commands and directions without difficulty  Lines/Tubes/Drains:  NG/OG/Feeding Tube Small bore feeding tube 12 Fr Left nostril (Active)   Number of days: 6       PT Treatments:  Therapeutic Exercise  Therapeutic Exercise Performed: Yes  Therapeutic Exercise Activity 1: standing marching x 5, heel raise x 5           Ambulation/Gait Training  Ambulation/Gait Training Performed: Yes  Ambulation/Gait Training 1  Surface 1: Level tile  Device 1: Rolling walker  Assistance 1: Minimum assistance, Minimal verbal cues (mod A  with turning)  Quality of Gait 1: Knee(s) buckle, Narrow base of support, Diminished heel strike, Decreased step length, Forward flexed posture (slow se)  Comments/Distance (ft) 1: 25 ft  Transfers  Transfer: Yes  Transfer 1  Technique 1: Sit to stand, Stand to sit  Transfer Device 1: Walker  Transfer Level of Assistance 1: Minimum assistance, Moderate verbal cues, Moderate tactile cues  Trials/Comments 1: x2 trials, cues for sequencing and safe handplacement with trasnfer             Outcome Measures:  [facility] Basic Mobility  Turning from your back to your side while in a flat bed without using bedrails: A little  Moving from lying on your back to sitting on the side of a flat bed without using bedrails: A little  Moving to and from bed to chair (including a wheelchair): A little  Standing up from a chair using your arms (e.g. wheelchair or bedside chair): A little  To walk in hospital room: A lot  Climbing 3-5 steps with railing: Total  Basic Mobility - Total Score: 15                            Education Documentation  Mobility Training, taught by Kimmy Merritt, PT at 8/5/2025  3:16 PM.  Learner: Patient  Readiness: Acceptance  Method: Explanation  Response: Verbalizes Understanding  Comment: safety and sequencing with mobility    Education Comments  No comments found.          OP EDUCATION:       Encounter Problems       Encounter Problems (Active)       Balance       Maintains static standing balance with upper extremity support with close supervision for completion of functional tasks.  (Progressing)       Start:  07/30/25    Expected End:  08/08/25            Maintains static and dynamic sitting balance with upper extremity support with distant supervision.  (Progressing)       Start:  07/30/25    Expected End:  08/08/25               Mobility       Patient will ambulate household distance 50ft with CGA using LRD.  (Progressing)       Start:  07/30/25    Expected End:  08/08/25               PT  Transfers        Patient will perform functional transfers with CGA using LRD.  (Progressing)       Start:  07/30/25    Expected End:  08/08/25       Tinetti>24 to reflect improvement in balance and decreased falls risk                    08/05/25 at 3:17 PM   Kimmy Merritt, PT   Rehab Office: [phone]

## 2025-08-06 ENCOUNTER — APPOINTMENT (OUTPATIENT)
Dept: RADIOLOGY | Facility: HOSPITAL | Age: 30
End: 2025-08-06
Payer: COMMERCIAL

## 2025-08-06 LAB
ALBUMIN SERPL BCP-MCNC: 4.1 G/DL (ref 3.4–5)
ANION GAP SERPL CALC-SCNC: 13 MMOL/L (ref 10–20)
BASOPHILS # BLD MANUAL: 0 X10*3/UL (ref 0–0.1)
BASOPHILS NFR BLD MANUAL: 0 %
BLASTS # BLD MANUAL: 0 X10*3/UL
BLASTS NFR BLD MANUAL: 0 %
BUN SERPL-MCNC: 26 MG/DL (ref 6–23)
BURR CELLS BLD QL SMEAR: ABNORMAL
CALCIUM SERPL-MCNC: 9.5 MG/DL (ref 8.6–10.6)
CHLORIDE SERPL-SCNC: 104 MMOL/L (ref 98–107)
CO2 SERPL-SCNC: 25 MMOL/L (ref 21–32)
CREAT SERPL-MCNC: 0.66 MG/DL (ref 0.5–1.3)
EGFRCR SERPLBLD CKD-EPI 2021: >90 ML/MIN/1.73M*2
EOSINOPHIL # BLD MANUAL: 0 X10*3/UL (ref 0–0.7)
EOSINOPHIL NFR BLD MANUAL: 0 %
ERYTHROCYTE [DISTWIDTH] IN BLOOD BY AUTOMATED COUNT: 15.6 % (ref 11.5–14.5)
GLUCOSE SERPL-MCNC: 99 MG/DL (ref 74–99)
HCT VFR BLD AUTO: 34.6 % (ref 41–52)
HGB BLD-MCNC: 11.4 G/DL (ref 13.5–17.5)
IMM GRANULOCYTES # BLD AUTO: 0.09 X10*3/UL (ref 0–0.7)
IMM GRANULOCYTES NFR BLD AUTO: 0.6 % (ref 0–0.9)
LYMPHOCYTES # BLD MANUAL: 4.44 X10*3/UL (ref 1.2–4.8)
LYMPHOCYTES NFR BLD MANUAL: 30.8 %
MAGNESIUM SERPL-MCNC: 2.22 MG/DL (ref 1.6–2.4)
MCH RBC QN AUTO: 31.6 PG (ref 26–34)
MCHC RBC AUTO-ENTMCNC: 32.9 G/DL (ref 32–36)
MCV RBC AUTO: 96 FL (ref 80–100)
METAMYELOCYTES # BLD MANUAL: 0 X10*3/UL
METAMYELOCYTES NFR BLD MANUAL: 0 %
MONOCYTES # BLD MANUAL: 0.49 X10*3/UL (ref 0.1–1)
MONOCYTES NFR BLD MANUAL: 3.4 %
MYELOCYTES # BLD MANUAL: 0 X10*3/UL
MYELOCYTES NFR BLD MANUAL: 0 %
NEUTROPHILS # BLD MANUAL: 9.48 X10*3/UL (ref 1.2–7.7)
NEUTS BAND # BLD MANUAL: 0 X10*3/UL (ref 0–0.7)
NEUTS BAND NFR BLD MANUAL: 0 %
NEUTS SEG # BLD MANUAL: 9.48 X10*3/UL (ref 1.2–7)
NEUTS SEG NFR BLD MANUAL: 65.8 %
NIL(NEG) CONTROL SPOT COUNT: NORMAL
NRBC BLD MANUAL-RTO: 0 % (ref 0–0)
NRBC BLD-RTO: 0 /100 WBCS (ref 0–0)
OVALOCYTES BLD QL SMEAR: ABNORMAL
PANEL A SPOT COUNT: 1
PANEL B SPOT COUNT: 0
PHOSPHATE SERPL-MCNC: 3.2 MG/DL (ref 2.5–4.9)
PLASMA CELLS # BLD MANUAL: 0 X10*3/UL
PLASMA CELLS NFR BLD MANUAL: 0 %
PLATELET # BLD AUTO: 290 X10*3/UL (ref 150–450)
POLYCHROMASIA BLD QL SMEAR: ABNORMAL
POS CONTROL SPOT COUNT: NORMAL
POTASSIUM SERPL-SCNC: 3.5 MMOL/L (ref 3.5–5.3)
PROMYELOCYTES # BLD MANUAL: 0 X10*3/UL
PROMYELOCYTES NFR BLD MANUAL: 0 %
RBC # BLD AUTO: 3.61 X10*6/UL (ref 4.5–5.9)
RBC MORPH BLD: ABNORMAL
SODIUM SERPL-SCNC: 138 MMOL/L (ref 136–145)
T-SPOT. TB INTERPRETATION: NEGATIVE
TOTAL CELLS COUNTED BLD: 117
VARIANT LYMPHS # BLD MANUAL: 0 X10*3/UL (ref 0–0.5)
VARIANT LYMPHS NFR BLD: 0 %
WBC # BLD AUTO: 14.4 X10*3/UL (ref 4.4–11.3)
WBC OTHER # BLD MANUAL: 0 X10*3/UL
WBC OTHER NFR BLD MANUAL: 0 %

## 2025-08-06 PROCEDURE — 2500000004 HC RX 250 GENERAL PHARMACY W/ HCPCS (ALT 636 FOR OP/ED)

## 2025-08-06 PROCEDURE — 2500000001 HC RX 250 WO HCPCS SELF ADMINISTERED DRUGS (ALT 637 FOR MEDICARE OP)

## 2025-08-06 PROCEDURE — 85027 COMPLETE CBC AUTOMATED: CPT

## 2025-08-06 PROCEDURE — 83735 ASSAY OF MAGNESIUM: CPT

## 2025-08-06 PROCEDURE — 97116 GAIT TRAINING THERAPY: CPT | Mod: GP

## 2025-08-06 PROCEDURE — 85007 BL SMEAR W/DIFF WBC COUNT: CPT

## 2025-08-06 PROCEDURE — 2500000002 HC RX 250 W HCPCS SELF ADMINISTERED DRUGS (ALT 637 FOR MEDICARE OP, ALT 636 FOR OP/ED)

## 2025-08-06 PROCEDURE — 2500000004 HC RX 250 GENERAL PHARMACY W/ HCPCS (ALT 636 FOR OP/ED): Mod: JZ

## 2025-08-06 PROCEDURE — 36415 COLL VENOUS BLD VENIPUNCTURE: CPT

## 2025-08-06 PROCEDURE — 99233 SBSQ HOSP IP/OBS HIGH 50: CPT | Performed by: PSYCHIATRY & NEUROLOGY

## 2025-08-06 PROCEDURE — 97110 THERAPEUTIC EXERCISES: CPT | Mod: GP

## 2025-08-06 PROCEDURE — 80069 RENAL FUNCTION PANEL: CPT

## 2025-08-06 PROCEDURE — 1100000001 HC PRIVATE ROOM DAILY

## 2025-08-06 RX ORDER — NAPROXEN SODIUM 220 MG/1
81 TABLET, FILM COATED ORAL DAILY
Status: DISCONTINUED | OUTPATIENT
Start: 2025-08-06 | End: 2025-08-07

## 2025-08-06 RX ORDER — POTASSIUM CHLORIDE 1.5 G/1.58G
20 POWDER, FOR SOLUTION ORAL 2 TIMES DAILY
Status: COMPLETED | OUTPATIENT
Start: 2025-08-06 | End: 2025-08-06

## 2025-08-06 RX ORDER — ENOXAPARIN SODIUM 100 MG/ML
40 INJECTION SUBCUTANEOUS EVERY 24 HOURS
Status: DISCONTINUED | OUTPATIENT
Start: 2025-08-06 | End: 2025-08-08 | Stop reason: HOSPADM

## 2025-08-06 RX ADMIN — METOCLOPRAMIDE HYDROCHLORIDE 10 MG: 5 INJECTION INTRAMUSCULAR; INTRAVENOUS at 21:49

## 2025-08-06 RX ADMIN — ENOXAPARIN SODIUM 40 MG: 100 INJECTION SUBCUTANEOUS at 08:43

## 2025-08-06 RX ADMIN — SULFAMETHOXAZOLE AND TRIMETHOPRIM 1 TABLET: 800; 160 TABLET ORAL at 08:43

## 2025-08-06 RX ADMIN — METOCLOPRAMIDE HYDROCHLORIDE 10 MG: 5 INJECTION INTRAMUSCULAR; INTRAVENOUS at 13:45

## 2025-08-06 RX ADMIN — MYCOPHENOLATE MOFETIL 1000 MG: 200 POWDER, FOR SUSPENSION ORAL at 08:43

## 2025-08-06 RX ADMIN — ASPIRIN 81 MG: 81 TABLET, CHEWABLE ORAL at 09:00

## 2025-08-06 RX ADMIN — POTASSIUM CHLORIDE 20 MEQ: 1.5 POWDER, FOR SOLUTION ORAL at 20:24

## 2025-08-06 RX ADMIN — POTASSIUM CHLORIDE 20 MEQ: 1.5 POWDER, FOR SOLUTION ORAL at 12:02

## 2025-08-06 RX ADMIN — METOCLOPRAMIDE HYDROCHLORIDE 10 MG: 5 INJECTION INTRAMUSCULAR; INTRAVENOUS at 05:47

## 2025-08-06 RX ADMIN — Medication 5 MG: at 17:54

## 2025-08-06 RX ADMIN — PANTOPRAZOLE SODIUM 40 MG: 40 INJECTION, POWDER, FOR SOLUTION INTRAVENOUS at 08:44

## 2025-08-06 RX ADMIN — PREDNISONE 60 MG: 20 TABLET ORAL at 08:43

## 2025-08-06 RX ADMIN — MYCOPHENOLATE MOFETIL 1000 MG: 200 POWDER, FOR SUSPENSION ORAL at 20:24

## 2025-08-06 ASSESSMENT — COGNITIVE AND FUNCTIONAL STATUS - GENERAL
MOVING TO AND FROM BED TO CHAIR: A LITTLE
MOVING FROM LYING ON BACK TO SITTING ON SIDE OF FLAT BED WITH BEDRAILS: A LITTLE
MOBILITY SCORE: 20
MOBILITY SCORE: 15
CLIMB 3 TO 5 STEPS WITH RAILING: A LITTLE
STANDING UP FROM CHAIR USING ARMS: A LITTLE
WALKING IN HOSPITAL ROOM: A LOT
STANDING UP FROM CHAIR USING ARMS: A LITTLE
MOVING TO AND FROM BED TO CHAIR: A LITTLE
TURNING FROM BACK TO SIDE WHILE IN FLAT BAD: A LITTLE
CLIMB 3 TO 5 STEPS WITH RAILING: TOTAL
WALKING IN HOSPITAL ROOM: A LITTLE

## 2025-08-06 ASSESSMENT — PAIN - FUNCTIONAL ASSESSMENT
PAIN_FUNCTIONAL_ASSESSMENT: 0-10

## 2025-08-06 ASSESSMENT — PAIN SCALES - GENERAL
PAINLEVEL_OUTOF10: 0 - NO PAIN

## 2025-08-06 NOTE — PROGRESS NOTES
"Nutrition Assessment    Reason for Assessment: Dietitian discretion (Follow up)    Recent events since last RDN visit:  8/4: failed SLP eval     Nutrition Intake Since Last RDN Visit:  Energy Intake: Good > 75 % (Tube feed rate appears to have reached gaol on 8/1-8/2 and has remained at this rate since)       Anthropometrics:  Height: 177.8 cm (5' 10\")   Weight: 63.8 kg (140 lb 10.5 oz)   BMI (Calculated): 20.18  IBW/kg (Dietitian Calculated): 72.7 kg  Percent of IBW: 87 %    Weight Change % This Admission:  Significant Weight Loss: No    Nutrition Focused Physical Exam Findings:    Subcutaneous Fat Loss:   Orbital Fat Pads: Mild-Moderate (slight dark circles and slight hollowing)  Buccal Fat Pads: Mild-Moderate (flat cheeks, minimal bounce)  Triceps: Mild-Moderate (less than ample fat tissue)  Muscle Wasting:  Temporalis: Mild-Moderate (slight depression)  Pectoralis (Clavicular Region): Mild-Moderate (some protrusion of clavicle)  Deltoid/Trapezius: Mild-Moderate (slight protrusion of acromion process)  Interosseous: Mild-Moderate (slightly depressed area between thumb and forefinger)  Trapezius/Infraspinatus/Supraspinatus (Scapular Region): Defer  Quadriceps: Severe (depressions on inner and outer thigh)  Gastrocnemius: Severe (minimal muscle definition)    Nutrition Significant Labs:  CBC Trend:   Results from last 7 days   Lab Units 08/06/25  0848 08/05/25  0757 08/04/25  0834 08/03/25  0636   WBC AUTO x10*3/uL 14.4* 12.5* 12.0* 16.9*   RBC AUTO x10*6/uL 3.61* 3.46* 3.30* 3.22*   HEMOGLOBIN g/dL 11.4* 10.9* 10.4* 10.1*   HEMATOCRIT % 34.6* 33.7* 32.0* 31.8*   MCV fL 96 97 97 99   PLATELETS AUTO x10*3/uL 290 278 280 300    , BMP Trend:   Results from last 7 days   Lab Units 08/06/25  0848 08/05/25  0757 08/04/25  0834 08/03/25  0636   GLUCOSE mg/dL 99 111* 97 139*   CALCIUM mg/dL 9.5 9.2 9.1 9.1   SODIUM mmol/L 138 139 143 140   POTASSIUM mmol/L 3.5 3.3* 3.2* 3.7   CO2 mmol/L 25 27 29 25   CHLORIDE mmol/L 104 " 105 106 104   BUN mg/dL 26* 27* 29* 26*   CREATININE mg/dL 0.66 0.75 0.80 0.71    , BG POCT trend:   Results from last 7 days   Lab Units 08/01/25  1246 07/31/25  1758 07/31/25  1139   POCT GLUCOSE mg/dL 165* 142* 145*    , Renal Lab Trend:   Results from last 7 days   Lab Units 08/06/25  0848 08/05/25  0757 08/04/25  0834 08/03/25  0636   POTASSIUM mmol/L 3.5 3.3* 3.2* 3.7   PHOSPHORUS mg/dL 3.2 3.2 2.9 2.5   SODIUM mmol/L 138 139 143 140   MAGNESIUM mg/dL 2.22 2.31 2.44* 2.73*   EGFR mL/min/1.73m*2 >90 >90 >90 >90   BUN mg/dL 26* 27* 29* 26*   CREATININE mg/dL 0.66 0.75 0.80 0.71      Scheduled medications  Scheduled Medications[1]    I/O:   Last BM Date: 08/06/25; Stool Appearance: Loose, Soft (08/06/25 0600)    Dietary Orders (From admission, onward)       Start     Ordered    07/31/25 1736  Enteral feeding with NPO Isosource 1.5; NG (nasogastric tube); 50; 300; Water; Every 6 hours  Diet effective now        Comments: Start @ 10ml/hr and increase by 10ml Q8H until goal of 50ml/hr is met.   Question Answer Comment   Tube feeding formula: Isosource 1.5    Feeding route: NG (nasogastric tube)    Tube feeding continuous rate (mL/hr): 50    Tube feeding flush (mL): 300    Flush type: Water    Flush frequency: Every 6 hours        07/31/25 1736    07/30/25 1818  May Not Participate in Room Service  ( ROOM SERVICE MAY NOT PARTICIPATE)  Once        Question:  .  Answer:  Yes    07/30/25 1817                     Estimated Needs:   Total Energy Estimated Needs in 24 hours (kCal):  (~1800)  Method for Estimating Needs: 25kcal/kg IBW  Total Protein Estimated Needs in 24 Hours (g):  (73-80)  Method for Estimating 24 Hour Protein Needs: 1.0-1.1g/kg IBW  Total Fluid Estimated Needs in 24 Hours (mL):  (per MD/team)        Nutrition Diagnosis   Malnutrition Diagnosis  Patient has Malnutrition Diagnosis: Yes  Diagnosis Status: New  Malnutrition Diagnosis: Moderate malnutrition related to chronic disease or condition  As  Evidenced by: mild subcutaneos fat loss, moderate muscle wasting  Additional Assessment Information: Despite intake meeting needs over the course of admission - suspect it is not enough time to improve nutrition status as it is likely chronic in nature given comorbidities    Nutrition Diagnosis  Patient has Nutrition Diagnosis: Yes  Diagnosis Status (1): Active  Nutrition Diagnosis 1: Swallowing difficulty  Related to (1): AMS  As Evidenced by (1): ongoing SLP recommendation of NPO + small bore feeding tube placement       Nutrition Interventions/Recommendations        Nutrition Recommendations:  Individualized Nutrition Prescription Provided for : enteral nutrition: Recommend to slightly increase TF to new goal: Isosource 1.5 @ 55ml/hr.    Nutrition Interventions/Goals:   Enteral Intake: Management of delivery rate of enteral nutrition  Goal: TF = 1980kcals and 90g pro      Education Documentation  No documentation found.            Nutrition Monitoring and Evaluation   Enteral and Parenteral Nutrition Intake Determination: Enteral nutrition intake - Tolerate TF at goal rate                   Goal Status: Some progress toward goal(s)    Time Spent (min): 30 minutes              [1] aspirin, 81 mg, nasogastric tube, Daily  enoxaparin, 40 mg, subcutaneous, q24h  melatonin, 5 mg, oral, Daily  metoclopramide, 10 mg, intravenous, q8h YONNY  mycophenolate, 1,000 mg, nasogastric tube, BID  pantoprazole, 40 mg, intravenous, Daily  potassium chloride, 20 mEq, nasogastric tube, BID  [Held by provider] predniSONE, 10 mg, oral, q24h  predniSONE, 60 mg, oral, Daily  sulfamethoxazole-trimethoprim, 1 tablet, nasogastric tube, Once per day on Monday Wednesday Friday

## 2025-08-06 NOTE — PROGRESS NOTES
"Juan Marsh is a 30 y.o. male on day 8 of admission presenting with Behcet's syndrome, neurologic type (Multi).      Subjective   No acute overnight events, has drooling, he verbalized more words with notable dysarthria, denied any other complaints.     Objective     Last Recorded Vitals  Blood pressure 93/84, pulse 51, temperature 36.6 °C (97.9 °F), temperature source Temporal, resp. rate 17, height 1.778 m (5' 10\"), weight 63.8 kg (140 lb 10.5 oz), SpO2 100%.      Physical Exam  Neurological Exam  MENTAL STATUS:  - awake alert, following commands. Able to follow 2 complex commands. Non- verbal. ( But when asked verbalized few phrase with dysarthria) Uses thumbs up/down and points to letters on boards, oriented to self, situation, and year.      CRANIAL NERVES:  - CN II: R pupil 5 mm constricts to 3 mm with light L pupil 4 mm constricts to 2 mm with light briskly.  Visual fields intact bilaterally   - CN III, IV, VI: slight inferior gaze at baseline , and subtle R eyelid ptosis have, all EOM and ptosis significantly improved since admission.   - CN V: equal facial sensation   - CN VII: symmetric face at baseline, Subtle Left facial droop at smile improves with full smile, drooling noted, Symmetric eyebrow elevation.  - CN VIII: equal hearing   - CN XI: Slight weakness with R shoulder shrug.  - CN XII: tongue is midline and equal movement.     MOTOR:   Clasp knife spasticity noted on the right arm, intact tone muscle bulk allover.   RUL: 4/5 in shoulder abduction, elbow flexion, extension and finger flexion, 5/5 in EDUARDO, and both lower extremities.     REFLEXES:        - Brisk 3+ biceps and brachioradialis reflexes bilaterally, more on the right, present ugalde on the right. Pectoral reflex present bilaterally.  - 3+ suprapatellar and patellar reflexes bilaterally more in the right, right ankle in +4 with few beat of clonus, elicited sustained clonus. Left has 3 beats of clonus. Babinski not present " bilaterally.      SENSATION:  Reported equal sensation, intact joint position.     COORDINATION: intact finger to nose on the left, noted dysmetria in the right upper extremity.    Gait: Deferred     Labs:  Last CBC:  Lab Results   Component Value Date    WBC 14.4 (H) 08/06/2025    HGB 11.4 (L) 08/06/2025    HCT 34.6 (L) 08/06/2025    MCV 96 08/06/2025     08/06/2025       Last RFP:  Lab Results   Component Value Date    GLUCOSE 99 08/06/2025    CALCIUM 9.5 08/06/2025     08/06/2025    K 3.5 08/06/2025    CO2 25 08/06/2025     08/06/2025    BUN 26 (H) 08/06/2025    CREATININE 0.66 08/06/2025       Imaging:  === 07/30/25 ===  CT ABDOMEN PELVIS w IV CONTRAST    IMPRESSION:  1. Posterior right lower lobe patchy ground-glass opacities which may  represent atelectasis versus early infectious/inflammatory changes.  No evidence of infection within the abdomen/pelvis.  2. Evaluation for GI hemorrhage is limited secondary to single phase  of contrast. Hyperdense material within the ascending colon favored  to represent enteric contents as opposed to active hemorrhage.  Otherwise, no evidence of bleeding.  3. Other chronic/incidental findings as above.    === 07/30/25 ===  XR ABDOMEN 1 VIEW    IMPRESSION:  Enteric tube tip projects over the right upper quadrant.    === 07/29/25 ===    MR BRAIN W AND WO CONTRAST    - Impression -  Compared to MRI brain 05/20/2025, there has been enlargement of the  infiltrative lesion involving portions of the right cristobal tyrone, right  cerebral peduncle, right posterior limb internal capsule extending  into the medial right basal ganglia and corona radiata. The greatest  degrees of new structure involvement are seen within the right globus  pallidus, ventral lateral thalamus and more caudally in the tyrone.  This results in expansile edema of the aforementioned structures with  surrounding mass-effect to a significantly greater degree compared to  prior study. There is increased  mass-effect on the hypothalamus and  right mammillary bodies. There is also increased mass effect on the  right lateral ventricle causing increased leftward bowing of the  septum pellucidum and some increased effacement of the 3rd ventricle.  There is also new patchy enhancement within this lesion, most  pronounced in the cerebral peduncle, right globus pallidus, ventral  medial thalamus, and posterior limb of the right internal capsule.  There is also increased edema within the right optic tract and right  aspect of the optic chiasm.    The other pre-existing lesion known to involve the left cerebral  peduncle and left internal capsule extending into the left corona  radiata is overall decreased in size and signal intensity on FLAIR  images compared to prior study; however, there is a tiny focus of new  enhancement within the left posterior limb internal capsule.    Assessment & Plan  Behcet's syndrome, neurologic type (Multi)      Juan Marsh is a 30 y.o. Right-handed AA male smoker with PMHx notable for heavy alcohol use, systemic (pericarditis, arthralgias, skin rash, recurrent orogenital ulcers, uveitis, retinal vasculitis, but negative [id number]) and neuro Behcet's disease on prednisone 10mg daily and MMF 500mg BID recurrent neurological episodes sine 2021 with recurrent bilateral cascade signs in the setting of poor compliance with immunomodulation (previously treated with AZA, humira, and most recently MMF). His acute attacks have previously responded well to IVMP and a single dose of cyclophosphamide recent left hip surgery for avascular necrosis who presented to the ED (7/28/2025) with AMS and being no-verbal and vomiting. MRI showed large T2 hyperintense lesion in the right lentiform nucleus, internal capsule, thalamus, and upper midbrain with mass effect and a small similar lesion on the left s/p 5 days IV methypred, and is on prednisone taper and s/p first dose infusion of infliximab  5 mg /kg on  8/4/2025 which was tolerated well. He is clinically improving, pending MBBS for swallowing and repeat MRI on Friday.     # Neuro Behcet's disease progression with AMS and new partial right third nerve palsy, improving.   :: S/p IV solumedrol 1 G daily for 5 dose (7/30 to 8/2/2025).  :: S/p  Infliximab first dose infusion 5 mg /kg (on 8/4/2025)  Continue Prednisone (60mg oral) for long taper.   Continue MMF 1,000 BID.   Continue bactrim 3 times weekly for PJP prophylaxis.   Repeat MRI head with and without contrast on Friday (8/8/2025)  Scheduled next doses of inflimximab (5 mg / kg per dose) at weeks 2 and 6 then every eight weeks.   Follow up with  after discharge.       # Dysphagia   # Hiccups, improving   Continue tube feeding through [facility].   Speech therapy follow up, MBBS planned for today    PPI 40 mg once daily   Continue metoclopramide 10 mg TID       #recent left hip surgery 7/10/2025 secondary to avascular necrosis   Ortho removed staples (8/2/2025). Weight-bearing as tolerated)   S/p aspirin (81mg BID) as DVT prophylaxis until (8/6/2025)   Start Lovenox for DVT prophylaxis  Resume home daily aspirin 81 mg for secondary stroke prevention.   Tylenol PRN    #Concern for pneumonia in right lower lobe, resolved   # leukocytosis, likely reactive from steroids   :: MRSA nares negative, s/p vancomycin   :: blood culture is negative   :: zosyn discontinued (8/2/2025)   Continue pulmonary hygiene measures     # Miscellaneous  Thiamine 500 mg 3 times daily for 3 days followed by 100 mL times daily  Zofran 4 mg every 8 hours as needed for nausea  MiraLAX 17 g daily, (on hold for frequent bowel movements)     Gi PPX: Protonix 40 mg IV OD   DVT ppx: Lovenox   Diet: NPO - SLP NG feeds  Dispo: PT/OT: acute rehab   Code status: Full    Angelique White MD  PGY-2 neurology resident

## 2025-08-06 NOTE — CARE PLAN
The patient's goals for the shift include      The clinical goals for the shift include Pt will have stable neurological status throughout the shift.    Over the shift, the patient remained stable throughout the shift. Pt awaiting MBSS in am.

## 2025-08-06 NOTE — PROGRESS NOTES
Physical Therapy    Physical Therapy Treatment    Patient Name: Juan Marsh  MRN: [id number]  Department: [facility] [id number]  Room: [address] [place] [address]  Today's Date: 8/6/2025  Time Calculation  Start Time: 1042  Stop Time: 1110  Time Calculation (min): 28 min    Assessment/Plan   PT Assessment  Barriers to Discharge Home: Physical needs, Caregiver assistance  Caregiver Assistance: Caregiver assistance needed per identified barriers - however, level of patient's required assistance exceeds assistance available at home  Physical Needs: Stair navigation into home limited by function/safety, Stair navigation to access bed limited by function/safety, Stair navigation to access bath limited by function/safety, In-home setup navigation limited by function/safety, Ambulating household distances limited by function/safety  Evaluation/Treatment Tolerance: Patient tolerated treatment well  Medical Staff Made Aware: Yes  End of Session Communication: Bedside nurse, Physician  Assessment Comment: Pt motivated with excellent effort.  Able to ambulate further in pathak today.  Still high falls risk and remains appropriate for high intensity PT at time of d/c.  End of Session Patient Position: Up in chair, Alarm on  PT Plan  Inpatient/Swing Bed or Outpatient: Inpatient  PT Plan  Treatment/Interventions: Bed mobility, Transfer training, Gait training, Stair training, Balance training, Neuromuscular re-education, Strengthening, Endurance training, Range of motion, Therapeutic exercise, Therapeutic activity, Home exercise program, Positioning, Postural re-education  PT Plan: Ongoing PT  PT Frequency: 5 times per week  PT Discharge Recommendations: High intensity level of continued care  PT Recommended Transfer Status: Assist x1, Assistive device (x1 to chair or BSC, x2 to bathroom)  PT - OK to Discharge: Yes    PT Visit Info:  PT Received On: 08/06/25  Response to Previous Treatment: Patient with no complaints from previous session.     General  Visit Information:   General  Family/Caregiver Present: No  Prior to Session Communication: Bedside nurse  Patient Position Received: Bed, 3 rail up  General Comment: Supine.  Pt very pleasant, cooperative and agreeable to PT.  Able to ambulate further in hallway today.  Tolerated well.    Subjective   Precautions:  Precautions  Medical Precautions: Fall precautions    Objective   Pain:  Pain Assessment  Pain Assessment: 0-10  0-10 (Numeric) Pain Score: 0 - No pain  Cognition:  Cognition  Overall Cognitive Status: Within Functional Limits  Arousal/Alertness: Appropriate responses to stimuli  Orientation Level: Oriented X4  Following Commands: Follows all commands and directions without difficulty  Impulsive: Mildly    Activity Tolerance:  Activity Tolerance  Endurance: Endurance does not limit participation in activity  Treatments:  Therapeutic Exercise  Therapeutic Exercise Activity 1: Sitting EOB: LAQ, hip flx x10.  Standing: marches, heel raises x10    Bed Mobility 1  Bed Mobility 1: Supine to sitting  Level of Assistance 1: Contact guard, Minimal verbal cues, Minimal tactile cues    Ambulation/Gait Training 1  Surface 1: Level tile, Carpet  Device 1: Rolling walker  Assistance 1: Moderate assistance, Moderate verbal cues, Moderate tactile cues  Quality of Gait 1: Wide base of support, Inconsistent stride length, Decreased step length, Soft knee(s), Knee(s) buckle, Ataxic (L leg lagging, occasional toe catch and buckle)  Comments/Distance (ft) 1: 4 x 40 feet  Transfer 1  Transfer From 1: Sit to, Stand to  Transfer to 1: Sit  Transfer Device 1: Walker  Transfer Level of Assistance 1: Minimum assistance, Moderate verbal cues, Moderate tactile cues  Trials/Comments 1: x5 this session  Transfers 2  Transfer From 2: Bed to  Transfer to 2: Chair with arms  Transfer Device 2: Walker  Transfer Level of Assistance 2: Minimum assistance, Moderate verbal cues, Moderate tactile cues    Outcome Measures:    [facility] Basic  Mobility  Turning from your back to your side while in a flat bed without using bedrails: A little  Moving from lying on your back to sitting on the side of a flat bed without using bedrails: A little  Moving to and from bed to chair (including a wheelchair): A little  Standing up from a chair using your arms (e.g. wheelchair or bedside chair): A little  To walk in hospital room: A lot  Climbing 3-5 steps with railing: Total  Basic Mobility - Total Score: 15    Education Documentation  Home Exercise Program, taught by Brenton Batista, PT at 8/6/2025 12:50 PM.  Learner: Patient  Readiness: Acceptance  Method: Explanation  Response: Verbalizes Understanding    Body Mechanics, taught by Brenton Batista PT at 8/6/2025 12:50 PM.  Learner: Patient  Readiness: Acceptance  Method: Explanation  Response: Verbalizes Understanding    Mobility Training, taught by Brenton Batista PT at 8/6/2025 12:50 PM.  Learner: Patient  Readiness: Acceptance  Method: Explanation  Response: Verbalizes Understanding    Education Comments  No comments found.    OP EDUCATION:       Encounter Problems       Encounter Problems (Active)       Balance       Maintains static standing balance with upper extremity support with close supervision for completion of functional tasks.  (Progressing)       Start:  07/30/25    Expected End:  08/08/25            Maintains static and dynamic sitting balance with upper extremity support with distant supervision.  (Progressing)       Start:  07/30/25    Expected End:  08/08/25               Mobility       Patient will ambulate household distance 50ft with CGA using LRD.  (Progressing)       Start:  07/30/25    Expected End:  08/08/25               PT Transfers        Patient will perform functional transfers with CGA using LRD.  (Progressing)       Start:  07/30/25    Expected End:  08/08/25       Tinetti>24 to reflect improvement in balance and decreased falls risk

## 2025-08-06 NOTE — CARE PLAN
The patient's goals for the shift include  RICK    The clinical goals for the shift include Patient will remain safe and free from injury overnight.    Over the shift, the patient was safe and had a stable exam. Patient was able to rest and had no s/sx of pain or discomfort.  Problem: Pain - Adult  Goal: Verbalizes/displays adequate comfort level or baseline comfort level  Outcome: Progressing     Problem: Safety - Adult  Goal: Free from fall injury  Outcome: Progressing     Problem: Discharge Planning  Goal: Discharge to home or other facility with appropriate resources  Outcome: Progressing     Problem: Chronic Conditions and Co-morbidities  Goal: Patient's chronic conditions and co-morbidity symptoms are monitored and maintained or improved  Outcome: Progressing     Problem: Nutrition  Goal: Nutrient intake appropriate for maintaining nutritional needs  Outcome: Progressing

## 2025-08-07 ENCOUNTER — APPOINTMENT (OUTPATIENT)
Dept: RADIOLOGY | Facility: HOSPITAL | Age: 30
End: 2025-08-07
Payer: COMMERCIAL

## 2025-08-07 PROBLEM — R41.82 ALTERED MENTAL STATUS, UNSPECIFIED ALTERED MENTAL STATUS TYPE: Status: RESOLVED | Noted: 2025-07-29 | Resolved: 2025-08-07

## 2025-08-07 LAB
ALBUMIN SERPL BCP-MCNC: 4.7 G/DL (ref 3.4–5)
ANION GAP SERPL CALC-SCNC: 18 MMOL/L (ref 10–20)
BASOPHILS # BLD AUTO: 0.03 X10*3/UL (ref 0–0.1)
BASOPHILS NFR BLD AUTO: 0.2 %
BUN SERPL-MCNC: 30 MG/DL (ref 6–23)
CALCIUM SERPL-MCNC: 10.1 MG/DL (ref 8.6–10.6)
CHLORIDE SERPL-SCNC: 99 MMOL/L (ref 98–107)
CO2 SERPL-SCNC: 24 MMOL/L (ref 21–32)
CREAT SERPL-MCNC: 0.88 MG/DL (ref 0.5–1.3)
EGFRCR SERPLBLD CKD-EPI 2021: >90 ML/MIN/1.73M*2
EOSINOPHIL # BLD AUTO: 0.13 X10*3/UL (ref 0–0.7)
EOSINOPHIL NFR BLD AUTO: 0.9 %
ERYTHROCYTE [DISTWIDTH] IN BLOOD BY AUTOMATED COUNT: 15.6 % (ref 11.5–14.5)
GLUCOSE SERPL-MCNC: 89 MG/DL (ref 74–99)
HCT VFR BLD AUTO: 39.3 % (ref 41–52)
HGB BLD-MCNC: 12.7 G/DL (ref 13.5–17.5)
IMM GRANULOCYTES # BLD AUTO: 0.13 X10*3/UL (ref 0–0.7)
IMM GRANULOCYTES NFR BLD AUTO: 0.9 % (ref 0–0.9)
LYMPHOCYTES # BLD AUTO: 5.08 X10*3/UL (ref 1.2–4.8)
LYMPHOCYTES NFR BLD AUTO: 35.6 %
MAGNESIUM SERPL-MCNC: 2.45 MG/DL (ref 1.6–2.4)
MCH RBC QN AUTO: 30.8 PG (ref 26–34)
MCHC RBC AUTO-ENTMCNC: 32.3 G/DL (ref 32–36)
MCV RBC AUTO: 95 FL (ref 80–100)
MONOCYTES # BLD AUTO: 0.87 X10*3/UL (ref 0.1–1)
MONOCYTES NFR BLD AUTO: 6.1 %
NEUTROPHILS # BLD AUTO: 8.03 X10*3/UL (ref 1.2–7.7)
NEUTROPHILS NFR BLD AUTO: 56.3 %
NRBC BLD-RTO: 0 /100 WBCS (ref 0–0)
PHOSPHATE SERPL-MCNC: 3.9 MG/DL (ref 2.5–4.9)
PLATELET # BLD AUTO: 343 X10*3/UL (ref 150–450)
POLYCHROMASIA BLD QL SMEAR: NORMAL
POTASSIUM SERPL-SCNC: 3.9 MMOL/L (ref 3.5–5.3)
RBC # BLD AUTO: 4.12 X10*6/UL (ref 4.5–5.9)
RBC MORPH BLD: NORMAL
SODIUM SERPL-SCNC: 137 MMOL/L (ref 136–145)
WBC # BLD AUTO: 14.3 X10*3/UL (ref 4.4–11.3)

## 2025-08-07 PROCEDURE — 2500000005 HC RX 250 GENERAL PHARMACY W/O HCPCS: Performed by: PSYCHIATRY & NEUROLOGY

## 2025-08-07 PROCEDURE — 2500000001 HC RX 250 WO HCPCS SELF ADMINISTERED DRUGS (ALT 637 FOR MEDICARE OP)

## 2025-08-07 PROCEDURE — 85025 COMPLETE CBC W/AUTO DIFF WBC: CPT

## 2025-08-07 PROCEDURE — 92611 MOTION FLUOROSCOPY/SWALLOW: CPT | Mod: GN

## 2025-08-07 PROCEDURE — 74230 X-RAY XM SWLNG FUNCJ C+: CPT | Performed by: RADIOLOGY

## 2025-08-07 PROCEDURE — 99233 SBSQ HOSP IP/OBS HIGH 50: CPT | Performed by: PSYCHIATRY & NEUROLOGY

## 2025-08-07 PROCEDURE — 97110 THERAPEUTIC EXERCISES: CPT | Mod: GP

## 2025-08-07 PROCEDURE — 36415 COLL VENOUS BLD VENIPUNCTURE: CPT

## 2025-08-07 PROCEDURE — 1100000001 HC PRIVATE ROOM DAILY

## 2025-08-07 PROCEDURE — 2500000004 HC RX 250 GENERAL PHARMACY W/ HCPCS (ALT 636 FOR OP/ED)

## 2025-08-07 PROCEDURE — 74230 X-RAY XM SWLNG FUNCJ C+: CPT

## 2025-08-07 PROCEDURE — 80069 RENAL FUNCTION PANEL: CPT

## 2025-08-07 PROCEDURE — 97116 GAIT TRAINING THERAPY: CPT | Mod: GP

## 2025-08-07 PROCEDURE — 83735 ASSAY OF MAGNESIUM: CPT

## 2025-08-07 RX ORDER — MYCOPHENOLATE MOFETIL 250 MG/1
1000 CAPSULE ORAL 2 TIMES DAILY
Status: DISCONTINUED | OUTPATIENT
Start: 2025-08-07 | End: 2025-08-07

## 2025-08-07 RX ORDER — METOCLOPRAMIDE 10 MG/1
10 TABLET ORAL EVERY 8 HOURS PRN
Status: CANCELLED
Start: 2025-08-07

## 2025-08-07 RX ORDER — METOCLOPRAMIDE 10 MG/1
10 TABLET ORAL 2 TIMES DAILY
Status: DISCONTINUED | OUTPATIENT
Start: 2025-08-07 | End: 2025-08-07

## 2025-08-07 RX ORDER — PANTOPRAZOLE SODIUM 40 MG/1
40 TABLET, DELAYED RELEASE ORAL
Status: DISCONTINUED | OUTPATIENT
Start: 2025-08-08 | End: 2025-08-08 | Stop reason: HOSPADM

## 2025-08-07 RX ORDER — PREDNISONE 20 MG/1
TABLET ORAL
Qty: 73 TABLET | Refills: 0 | Status: SHIPPED | OUTPATIENT
Start: 2025-08-08 | End: 2025-10-08

## 2025-08-07 RX ORDER — MYCOPHENOLATE MOFETIL 250 MG/1
1000 CAPSULE ORAL 2 TIMES DAILY
Status: DISCONTINUED | OUTPATIENT
Start: 2025-08-07 | End: 2025-08-08 | Stop reason: HOSPADM

## 2025-08-07 RX ORDER — NAPROXEN SODIUM 220 MG/1
81 TABLET, FILM COATED ORAL DAILY
Start: 2025-08-07

## 2025-08-07 RX ORDER — ACETAMINOPHEN 325 MG/1
650 TABLET ORAL EVERY 6 HOURS PRN
Start: 2025-08-07

## 2025-08-07 RX ORDER — NAPROXEN SODIUM 220 MG/1
81 TABLET, FILM COATED ORAL DAILY
Status: DISCONTINUED | OUTPATIENT
Start: 2025-08-08 | End: 2025-08-07

## 2025-08-07 RX ORDER — POLYETHYLENE GLYCOL 3350, SODIUM SULFATE ANHYDROUS, SODIUM BICARBONATE, SODIUM CHLORIDE, POTASSIUM CHLORIDE 236; 22.74; 6.74; 5.86; 2.97 G/4L; G/4L; G/4L; G/4L; G/4L
240 POWDER, FOR SOLUTION ORAL AS NEEDED
Start: 2025-08-07

## 2025-08-07 RX ORDER — SULFAMETHOXAZOLE AND TRIMETHOPRIM 800; 160 MG/1; MG/1
1 TABLET ORAL 3 TIMES WEEKLY
Status: DISCONTINUED | OUTPATIENT
Start: 2025-08-08 | End: 2025-08-08 | Stop reason: HOSPADM

## 2025-08-07 RX ORDER — POLYETHYLENE GLYCOL 3350 17 G/17G
17 POWDER, FOR SOLUTION ORAL DAILY PRN
Status: DISCONTINUED | OUTPATIENT
Start: 2025-08-07 | End: 2025-08-08 | Stop reason: HOSPADM

## 2025-08-07 RX ORDER — PREDNISONE 20 MG/1
60 TABLET ORAL DAILY
Status: DISCONTINUED | OUTPATIENT
Start: 2025-08-07 | End: 2025-08-08 | Stop reason: HOSPADM

## 2025-08-07 RX ORDER — PANTOPRAZOLE SODIUM 40 MG/1
40 TABLET, DELAYED RELEASE ORAL
Start: 2025-08-08

## 2025-08-07 RX ORDER — NAPROXEN SODIUM 220 MG/1
81 TABLET, FILM COATED ORAL DAILY
Status: DISCONTINUED | OUTPATIENT
Start: 2025-08-07 | End: 2025-08-08 | Stop reason: HOSPADM

## 2025-08-07 RX ADMIN — MYCOPHENOLATE MOFETIL 1000 MG: 250 CAPSULE ORAL at 12:20

## 2025-08-07 RX ADMIN — Medication 5 MG: at 19:32

## 2025-08-07 RX ADMIN — METOCLOPRAMIDE 10 MG: 10 TABLET ORAL at 10:14

## 2025-08-07 RX ADMIN — BARIUM SULFATE 30 ML: 400 SUSPENSION ORAL at 10:53

## 2025-08-07 RX ADMIN — BARIUM SULFATE 60 ML: 0.81 POWDER, FOR SUSPENSION ORAL at 10:53

## 2025-08-07 RX ADMIN — ACETAMINOPHEN 650 MG: 325 TABLET ORAL at 17:32

## 2025-08-07 RX ADMIN — BARIUM SULFATE 30 ML: 400 PASTE ORAL at 10:52

## 2025-08-07 RX ADMIN — METOCLOPRAMIDE HYDROCHLORIDE 10 MG: 5 INJECTION INTRAMUSCULAR; INTRAVENOUS at 05:14

## 2025-08-07 RX ADMIN — ENOXAPARIN SODIUM 40 MG: 100 INJECTION SUBCUTANEOUS at 10:15

## 2025-08-07 RX ADMIN — MYCOPHENOLATE MOFETIL 1000 MG: 250 CAPSULE ORAL at 20:07

## 2025-08-07 RX ADMIN — ASPIRIN 81 MG: 81 TABLET, CHEWABLE ORAL at 10:15

## 2025-08-07 RX ADMIN — PREDNISONE 60 MG: 20 TABLET ORAL at 10:13

## 2025-08-07 ASSESSMENT — COGNITIVE AND FUNCTIONAL STATUS - GENERAL
MOBILITY SCORE: 16
TURNING FROM BACK TO SIDE WHILE IN FLAT BAD: A LITTLE
MOVING TO AND FROM BED TO CHAIR: A LITTLE
STANDING UP FROM CHAIR USING ARMS: A LITTLE
MOVING FROM LYING ON BACK TO SITTING ON SIDE OF FLAT BED WITH BEDRAILS: A LITTLE
CLIMB 3 TO 5 STEPS WITH RAILING: A LOT
WALKING IN HOSPITAL ROOM: A LOT

## 2025-08-07 ASSESSMENT — PAIN SCALES - GENERAL
PAINLEVEL_OUTOF10: 0 - NO PAIN

## 2025-08-07 ASSESSMENT — PAIN - FUNCTIONAL ASSESSMENT
PAIN_FUNCTIONAL_ASSESSMENT: 0-10

## 2025-08-07 NOTE — PROGRESS NOTES
"Juan Marsh is a 30 y.o. male on day 9 of admission presenting with Behcet's syndrome, neurologic type (Multi).      Subjective   No acute overnight events. He passed MBBS this morning and diet changed to minced diet. He denied any complaints     Objective     Last Recorded Vitals  Blood pressure 118/81, pulse 91, temperature 36.8 °C (98.2 °F), temperature source Temporal, resp. rate 16, height 1.778 m (5' 10\"), weight 63.8 kg (140 lb 10.5 oz), SpO2 100%.      Physical Exam  Neurological Exam  MENTAL STATUS:  - awake alert, following commands. Able to follow 2 complex commands. Dysarthria  when talking, mainly uses hand gesture for communication     CRANIAL NERVES:  - CN II: R pupil 5 mm constricts to 3 mm with light L pupil 4 mm constricts to 2 mm with light briskly.  Visual fields intact bilaterally   - CN III, IV, VI: subtle inferior gaze at baseline , and subtle R eyelid ptosis have, all EOM and ptosis all significantly improved since admission.   - CN V: equal facial sensation   - CN VII: symmetric face at baseline, Subtle Left facial droop at smile improves with full smile, drooling noted, Symmetric eyebrow elevation.  - CN VIII: equal hearing   - CN XI: Slight weakness with R shoulder shrug.  - CN XII: tongue is midline and equal movement.     MOTOR:   Clasp knife spasticity noted on the right arm, intact tone muscle bulk allover.   RUL: 4/5 in shoulder abduction, elbow flexion, extension and finger flexion, 5/5 in EDUARDO, and both lower extremities.     REFLEXES:        - Brisk 3+ biceps and brachioradialis reflexes bilaterally, more on the right, present ugalde on the right. Pectoral reflex present bilaterally.  - 3+ suprapatellar and patellar reflexes bilaterally more in the right, right ankle in +4 with few beat of clonus, elicited sustained clonus. Left has 3 beats of clonus. Babinski not present bilaterally.      SENSATION:  Reported equal sensation, intact joint position.     COORDINATION: intact " finger to nose on the left, noted dysmetria in the right upper extremity.    Gait: Deferred     Labs:  Last CBC:  Lab Results   Component Value Date    WBC 14.4 (H) 08/06/2025    HGB 11.4 (L) 08/06/2025    HCT 34.6 (L) 08/06/2025    MCV 96 08/06/2025     08/06/2025       Last RFP:  Lab Results   Component Value Date    GLUCOSE 99 08/06/2025    CALCIUM 9.5 08/06/2025     08/06/2025    K 3.5 08/06/2025    CO2 25 08/06/2025     08/06/2025    BUN 26 (H) 08/06/2025    CREATININE 0.66 08/06/2025       Imaging:  === 07/30/25 ===  CT ABDOMEN PELVIS w IV CONTRAST    IMPRESSION:  1. Posterior right lower lobe patchy ground-glass opacities which may  represent atelectasis versus early infectious/inflammatory changes.  No evidence of infection within the abdomen/pelvis.  2. Evaluation for GI hemorrhage is limited secondary to single phase  of contrast. Hyperdense material within the ascending colon favored  to represent enteric contents as opposed to active hemorrhage.  Otherwise, no evidence of bleeding.  3. Other chronic/incidental findings as above.    === 07/30/25 ===  XR ABDOMEN 1 VIEW    IMPRESSION:  Enteric tube tip projects over the right upper quadrant.    === 07/29/25 ===    MR BRAIN W AND WO CONTRAST    - Impression -  Compared to MRI brain 05/20/2025, there has been enlargement of the  infiltrative lesion involving portions of the right cristobal tyrone, right  cerebral peduncle, right posterior limb internal capsule extending  into the medial right basal ganglia and corona radiata. The greatest  degrees of new structure involvement are seen within the right globus  pallidus, ventral lateral thalamus and more caudally in the tyrone.  This results in expansile edema of the aforementioned structures with  surrounding mass-effect to a significantly greater degree compared to  prior study. There is increased mass-effect on the hypothalamus and  right mammillary bodies. There is also increased mass effect on  the  right lateral ventricle causing increased leftward bowing of the  septum pellucidum and some increased effacement of the 3rd ventricle.  There is also new patchy enhancement within this lesion, most  pronounced in the cerebral peduncle, right globus pallidus, ventral  medial thalamus, and posterior limb of the right internal capsule.  There is also increased edema within the right optic tract and right  aspect of the optic chiasm.    The other pre-existing lesion known to involve the left cerebral  peduncle and left internal capsule extending into the left corona  radiata is overall decreased in size and signal intensity on FLAIR  images compared to prior study; however, there is a tiny focus of new  enhancement within the left posterior limb internal capsule.    Assessment & Plan  Behcet's syndrome, neurologic type (Multi)      Juan Marsh is a 30 y.o. Right-handed AA male smoker with PMHx notable for heavy alcohol use, systemic (pericarditis, arthralgias, skin rash, recurrent orogenital ulcers, uveitis, retinal vasculitis, but negative HLAB51) and neuro Behcet's disease on prednisone 10mg daily and MMF 500mg BID recurrent neurological episodes sine 2021 with recurrent bilateral cascade signs in the setting of poor compliance with immunomodulation (previously treated with AZA, humira, and most recently MMF). His acute attacks have previously responded well to IVMP and a single dose of cyclophosphamide recent left hip surgery for avascular necrosis who presented to the ED (7/28/2025) with AMS and being no-verbal and vomiting. MRI showed large T2 hyperintense lesion in the right lentiform nucleus, internal capsule, thalamus, and upper midbrain with mass effect and a small similar lesion on the left s/p 5 days IV methypred, and is on prednisone taper and s/p first dose infusion of infliximab  5 mg /kg on 8/4/2025 which was tolerated well. He is clinically improving, pending MBBS for swallowing and repeat  MRI on Friday.     # Neuro Behcet's disease progression with AMS and new partial right third nerve palsy, improving.   :: S/p IV solumedrol 1 G daily for 5 dose (7/30 to 8/2/2025).  :: S/p  Infliximab first dose infusion 5 mg /kg (on 8/4/2025)  Continue Prednisone 60mg oral taper with decreasing 10 mg weekly until reaching 10 mg   Continue MMF 1,000 BID.   Continue bactrim 3 times weekly for PJP prophylaxis.   Repeat MRI head with and without contrast on Friday (8/8/2025)  Scheduled next doses of inflimximab (5 mg / kg per dose) at weeks 2 and 6 then every eight weeks.   Follow up with  after discharge in 4-8 weeks.       # Dysphagia, improving   # Hiccups, improving   S/p tube feeding through Dobbhoff.   Speech evaluation: minced diet level 5   PPI 40 mg once daily   Discontinue metoclopramide and monitor for hiccups.        #recent left hip surgery 7/10/2025 secondary to avascular necrosis   Ortho removed staples (8/2/2025). Weight-bearing as tolerated)   S/p aspirin (81mg BID) as DVT prophylaxis until (8/6/2025)   continue Lovenox for DVT prophylaxis  continue home daily aspirin 81 mg for secondary stroke prevention.   Tylenol PRN    #Concern for pneumonia in right lower lobe, resolved   # leukocytosis, likely reactive from steroids   :: MRSA nares negative, s/p vancomycin   :: blood culture is negative   :: zosyn discontinued (8/2/2025)   Continue pulmonary hygiene measures     # Miscellaneous  Thiamine 500 mg 3 times daily for 3 days followed by 100 mL times daily  Zofran 4 mg every 8 hours as needed for nausea  MiraLAX 17 g (PRN for frequent bowel movements)     Gi PPX: Protonix 40 mg IV OD   DVT ppx: Lovenox   Diet: minced   Dispo: PT/OT: acute rehab   Code status: Full    Angelique White MD  PGY-2 neurology resident

## 2025-08-07 NOTE — CARE PLAN
The patient's goals for the shift include  rest    The clinical goals for the shift include Patient will remain safe    Over the shift, the patient was safe and had a stable exam. No s/sx of pain or discomfort overnight.  Problem: Pain - Adult  Goal: Verbalizes/displays adequate comfort level or baseline comfort level  Outcome: Progressing     Problem: Safety - Adult  Goal: Free from fall injury  Outcome: Progressing     Problem: Discharge Planning  Goal: Discharge to home or other facility with appropriate resources  Outcome: Progressing     Problem: Chronic Conditions and Co-morbidities  Goal: Patient's chronic conditions and co-morbidity symptoms are monitored and maintained or improved  Outcome: Progressing     Problem: Nutrition  Goal: Nutrient intake appropriate for maintaining nutritional needs  Outcome: Progressing

## 2025-08-07 NOTE — PROGRESS NOTES
Physical Therapy                 Therapy Communication Note    Patient Name: Juan Marsh  MRN: [id number]  Department: [facility]  Room: [facility]  Today's Date: 8/7/2025     Discipline: Physical Therapy    PT Missed Visit: Yes     Missed Visit Reason: Other (Comment) (Off division)    Missed Time: 903 Attempt

## 2025-08-07 NOTE — PROCEDURES
Speech-Language Pathology  Inpatient Modified Barium Swallow Study    Patient Name: Juan Marsh  MRN: [id number]  : 1995  Today's Date: 25  Start Time: 845  Stop Time: 915  Time Calculation (min): 30    Modified Barium Swallow Study completed. Informed verbal consent obtained prior to completion of exam. Trials of thin liquids via tsp/straw, mildly (nectar) thick liquids via tsp/straw, purees, and crushed solids in purees were given.     SLP: Lennox Elizabeth SLP   Contact info: [employer] secure chat    Reason for Referral: C/f aspiration/oropharyngeal dysphagia  Patient Hx: Juan Marsh is a 30 y.o. 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      BAT was called at 8:11 pm, NIHSS was 7 (1 LOC q, 2 aphasia, 2 dysarthria, 1 right facial droop, and 1 left leg drift (limited by pain) ), /86 and glucose 107. CTH revealed hypodensity in same territory as previously demonstrated lesions with stable to improved compression of ventricle without any obvious dilatation of ventricles. CTA H/N negative for LVO. BAT was cancelled.      Patient had recent hip surgery two weeks ago, he was doing well, then today mother noticed that at 10 am in the morning he was vomiting and not able to speak his baseline is that he had some delay in answering questions and can use some signs but never like this. They tried giving him some fluids then  Father found him on the floor drowsy at 7 pm , no tongue bites, abnormal movements or urine incontinence ( family noted that he has been having occasional urine  incontinence over the past dew weeks) He is complaint with all his medication but was not able to take the morning meds due to vomiting. They deny any recent illnesses or missed medications.   Respiratory Status: Room air  Current diet: NPO w/ alternate means nutrition/hydration    Pain:  Pain Scale: 0-10  Ratin    DIET RECOMMENDATIONS:   - Minced & Moist (IDDSI Level 5)  - Thin liquids (IDDSI Level 0); Of note, PT MUST FEED HIMSELF    STRATEGIES:  Upright for all PO intake  Remain upright for 20-30 min after eating  Small bites/sips  Straws ok  Slow rate of consumption  Pills per pt preference    SLP PLAN:  Skilled SLP Services: Skilled SLP intervention for dysphagia is warranted.  SLP Frequency: 1x per week   Treatment/Interventions:   - Compensatory strategy training  - Diet tolerance/advancement  - Patient/caregiver education    Discussed POC: patient  Discussed Risks/Benefits: Yes  Patient/Caregiver Agreeable: Yes    Short term goals established:  Pt/caregiver/family will demonstrate adequate return of knowledge re: dysphagia POC/diet recommendations/safe swallowing guidelines w/ 100% acc to effectively assist the patient in immediate safety with oral intake and swallowing.  Start Date: 2025  End Date: 2025  Status: Progressing     Pt will tolerate least restrictive diet with no overt clinical s/s aspiration 100% of the time.   Start Date: 2025  End Date: 2025  Status: Progressing    Education Provided: Results and recommendations per MBSS, with video review; recommendations and POC at this time. Verbal understanding and agreement given on all accounts.     Treatment Provided Today: SLP provided extensive education and training to pt/pt family regarding anatomy/physiology of swallow function, risk factors of aspiration/aspiration pna & how to mitigate factors, diet modifications, and the use of compensatory swallow strategies to promote pt safety upon PO intake.    Additional Medical  Consults Suggested:   N/A    Mechanics of the Swallow Summary:  ORAL PHASE:  Lip Closure - Impaired  Tongue Control During Bolus Hold - Intact  Bolus prep/mastication - Impaired  Bolus transport/lingual motion - Impaired  Oral residue - present    PHARYNGEAL PHASE:  Initiation of pharyngeal swallow - Impaired  Soft palate elevation - Intact  Laryngeal elevation - Intact  Anterior hyoid excursion - Intact  Epiglottic movement - Impaired  Laryngeal vestibule closure - Impaired  Pharyngeal stripping wave - Intact  Pharyngeal contraction (A/P view) - Intact  Pharyngoesophageal segment opening - Intact  Tongue base retraction - Intact  Pharyngeal residue - absent    ESOPHAGEAL PHASE:  Esophageal clearance - Intact    *Of note: The A-P bolus follow-through is not intended to be utilized as a diagnostic assessment of the esophagus, rather a tool to observe the biomechanical aspects of the swallow continuum and to inform the need for further evaluation by medical specialists, as applicable.     SLP Impressions with Severity Rating:   Pt awake/alert, consistently cooperative/able to follow commands, and responded appropriately to conversation/questions by SLP. On room air and w/ dobhoff in place.     Mild-moderate oropharyngeal dysphagia. Mildly prolonged manipulation of purees and crushed solids in purees; however, w/ consistent complete oral clearance.     Impairments most impacting pharyngeal swallow function include posterior loss of all consistencies to the level of the pyriforms and significantly delayed pharyngeal swallow initiation w/ clinician-fed trials. SILENT aspiration during the swallow w/ both thin liquids and purees administered by SLP 2/2 delayed swallow onset/laryngeal vestibular closure. Pt did not sense aspiration and made no independent effort to clear; cued cough successful in clearing thin liquids though unsuccessful w/ purees. With self-administered trials of thin liquids via straw and purees/crushed  solids in purees, pt w/ no penetration or aspiration across all remaining trials; suspect 2/2 pt requiring the sensory input and awareness associated w/ self-feeding. Adequate pharyngeal constriction w/ complete pharyngeal clearance throughout study.     Recommending Minced/Moist (Level 5) Solids & Thin Liquids; pt must feed himself. Ensure safe swallowing guidelines (listed below) are followed during all oral intake. SLP to continue to follow to ensure diet tolerance as pt appropriate/schedule permits. If pt presents with any changes to mental, medical, or respiratory status, please make NPO and alert SLP. RN/MD aware.      Rosenbek's Penetration Aspiration Scale  Thin Liquids: 8. SILENT ASPIRATION - contrast passes glottis, visible residue, NO pt response]   During the Swallow w/ clinician-fed trial  Nectar Thick Liquids: 1. NO ASPIRATION & NO PENETRATION - no aspiration, contrast does not enter airway  Honey Thick Liquids: Not Tested  Puree: 8. SILENT ASPIRATION - contrast passes glottis, visible residue, NO pt response]   During the Swallow w/ clinician-fed trial  Soft Solids: 1. NO ASPIRATION & NO PENETRATION - no aspiration, contrast does note enter airway

## 2025-08-07 NOTE — PROGRESS NOTES
Care Transitions Progress Note: Per medical team patient will be med ready on 8/8 after MRI.    [facility] has accepted and [facility] arranged transport for 330 pm on 8/8.  Medical team, patient and his mother are updated.  N2N report is [phone]

## 2025-08-07 NOTE — PROGRESS NOTES
Physical Therapy    Physical Therapy Treatment    Patient Name: Juan Marsh  MRN: [id number]  Department: [place] [id number]  Room: [address] [place], [place] [address]  Today's Date: 8/7/2025  Time Calculation  Start Time: 0955  Stop Time: 1034  Time Calculation (min): 39 min    Assessment/Plan   PT Assessment  Barriers to Discharge Home: Physical needs, Caregiver assistance  Caregiver Assistance: Caregiver assistance needed per identified barriers - however, level of patient's required assistance exceeds assistance available at home  Physical Needs: Stair navigation into home limited by function/safety, Stair navigation to access bed limited by function/safety, Stair navigation to access bath limited by function/safety, In-home setup navigation limited by function/safety, Ambulating household distances limited by function/safety  Evaluation/Treatment Tolerance: Patient tolerated treatment well  Medical Staff Made Aware: Yes  End of Session Communication: Bedside nurse  Assessment Comment: Pt motivated with excellent effort.  Able to ambulate further in pathak today again and begin stair navigation training.  Still high falls risk and remains appropriate for high intensity PT at time of d/c.  End of Session Patient Position: Up in chair, Alarm off, not on at start of session, Alarm off, caregiver present (RN present)  PT Plan  Inpatient/Swing Bed or Outpatient: Inpatient  PT Plan  Treatment/Interventions: Bed mobility, Transfer training, Gait training, Stair training, Balance training, Neuromuscular re-education, Strengthening, Endurance training, Range of motion, Therapeutic exercise, Therapeutic activity, Home exercise program, Positioning, Postural re-education  PT Plan: Ongoing PT  PT Frequency: 5 times per week  PT Discharge Recommendations: High intensity level of continued care  PT Recommended Transfer Status: Assist x1, Assistive device (x1 to chair or BSC, x2 to bathroom)  PT - OK to Discharge: Yes    PT Visit Info:  PT Received On:  08/07/25     General Visit Information:   General  Family/Caregiver Present: No  Prior to Session Communication: Bedside nurse  Patient Position Received: Up in chair, Alarm off, not on at start of session  General Comment: Supine.  Pt very pleasant, cooperative and agreeable to PT.  Able to ambulate further in hallway again today and also begin stair navigation training.  Tolerated well.    Subjective   Precautions:  Precautions  Medical Precautions: Fall precautions    Objective   Pain:  Pain Assessment  Pain Assessment: 0-10  0-10 (Numeric) Pain Score: 0 - No pain  Cognition:  Cognition  Overall Cognitive Status: Within Functional Limits  Arousal/Alertness: Appropriate responses to stimuli  Orientation Level: Oriented X4  Following Commands: Follows all commands and directions without difficulty    Activity Tolerance:  Activity Tolerance  Endurance: Endurance does not limit participation in activity  Treatments:  Therapeutic Exercise  Therapeutic Exercise Activity 1: Sitting in chair: LAQ, hip flx 3x10.  Stretch into DF 3x 30 seconds after hold/release 10 seconds.  Improved from neutral to +10. Standing: marches, heel raises x10    Bed Mobility  Bed Mobility: No (Up in chair pre/post)    Ambulation/Gait Training 1  Surface 1: Level tile, Carpet  Device 1: Rolling walker  Assistance 1: Moderate assistance, Moderate verbal cues, Moderate tactile cues (Primarily variable CGA/Min A, but requiring Mod A for LOB with scissoring)  Quality of Gait 1: Wide base of support, Inconsistent stride length, Decreased step length, Soft knee(s), Knee(s) buckle, Ataxic, Scissoring (L leg lagging, occasional toe catch and buckle, occasional scissor)  Comments/Distance (ft) 1: 4x 50 feet  Ambulation/Gait Training 2  Surface 2: Level tile, Carpet  Device 2: No device  Assistance 2: Moderate assistance, Moderate verbal cues, Moderate tactile cues  Quality of Gait 2: Wide base of support, Diminished heel strike, Inconsistent stride  length, Decreased step length, Soft knee(s), Knee(s) buckle, Scissoring  Comments/Distance (ft) 2: 3x 40 feet  Transfer 1  Transfer From 1: Sit to, Stand to  Transfer to 1: Sit  Transfer Device 1: Walker  Transfer Level of Assistance 1: Contact guard, Minimal verbal cues  Transfers 2  Transfer From 2: Sit to, Stand to  Transfer to 2: Sit  Transfer Device 2:  (no device)  Transfer Level of Assistance 2: Minimum assistance, Moderate verbal cues    Stairs  Stairs: Yes  Stairs  Rails 1: Right  Device 1: Railing  Assistance 1: Moderate assistance, Moderate verbal cues (Min A primarily but with Mod A for buckling LLE x2)  Comment/Number of Steps 1: Up/dn 8, both feet same step, 2 hands on rail    Outcome Measures:    [facility] Basic Mobility  Turning from your back to your side while in a flat bed without using bedrails: A little  Moving from lying on your back to sitting on the side of a flat bed without using bedrails: A little  Moving to and from bed to chair (including a wheelchair): A little  Standing up from a chair using your arms (e.g. wheelchair or bedside chair): A little  To walk in hospital room: A lot  Climbing 3-5 steps with railing: A lot  Basic Mobility - Total Score: 16    Education Documentation  Home Exercise Program, taught by Brenton Batista PT at 8/7/2025 12:19 PM.  Learner: Patient  Readiness: Eager  Method: Explanation  Response: Verbalizes Understanding    Body Mechanics, taught by Brenton Batista PT at 8/7/2025 12:19 PM.  Learner: Patient  Readiness: Eager  Method: Explanation  Response: Verbalizes Understanding    Mobility Training, taught by Brenton Batista PT at 8/7/2025 12:19 PM.  Learner: Patient  Readiness: Eager  Method: Explanation  Response: Verbalizes Understanding    Education Comments  No comments found.        OP EDUCATION:       Encounter Problems       Encounter Problems (Active)       Balance       Maintains static standing balance with upper extremity support with close  supervision for completion of functional tasks.  (Progressing)       Start:  07/30/25    Expected End:  08/08/25            Maintains static and dynamic sitting balance with upper extremity support with distant supervision.  (Progressing)       Start:  07/30/25    Expected End:  08/08/25               Mobility       Patient will ambulate household distance 50ft with CGA using LRD.  (Progressing)       Start:  07/30/25    Expected End:  08/08/25               PT Transfers        Patient will perform functional transfers with CGA using LRD.  (Progressing)       Start:  07/30/25    Expected End:  08/08/25       Tinetti>24 to reflect improvement in balance and decreased falls risk

## 2025-08-07 NOTE — DISCHARGE INSTRUCTIONS
Dear Juan Marsh,    You were admitted to [facility] on 7/29/2025  for a flare of Behçet’s disease. You  You received high-dose steroids and a new medication called infliximab, which helped improve your symptoms. Your swallowing improved enough for you to eat a minced food, and your overall condition has stabilized. You will be transferred to an acute rehab facility to continue your recovery and receive physical, occupational, and speech therapy.    Please follow the diet recommended by your speech therapist (minced diet).  Please come to the emergency department if you develop any new or worsening neurologic symptoms.    Medications Updates:  1- Decrease prednisone gradually until reaching prednisone:  Take 3 tablets (60 mg) by mouth once daily for 3 days.  Then take 2.5 tablets (50 mg) by mouth once daily for 7 days.  Then take 2 tablets (40 mg) by mouth once daily for 7 days.  Then take 1.5 tablets (30 mg) by mouth once daily for 7 days.  Then take 1 tablet (20 mg) by mouth once daily for 7 days.  Then take half a tablet (10 mg) by mouth once daily.    2- Infliximab   You will have to go to the infusion center to receive the follow up doses for inflixmab, next dose will be in 2 weeks then 4 weeks after that then every 8 weeks     Continue taking your previous medications as prescribed:   CellCept 1000 mg twice daily   Protonix 40 mg daily   Bactrim 3 times weekly   Calcium and vitamin D  Aspirin 81 mg daily       Follow-Up Appointments:  Follow-up primary care provider.  Follow-up with neurology ()       We wish you all the [facility]  [facility] Inpatient Neurology Team

## 2025-08-08 ENCOUNTER — APPOINTMENT (OUTPATIENT)
Dept: RADIOLOGY | Facility: HOSPITAL | Age: 30
End: 2025-08-08
Payer: COMMERCIAL

## 2025-08-08 VITALS
SYSTOLIC BLOOD PRESSURE: 142 MMHG | RESPIRATION RATE: 16 BRPM | WEIGHT: 140.65 LBS | DIASTOLIC BLOOD PRESSURE: 89 MMHG | OXYGEN SATURATION: 96 % | HEIGHT: 70 IN | HEART RATE: 118 BPM | TEMPERATURE: 97.7 F | BODY MASS INDEX: 20.14 KG/M2

## 2025-08-08 LAB
ALBUMIN SERPL BCP-MCNC: 4.8 G/DL (ref 3.4–5)
ANION GAP SERPL CALC-SCNC: 18 MMOL/L (ref 10–20)
BASOPHILS # BLD AUTO: 0.01 X10*3/UL (ref 0–0.1)
BASOPHILS NFR BLD AUTO: 0.1 %
BUN SERPL-MCNC: 38 MG/DL (ref 6–23)
CALCIUM SERPL-MCNC: 9.9 MG/DL (ref 8.6–10.6)
CHLORIDE SERPL-SCNC: 98 MMOL/L (ref 98–107)
CO2 SERPL-SCNC: 23 MMOL/L (ref 21–32)
CREAT SERPL-MCNC: 1.11 MG/DL (ref 0.5–1.3)
EGFRCR SERPLBLD CKD-EPI 2021: >90 ML/MIN/1.73M*2
EOSINOPHIL # BLD AUTO: 0.04 X10*3/UL (ref 0–0.7)
EOSINOPHIL NFR BLD AUTO: 0.3 %
ERYTHROCYTE [DISTWIDTH] IN BLOOD BY AUTOMATED COUNT: 15.8 % (ref 11.5–14.5)
GLUCOSE SERPL-MCNC: 122 MG/DL (ref 74–99)
HCT VFR BLD AUTO: 41 % (ref 41–52)
HGB BLD-MCNC: 13.4 G/DL (ref 13.5–17.5)
IMM GRANULOCYTES # BLD AUTO: 0.11 X10*3/UL (ref 0–0.7)
IMM GRANULOCYTES NFR BLD AUTO: 0.9 % (ref 0–0.9)
LYMPHOCYTES # BLD AUTO: 2.88 X10*3/UL (ref 1.2–4.8)
LYMPHOCYTES NFR BLD AUTO: 23.2 %
MAGNESIUM SERPL-MCNC: 2.47 MG/DL (ref 1.6–2.4)
MCH RBC QN AUTO: 31.2 PG (ref 26–34)
MCHC RBC AUTO-ENTMCNC: 32.7 G/DL (ref 32–36)
MCV RBC AUTO: 96 FL (ref 80–100)
MONOCYTES # BLD AUTO: 0.7 X10*3/UL (ref 0.1–1)
MONOCYTES NFR BLD AUTO: 5.6 %
NEUTROPHILS # BLD AUTO: 8.66 X10*3/UL (ref 1.2–7.7)
NEUTROPHILS NFR BLD AUTO: 69.9 %
NRBC BLD-RTO: 0 /100 WBCS (ref 0–0)
PHOSPHATE SERPL-MCNC: 4.7 MG/DL (ref 2.5–4.9)
PLATELET # BLD AUTO: 349 X10*3/UL (ref 150–450)
POTASSIUM SERPL-SCNC: 3.7 MMOL/L (ref 3.5–5.3)
RBC # BLD AUTO: 4.29 X10*6/UL (ref 4.5–5.9)
SODIUM SERPL-SCNC: 135 MMOL/L (ref 136–145)
WBC # BLD AUTO: 12.4 X10*3/UL (ref 4.4–11.3)

## 2025-08-08 PROCEDURE — A9575 INJ GADOTERATE MEGLUMI 0.1ML: HCPCS | Performed by: PSYCHIATRY & NEUROLOGY

## 2025-08-08 PROCEDURE — 85025 COMPLETE CBC W/AUTO DIFF WBC: CPT

## 2025-08-08 PROCEDURE — 83735 ASSAY OF MAGNESIUM: CPT

## 2025-08-08 PROCEDURE — 2500000002 HC RX 250 W HCPCS SELF ADMINISTERED DRUGS (ALT 637 FOR MEDICARE OP, ALT 636 FOR OP/ED)

## 2025-08-08 PROCEDURE — 97116 GAIT TRAINING THERAPY: CPT | Mod: GP

## 2025-08-08 PROCEDURE — 2550000001 HC RX 255 CONTRASTS: Performed by: PSYCHIATRY & NEUROLOGY

## 2025-08-08 PROCEDURE — 97110 THERAPEUTIC EXERCISES: CPT | Mod: GP

## 2025-08-08 PROCEDURE — 70553 MRI BRAIN STEM W/O & W/DYE: CPT

## 2025-08-08 PROCEDURE — 97110 THERAPEUTIC EXERCISES: CPT | Mod: GO

## 2025-08-08 PROCEDURE — 70553 MRI BRAIN STEM W/O & W/DYE: CPT | Performed by: RADIOLOGY

## 2025-08-08 PROCEDURE — 99233 SBSQ HOSP IP/OBS HIGH 50: CPT | Performed by: PSYCHIATRY & NEUROLOGY

## 2025-08-08 PROCEDURE — 92526 ORAL FUNCTION THERAPY: CPT | Mod: GN

## 2025-08-08 PROCEDURE — 97535 SELF CARE MNGMENT TRAINING: CPT | Mod: GO

## 2025-08-08 PROCEDURE — 84100 ASSAY OF PHOSPHORUS: CPT

## 2025-08-08 PROCEDURE — 2500000001 HC RX 250 WO HCPCS SELF ADMINISTERED DRUGS (ALT 637 FOR MEDICARE OP)

## 2025-08-08 PROCEDURE — 36415 COLL VENOUS BLD VENIPUNCTURE: CPT

## 2025-08-08 PROCEDURE — 2500000004 HC RX 250 GENERAL PHARMACY W/ HCPCS (ALT 636 FOR OP/ED)

## 2025-08-08 RX ORDER — GADOTERATE MEGLUMINE 376.9 MG/ML
15 INJECTION INTRAVENOUS
Status: COMPLETED | OUTPATIENT
Start: 2025-08-08 | End: 2025-08-08

## 2025-08-08 RX ADMIN — MYCOPHENOLATE MOFETIL 1000 MG: 250 CAPSULE ORAL at 09:22

## 2025-08-08 RX ADMIN — ASPIRIN 81 MG: 81 TABLET, CHEWABLE ORAL at 09:22

## 2025-08-08 RX ADMIN — GADOTERATE MEGLUMINE 12 ML: 376.9 INJECTION INTRAVENOUS at 08:43

## 2025-08-08 RX ADMIN — ENOXAPARIN SODIUM 40 MG: 100 INJECTION SUBCUTANEOUS at 09:22

## 2025-08-08 RX ADMIN — SULFAMETHOXAZOLE AND TRIMETHOPRIM 1 TABLET: 800; 160 TABLET ORAL at 09:22

## 2025-08-08 RX ADMIN — PANTOPRAZOLE SODIUM 40 MG: 40 TABLET, DELAYED RELEASE ORAL at 06:02

## 2025-08-08 RX ADMIN — PREDNISONE 60 MG: 20 TABLET ORAL at 09:22

## 2025-08-08 ASSESSMENT — PAIN SCALES - GENERAL
PAINLEVEL_OUTOF10: 0 - NO PAIN

## 2025-08-08 ASSESSMENT — COGNITIVE AND FUNCTIONAL STATUS - GENERAL
CLIMB 3 TO 5 STEPS WITH RAILING: A LOT
CLIMB 3 TO 5 STEPS WITH RAILING: A LOT
MOVING FROM LYING ON BACK TO SITTING ON SIDE OF FLAT BED WITH BEDRAILS: A LITTLE
MOBILITY SCORE: 16
DAILY ACTIVITIY SCORE: 15
TURNING FROM BACK TO SIDE WHILE IN FLAT BAD: A LITTLE
TOILETING: A LITTLE
DRESSING REGULAR UPPER BODY CLOTHING: A LITTLE
STANDING UP FROM CHAIR USING ARMS: A LITTLE
STANDING UP FROM CHAIR USING ARMS: A LITTLE
TOILETING: A LOT
PERSONAL GROOMING: A LITTLE
DRESSING REGULAR UPPER BODY CLOTHING: A LOT
MOVING TO AND FROM BED TO CHAIR: A LITTLE
DRESSING REGULAR LOWER BODY CLOTHING: A LITTLE
WALKING IN HOSPITAL ROOM: A LOT
MOBILITY SCORE: 19
WALKING IN HOSPITAL ROOM: A LITTLE
DRESSING REGULAR LOWER BODY CLOTHING: A LITTLE
HELP NEEDED FOR BATHING: A LITTLE
EATING MEALS: A LITTLE
HELP NEEDED FOR BATHING: A LOT
MOVING TO AND FROM BED TO CHAIR: A LITTLE
DAILY ACTIVITIY SCORE: 19
PERSONAL GROOMING: A LITTLE

## 2025-08-08 ASSESSMENT — PAIN SCALES - PAIN ASSESSMENT IN ADVANCED DEMENTIA (PAINAD)
TOTALSCORE: 0
BREATHING: NORMAL
BODYLANGUAGE: RELAXED
CONSOLABILITY: NO NEED TO CONSOLE
FACIALEXPRESSION: SMILING OR INEXPRESSIVE

## 2025-08-08 ASSESSMENT — PAIN - FUNCTIONAL ASSESSMENT
PAIN_FUNCTIONAL_ASSESSMENT: 0-10
PAIN_FUNCTIONAL_ASSESSMENT: 0-10

## 2025-08-08 ASSESSMENT — ACTIVITIES OF DAILY LIVING (ADL): HOME_MANAGEMENT_TIME_ENTRY: 30

## 2025-08-08 NOTE — PROGRESS NOTES
Speech-Language Pathology  Adult Inpatient Swallow Treatment    Patient Name: Juan Marsh  MRN: [id number]  Today's Date: 8/8/2025   Start Time: 1015  Stop Time: 1030  Time Calculation (min): 15    Impression:   Swallow treatment session to ensure diet tolerance completed. Pt demonstrated swallowing safety/efficiency w/ Minced/Moist (Level 5) Solids & Thin Liquids (pt MUST feed himself) per MBSS the previous date, 8/7. Received awake/alert and upright in chair for session w/ breakfast tray. Consistently cooperative/able to follow commands and responded appropriately to conversation/questions by SLP. Denies any difficulty on current diet though expresses dislike of solids textures.      Pt independently consumed minced/moist (level 5) solids and thin liquids via cup from breakfast tray w/ normal oral management and no overt clinical s/s aspiration; no coughing or changes in respiratory status/vocal quality.     Recommending Minced/Moist (Level 5) Solids & Thin Liquids; pt must feed himself. Ensure safe swallowing guidelines (listed below) are followed during all oral intake. SLP to continue to follow to ensure diet tolerance as pt appropriate/schedule permits. If pt presents with any changes to mental, medical, or respiratory status, please make NPO and alert SLP. RN/MD aware.       Recommendations:  Minced/Moist (Level 5) Solids & Thin Liquids; pt must feed himself  Upright for all PO intake  Remain upright for 20-30 min after eating  Small bites/sips  Straws ok  Slow rate of consumption  Pills per pt preference  SLP to continue to follow to ensure diet tolerance as pt appropriate/schedule permits  If pt presents with any changes to mental, medical, or respiratory status, please make NPO and alert SLP    Goal:   Pt/caregiver/family will demonstrate adequate return of knowledge re: dysphagia POC/diet recommendations/safe swallowing guidelines w/ 100% acc to effectively assist the patient in immediate safety  with oral intake and swallowing.  Start Date: 7/30/2025  End Date: 8/30/2025  Status: Progressing     Pt will tolerate least restrictive diet with no overt clinical s/s aspiration 100% of the time.   Start Date: 7/30/2025  End Date: 8/30/2025  Status: Progressing       Plan:  SLP Services Indicated: Yes  Frequency: 1x per week  Discussed POC with patient  SLP - OK to Discharge    Pain:   0-10  0 = No pain.     Inpatient Education:  Extensive education provided to patient regarding current swallow function, recommendations/results, and POC.      Consultations/Referrals/Coordination of Services:   N/A

## 2025-08-08 NOTE — PROGRESS NOTES
Occupational Therapy    Occupational Therapy Treatment    Name: Juan Marsh  MRN: [id number]  : 1995  Date: 25  Room: [address] [place] [address]      Time Calculation  Start Time: 922  Stop Time:   Time Calculation (min): 45 min  Assessment:  Barriers to Discharge Home: Caregiver assistance, Cognition needs, Physical needs  Caregiver Assistance: Caregiver assistance needed per identified barriers - however, level of patient's required assistance exceeds assistance available at home  Cognition Needs: Insight of patient limited regarding functional ability/needs  Physical Needs: 24hr mobility assistance needed, 24hr ADL assistance needed, High falls risk due to function or environment, In-home setup navigation limited by function/safety  Evaluation/Treatment Tolerance: Patient tolerated treatment well  Medical Staff Made Aware: Yes  End of Session Communication: Bedside nurse  End of Session Patient Position: Up in chair, Alarm off, not on at start of session  Plan:  Treatment Interventions: ADL retraining, Functional transfer training, UE strengthening/ROM, Endurance training, Cognitive reorientation, Fine motor coordination activities  OT Frequency: 5 times per week  OT Discharge Recommendations: High intensity level of continued care  OT Recommended Transfer Status: Minimal assist, Assist of 1  OT - OK to Discharge: Yes    Subjective   General:  OT Last Visit  OT Received On: 25  OT Missed Visit: Yes  Missed Visit Reason:  (@0830 - pt off the floor at MRI - will follow up once available.)  Prior to Session Communication: Bedside nurse  Patient Position Received: Up in chair, Alarm off, not on at start of session  Family/Caregiver Present: No  General Comment: Pt pleasant and agreeable to OT session - motivated throughout session   Precautions:  LE Weight Bearing Status: Weight Bearing as Tolerated (L LE)  Medical Precautions: Fall precautions  Lines/Tubes/Drains:  External Urinary Catheter (Active)  "  Number of days: 3   Cognition:  Overall Cognitive Status: Within Functional Limits  Arousal/Alertness: Appropriate responses to stimuli  Orientation Level: Oriented X4  Following Commands: Follows one step commands with increased time  Cognition Comments: Delayed response 2/2 expressive deficits    Pain Assessment:  Pain Assessment  Pain Assessment: 0-10  0-10 (Numeric) Pain Score: 0 - No pain     Objective   Activities of Daily Living:      Grooming  Grooming Level of Assistance: Contact guard  Grooming Where Assessed: Standing sinkside, Sitting sinkside  Grooming Comments: Pt beings oral hygiene standing at sink with FWW, CGA for safety, he requires setup assistance and cues to step closer to sink and increase safety. He requires increased time to complete. Pt sits to chair at sink part way through task 2/2 fatigue and to increase comfort/balance. Pt smiles in the mirror several times during activity and states \"this feels so good.\" Cues during task to engage R UE as pt tends to rely on L hand   LE Dressing  LE Dressing: Yes  Sock Level of Assistance: Contact guard  LE Dressing Where Assessed: Chair  LE Dressing Comments: CGA while in chair while leaning forward to reach to feet. Increased time to complete 2/2 impaired coordination in R hand   Bed Mobility/Transfers:      Transfers  Transfer: Yes  Transfer 1  Transfer From 1: Sit to, Stand to  Transfer to 1: Stand, Sit  Technique 1: Sit to stand, Stand to sit  Transfer Device 1: Walker  Transfer Level of Assistance 1: Contact guard  Trials/Comments 1: Cues for improved positioning and safety  Toilet Transfers  Toilet Transfer From: Chair  Toilet Transfer Type: To and from  Toilet Transfer to: Standard bedside commode  Toilet Transfer Technique: Ambulating  Toilet Transfers: Contact guard  Toilet Transfers Comments: Using FWW, cues for improved walker positioning including maintaining in front while turning   Therapy/Activity:   Therapeutic Exercise  Therapeutic " Exercise Performed: Yes  Therapeutic Exercise Activity 1: UE ther ex using red theraband while sitting in chair - pt completed shoulder horizontal abduction and flexion/extension, and elbow flexion/extension exercises. He completed 12 reps per exercise with short rest breaks between. Good follow through with demonstration.  Outcome Measures:  [facility] Daily Activity  Putting on and taking off regular lower body clothing: A little  Bathing (including washing, rinsing, drying): A lot  Putting on and taking off regular upper body clothing: A lot  Toileting, which includes using toilet, bedpan or urinal: A lot  Taking care of personal grooming such as brushing teeth: A little  Eating Meals: A little  Daily Activity - Total Score: 15     Education Documentation  Body Mechanics, taught by Jewel Iverson OT at 8/8/2025 11:37 AM.  Learner: Patient  Readiness: Acceptance  Method: Explanation, Demonstration  Response: Needs Reinforcement  Comment: ADLs and safety    Precautions, taught by Jewel Iverson OT at 8/8/2025 11:37 AM.  Learner: Patient  Readiness: Acceptance  Method: Explanation, Demonstration  Response: Needs Reinforcement  Comment: ADLs and safety    ADL Training, taught by Jewel Iverson OT at 8/8/2025 11:37 AM.  Learner: Patient  Readiness: Acceptance  Method: Explanation, Demonstration  Response: Needs Reinforcement  Comment: ADLs and safety    Education Comments  No comments found.    Goals:  Encounter Problems       Encounter Problems (Active)       ADLs       Patient will perform UB and LB bathing  with minimal assist  level of assistance and grab bars. (Progressing)       Start:  07/31/25    Expected End:  08/21/25            Patient with complete lower body dressing with minimal assist  level of assistance donning and doffing all LE clothes  with PRN adaptive equipment while supported sitting (Progressing)       Start:  07/31/25    Expected End:  08/22/25            Patient will complete toileting including  hygiene clothing management/hygiene with minimal assist  level of assistance and grab bars. (Progressing)       Start:  07/31/25    Expected End:  08/21/25               COGNITION/SAFETY       Patient will score WFL on standardized cognitive assessment with min verbal cues and within reasonable time frame (Progressing)       Start:  07/31/25    Expected End:  08/21/25               EXERCISE/STRENGTHENING       Patient will complete BUE exercises for 15 reps in order to improve strength and activity for ADL performance.  (Progressing)       Start:  07/31/25    Expected End:  08/21/25               MOBILITY       Patient will perform Functional mobility min Household distances/Community Distances with minimal assist  level of assistance and least restrictive device in order to improve safety and functional mobility. (Progressing)       Start:  07/31/25    Expected End:  08/21/25               TRANSFERS       Patient will perform bed mobility minimal assist  level of assistance and bed rails in order to improve safety and independence with mobility (Progressing)       Start:  07/31/25    Expected End:  08/21/25            Patient will complete sit to stand transfer with minimal assist  level of assistance and least restrictive device in order to improve safety and prepare for out of bed mobility. (Progressing)       Start:  07/31/25    Expected End:  08/21/25 08/08/25 at 11:37 AM   Jewel Iverson, OT   [phone]

## 2025-08-08 NOTE — PROGRESS NOTES
Physical Therapy                 Therapy Communication Note    Patient Name: Juan Marsh  MRN: [id number]  Department: [place] [id number]  Room: [address] [place], [place] [address]  Today's Date: 8/8/2025     Discipline: Physical Therapy    PT Missed Visit: Yes     Missed Visit Reason: Other (Comment) (Off division)    Missed Time: 915 Attempt

## 2025-08-08 NOTE — DISCHARGE SUMMARY
Discharge Diagnosis  Behcet's syndrome, neurologic type (Multi)    Issues Requiring Follow-Up  Continue Prednisone 60mg oral taper with decreasing 10 mg weekly until reaching 10 mg.  Continue MMF 1,000 mg BID.   Continue bactrim 3 times weekly for PJP prophylaxis, daily pantoprazole 40 mg and daily calcium/Vitamin D supplements.   Scheduled next doses of inflimximab (5 mg / kg per dose) at weeks 2 and 4 weeks after that then every eight weeks.   Follow up with  after discharge in 8/26/2025.       Steroids Taper:  60 mg PO daily (3 tablets) 8/8/2025-8/10/2025 (3 days)  50 mg PO daily (2.5 tablets) 8/11/2025-8/17/2025 (7 days)  40 mg PO daily (2 tablets) 8/18/2025-8/24/2025 (7 days)  30 mg PO daily (1.5 tablets) 8/25/2025-8/31/2025 (7 days)  20 mg PO daily (1 tablet) 9/1/2025-9/7/2025 (7 days)  10 mg PO daily (0.5 tablet) 9/8/2025-10/7/2025 (30 days)    Test Results Pending At Discharge  Pending Labs       No current pending labs.            Hospital Course  Juan Marsh is a 30-year-old right-handed  male smoker with a past medical history notable for heavy alcohol use and systemic and neuro-Behçet's disease (manifested by pericarditis, arthralgias, skin rash, recurrent orogenital ulcers, uveitis, and retinal vasculitis, but negative HLA-B51). He is currently on prednisone 10 mg daily and  mg BID, with recurrent neurological episodes since 2021 characterized by recurrent bilateral cascade signs in the setting of poor compliance with immunomodulation (previously treated with AZA, Humira, and most recently MMF). His acute attacks have previously responded well to IV methylprednisolone and a single dose of cyclophosphamide. He recently underwent left hip surgery for avascular necrosis.  He presented to the ED on 7/28/2025 with altered mental status, nonverbal state, and vomiting. He initially arrived as a BAT with an NIHSS of 7. Examination revealed drowsiness, aphonia, and  aphasia. CT scan on 7/29/2025 showed some progression of the right-sided lesion with slight mass effect. Infectious workup, including hip X-ray and CT of the abdomen and pelvis, was negative. MRI on 7/29/2025 demonstrated worsening cascade on the right, involving the right lentiform nucleus, internal capsule, thalamus, and upper midbrain with mass effect, as well as a small similar lesion on the left.  He completed 5 days of IV solumedrol, then transitioned to an oral prednisone taper. He received his first infusion of infliximab 5 mg/kg on 8/4/2025, which was well tolerated. His mental status and neurological deficits improved significantly throughout the hospitalization. Follow-up MRI on 8/8/2025 showed significant improvement in righ cascade and mass effect and resolution of enhancement   He is being discharged to acute rehab facility. He will continue infliximab infusions at 2 weeks, 4 weeks after that, and then every eight weeks. He will undergo a prolonged prednisone taper with decreasing 10 mg weekly until reaching 10 mg daily, with PJP and PPI prophylaxis, as well as vitamin D and calcium supplementation. He experienced significant dysphagia and required tube feeding; repeated evaluation with modified barium swallow cleared him for a minced diet.         Pertinent Physical Exam At Time of Discharge  Physical Exam  MENTAL STATUS:  - awake alert, following commands. Able to follow 2 complex commands. Dysarthria  when talking, mainly uses hand gesture for communication     CRANIAL NERVES:  - CN II: R pupil 4 mm constricts to 3 mm with light L pupil 3 mm constricts to 2 mm with light briskly.  Visual fields intact bilaterally   - CN III, IV, VI: subtle inferior gaze at baseline , and subtle R eyelid ptosis have, all EOM and ptosis all significantly improved since admission.   - CN V: equal facial sensation   - CN VII: symmetric face at baseline, Subtle Left facial droop at smile improves with full smile,  drooling noted, Symmetric eyebrow elevation.  - CN VIII: equal hearing   - CN XI: Slight weakness with R shoulder shrug.  - CN XII: tongue is midline and equal movement.      MOTOR:   Clasp knife spasticity noted on the right arm, intact tone muscle bulk allover.   RUL: 4/5 in shoulder abduction, elbow flexion, extension and finger flexion, 5/5 in EDUARDO, and both lower extremities.      REFLEXES:        - Brisk 3+ biceps and brachioradialis reflexes bilaterally, more on the right, present ugalde on the right. Pectoral reflex present bilaterally.  - 3+ suprapatellar and patellar reflexes bilaterally more in the right, right ankle in +4 with few beat of clonus, elicited sustained clonus. Left has 3 beats of clonus. Babinski not present bilaterally.       SENSATION:  Reported equal sensation, intact joint position.      COORDINATION: intact finger to nose on the left, noted dysmetria in the right upper extremity.    Gait: witness walking with PT assistance across the hallway, Normal stride and stance.           Home Medications     Medication List      START taking these medications     aspirin 81 mg chewable tablet; Chew and swallow 1 tablet (81 mg) once   daily.; Replaces: aspirin 81 mg EC tablet   inFLIXimab-dyyb 100 mg injection; Commonly known as: Inflectra; Infuse   300 mg IVPB every 4 weeks for two doses to complete induction dosing.   polyethylene glycol 236-22.74-6.74 -5.86 gram solution; Commonly known   as: GoLYTELY; Take 240 mL by mouth if needed (consitpation).     CHANGE how you take these medications     predniSONE 20 mg tablet; Commonly known as: Deltasone; Take 3 tablets   (60 mg) by mouth once daily for 3 days, THEN 2.5 tablets (50 mg) once   daily for 7 days, THEN 2 tablets (40 mg) once daily for 7 days, THEN 1.5   tablets (30 mg) once daily for 7 days, THEN 1 tablet (20 mg) once daily   for 7 days, THEN 0.5 tablets (10 mg) once daily.; Start taking on: August 8, 2025; What changed: medication  strength, See the new instructions.     CONTINUE taking these medications     acetaminophen 325 mg tablet; Commonly known as: Tylenol; Take 2 tablets   (650 mg) by mouth every 6 hours if needed (pain).   brimonidine 0.2 % ophthalmic solution; Commonly known as: AlphaGAN;   Instill 1 drop into right eye every 8 hours   mycophenolate 500 mg tablet; Commonly known as: Cellcept; Take 2 tablets   (1,000 mg) by mouth 2 times a day.   ondansetron 4 mg tablet; Commonly known as: Zofran; Take 1 tablet (4 mg)   by mouth every 8 hours if needed for nausea.   Oyster Shell Calcium-Vit D3 500 mg-10 mcg (400 unit) tablet; Generic   drug: calcium carbonate-vitamin D3; Take 1 tablet by mouth 2 times a day.   pantoprazole 40 mg EC tablet; Commonly known as: ProtoNix; Take 1 tablet   (40 mg) by mouth once daily in the morning. Take before meals. Do not   crush, chew, or split. Do not fill before August 8, 2025.   prednisoLONE acetate 1 % ophthalmic suspension; Commonly known as:   Pred-Forte; Administer 1 drop into the right eye 4 times a day.   sulfamethoxazole-trimethoprim 800-160 mg tablet; Commonly known as:   Bactrim DS; Take 1 tablet by mouth three times a week on monday, wednesday   and friday     STOP taking these medications     aspirin 81 mg EC tablet; Replaced by: aspirin 81 mg chewable tablet   b complex vitamins capsule   cyclobenzaprine 10 mg tablet; Commonly known as: Flexeril   gabapentin 300 mg capsule; Commonly known as: Neurontin   indomethacin 25 mg capsule; Commonly known as: Indocin   mirtazapine 15 mg tablet; Commonly known as: Remeron   omega-3 acid ethyl esters 1 gram capsule; Commonly known as: Lovaza   polyethylene glycol 17 gram/dose powder; Commonly known as: Glycolax,   Miralax       Outpatient Follow-Up  Future Appointments   Date Time Provider Department Center   8/20/2025  2:15 PM Brenda Quintero PT [id number] [facility]   8/26/2025  1:30 PM Juancarlos Ulloa MD [id number] [facility]   12/29/2025 11:00  DAPHNEY Ulloa MD [id number] [facility]       Angelique White MD

## 2025-08-08 NOTE — CARE PLAN
The patient's goals for the shift include      The clinical goals for the shift include pt will remain safe and injury free throughout the shift      Problem: Pain - Adult  Goal: Verbalizes/displays adequate comfort level or baseline comfort level  Outcome: Progressing     Problem: Safety - Adult  Goal: Free from fall injury  Outcome: Progressing     Problem: Discharge Planning  Goal: Discharge to home or other facility with appropriate resources  Outcome: Progressing     Problem: Chronic Conditions and Co-morbidities  Goal: Patient's chronic conditions and co-morbidity symptoms are monitored and maintained or improved  Outcome: Progressing     Problem: Nutrition  Goal: Nutrient intake appropriate for maintaining nutritional needs  Outcome: Progressing     Problem: Skin  Goal: Decreased wound size/increased tissue granulation at next dressing change  Outcome: Progressing  Goal: Participates in plan/prevention/treatment measures  Outcome: Progressing  Goal: Prevent/manage excess moisture  Outcome: Progressing  Goal: Prevent/minimize sheer/friction injuries  Outcome: Progressing  Goal: Promote/optimize nutrition  Outcome: Progressing  Goal: Promote skin healing  Outcome: Progressing

## 2025-08-08 NOTE — PROGRESS NOTES
Occupational Therapy                 Therapy Communication Note    Patient Name: Juan Marsh  MRN: [id number]  Department: [facility]  Room: 247/[facility]  Today's Date: 8/8/2025     Discipline: Occupational Therapy    Missed Visit: OT Missed Visit: Yes     Missed Visit Reason: Missed Visit Reason:  (@0830 - pt off the floor at MRI - will follow up once available.)    Missed Time: Attempt    Comment:

## 2025-08-08 NOTE — CARE PLAN
The patient's goals for the shift include      The clinical goals for the shift include Patient will remain safe and free of falls this shift.      Problem: Pain - Adult  Goal: Verbalizes/displays adequate comfort level or baseline comfort level  8/8/2025 1114 by Elisa Orozco RN  Outcome: Progressing  8/8/2025 1114 by Elisa Orozco RN  Outcome: Progressing     Problem: Safety - Adult  Goal: Free from fall injury  8/8/2025 1114 by Elisa Orozco RN  Outcome: Progressing  8/8/2025 1114 by Elisa Orozco RN  Outcome: Progressing     Problem: Discharge Planning  Goal: Discharge to home or other facility with appropriate resources  8/8/2025 1114 by Elisa Orozco RN  Outcome: Progressing  8/8/2025 1114 by Elisa Orozco RN  Outcome: Progressing     Problem: Chronic Conditions and Co-morbidities  Goal: Patient's chronic conditions and co-morbidity symptoms are monitored and maintained or improved  8/8/2025 1114 by Elisa Orozco RN  Outcome: Progressing  8/8/2025 1114 by Elisa Orozco RN  Outcome: Progressing     Problem: Nutrition  Goal: Nutrient intake appropriate for maintaining nutritional needs  8/8/2025 1114 by Elisa Orozco RN  Outcome: Progressing  8/8/2025 1114 by Elisa Orozco RN  Outcome: Progressing     Problem: Skin  Goal: Decreased wound size/increased tissue granulation at next dressing change  8/8/2025 1114 by Elisa Orozco RN  Outcome: Progressing  8/8/2025 1114 by Elisa Orozco RN  Outcome: Progressing  Goal: Participates in plan/prevention/treatment measures  8/8/2025 1114 by Elisa Orozco RN  Outcome: Progressing  8/8/2025 1114 by Elisa Orozco RN  Outcome: Progressing  Goal: Prevent/manage excess moisture  8/8/2025 1114 by Elisa Orozco RN  Outcome: Progressing  8/8/2025 1114 by Elisa Orozco RN  Outcome: Progressing  Goal: Prevent/minimize sheer/friction injuries  8/8/2025 1114 by Elisa Orozco RN  Outcome: Progressing  8/8/2025 1114 by Elisa Orozco  RN  Outcome: Progressing  Goal: Promote/optimize nutrition  8/8/2025 1114 by Elisa Orozco RN  Outcome: Progressing  8/8/2025 1114 by Elisa Orozco RN  Outcome: Progressing  Goal: Promote skin healing  8/8/2025 1114 by Elisa Orozco RN  Outcome: Progressing  8/8/2025 1114 by Elisa Orozco RN  Outcome: Progressing

## 2025-08-08 NOTE — PROGRESS NOTES
Physical Therapy    Physical Therapy Treatment    Patient Name: Juan Marsh  MRN: [id number]  Department: [place] [facility] [id number]  Room: [address] [place], [place] [address]  Today's Date: 8/8/2025  Time Calculation  Start Time: 1010  Stop Time: 1052  Time Calculation (min): 42 min    Assessment/Plan   PT Assessment  Barriers to Discharge Home: Physical needs, Caregiver assistance  Caregiver Assistance: Caregiver assistance needed per identified barriers - however, level of patient's required assistance exceeds assistance available at home  Physical Needs: Stair navigation into home limited by function/safety, Stair navigation to access bed limited by function/safety, Stair navigation to access bath limited by function/safety, In-home setup navigation limited by function/safety, Ambulating household distances limited by function/safety  Evaluation/Treatment Tolerance: Patient tolerated treatment well  Medical Staff Made Aware: Yes  End of Session Communication: Bedside nurse  Assessment Comment: Pt motivated with excellent effort.  Able to ambulate further in pathak today again and begin stair navigation training.  Still high falls risk as evidenced by Tinetti 10/28.  Pt remains appropriate for high intensity PT at time of d/c.  End of Session Patient Position: Up in chair, Alarm off, not on at start of session  PT Plan  Inpatient/Swing Bed or Outpatient: Inpatient  PT Plan  Treatment/Interventions: Bed mobility, Transfer training, Gait training, Stair training, Balance training, Neuromuscular re-education, Strengthening, Endurance training, Range of motion, Therapeutic exercise, Therapeutic activity, Home exercise program, Positioning, Postural re-education  PT Plan: Ongoing PT  PT Frequency: 5 times per week  PT Discharge Recommendations: High intensity level of continued care  PT Recommended Transfer Status: Assist x1, Assistive device (x1 to chair or BSC, x2 to bathroom)  PT - OK to Discharge: Yes    PT Visit Info:  PT Received On: 08/08/25      General Visit Information:   General  Family/Caregiver Present: No  Prior to Session Communication: Bedside nurse  Patient Position Received: Up in chair, Alarm off, not on at start of session  General Comment: Up in chair.  Verbalizing clearly today although at times with increased effort.  Pt very pleasant, cooperative and agreeable to PT.  Tolerated well.    Subjective   Precautions:  Precautions  Medical Precautions: Fall precautions    Objective   Pain:  Pain Assessment  Pain Assessment: 0-10  0-10 (Numeric) Pain Score: 0 - No pain  Cognition:  Cognition  Overall Cognitive Status: Within Functional Limits  Arousal/Alertness: Appropriate responses to stimuli  Orientation Level: Oriented X4  Following Commands: Follows one step commands with increased time    Activity Tolerance:  Activity Tolerance  Endurance: Endurance does not limit participation in activity  Treatments:  Therapeutic Exercise  Therapeutic Exercise Activity 1: Sitting in chair: LAQ, hip flx 3x10. Stretch into DF 3x 30 seconds after hold/release 10 seconds. Improved from neutral to +10. Standing: marches, heel raises x10    Bed Mobility  Bed Mobility: No (Up in chair pre/post)    Ambulation/Gait Training 1  Surface 1: Level tile, Carpet  Device 1: No device  Assistance 1: Moderate assistance, Moderate verbal cues, Moderate tactile cues (Primarily Min A but LOB x3 requiring Mod A to correct)  Quality of Gait 1: Wide base of support, Inconsistent stride length, Decreased step length, Soft knee(s), Knee(s) buckle, Scissoring, Listing (L lean. Ongoing cues for L DF and L heel strike)  Comments/Distance (ft) 1: 4 x 50 feet, 2 times with seated break  Transfers  Transfer: Yes  Transfer 1  Transfer From 1: Sit to, Stand to  Transfer to 1: Sit  Transfer Device 1:  (no device)  Transfer Level of Assistance 1: Minimum assistance, Minimal verbal cues    Stairs  Rails 1: Right  Device 1: Railing  Assistance 1: Moderate assistance, Moderate verbal cues  (Primarily CGA, but requiring Mod A for L buckling x1, and LOB while turning at top of stairs)  Comment/Number of Steps 1: Up/dn 8, performed twice    Outcome Measures:    [facility] Basic Mobility  Turning from your back to your side while in a flat bed without using bedrails: A little  Moving from lying on your back to sitting on the side of a flat bed without using bedrails: A little  Moving to and from bed to chair (including a wheelchair): A little  Standing up from a chair using your arms (e.g. wheelchair or bedside chair): A little  To walk in hospital room: A lot  Climbing 3-5 steps with railing: A lot  Basic Mobility - Total Score: 16    Tinetti  Sitting Balance: Steady, safe  Arises: Unable without help  Attempts to Arise: Unable without help  Immediate Standing Balance (First 5 Seconds): Unsteady (Swaggers, moves feet, trunk sway)  Standing Balance: Unsteady  Nudged: Begins to fall  Eyes Closed: Unsteady  Turned 360 Degrees: Steadiness: Unsteady (Grabs, staggers)  Turned 360 Degrees: Continuity of Steps: Discontinuous steps  Sitting Down: Uses arms or not a smooth motion  Balance Score: 2  Initiation of Gait: No hesitancy  Step Height: R Swing Foot: Right foot complete clears floor  Step Length: R Swing Foot: Passes left stance foot  Step Height: L Swing Foot: Left foot complete clears floor  Step Length: L Swing Foot: Passes right stance foot  Step Symmetry: Right and left step appear equal  Step Continuity: Stopping or discontinuity between steps  Path: Mild/moderate deviation or uses walking aid  Trunk: Marked sway or uses walking aid  Walking Time: Heels almost touching while walking  Gait Score: 8  Total Score: 10    Education Documentation  Home Exercise Program, taught by Brenton Batista, PT at 8/8/2025  1:02 PM.  Learner: Patient  Readiness: Eager  Method: Explanation  Response: Verbalizes Understanding    Body Mechanics, taught by Brenton Batista, PT at 8/8/2025  1:02 PM.  Learner:  Patient  Readiness: Eager  Method: Explanation  Response: Verbalizes Understanding    Mobility Training, taught by Brenton Batista, PT at 8/8/2025  1:02 PM.  Learner: Patient  Readiness: Eager  Method: Explanation  Response: Verbalizes Understanding    Education Comments  No comments found.    OP EDUCATION:       Encounter Problems       Encounter Problems (Active)       Balance       Maintains static standing balance with upper extremity support with close supervision for completion of functional tasks.  (Progressing)       Start:  07/30/25    Expected End:  08/08/25            Maintains static and dynamic sitting balance with upper extremity support with distant supervision.  (Progressing)       Start:  07/30/25    Expected End:  08/08/25               Mobility       Patient will ambulate household distance 50ft with CGA using LRD.  (Progressing)       Start:  07/30/25    Expected End:  08/08/25               PT Transfers        Patient will perform functional transfers with CGA using LRD.  (Progressing)       Start:  07/30/25    Expected End:  08/08/25       Tinetti>24 to reflect improvement in balance and decreased falls risk

## 2025-08-08 NOTE — NURSING NOTE
Patient discharged to  acute rehab in [place]. All belongings, including cell phone and laptop with patient. Patient's visitor took some belonging with her. Report given to Corky at facility. Patient taken off the floor via stretcher at this time.

## 2025-08-08 NOTE — PROGRESS NOTES
"Juan Marsh is a 30 y.o. male on day 10 of admission presenting with Behcet's syndrome, neurologic type (Multi).      Subjective   No acute overnight events. Vitally stable, He denied any complaints. MRI repeated this morning and showed significant improvement. He is scheduled for discharge in late afternoon     Objective     Last Recorded Vitals  Blood pressure 118/82, pulse 84, temperature 36.2 °C (97.2 °F), temperature source Temporal, resp. rate 16, height 1.778 m (5' 10\"), weight 63.8 kg (140 lb 10.5 oz), SpO2 100%.      Physical Exam  Neurological Exam  MENTAL STATUS:  - awake alert, following commands. Able to follow 2 complex commands. Dysarthria  when talking, mainly uses hand gesture for communication     CRANIAL NERVES:  - CN II: R pupil 4 mm constricts to 3 mm with light L pupil 3 mm constricts to 2 mm with light briskly.  Visual fields intact bilaterally   - CN III, IV, VI: subtle inferior gaze at baseline , and subtle R eyelid ptosis have, all EOM and ptosis all significantly improved since admission.   - CN V: equal facial sensation   - CN VII: symmetric face at baseline, Subtle Left facial droop at smile improves with full smile, drooling noted, Symmetric eyebrow elevation.  - CN VIII: equal hearing   - CN XI: Slight weakness with R shoulder shrug.  - CN XII: tongue is midline and equal movement.     MOTOR:   Clasp knife spasticity noted on the right arm, intact tone muscle bulk allover.   RUL: 4/5 in shoulder abduction, elbow flexion, extension and finger flexion, 5/5 in EDUARDO, and both lower extremities.     REFLEXES:        - Brisk 3+ biceps and brachioradialis reflexes bilaterally, more on the right, present ugalde on the right. Pectoral reflex present bilaterally.  - 3+ suprapatellar and patellar reflexes bilaterally more in the right, right ankle in +4 with few beat of clonus, elicited sustained clonus. Left has 3 beats of clonus. Babinski not present bilaterally.      " SENSATION:  Reported equal sensation, intact joint position.     COORDINATION: intact finger to nose on the left, noted dysmetria in the right upper extremity.    Gait: Deferred     Labs:  Last CBC:  Lab Results   Component Value Date    WBC 12.4 (H) 08/08/2025    HGB 13.4 (L) 08/08/2025    HCT 41.0 08/08/2025    MCV 96 08/08/2025     08/08/2025       Last RFP:  Lab Results   Component Value Date    GLUCOSE 122 (H) 08/08/2025    CALCIUM 9.9 08/08/2025     (L) 08/08/2025    K 3.7 08/08/2025    CO2 23 08/08/2025    CL 98 08/08/2025    BUN 38 (H) 08/08/2025    CREATININE 1.11 08/08/2025       Imaging:  === 07/30/25 ===  CT ABDOMEN PELVIS w IV CONTRAST    IMPRESSION:  1. Posterior right lower lobe patchy ground-glass opacities which may  represent atelectasis versus early infectious/inflammatory changes.  No evidence of infection within the abdomen/pelvis.  2. Evaluation for GI hemorrhage is limited secondary to single phase  of contrast. Hyperdense material within the ascending colon favored  to represent enteric contents as opposed to active hemorrhage.  Otherwise, no evidence of bleeding.  3. Other chronic/incidental findings as above.    === 07/30/25 ===  XR ABDOMEN 1 VIEW    IMPRESSION:  Enteric tube tip projects over the right upper quadrant.    === 07/29/25 ===    MR BRAIN W AND WO CONTRAST    - Impression -  Compared to MRI brain 05/20/2025, there has been enlargement of the  infiltrative lesion involving portions of the right cristobal tyrone, right  cerebral peduncle, right posterior limb internal capsule extending  into the medial right basal ganglia and corona radiata. The greatest  degrees of new structure involvement are seen within the right globus  pallidus, ventral lateral thalamus and more caudally in the tyrone.  This results in expansile edema of the aforementioned structures with  surrounding mass-effect to a significantly greater degree compared to  prior study. There is increased mass-effect  on the hypothalamus and  right mammillary bodies. There is also increased mass effect on the  right lateral ventricle causing increased leftward bowing of the  septum pellucidum and some increased effacement of the 3rd ventricle.  There is also new patchy enhancement within this lesion, most  pronounced in the cerebral peduncle, right globus pallidus, ventral  medial thalamus, and posterior limb of the right internal capsule.  There is also increased edema within the right optic tract and right  aspect of the optic chiasm.    The other pre-existing lesion known to involve the left cerebral  peduncle and left internal capsule extending into the left corona  radiata is overall decreased in size and signal intensity on FLAIR  images compared to prior study; however, there is a tiny focus of new  enhancement within the left posterior limb internal capsule.    Assessment & Plan  Behcet's syndrome, neurologic type (Multi)      Juan Marsh is a 30 y.o. Right-handed AA male smoker with PMHx notable for heavy alcohol use, systemic (pericarditis, arthralgias, skin rash, recurrent orogenital ulcers, uveitis, retinal vasculitis, but negative [id number]) and neuro Behcet's disease on prednisone 10mg daily and MMF 500mg BID recurrent neurological episodes sine 2021 with recurrent bilateral cascade signs in the setting of poor compliance with immunomodulation (previously treated with AZA, humira, and most recently MMF). His acute attacks have previously responded well to IVMP and a single dose of cyclophosphamide recent left hip surgery for avascular necrosis who presented to the ED (7/28/2025) with AMS and being no-verbal and vomiting. MRI showed large T2 hyperintense lesion in the right lentiform nucleus, internal capsule, thalamus, and upper midbrain with mass effect and a small similar lesion on the left s/p 5 days IV methypred, and is on prednisone taper and s/p first dose infusion of infliximab  5 mg /kg on 8/4/2025  which was tolerated well. He is clinically improving ,MRI repeated this morning and showed significant improvement.    # Neuro Behcet's disease progression with AMS and new partial right third nerve palsy, improving.   :: S/p IV solumedrol 1 G daily for 5 dose (7/30 to 8/2/2025).  :: S/p  Infliximab first dose infusion 5 mg /kg (on 8/4/2025)  Continue Prednisone 60mg oral taper with decreasing 10 mg weekly until reaching 10 mg   Continue MMF 1,000 BID.   Continue bactrim 3 times weekly for PJP prophylaxis.   Scheduled next doses of inflimximab (5 mg / kg per dose) at weeks 2 and 6 then every eight weeks.   Follow up with  after discharge in 4-8 weeks.       # Dysphagia, improving   # Hiccups, improving   S/p tube feeding through [place].   Speech evaluation: minced diet level 5   S/p metoclopramide and monitor for hiccups.    PPI 40 mg once daily       #recent left hip surgery 7/10/2025 secondary to avascular necrosis   Ortho removed staples (8/2/2025). Weight-bearing as tolerated)   S/p aspirin (81mg BID) as DVT prophylaxis until (8/6/2025)   continue Lovenox for DVT prophylaxis  continue home daily aspirin 81 mg for secondary stroke prevention.   Tylenol PRN    #Concern for pneumonia in right lower lobe, resolved   # leukocytosis, likely reactive from steroids   :: MRSA nares negative, s/p vancomycin   :: blood culture is negative   :: zosyn discontinued (8/2/2025)   Continue pulmonary hygiene measures     # Miscellaneous  Thiamine 500 mg 3 times daily for 3 days followed by 100 mL times daily  Zofran 4 mg every 8 hours as needed for nausea  MiraLAX 17 g (PRN for frequent bowel movements)     Gi PPX: Protonix 40 mg IV OD   DVT ppx: Lovenox   Diet: minced   Dispo: PT/OT: acute rehab   Code status: Full    Angelique White MD  PGY-2 neurology resident

## 2025-08-10 ENCOUNTER — LAB REQUISITION (OUTPATIENT)
Dept: LAB | Facility: HOSPITAL | Age: 30
End: 2025-08-10
Payer: COMMERCIAL

## 2025-08-10 LAB
ANION GAP SERPL CALC-SCNC: 17 MMOL/L (ref 10–20)
BUN SERPL-MCNC: 39 MG/DL (ref 6–23)
CALCIUM SERPL-MCNC: 9.7 MG/DL (ref 8.6–10.3)
CHLORIDE SERPL-SCNC: 103 MMOL/L (ref 98–107)
CO2 SERPL-SCNC: 21 MMOL/L (ref 21–32)
CREAT SERPL-MCNC: 0.97 MG/DL (ref 0.5–1.3)
EGFRCR SERPLBLD CKD-EPI 2021: >90 ML/MIN/1.73M*2
ERYTHROCYTE [DISTWIDTH] IN BLOOD BY AUTOMATED COUNT: 16 % (ref 11.5–14.5)
GLUCOSE SERPL-MCNC: 93 MG/DL (ref 74–99)
HCT VFR BLD AUTO: 35.3 % (ref 41–52)
HGB BLD-MCNC: 11.9 G/DL (ref 13.5–17.5)
MCH RBC QN AUTO: 31.6 PG (ref 26–34)
MCHC RBC AUTO-ENTMCNC: 33.7 G/DL (ref 32–36)
MCV RBC AUTO: 94 FL (ref 80–100)
NRBC BLD-RTO: 0 /100 WBCS (ref 0–0)
PLATELET # BLD AUTO: 297 X10*3/UL (ref 150–450)
POTASSIUM SERPL-SCNC: 4.1 MMOL/L (ref 3.5–5.3)
RBC # BLD AUTO: 3.76 X10*6/UL (ref 4.5–5.9)
SODIUM SERPL-SCNC: 137 MMOL/L (ref 136–145)
WBC # BLD AUTO: 14.9 X10*3/UL (ref 4.4–11.3)

## 2025-08-10 PROCEDURE — 80048 BASIC METABOLIC PNL TOTAL CA: CPT

## 2025-08-10 PROCEDURE — 85027 COMPLETE CBC AUTOMATED: CPT

## 2025-08-11 ENCOUNTER — PATIENT OUTREACH (OUTPATIENT)
Dept: CARE COORDINATION | Facility: CLINIC | Age: 30
End: 2025-08-11
Payer: COMMERCIAL

## 2025-08-11 ENCOUNTER — LAB REQUISITION (OUTPATIENT)
Dept: LAB | Facility: HOSPITAL | Age: 30
End: 2025-08-11
Payer: COMMERCIAL

## 2025-08-11 LAB
APPEARANCE UR: CLEAR
BILIRUB UR STRIP.AUTO-MCNC: NEGATIVE MG/DL
CAOX CRY #/AREA UR COMP ASSIST: NORMAL /HPF
COLOR UR: YELLOW
GLUCOSE UR STRIP.AUTO-MCNC: NORMAL MG/DL
KETONES UR STRIP.AUTO-MCNC: NEGATIVE MG/DL
LEUKOCYTE ESTERASE UR QL STRIP.AUTO: NEGATIVE
MUCOUS THREADS #/AREA URNS AUTO: NORMAL /LPF
NITRITE UR QL STRIP.AUTO: NEGATIVE
PH UR STRIP.AUTO: 6 [PH]
PROT UR STRIP.AUTO-MCNC: ABNORMAL MG/DL
RBC # UR STRIP.AUTO: NEGATIVE MG/DL
RBC #/AREA URNS AUTO: NORMAL /HPF
SP GR UR STRIP.AUTO: 1.04
UROBILINOGEN UR STRIP.AUTO-MCNC: NORMAL MG/DL
WBC #/AREA URNS AUTO: NORMAL /HPF

## 2025-08-11 PROCEDURE — 81001 URINALYSIS AUTO W/SCOPE: CPT

## 2025-08-12 ENCOUNTER — DOCUMENTATION (OUTPATIENT)
Dept: INFUSION THERAPY | Facility: CLINIC | Age: 30
End: 2025-08-12
Payer: COMMERCIAL

## 2025-08-12 DIAGNOSIS — M35.2 BEHCET'S SYNDROME, NEUROLOGIC TYPE (MULTI): Primary | ICD-10-CM

## 2025-08-12 RX ORDER — ACETAMINOPHEN 325 MG/1
650 TABLET ORAL ONCE
OUTPATIENT
Start: 2025-11-10 | End: 2025-11-10

## 2025-08-12 RX ORDER — FAMOTIDINE 10 MG/ML
20 INJECTION, SOLUTION INTRAVENOUS ONCE AS NEEDED
OUTPATIENT
Start: 2025-11-10

## 2025-08-12 RX ORDER — EPINEPHRINE 0.3 MG/.3ML
0.3 INJECTION SUBCUTANEOUS EVERY 5 MIN PRN
OUTPATIENT
Start: 2025-11-10

## 2025-08-12 RX ORDER — DIPHENHYDRAMINE HYDROCHLORIDE 50 MG/ML
50 INJECTION, SOLUTION INTRAMUSCULAR; INTRAVENOUS AS NEEDED
OUTPATIENT
Start: 2025-11-10

## 2025-08-12 RX ORDER — DIPHENHYDRAMINE HCL 25 MG
25 TABLET ORAL ONCE
OUTPATIENT
Start: 2025-11-10 | End: 2025-11-10

## 2025-08-12 RX ORDER — ALBUTEROL SULFATE 0.83 MG/ML
3 SOLUTION RESPIRATORY (INHALATION) AS NEEDED
OUTPATIENT
Start: 2025-11-10

## 2025-08-13 ENCOUNTER — DOCUMENTATION (OUTPATIENT)
Dept: HOME HEALTH SERVICES | Facility: HOME HEALTH | Age: 30
End: 2025-08-13
Payer: COMMERCIAL

## 2025-08-13 NOTE — DOCUMENTATION CLARIFICATION NOTE
"    PATIENT:               MADELIN GEE  ACCT #:                  [id number]  MRN:                       [id number]  :                       1995  ADMIT DATE:       2025 8:38 PM  DISCH DATE:        2025 5:22 PM  RESPONDING PROVIDER #:        [id number]          PROVIDER RESPONSE TEXT:    I agree with dietician diagnosis of Moderate malnutrition on 2025.    CDI QUERY TEXT:    Clarification    Instruction:    Based on your assessment of the patient and the clinical information, please provide the requested documentation by clicking on the appropriate radio button and enter any additional information if prompted.    Question: Please further clarify this patient nutritional status as    When answering this query, please exercise your independent professional judgment. The fact that a question is being asked, does not imply that any particular answer is desired or expected.    The patient's clinical indicators include:  Clinical Information: 31 yo male with Behcet's Disease.    Clinical Indicators:  Dietitian  - \"Subcutaneous Fat Loss:  Orbital Fat Pads: Mild-Moderate (slight dark circles and slight hollowing)  Buccal Fat Pads: Mild-Moderate (flat cheeks, minimal bounce)  Triceps: Mild-Moderate (less than ample fat tissue)  Muscle Wasting:  Temporalis: Mild-Moderate (slight depression)  Pectoralis (Clavicular Region): Mild-Moderate (some protrusion of clavicle)  Deltoid/Trapezius: Mild-Moderate (slight protrusion of acromion process)  Interosseous: Mild-Moderate (slightly depressed area between thumb and forefinger)  Trapezius/Infraspinatus/Supraspinatus (Scapular Region): Defer  Quadriceps: Severe (depressions on inner and outer thigh)  Gastrocnemius: Severe (minimal muscle definition)\"  \"Diagnosis Status: New  Malnutrition Diagnosis: Moderate malnutrition related to chronic disease or condition  As Evidenced by: mild subcutaneos fat loss, moderate muscle wasting\"    Treatment:  Dietitian " consult 08/06  Isosource 1.5 55 mL/h 08/06    Risk Factors: Behcet's disease, swallowing difficulty  Options provided:  -- I agree with dietician diagnosis of Moderate malnutrition on 08/06/2025.  -- Other - I will add my own diagnosis  -- Refer to Clinical Documentation Reviewer    Query created by: Zechariah Ayala on 8/8/2025 12:16 PM      Electronically signed by:  RYAN LAND MD 8/13/2025 2:53 PM

## 2025-08-16 ENCOUNTER — HOME CARE VISIT (OUTPATIENT)
Dept: HOME HEALTH SERVICES | Facility: HOME HEALTH | Age: 30
End: 2025-08-16
Payer: COMMERCIAL

## 2025-08-16 VITALS
OXYGEN SATURATION: 98 % | DIASTOLIC BLOOD PRESSURE: 79 MMHG | HEART RATE: 118 BPM | TEMPERATURE: 100.3 F | SYSTOLIC BLOOD PRESSURE: 100 MMHG | RESPIRATION RATE: 16 BRPM

## 2025-08-16 PROCEDURE — G0299 HHS/HOSPICE OF RN EA 15 MIN: HCPCS

## 2025-08-16 ASSESSMENT — ACTIVITIES OF DAILY LIVING (ADL)
OASIS_M1830: 01
ENTERING_EXITING_HOME: MODERATE ASSIST

## 2025-08-16 ASSESSMENT — ENCOUNTER SYMPTOMS
PERSON REPORTING PAIN: PATIENT
SLEEP QUALITY: FAIR
DENIES PAIN: 1
ANGER WITHIN DEFINED LIMITS: 1
AGGRESSION WITHIN DEFINED LIMITS: 1
APPETITE LEVEL: FAIR
CHANGE IN APPETITE: UNCHANGED
MUSCLE WEAKNESS: 1

## 2025-08-18 ENCOUNTER — HOME CARE VISIT (OUTPATIENT)
Dept: HOME HEALTH SERVICES | Facility: HOME HEALTH | Age: 30
End: 2025-08-18
Payer: COMMERCIAL

## 2025-08-18 ENCOUNTER — APPOINTMENT (OUTPATIENT)
Dept: INFUSION THERAPY | Facility: CLINIC | Age: 30
End: 2025-08-18
Payer: COMMERCIAL

## 2025-08-18 VITALS
WEIGHT: 130 LBS | BODY MASS INDEX: 18.65 KG/M2 | DIASTOLIC BLOOD PRESSURE: 76 MMHG | HEART RATE: 96 BPM | OXYGEN SATURATION: 100 % | SYSTOLIC BLOOD PRESSURE: 115 MMHG | TEMPERATURE: 98.3 F | RESPIRATION RATE: 16 BRPM

## 2025-08-18 DIAGNOSIS — M35.2 BEHCET'S SYNDROME, NEUROLOGIC TYPE (MULTI): ICD-10-CM

## 2025-08-18 PROCEDURE — 96415 CHEMO IV INFUSION ADDL HR: CPT | Performed by: NURSE PRACTITIONER

## 2025-08-18 PROCEDURE — 96375 TX/PRO/DX INJ NEW DRUG ADDON: CPT | Performed by: NURSE PRACTITIONER

## 2025-08-18 PROCEDURE — 96413 CHEMO IV INFUSION 1 HR: CPT | Performed by: NURSE PRACTITIONER

## 2025-08-18 RX ORDER — DIPHENHYDRAMINE HCL 25 MG
25 CAPSULE ORAL ONCE
Status: COMPLETED | OUTPATIENT
Start: 2025-08-18 | End: 2025-08-18

## 2025-08-18 RX ORDER — DIPHENHYDRAMINE HYDROCHLORIDE 50 MG/ML
50 INJECTION, SOLUTION INTRAMUSCULAR; INTRAVENOUS AS NEEDED
OUTPATIENT
Start: 2025-09-15

## 2025-08-18 RX ORDER — DIPHENHYDRAMINE HCL 25 MG
25 CAPSULE ORAL ONCE
OUTPATIENT
Start: 2025-09-15 | End: 2025-09-15

## 2025-08-18 RX ORDER — EPINEPHRINE 0.3 MG/.3ML
0.3 INJECTION SUBCUTANEOUS EVERY 5 MIN PRN
OUTPATIENT
Start: 2025-09-15

## 2025-08-18 RX ORDER — ALBUTEROL SULFATE 0.83 MG/ML
3 SOLUTION RESPIRATORY (INHALATION) AS NEEDED
OUTPATIENT
Start: 2025-09-15

## 2025-08-18 RX ORDER — FAMOTIDINE 10 MG/ML
20 INJECTION, SOLUTION INTRAVENOUS ONCE AS NEEDED
OUTPATIENT
Start: 2025-09-15

## 2025-08-18 RX ORDER — ACETAMINOPHEN 325 MG/1
650 TABLET ORAL ONCE
Status: CANCELLED | OUTPATIENT
Start: 2025-09-15 | End: 2025-09-15

## 2025-08-18 RX ORDER — ACETAMINOPHEN 325 MG/1
650 TABLET ORAL ONCE
OUTPATIENT
Start: 2025-09-15 | End: 2025-09-15

## 2025-08-18 RX ORDER — ACETAMINOPHEN 325 MG/1
650 TABLET ORAL ONCE
Status: COMPLETED | OUTPATIENT
Start: 2025-08-18 | End: 2025-08-18

## 2025-08-18 RX ORDER — DIPHENHYDRAMINE HCL 25 MG
25 CAPSULE ORAL ONCE
Status: CANCELLED | OUTPATIENT
Start: 2025-09-15 | End: 2025-09-15

## 2025-08-18 RX ADMIN — Medication 25 MG: at 10:27

## 2025-08-18 RX ADMIN — ACETAMINOPHEN 650 MG: 325 TABLET ORAL at 10:26

## 2025-08-18 ASSESSMENT — ENCOUNTER SYMPTOMS
HEADACHES: 0
BLOOD IN STOOL: 0
FATIGUE: 0
PALPITATIONS: 0
COUGH: 0
DIZZINESS: 0
DIARRHEA: 0
FEVER: 0
HEMATURIA: 0
VOMITING: 0
EYE PROBLEMS: 1
MYALGIAS: 0
VOICE CHANGE: 0
CONSTIPATION: 0
DYSURIA: 0
EXTREMITY WEAKNESS: 1
NAUSEA: 0
WOUND: 0
LEG SWELLING: 0
LIGHT-HEADEDNESS: 0
ARTHRALGIAS: 0
UNEXPECTED WEIGHT CHANGE: 0
NUMBNESS: 0
FREQUENCY: 0
CHILLS: 0
SORE THROAT: 0
BRUISES/BLEEDS EASILY: 0
APPETITE CHANGE: 0
WHEEZING: 0
TROUBLE SWALLOWING: 1
ABDOMINAL PAIN: 0
SHORTNESS OF BREATH: 0

## 2025-08-19 ENCOUNTER — HOME CARE VISIT (OUTPATIENT)
Dept: HOME HEALTH SERVICES | Facility: HOME HEALTH | Age: 30
End: 2025-08-19
Payer: COMMERCIAL

## 2025-08-19 VITALS — RESPIRATION RATE: 16 BRPM

## 2025-08-19 PROCEDURE — G0151 HHCP-SERV OF PT,EA 15 MIN: HCPCS

## 2025-08-19 SDOH — HEALTH STABILITY: PHYSICAL HEALTH: PHYSICAL EXERCISE: SIT, STAND , SUPINE

## 2025-08-19 SDOH — HEALTH STABILITY: PHYSICAL HEALTH: EXERCISE ACTIVITY: OPEN/ CLOSED CHAIN EX

## 2025-08-19 SDOH — HEALTH STABILITY: PHYSICAL HEALTH: EXERCISE TYPE: B LE

## 2025-08-19 SDOH — HEALTH STABILITY: PHYSICAL HEALTH: PHYSICAL EXERCISE: 15

## 2025-08-19 SDOH — HEALTH STABILITY: PHYSICAL HEALTH: EXERCISE ACTIVITIES SETS: 2

## 2025-08-19 ASSESSMENT — ACTIVITIES OF DAILY LIVING (ADL)
AMBULATION_DISTANCE/DURATION_TOLERATED: 100  FT
AMBULATION ASSISTANCE: ONE PERSON
AMBULATION ASSISTANCE: TWO PERSON

## 2025-08-19 ASSESSMENT — ENCOUNTER SYMPTOMS
PERSON REPORTING PAIN: PATIENT
MUSCLE WEAKNESS: 1
DENIES PAIN: 1

## 2025-08-21 ENCOUNTER — HOME CARE VISIT (OUTPATIENT)
Dept: HOME HEALTH SERVICES | Facility: HOME HEALTH | Age: 30
End: 2025-08-21
Payer: COMMERCIAL

## 2025-08-21 PROCEDURE — G0152 HHCP-SERV OF OT,EA 15 MIN: HCPCS

## 2025-08-21 ASSESSMENT — ACTIVITIES OF DAILY LIVING (ADL)
DRESSING_LB_CURRENT_FUNCTION: SUPERVISION
TOILETING: 1
BATHING ASSESSED: 1
TOILETING: INDEPENDENT
DRESSING_UB_CURRENT_FUNCTION: SUPERVISION
BATHING_CURRENT_FUNCTION: SUPERVISION

## 2025-08-21 ASSESSMENT — ENCOUNTER SYMPTOMS
DENIES PAIN: 1
PERSON REPORTING PAIN: PATIENT

## 2025-08-22 ENCOUNTER — HOME CARE VISIT (OUTPATIENT)
Dept: HOME HEALTH SERVICES | Facility: HOME HEALTH | Age: 30
End: 2025-08-22
Payer: COMMERCIAL

## 2025-08-22 VITALS — RESPIRATION RATE: 16 BRPM | TEMPERATURE: 98.4 F

## 2025-08-22 PROCEDURE — G0151 HHCP-SERV OF PT,EA 15 MIN: HCPCS

## 2025-08-22 SDOH — HEALTH STABILITY: PHYSICAL HEALTH: EXERCISE ACTIVITIES SETS: 2

## 2025-08-22 SDOH — HEALTH STABILITY: PHYSICAL HEALTH: PHYSICAL EXERCISE: SIT, STAND, SUPINE

## 2025-08-22 SDOH — HEALTH STABILITY: PHYSICAL HEALTH: EXERCISE ACTIVITY: OPEN/ CLOSED CHAIN EX

## 2025-08-22 SDOH — HEALTH STABILITY: PHYSICAL HEALTH: PHYSICAL EXERCISE: 15

## 2025-08-22 SDOH — HEALTH STABILITY: PHYSICAL HEALTH: EXERCISE TYPE: B LE

## 2025-08-22 ASSESSMENT — ENCOUNTER SYMPTOMS
SUBJECTIVE PAIN PROGRESSION: GRADUALLY IMPROVING
PAIN LOCATION - RELIEVING FACTORS: MEDS, CP
PAIN LOCATION - EXACERBATING FACTORS: MVT
LOWEST PAIN SEVERITY IN PAST 24 HOURS: 1/10
HIGHEST PAIN SEVERITY IN PAST 24 HOURS: 5/10
PAIN LOCATION - PAIN SEVERITY: 5/10
PAIN LOCATION - PAIN FREQUENCY: INTERMITTENT
PAIN: 1
MUSCLE WEAKNESS: 1
PAIN SEVERITY GOAL: 0/10
LIMITED RANGE OF MOTION: 1
PAIN LOCATION: LEFT LEG
PERSON REPORTING PAIN: PATIENT
PAIN LOCATION - PAIN QUALITY: ACHE
PAIN LOCATION - PAIN DURATION: HR

## 2025-08-26 ENCOUNTER — HOME CARE VISIT (OUTPATIENT)
Dept: HOME HEALTH SERVICES | Facility: HOME HEALTH | Age: 30
End: 2025-08-26
Payer: COMMERCIAL

## 2025-08-26 ENCOUNTER — OFFICE VISIT (OUTPATIENT)
Dept: NEUROLOGY | Facility: HOSPITAL | Age: 30
End: 2025-08-26
Payer: COMMERCIAL

## 2025-08-26 VITALS
RESPIRATION RATE: 18 BRPM | SYSTOLIC BLOOD PRESSURE: 120 MMHG | HEART RATE: 79 BPM | WEIGHT: 130 LBS | DIASTOLIC BLOOD PRESSURE: 74 MMHG | BODY MASS INDEX: 18.61 KG/M2 | TEMPERATURE: 96.9 F | HEIGHT: 70 IN

## 2025-08-26 DIAGNOSIS — M35.2 BEHCET'S SYNDROME, NEUROLOGIC TYPE (MULTI): Primary | ICD-10-CM

## 2025-08-26 PROCEDURE — 99215 OFFICE O/P EST HI 40 MIN: CPT | Performed by: PSYCHIATRY & NEUROLOGY

## 2025-08-26 PROCEDURE — 3008F BODY MASS INDEX DOCD: CPT | Performed by: PSYCHIATRY & NEUROLOGY

## 2025-08-26 PROCEDURE — 99212 OFFICE O/P EST SF 10 MIN: CPT

## 2025-08-26 PROCEDURE — RXMED WILLOW AMBULATORY MEDICATION CHARGE

## 2025-08-26 RX ORDER — OXYBUTYNIN CHLORIDE 5 MG/1
5 TABLET ORAL 3 TIMES DAILY
Qty: 90 TABLET | Refills: 11 | Status: SHIPPED | OUTPATIENT
Start: 2025-08-26 | End: 2026-08-26

## 2025-08-26 ASSESSMENT — PAIN SCALES - GENERAL: PAINLEVEL_OUTOF10: 0-NO PAIN

## 2025-08-27 ENCOUNTER — HOME CARE VISIT (OUTPATIENT)
Dept: HOME HEALTH SERVICES | Facility: HOME HEALTH | Age: 30
End: 2025-08-27
Payer: COMMERCIAL

## 2025-08-27 VITALS — RESPIRATION RATE: 16 BRPM | OXYGEN SATURATION: 100 % | HEART RATE: 106 BPM

## 2025-08-27 PROCEDURE — G0153 HHCP-SVS OF S/L PATH,EA 15MN: HCPCS

## 2025-08-27 PROCEDURE — G0152 HHCP-SERV OF OT,EA 15 MIN: HCPCS

## 2025-08-27 PROCEDURE — G0151 HHCP-SERV OF PT,EA 15 MIN: HCPCS

## 2025-08-27 SDOH — HEALTH STABILITY: PHYSICAL HEALTH: EXERCISE ACTIVITY: OPEN/ CLOSED CHAIN EX

## 2025-08-27 SDOH — HEALTH STABILITY: PHYSICAL HEALTH: EXERCISE ACTIVITIES SETS: 2

## 2025-08-27 SDOH — HEALTH STABILITY: PHYSICAL HEALTH: EXERCISE TYPE: B LE

## 2025-08-27 SDOH — HEALTH STABILITY: PHYSICAL HEALTH: PHYSICAL EXERCISE: SIT, STAND , SUPINE

## 2025-08-27 SDOH — HEALTH STABILITY: PHYSICAL HEALTH: PHYSICAL EXERCISE: 10

## 2025-08-27 ASSESSMENT — ENCOUNTER SYMPTOMS
PAIN LOCATION - RELIEVING FACTORS: MEDS
PAIN LOCATION - PAIN SEVERITY: 4/10
PAIN LOCATION: RIGHT HIP
HIGHEST PAIN SEVERITY IN PAST 24 HOURS: 4/10
PERSON REPORTING PAIN: PATIENT
PAIN LOCATION - PAIN QUALITY: ACHE
AGGRESSION WITHIN DEFINED LIMITS: 1
PAIN SEVERITY GOAL: 0/10
PAIN: 1
LOWEST PAIN SEVERITY IN PAST 24 HOURS: 1/10
SUBJECTIVE PAIN PROGRESSION: GRADUALLY IMPROVING
PAIN LOCATION - PAIN DURATION: MIN
PERSON REPORTING PAIN: PATIENT
PAIN LOCATION - PAIN FREQUENCY: INTERMITTENT
DENIES PAIN: 1
ANGER WITHIN DEFINED LIMITS: 1
MUSCLE WEAKNESS: 1
PAIN LOCATION - EXACERBATING FACTORS: ROM

## 2025-08-27 ASSESSMENT — ACTIVITIES OF DAILY LIVING (ADL)
PHYSICAL TRANSFERS ASSESSED: 1
AMBULATION ASSISTANCE ON FLAT SURFACES: 1
AMBULATION_DISTANCE/DURATION_TOLERATED: 80 FT
CURRENT_FUNCTION: INDEPENDENT

## 2025-08-29 ENCOUNTER — PHARMACY VISIT (OUTPATIENT)
Dept: PHARMACY | Facility: CLINIC | Age: 30
End: 2025-08-29
Payer: MEDICAID

## 2025-08-29 ENCOUNTER — HOME CARE VISIT (OUTPATIENT)
Dept: HOME HEALTH SERVICES | Facility: HOME HEALTH | Age: 30
End: 2025-08-29
Payer: COMMERCIAL

## 2025-08-29 VITALS — OXYGEN SATURATION: 100 % | RESPIRATION RATE: 16 BRPM | HEART RATE: 98 BPM

## 2025-08-29 PROCEDURE — G0151 HHCP-SERV OF PT,EA 15 MIN: HCPCS

## 2025-08-29 SDOH — HEALTH STABILITY: PHYSICAL HEALTH: EXERCISE ACTIVITY: OPEN / CLOSED CHAIN

## 2025-08-29 SDOH — HEALTH STABILITY: PHYSICAL HEALTH: EXERCISE TYPE: B LE

## 2025-08-29 SDOH — HEALTH STABILITY: PHYSICAL HEALTH: PHYSICAL EXERCISE: SIT, SUPINE, STAND

## 2025-08-29 SDOH — HEALTH STABILITY: PHYSICAL HEALTH: EXERCISE ACTIVITIES SETS: 2

## 2025-08-29 SDOH — HEALTH STABILITY: PHYSICAL HEALTH: PHYSICAL EXERCISE: 15

## 2025-08-29 ASSESSMENT — ENCOUNTER SYMPTOMS
HIGHEST PAIN SEVERITY IN PAST 24 HOURS: 4/10
PAIN: 1
PAIN LOCATION - PAIN DURATION: MIN
PAIN LOCATION - PAIN SEVERITY: 4/10
SUBJECTIVE PAIN PROGRESSION: GRADUALLY IMPROVING
PAIN LOCATION: RIGHT HIP
PAIN LOCATION - PAIN FREQUENCY: INTERMITTENT
PAIN LOCATION - EXACERBATING FACTORS: MVT
PAIN SEVERITY GOAL: 0/10
MUSCLE WEAKNESS: 1
PAIN LOCATION - RELIEVING FACTORS: MEDS
PAIN LOCATION - PAIN QUALITY: ACHE
LOWEST PAIN SEVERITY IN PAST 24 HOURS: 2/10

## 2025-08-29 ASSESSMENT — ACTIVITIES OF DAILY LIVING (ADL)
CURRENT_FUNCTION: INDEPENDENT
PHYSICAL TRANSFERS ASSESSED: 1
AMBULATION_DISTANCE/DURATION_TOLERATED: 100 FT
AMBULATION ASSISTANCE ON FLAT SURFACES: 1

## 2025-09-01 PROCEDURE — RXMED WILLOW AMBULATORY MEDICATION CHARGE

## 2025-09-02 ENCOUNTER — APPOINTMENT (OUTPATIENT)
Dept: INFUSION THERAPY | Facility: CLINIC | Age: 30
End: 2025-09-02
Payer: COMMERCIAL

## 2025-09-03 ENCOUNTER — PHARMACY VISIT (OUTPATIENT)
Dept: PHARMACY | Facility: CLINIC | Age: 30
End: 2025-09-03
Payer: MEDICAID

## 2025-09-03 ENCOUNTER — HOME CARE VISIT (OUTPATIENT)
Dept: HOME HEALTH SERVICES | Facility: HOME HEALTH | Age: 30
End: 2025-09-03
Payer: COMMERCIAL

## 2025-09-03 PROCEDURE — G0153 HHCP-SVS OF S/L PATH,EA 15MN: HCPCS

## 2025-09-03 ASSESSMENT — PAIN SCALES - PAIN ASSESSMENT IN ADVANCED DEMENTIA (PAINAD)
BREATHING: 0
NEGVOCALIZATION: 0 - NONE.
CONSOLABILITY: 0
BODYLANGUAGE: 0 - RELAXED.
CONSOLABILITY: 0 - NO NEED TO CONSOLE.
FACIALEXPRESSION: 0
NEGVOCALIZATION: 0
TOTALSCORE: 0
BODYLANGUAGE: 0
FACIALEXPRESSION: 0 - SMILING OR INEXPRESSIVE.

## 2025-09-03 ASSESSMENT — ENCOUNTER SYMPTOMS
PERSON REPORTING PAIN: PATIENT
DENIES PAIN: 1
SUBJECTIVE PAIN PROGRESSION: UNCHANGED

## 2025-09-04 ENCOUNTER — HOME CARE VISIT (OUTPATIENT)
Dept: HOME HEALTH SERVICES | Facility: HOME HEALTH | Age: 30
End: 2025-09-04
Payer: COMMERCIAL

## 2025-09-04 PROCEDURE — G0152 HHCP-SERV OF OT,EA 15 MIN: HCPCS

## 2025-09-15 ENCOUNTER — APPOINTMENT (OUTPATIENT)
Dept: INFUSION THERAPY | Facility: CLINIC | Age: 30
End: 2025-09-15
Payer: COMMERCIAL

## 2025-09-30 ENCOUNTER — APPOINTMENT (OUTPATIENT)
Dept: INFUSION THERAPY | Facility: CLINIC | Age: 30
End: 2025-09-30
Payer: COMMERCIAL

## 2025-11-10 ENCOUNTER — APPOINTMENT (OUTPATIENT)
Dept: INFUSION THERAPY | Facility: CLINIC | Age: 30
End: 2025-11-10
Payer: COMMERCIAL

## 2025-11-25 ENCOUNTER — APPOINTMENT (OUTPATIENT)
Dept: INFUSION THERAPY | Facility: CLINIC | Age: 30
End: 2025-11-25
Payer: COMMERCIAL

## 2026-01-05 ENCOUNTER — APPOINTMENT (OUTPATIENT)
Dept: INFUSION THERAPY | Facility: CLINIC | Age: 31
End: 2026-01-05
Payer: COMMERCIAL

## (undated) DEVICE — DRAPE, SHEET, U, W/ADHESIVE STRIP, IMPERVIOUS, 60 X 70 IN, DISPOSABLE, LF, STERILE

## (undated) DEVICE — SUTURE, MONOCRYL, 4-0, 18 IN, PS2, UNDYED

## (undated) DEVICE — HOOD, SURGICAL, FLYTE HYBRID

## (undated) DEVICE — COVER, C-ARM W/CLIPS, OEC GE

## (undated) DEVICE — SUTURE, MONOCRYL, 2-0, 36 IN, CT-1, UNDYED

## (undated) DEVICE — Device

## (undated) DEVICE — DRAPE, INCISE, ANTIMICROBIAL, IOBAN 2, LARGE, 17 X 23 IN, DISPOSABLE, STERILE

## (undated) DEVICE — BANDAGE, GAUZE, CONFORMING, KERLIX, 6 PLY, 4.5 IN X 4.1 YD

## (undated) DEVICE — SUTURE, ETHIBOND, 5, 30 IN, V40, MULTIPACK, GREEN

## (undated) DEVICE — DRESSING, MEPILEX BORDER, POST-OP AG, 4 X 10 IN

## (undated) DEVICE — COVER, BACK TABLE, 65 X 90, HVY REINFORCED

## (undated) DEVICE — BLADE, SAW, RECIPROCATING, DOUBLE-SIDED, 70 X 12.5 X 1 MM, STAINLESS STEEL

## (undated) DEVICE — BANDAGE, COFLEX, 6 X 5 YDS, FOAM TAN, STERILE, LF

## (undated) DEVICE — MAYO TRAY, LARGE

## (undated) DEVICE — SYRINGE, 35 CC, LUER LOCK, MONOJECT, LF

## (undated) DEVICE — INTERPULSE HANDPIECE SET W/ 10FT SUCTION TUBING

## (undated) DEVICE — STAPLER, SKIN PROXIMATE, 35 WIDE

## (undated) DEVICE — DRAPE, SHEET, THREE QUARTER, FAN FOLD, 57 X 77 IN

## (undated) DEVICE — DRESSING, MEPILEX BORDER, POST-OP AG, 4 X 6 IN

## (undated) DEVICE — TIP,  ELECTRODE COATED INSULATED, EXTENDED, LF

## (undated) DEVICE — MANIFOLD, 4 PORT NEPTUNE STANDARD

## (undated) DEVICE — DRAPE, TIBURON, SPLIT SHEET, REINF ADH STRIP, 77X122

## (undated) DEVICE — BANDAGE, ELASTIC, MATRIX, SELF-CLOSURE, 4 IN X 5 YD, LF

## (undated) DEVICE — SUTURE, PDS II, 0, 27 IN, CT-2, VIOLET

## (undated) DEVICE — HIGH FLOW TIP FOR INTERPULSE HANDPIECE SET

## (undated) DEVICE — TOWEL, SURGICAL, NEURO, O/R, 16 X 26, BLUE, STERILE

## (undated) DEVICE — APPLICATOR, CHLORAPREP, W/ORANGE TINT, 26ML

## (undated) DEVICE — DRAPE COVER, C ARM, FLOUROSCAN IMAGING SYS

## (undated) DEVICE — ELECTRODE, ELECTROSURGICAL, BLADE, INSULATED, ENT/IMA, STERILE

## (undated) DEVICE — BANDAGE, ELASTIC, ACE, ACE, DOUBLE LENGTH, 6 X 550 IN, LF

## (undated) DEVICE — COVER, CART, 45 X 27 X 48 IN, CLEAR

## (undated) DEVICE — ADHESIVE, SKIN, DERMABOND ADVANCED, 15CM, PEN-STYLE

## (undated) DEVICE — NEEDLE, EPIDURAL, TUOHY, 18 G X 3.5 IN